# Patient Record
Sex: FEMALE | Race: WHITE | Employment: OTHER | URBAN - METROPOLITAN AREA
[De-identification: names, ages, dates, MRNs, and addresses within clinical notes are randomized per-mention and may not be internally consistent; named-entity substitution may affect disease eponyms.]

---

## 2017-04-20 ENCOUNTER — OFFICE VISIT (OUTPATIENT)
Dept: FAMILY MEDICINE CLINIC | Age: 77
End: 2017-04-20

## 2017-04-20 VITALS
HEART RATE: 76 BPM | WEIGHT: 128.5 LBS | RESPIRATION RATE: 16 BRPM | HEIGHT: 66 IN | SYSTOLIC BLOOD PRESSURE: 110 MMHG | DIASTOLIC BLOOD PRESSURE: 62 MMHG | BODY MASS INDEX: 20.65 KG/M2

## 2017-04-20 DIAGNOSIS — S96.912A STRAIN OF ANKLE, LEFT, INITIAL ENCOUNTER: Primary | ICD-10-CM

## 2017-04-20 PROCEDURE — 99213 OFFICE O/P EST LOW 20 MIN: CPT | Performed by: FAMILY MEDICINE

## 2017-04-20 ASSESSMENT — ENCOUNTER SYMPTOMS
CONSTIPATION: 0
SHORTNESS OF BREATH: 0
SINUS PRESSURE: 0

## 2017-04-26 DIAGNOSIS — F41.9 ANXIETY: ICD-10-CM

## 2017-04-27 RX ORDER — LORAZEPAM 1 MG/1
1 TABLET ORAL DAILY PRN
Qty: 30 TABLET | Refills: 0 | Status: SHIPPED | OUTPATIENT
Start: 2017-04-27 | End: 2017-05-31 | Stop reason: SDUPTHER

## 2017-05-31 DIAGNOSIS — F41.9 ANXIETY: ICD-10-CM

## 2017-05-31 RX ORDER — LORAZEPAM 1 MG/1
1 TABLET ORAL DAILY PRN
Qty: 30 TABLET | Refills: 0 | Status: SHIPPED | OUTPATIENT
Start: 2017-05-31 | End: 2017-07-06 | Stop reason: SDUPTHER

## 2017-07-06 DIAGNOSIS — F41.9 ANXIETY: ICD-10-CM

## 2017-07-06 RX ORDER — LORAZEPAM 1 MG/1
1 TABLET ORAL DAILY PRN
Qty: 30 TABLET | Refills: 0 | Status: SHIPPED | OUTPATIENT
Start: 2017-07-06 | End: 2017-08-03 | Stop reason: SDUPTHER

## 2017-07-20 ENCOUNTER — HOSPITAL ENCOUNTER (OUTPATIENT)
Age: 77
Setting detail: SPECIMEN
Discharge: HOME | End: 2017-07-20
Payer: MEDICARE

## 2017-07-20 LAB — GLUCOSE BLD-MCNC: 95 MG/DL (ref 70–108)

## 2017-07-20 PROCEDURE — 36415 COLL VENOUS BLD VENIPUNCTURE: CPT

## 2017-07-20 PROCEDURE — 82947 ASSAY GLUCOSE BLOOD QUANT: CPT

## 2017-08-03 DIAGNOSIS — F41.9 ANXIETY: ICD-10-CM

## 2017-08-03 RX ORDER — LORAZEPAM 1 MG/1
1 TABLET ORAL DAILY PRN
Qty: 30 TABLET | Refills: 0 | Status: SHIPPED | OUTPATIENT
Start: 2017-08-03 | End: 2017-09-06 | Stop reason: SDUPTHER

## 2017-09-06 DIAGNOSIS — F41.9 ANXIETY: ICD-10-CM

## 2017-09-06 RX ORDER — LORAZEPAM 1 MG/1
1 TABLET ORAL DAILY PRN
Qty: 30 TABLET | Refills: 0 | Status: SHIPPED | OUTPATIENT
Start: 2017-09-06 | End: 2017-10-05 | Stop reason: SDUPTHER

## 2017-10-05 ENCOUNTER — OFFICE VISIT (OUTPATIENT)
Dept: FAMILY MEDICINE CLINIC | Age: 77
End: 2017-10-05

## 2017-10-05 VITALS
BODY MASS INDEX: 20.45 KG/M2 | WEIGHT: 127.25 LBS | RESPIRATION RATE: 16 BRPM | HEIGHT: 66 IN | DIASTOLIC BLOOD PRESSURE: 82 MMHG | HEART RATE: 72 BPM | SYSTOLIC BLOOD PRESSURE: 140 MMHG

## 2017-10-05 DIAGNOSIS — F41.9 ANXIETY: Primary | ICD-10-CM

## 2017-10-05 DIAGNOSIS — Z78.0 POST-MENOPAUSAL: ICD-10-CM

## 2017-10-05 PROCEDURE — 99214 OFFICE O/P EST MOD 30 MIN: CPT | Performed by: FAMILY MEDICINE

## 2017-10-05 RX ORDER — LORAZEPAM 1 MG/1
1 TABLET ORAL DAILY PRN
Qty: 30 TABLET | Refills: 0 | Status: SHIPPED | OUTPATIENT
Start: 2017-10-05 | End: 2018-01-03 | Stop reason: SDUPTHER

## 2017-10-05 ASSESSMENT — PATIENT HEALTH QUESTIONNAIRE - PHQ9
1. LITTLE INTEREST OR PLEASURE IN DOING THINGS: 0
2. FEELING DOWN, DEPRESSED OR HOPELESS: 0
SUM OF ALL RESPONSES TO PHQ QUESTIONS 1-9: 0
SUM OF ALL RESPONSES TO PHQ9 QUESTIONS 1 & 2: 0

## 2017-10-05 ASSESSMENT — ENCOUNTER SYMPTOMS
CONSTIPATION: 0
SHORTNESS OF BREATH: 0
SINUS PRESSURE: 0

## 2017-10-05 NOTE — PATIENT INSTRUCTIONS
Let me know if you would agree to do the echocardiogram to look into the heart murmur further  See me in 6 months

## 2017-10-05 NOTE — PROGRESS NOTES
Subjective:      Patient ID: Marija Beatty is a 76 y.o. female. HPI  1. She would like a DEXA scan   2. She continues on the lorazapam no more than one daily. She does not feel badly from it. Review of Systems   Constitutional: Negative for fatigue. HENT: Negative for sinus pressure. Eyes: Negative for visual disturbance. Respiratory: Negative for shortness of breath. Cardiovascular: Negative for chest pain. Gastrointestinal: Negative for constipation. Genitourinary: Negative. Musculoskeletal: Negative for arthralgias. Skin: Negative for rash. Neurological: Negative for headaches. The patient's medications, allergies, past medical problems, surgical, social, and family histories were reviewed and updated as needed. Objective:   Physical Exam   Constitutional: She is oriented to person, place, and time. She appears well-developed and well-nourished. No distress. HENT:   Head: Normocephalic and atraumatic. Right Ear: External ear normal.   Left Ear: External ear normal.   Nose: Nose normal.   Mouth/Throat: Oropharynx is clear and moist. No oropharyngeal exudate. Eyes: Conjunctivae are normal. No scleral icterus. Neck: Neck supple. Carotid bruit is not present. No tracheal deviation present. No thyromegaly present. Cardiovascular: Normal rate, regular rhythm, normal heart sounds and intact distal pulses. Pulmonary/Chest: Effort normal and breath sounds normal.   Abdominal: Soft. Bowel sounds are normal. She exhibits no mass. Musculoskeletal: She exhibits no edema. Lymphadenopathy:     She has no cervical adenopathy. Neurological: She is alert and oriented to person, place, and time. Skin: Skin is warm and dry. Psychiatric: She has a normal mood and affect. Her behavior is normal.     Blood pressure (!) 140/82, pulse 72, resp. rate 16, height 5' 6\" (1.676 m), weight 127 lb 4 oz (57.7 kg), not currently breastfeeding. Assessment:      1. Anxiety     2. Post-menopausal  MELINDA Dexa Bone Density Scan           Plan:      Let me know if you would agree to do the echocardiogram to look into the heart murmur further  See me in 6 months

## 2017-10-05 NOTE — MR AVS SNAPSHOT
Your Current Medications Are              LORazepam (ATIVAN) 1 MG tablet Take 1 tablet by mouth daily as needed for Anxiety    aspirin 81 MG tablet Take 81 mg by mouth daily    Cholecalciferol (VITAMIN D3) 3000 UNITS TABS Take 1,000 Units by mouth       Allergies              Clindamycin/lincomycin Hives    Hydrocod Polst-cpm Polst Er     anxious    Morphine Nausea And Vomiting         Additional Information        Basic Information     Date Of Birth Sex Race Ethnicity Preferred Language    1940 Female White Non-/Non  English      Problem List as of 10/5/2017  Date Reviewed: 4/29/2013                Squamous cell carcinoma of skin      Preventive Care        Date Due    Tetanus Combination Vaccine (1 - Tdap) 4/12/1957    Cholesterol Screening 4/12/1978    Yearly Flu Vaccine (1) 10/5/2018 (Originally 9/1/2017)    Pneumococcal Vaccines (two) for all adults aged 72 and over (1 of 2 - PCV13) 10/5/2018 (Originally 4/12/2003)            [address] [place] [address] records indicate that you have declined MyChart signup.

## 2017-10-15 DIAGNOSIS — M81.0 OSTEOPOROSIS OF MULTIPLE SITES WITHOUT PATHOLOGICAL FRACTURE: Primary | ICD-10-CM

## 2017-10-15 DIAGNOSIS — M81.8 OTHER OSTEOPOROSIS WITHOUT CURRENT PATHOLOGICAL FRACTURE: ICD-10-CM

## 2017-10-16 ENCOUNTER — TELEPHONE (OUTPATIENT)
Dept: FAMILY MEDICINE CLINIC | Age: 77
End: 2017-10-16

## 2017-10-16 NOTE — TELEPHONE ENCOUNTER
----- Message from Antonio Jarquin MD sent at 10/15/2017  9:47 PM EDT -----  Let her know the DEXA showed osteoporosis and I would like her to get some non fasting labs and to then see me to discuss treatment options.

## 2017-10-25 ENCOUNTER — NURSE ONLY (OUTPATIENT)
Dept: FAMILY MEDICINE CLINIC | Age: 77
End: 2017-10-25

## 2017-10-25 DIAGNOSIS — R30.0 BURNING WITH URINATION: Primary | ICD-10-CM

## 2017-10-25 LAB
BACTERIA URINE, POC: ABNORMAL
BILIRUBIN URINE: 0 MG/DL
BLOOD, URINE: POSITIVE
CASTS URINE, POC: ABNORMAL
CLARITY: ABNORMAL
COLOR: ABNORMAL
CRYSTALS URINE, POC: ABNORMAL
EPI CELLS URINE, POC: ABNORMAL
GLUCOSE URINE: ABNORMAL
KETONES, URINE: POSITIVE
LEUKOCYTE EST, POC: ABNORMAL
NITRITE, URINE: NEGATIVE
PH UA: 6.5 (ref 4.5–8)
PROTEIN UA: POSITIVE
RBC URINE, POC: ABNORMAL
SPECIFIC GRAVITY UA: 1.01 (ref 1–1.03)
UROBILINOGEN, URINE: NORMAL
VITAMIN D 25-HYDROXY: 36.46 NG/ML (ref 30–100)
WBC URINE, POC: ABNORMAL
YEAST URINE, POC: ABNORMAL

## 2017-10-25 PROCEDURE — 81000 URINALYSIS NONAUTO W/SCOPE: CPT | Performed by: FAMILY MEDICINE

## 2017-10-26 ENCOUNTER — TELEPHONE (OUTPATIENT)
Dept: FAMILY MEDICINE CLINIC | Age: 77
End: 2017-10-26

## 2017-10-26 RX ORDER — SULFAMETHOXAZOLE AND TRIMETHOPRIM 800; 160 MG/1; MG/1
1 TABLET ORAL 2 TIMES DAILY
Qty: 14 TABLET | Refills: 0 | Status: SHIPPED | OUTPATIENT
Start: 2017-10-26 | End: 2018-02-23 | Stop reason: SDUPTHER

## 2017-10-27 DIAGNOSIS — E55.9 VITAMIN D DEFICIENCY: ICD-10-CM

## 2017-11-02 ENCOUNTER — TELEPHONE (OUTPATIENT)
Dept: FAMILY MEDICINE CLINIC | Age: 77
End: 2017-11-02

## 2017-11-02 NOTE — TELEPHONE ENCOUNTER
Rocio, I ordered the follow up lab but did not do the message. She needs to be on vit D3 over the counter at 5,000 units daily with a meal and check the non fasting level next in late April.

## 2018-01-03 DIAGNOSIS — F41.9 ANXIETY: ICD-10-CM

## 2018-01-03 RX ORDER — LORAZEPAM 1 MG/1
1 TABLET ORAL DAILY PRN
Qty: 30 TABLET | Refills: 0 | Status: SHIPPED | OUTPATIENT
Start: 2018-01-03 | End: 2018-02-23 | Stop reason: SDUPTHER

## 2018-01-03 NOTE — TELEPHONE ENCOUNTER
Romi Cason called requesting a refill of the below medication which has been pended for you:     Requested Prescriptions     Pending Prescriptions Disp Refills    LORazepam (ATIVAN) 1 MG tablet 30 tablet 0     Sig: Take 1 tablet by mouth daily as needed for Anxiety.        Last Appointment Date: 10/5/2017  Next Appointment Date: Visit date not found    Allergies   Allergen Reactions    Ciprofloxacin Other (See Comments)     dizziness    Clindamycin/Lincomycin Hives    Hydrocod Polst-Cpm Polst Er      anxious    Morphine Nausea And Vomiting

## 2018-02-23 ENCOUNTER — OFFICE VISIT (OUTPATIENT)
Dept: FAMILY MEDICINE CLINIC | Age: 78
End: 2018-02-23

## 2018-02-23 VITALS
HEART RATE: 60 BPM | BODY MASS INDEX: 20.95 KG/M2 | RESPIRATION RATE: 16 BRPM | SYSTOLIC BLOOD PRESSURE: 120 MMHG | HEIGHT: 66 IN | DIASTOLIC BLOOD PRESSURE: 70 MMHG | WEIGHT: 130.38 LBS

## 2018-02-23 DIAGNOSIS — R30.9 PAIN WITH URINATION: ICD-10-CM

## 2018-02-23 DIAGNOSIS — F41.9 ANXIETY: ICD-10-CM

## 2018-02-23 DIAGNOSIS — R07.9 CHEST PAIN, UNSPECIFIED TYPE: ICD-10-CM

## 2018-02-23 DIAGNOSIS — N30.00 ACUTE CYSTITIS WITHOUT HEMATURIA: Primary | ICD-10-CM

## 2018-02-23 LAB
BACTERIA URINE, POC: ABNORMAL
BILIRUBIN URINE: 0 MG/DL
BLOOD, URINE: NEGATIVE
CASTS URINE, POC: ABNORMAL
CLARITY: CLEAR
COLOR: YELLOW
CRYSTALS URINE, POC: ABNORMAL
EPI CELLS URINE, POC: ABNORMAL
GLUCOSE URINE: ABNORMAL
KETONES, URINE: POSITIVE
LEUKOCYTE EST, POC: ABNORMAL
NITRITE, URINE: NEGATIVE
PH UA: 5 (ref 4.5–8)
PROTEIN UA: POSITIVE
RBC URINE, POC: ABNORMAL
SPECIFIC GRAVITY UA: 1.02 (ref 1–1.03)
UROBILINOGEN, URINE: NORMAL
WBC URINE, POC: ABNORMAL
YEAST URINE, POC: ABNORMAL

## 2018-02-23 PROCEDURE — 81000 URINALYSIS NONAUTO W/SCOPE: CPT | Performed by: FAMILY MEDICINE

## 2018-02-23 PROCEDURE — 99213 OFFICE O/P EST LOW 20 MIN: CPT | Performed by: FAMILY MEDICINE

## 2018-02-23 PROCEDURE — 93000 ELECTROCARDIOGRAM COMPLETE: CPT | Performed by: FAMILY MEDICINE

## 2018-02-23 RX ORDER — LORAZEPAM 1 MG/1
1 TABLET ORAL DAILY PRN
Qty: 30 TABLET | Refills: 0 | Status: SHIPPED | OUTPATIENT
Start: 2018-02-23 | End: 2018-03-20 | Stop reason: SDUPTHER

## 2018-02-23 RX ORDER — SULFAMETHOXAZOLE AND TRIMETHOPRIM 800; 160 MG/1; MG/1
1 TABLET ORAL 2 TIMES DAILY
Qty: 14 TABLET | Refills: 0 | Status: SHIPPED | OUTPATIENT
Start: 2018-02-23 | End: 2018-03-02

## 2018-02-23 ASSESSMENT — PATIENT HEALTH QUESTIONNAIRE - PHQ9
SUM OF ALL RESPONSES TO PHQ QUESTIONS 1-9: 1
1. LITTLE INTEREST OR PLEASURE IN DOING THINGS: 0
2. FEELING DOWN, DEPRESSED OR HOPELESS: 1
SUM OF ALL RESPONSES TO PHQ9 QUESTIONS 1 & 2: 1

## 2018-02-23 ASSESSMENT — ENCOUNTER SYMPTOMS
CONSTIPATION: 0
SINUS PRESSURE: 0
SHORTNESS OF BREATH: 0

## 2018-02-23 NOTE — PROGRESS NOTES
Result Value Ref Range    Color, UA Yellow     Clarity, UA Clear Clear    Glucose, Ur neg     Bilirubin Urine 0 mg/dL    Ketones, Urine Positive     Specific Gravity, UA 1.020 1.005 - 1.030    Blood, Urine Negative     pH, UA 5 4.5 - 8.0    Protein, UA Positive (A) Negative    Nitrite, Urine Negative     Leukocytes, UA moderate     Urobilinogen, Urine Normal     rbc urine, poc rare     wbc urine, poc moderate     bacteria urine, poc few     yeast urine, poc      casts urine, poc      epi cells urine, poc rare     crystals urine, poc       The EKG is reviewed by me with comparison to 8/6/2014 showing no ischemic change, and single PVC  Assessment:      1. Acute cystitis without hematuria  sulfamethoxazole-trimethoprim (BACTRIM DS) 800-160 MG per tablet   2. Pain with urination  POC URINE with Microscopic    sulfamethoxazole-trimethoprim (BACTRIM DS) 800-160 MG per tablet   3. Chest pain, unspecified type  EKG 12 Lead   4.  Anxiety  LORazepam (ATIVAN) 1 MG tablet           Plan:      Stop up for a urine recheck in 2 weeks  Take a full lorazepam for the next 5 days

## 2018-03-02 ENCOUNTER — NURSE ONLY (OUTPATIENT)
Dept: FAMILY MEDICINE CLINIC | Age: 78
End: 2018-03-02

## 2018-03-02 DIAGNOSIS — R35.0 FREQUENCY OF URINATION: ICD-10-CM

## 2018-03-02 DIAGNOSIS — R30.9 PAIN WITH URINATION: Primary | ICD-10-CM

## 2018-03-02 LAB
BACTERIA URINE, POC: ABNORMAL
BILIRUBIN URINE: 0 MG/DL
BLOOD, URINE: NEGATIVE
CASTS URINE, POC: ABNORMAL
CLARITY: CLEAR
COLOR: YELLOW
CRYSTALS URINE, POC: ABNORMAL
EPI CELLS URINE, POC: ABNORMAL
GLUCOSE URINE: ABNORMAL
KETONES, URINE: POSITIVE
LEUKOCYTE EST, POC: ABNORMAL
NITRITE, URINE: NEGATIVE
PH UA: 5 (ref 4.5–8)
PROTEIN UA: POSITIVE
RBC URINE, POC: ABNORMAL
SPECIFIC GRAVITY UA: 1.01 (ref 1–1.03)
UROBILINOGEN, URINE: NORMAL
WBC URINE, POC: ABNORMAL
YEAST URINE, POC: ABNORMAL

## 2018-03-02 PROCEDURE — 81000 URINALYSIS NONAUTO W/SCOPE: CPT | Performed by: FAMILY MEDICINE

## 2018-03-09 ENCOUNTER — OFFICE VISIT (OUTPATIENT)
Dept: FAMILY MEDICINE CLINIC | Age: 78
End: 2018-03-09

## 2018-03-09 VITALS
BODY MASS INDEX: 20.73 KG/M2 | WEIGHT: 129 LBS | DIASTOLIC BLOOD PRESSURE: 70 MMHG | HEART RATE: 76 BPM | SYSTOLIC BLOOD PRESSURE: 114 MMHG | RESPIRATION RATE: 16 BRPM | HEIGHT: 66 IN

## 2018-03-09 DIAGNOSIS — Z87.898 HISTORY OF TACHYCARDIA: ICD-10-CM

## 2018-03-09 DIAGNOSIS — R35.0 URINE FREQUENCY: Primary | ICD-10-CM

## 2018-03-09 LAB
BACTERIA URINE, POC: ABNORMAL
BILIRUBIN URINE: 0 MG/DL
BLOOD, URINE: NEGATIVE
CASTS URINE, POC: ABNORMAL
CLARITY: CLEAR
COLOR: YELLOW
CRYSTALS URINE, POC: ABNORMAL
EPI CELLS URINE, POC: ABNORMAL
GLUCOSE URINE: ABNORMAL
KETONES, URINE: POSITIVE
LEUKOCYTE EST, POC: ABNORMAL
NITRITE, URINE: NEGATIVE
PH UA: 6 (ref 4.5–8)
PROTEIN UA: POSITIVE
RBC URINE, POC: ABNORMAL
SPECIFIC GRAVITY UA: 1.02 (ref 1–1.03)
UROBILINOGEN, URINE: NORMAL
WBC URINE, POC: ABNORMAL
YEAST URINE, POC: ABNORMAL

## 2018-03-09 PROCEDURE — 99213 OFFICE O/P EST LOW 20 MIN: CPT | Performed by: FAMILY MEDICINE

## 2018-03-09 PROCEDURE — 81000 URINALYSIS NONAUTO W/SCOPE: CPT | Performed by: FAMILY MEDICINE

## 2018-03-09 ASSESSMENT — ENCOUNTER SYMPTOMS
SINUS PRESSURE: 0
CONSTIPATION: 0
SHORTNESS OF BREATH: 0

## 2018-03-09 NOTE — PROGRESS NOTES
Leukocytes, UA neg     Urobilinogen, Urine Normal     rbc urine, poc none     wbc urine, poc none     bacteria urine, poc none     yeast urine, poc      casts urine, poc      epi cells urine, poc few     crystals urine, poc             Assessment:      1. Urine frequency  POC URINE with Microscopic   2.  History of tachycardia  [employer] MD           Plan:      See Dr Tijerina Slider  See me in 6 months

## 2018-03-20 DIAGNOSIS — F41.9 ANXIETY: ICD-10-CM

## 2018-03-20 NOTE — TELEPHONE ENCOUNTER
Pt called and req 30 day refill:    Lorazepam 1 mg I po qd prn    Please send to :    Susanne Zamarri[facility]    Pt can wait until Dr Mane returns but she will be out on the 23rd.

## 2018-03-22 RX ORDER — LORAZEPAM 1 MG/1
1 TABLET ORAL DAILY PRN
Qty: 30 TABLET | Refills: 0 | Status: SHIPPED | OUTPATIENT
Start: 2018-03-22 | End: 2018-04-23 | Stop reason: SDUPTHER

## 2018-04-23 DIAGNOSIS — F41.9 ANXIETY: ICD-10-CM

## 2018-04-23 RX ORDER — LORAZEPAM 1 MG/1
1 TABLET ORAL DAILY PRN
Qty: 30 TABLET | Refills: 0 | Status: SHIPPED | OUTPATIENT
Start: 2018-04-23 | End: 2018-05-22 | Stop reason: SDUPTHER

## 2018-04-30 ENCOUNTER — TELEPHONE (OUTPATIENT)
Dept: FAMILY MEDICINE CLINIC | Age: 78
End: 2018-04-30

## 2018-04-30 RX ORDER — SULFAMETHOXAZOLE AND TRIMETHOPRIM 800; 160 MG/1; MG/1
1 TABLET ORAL 2 TIMES DAILY
Qty: 10 TABLET | Refills: 0 | Status: SHIPPED | OUTPATIENT
Start: 2018-04-30 | End: 2018-05-05

## 2018-05-04 ENCOUNTER — TELEPHONE (OUTPATIENT)
Dept: FAMILY MEDICINE CLINIC | Age: 78
End: 2018-05-04

## 2018-05-07 ENCOUNTER — OFFICE VISIT (OUTPATIENT)
Dept: FAMILY MEDICINE CLINIC | Age: 78
End: 2018-05-07

## 2018-05-07 VITALS
RESPIRATION RATE: 16 BRPM | WEIGHT: 129.38 LBS | DIASTOLIC BLOOD PRESSURE: 68 MMHG | HEART RATE: 60 BPM | BODY MASS INDEX: 20.79 KG/M2 | SYSTOLIC BLOOD PRESSURE: 136 MMHG | HEIGHT: 66 IN

## 2018-05-07 DIAGNOSIS — R30.9 PAIN WITH URINATION: Primary | ICD-10-CM

## 2018-05-07 LAB
BACTERIA URINE, POC: ABNORMAL
BILIRUBIN URINE: 0 MG/DL
BLOOD, URINE: POSITIVE
CASTS URINE, POC: ABNORMAL
CLARITY: CLEAR
COLOR: YELLOW
CRYSTALS URINE, POC: ABNORMAL
EPI CELLS URINE, POC: ABNORMAL
GLUCOSE URINE: ABNORMAL
KETONES, URINE: POSITIVE
LEUKOCYTE EST, POC: ABNORMAL
NITRITE, URINE: NEGATIVE
PH UA: 5 (ref 4.5–8)
PROTEIN UA: POSITIVE
RBC URINE, POC: ABNORMAL
SPECIFIC GRAVITY UA: 1.01 (ref 1–1.03)
UROBILINOGEN, URINE: NORMAL
WBC URINE, POC: ABNORMAL
YEAST URINE, POC: ABNORMAL

## 2018-05-07 PROCEDURE — 99213 OFFICE O/P EST LOW 20 MIN: CPT | Performed by: FAMILY MEDICINE

## 2018-05-07 PROCEDURE — 81000 URINALYSIS NONAUTO W/SCOPE: CPT | Performed by: FAMILY MEDICINE

## 2018-05-07 ASSESSMENT — ENCOUNTER SYMPTOMS
CONSTIPATION: 0
SINUS PRESSURE: 0
SHORTNESS OF BREATH: 0

## 2018-05-09 ENCOUNTER — TELEPHONE (OUTPATIENT)
Dept: FAMILY MEDICINE CLINIC | Age: 78
End: 2018-05-09

## 2018-05-09 RX ORDER — NITROFURANTOIN 25; 75 MG/1; MG/1
100 CAPSULE ORAL 2 TIMES DAILY
Qty: 14 CAPSULE | Refills: 0 | Status: SHIPPED | OUTPATIENT
Start: 2018-05-09 | End: 2018-05-16

## 2018-05-10 ENCOUNTER — TELEPHONE (OUTPATIENT)
Dept: FAMILY MEDICINE CLINIC | Age: 78
End: 2018-05-10

## 2018-05-10 LAB — URINE CULTURE, ROUTINE: ABNORMAL

## 2018-05-22 DIAGNOSIS — F41.9 ANXIETY: ICD-10-CM

## 2018-05-22 RX ORDER — LORAZEPAM 1 MG/1
1 TABLET ORAL DAILY PRN
Qty: 30 TABLET | Refills: 0 | Status: SHIPPED | OUTPATIENT
Start: 2018-05-22 | End: 2018-06-25 | Stop reason: SDUPTHER

## 2018-06-14 ENCOUNTER — OFFICE VISIT (OUTPATIENT)
Dept: FAMILY MEDICINE CLINIC | Age: 78
End: 2018-06-14

## 2018-06-14 VITALS
HEART RATE: 76 BPM | HEIGHT: 66 IN | RESPIRATION RATE: 14 BRPM | BODY MASS INDEX: 20.25 KG/M2 | WEIGHT: 126 LBS | DIASTOLIC BLOOD PRESSURE: 78 MMHG | SYSTOLIC BLOOD PRESSURE: 138 MMHG

## 2018-06-14 DIAGNOSIS — R35.0 URINARY FREQUENCY: Primary | ICD-10-CM

## 2018-06-14 LAB
BACTERIA URINE, POC: ABNORMAL
BILIRUBIN URINE: 0 MG/DL
BLOOD, URINE: POSITIVE
CASTS URINE, POC: ABNORMAL
CLARITY: CLEAR
COLOR: YELLOW
CRYSTALS URINE, POC: ABNORMAL
EPI CELLS URINE, POC: ABNORMAL
GLUCOSE URINE: NEGATIVE
KETONES, URINE: POSITIVE
LEUKOCYTE EST, POC: NEGATIVE
NITRITE, URINE: NEGATIVE
PH UA: 5 (ref 4.5–8)
PROTEIN UA: NEGATIVE
RBC URINE, POC: ABNORMAL
SPECIFIC GRAVITY UA: 1.01 (ref 1–1.03)
UROBILINOGEN, URINE: NORMAL
WBC URINE, POC: ABNORMAL
YEAST URINE, POC: ABNORMAL

## 2018-06-14 PROCEDURE — 99213 OFFICE O/P EST LOW 20 MIN: CPT | Performed by: FAMILY MEDICINE

## 2018-06-14 PROCEDURE — 81000 URINALYSIS NONAUTO W/SCOPE: CPT | Performed by: FAMILY MEDICINE

## 2018-06-14 ASSESSMENT — ENCOUNTER SYMPTOMS
SORE THROAT: 0
COUGH: 0
ABDOMINAL PAIN: 0
SHORTNESS OF BREATH: 0
EYE PAIN: 0
CONSTIPATION: 0
CHEST TIGHTNESS: 0
NAUSEA: 0
WHEEZING: 0

## 2018-06-25 DIAGNOSIS — F41.9 ANXIETY: ICD-10-CM

## 2018-06-25 RX ORDER — LORAZEPAM 1 MG/1
1 TABLET ORAL DAILY PRN
Qty: 30 TABLET | Refills: 0 | Status: SHIPPED | OUTPATIENT
Start: 2018-06-25 | End: 2018-07-24 | Stop reason: SDUPTHER

## 2018-07-24 DIAGNOSIS — F41.9 ANXIETY: ICD-10-CM

## 2018-07-24 RX ORDER — LORAZEPAM 1 MG/1
1 TABLET ORAL DAILY PRN
Qty: 30 TABLET | Refills: 0 | Status: SHIPPED | OUTPATIENT
Start: 2018-07-24 | End: 2018-08-22 | Stop reason: SDUPTHER

## 2018-07-24 NOTE — TELEPHONE ENCOUNTER
Monika Cortes called requesting a refill of the below medication which has been pended for you:     Requested Prescriptions     Pending Prescriptions Disp Refills    LORazepam (ATIVAN) 1 MG tablet 30 tablet 0     Sig: Take 1 tablet by mouth daily as needed for Anxiety for up to 30 days. .       Last Appointment Date: 5/7/2018  Next Appointment Date: Visit date not found    Allergies   Allergen Reactions    Ciprofloxacin Other (See Comments)     dizziness    Clindamycin/Lincomycin Hives    Hydrocod Polst-Cpm Polst Er      anxious    Morphine Nausea And Vomiting

## 2018-08-22 DIAGNOSIS — F41.9 ANXIETY: ICD-10-CM

## 2018-08-22 RX ORDER — LORAZEPAM 1 MG/1
1 TABLET ORAL DAILY PRN
Qty: 30 TABLET | Refills: 0 | Status: SHIPPED | OUTPATIENT
Start: 2018-08-22 | End: 2018-09-21 | Stop reason: SDUPTHER

## 2018-09-12 ENCOUNTER — OFFICE VISIT (OUTPATIENT)
Dept: CARDIOLOGY CLINIC | Age: 78
End: 2018-09-12
Payer: MEDICARE

## 2018-09-12 ENCOUNTER — HOSPITAL ENCOUNTER (OUTPATIENT)
Age: 78
Discharge: HOME | End: 2018-09-12
Payer: MEDICARE

## 2018-09-12 VITALS
HEART RATE: 80 BPM | DIASTOLIC BLOOD PRESSURE: 90 MMHG | BODY MASS INDEX: 20.6 KG/M2 | HEIGHT: 66 IN | SYSTOLIC BLOOD PRESSURE: 140 MMHG | WEIGHT: 128.2 LBS

## 2018-09-12 DIAGNOSIS — R00.0 TACHYCARDIA: ICD-10-CM

## 2018-09-12 DIAGNOSIS — I49.3 PVC (PREMATURE VENTRICULAR CONTRACTION): ICD-10-CM

## 2018-09-12 DIAGNOSIS — R06.09 DYSPNEA ON EXERTION: Primary | ICD-10-CM

## 2018-09-12 DIAGNOSIS — R06.09 DYSPNEA ON EXERTION: ICD-10-CM

## 2018-09-12 LAB
ANION GAP SERPL CALCULATED.3IONS-SCNC: 11 MEQ/L (ref 8–16)
BUN BLDV-MCNC: 10 MG/DL (ref 7–22)
CALCIUM SERPL-MCNC: 9.6 MG/DL (ref 8.5–10.5)
CHLORIDE BLD-SCNC: 99 MEQ/L (ref 98–111)
CO2: 24 MEQ/L (ref 23–33)
CREAT SERPL-MCNC: 0.5 MG/DL (ref 0.4–1.2)
GFR SERPL CREATININE-BSD FRML MDRD: > 90 ML/MIN/1.73M2
GLUCOSE BLD-MCNC: 116 MG/DL (ref 70–108)
MAGNESIUM: 2.3 MG/DL (ref 1.6–2.4)
POTASSIUM SERPL-SCNC: 3.9 MEQ/L (ref 3.5–5.2)
SODIUM BLD-SCNC: 134 MEQ/L (ref 135–145)
TSH SERPL DL<=0.05 MIU/L-ACNC: 4.74 UIU/ML (ref 0.4–4.2)

## 2018-09-12 PROCEDURE — 83735 ASSAY OF MAGNESIUM: CPT

## 2018-09-12 PROCEDURE — 99204 OFFICE O/P NEW MOD 45 MIN: CPT | Performed by: NUCLEAR MEDICINE

## 2018-09-12 PROCEDURE — 4040F PNEUMOC VAC/ADMIN/RCVD: CPT | Performed by: NUCLEAR MEDICINE

## 2018-09-12 PROCEDURE — 1123F ACP DISCUSS/DSCN MKR DOCD: CPT | Performed by: NUCLEAR MEDICINE

## 2018-09-12 PROCEDURE — G8427 DOCREV CUR MEDS BY ELIG CLIN: HCPCS | Performed by: NUCLEAR MEDICINE

## 2018-09-12 PROCEDURE — 84443 ASSAY THYROID STIM HORMONE: CPT

## 2018-09-12 PROCEDURE — G8400 PT W/DXA NO RESULTS DOC: HCPCS | Performed by: NUCLEAR MEDICINE

## 2018-09-12 PROCEDURE — 1101F PT FALLS ASSESS-DOCD LE1/YR: CPT | Performed by: NUCLEAR MEDICINE

## 2018-09-12 PROCEDURE — 1036F TOBACCO NON-USER: CPT | Performed by: NUCLEAR MEDICINE

## 2018-09-12 PROCEDURE — 80048 BASIC METABOLIC PNL TOTAL CA: CPT

## 2018-09-12 PROCEDURE — 1090F PRES/ABSN URINE INCON ASSESS: CPT | Performed by: NUCLEAR MEDICINE

## 2018-09-12 PROCEDURE — 36415 COLL VENOUS BLD VENIPUNCTURE: CPT

## 2018-09-12 PROCEDURE — G8420 CALC BMI NORM PARAMETERS: HCPCS | Performed by: NUCLEAR MEDICINE

## 2018-09-12 ASSESSMENT — ENCOUNTER SYMPTOMS
PHOTOPHOBIA: 0
DIARRHEA: 0
ANAL BLEEDING: 0
VOMITING: 0
BLOOD IN STOOL: 0
RECTAL PAIN: 0
CHEST TIGHTNESS: 0
COLOR CHANGE: 0
FACIAL SWELLING: 0
SHORTNESS OF BREATH: 1
ABDOMINAL PAIN: 0
BACK PAIN: 1
ABDOMINAL DISTENTION: 0
CONSTIPATION: 0
NAUSEA: 0

## 2018-09-12 NOTE — PROGRESS NOTES
- Tdap) 04/12/1957    Shingles Vaccine (1 of 2 - 2 Dose Series) 04/12/1988    Flu vaccine (1) 10/05/2018 (Originally 9/1/2018)    Pneumococcal low/med risk (1 of 2 - PCV13) 10/05/2018 (Originally 4/12/2003)    DEXA (modify frequency per FRAX score)  Addressed       Subjective:  Review of Systems   Constitutional: Positive for fatigue. Negative for chills and diaphoresis. HENT: Negative for facial swelling and nosebleeds. Eyes: Negative for photophobia. Respiratory: Positive for shortness of breath. Negative for chest tightness. Cardiovascular: Positive for palpitations. Negative for chest pain and leg swelling. Gastrointestinal: Negative for abdominal distention, abdominal pain, anal bleeding, blood in stool, constipation, diarrhea, nausea, rectal pain and vomiting. Endocrine: Negative for polyphagia. Genitourinary: Negative for dysuria, frequency and urgency. Musculoskeletal: Positive for arthralgias and back pain. Negative for gait problem, joint swelling, myalgias, neck pain and neck stiffness. Skin: Negative for color change, pallor and rash. Allergic/Immunologic: Negative for food allergies. Neurological: Positive for dizziness and light-headedness. Negative for syncope. Hematological: Negative for adenopathy. Psychiatric/Behavioral: Negative for behavioral problems, confusion, decreased concentration and dysphoric mood. Objective:  Physical Exam   Constitutional: She is oriented to person, place, and time. She appears well-developed. HENT:   Head: Normocephalic. Eyes: EOM are normal.   Neck: No JVD present. No tracheal deviation present. No thyromegaly present. Cardiovascular: Normal rate. Exam reveals no gallop and no friction rub. Murmur heard. Pulmonary/Chest: No respiratory distress. She has no wheezes. She has no rales. She exhibits no tenderness. Abdominal: She exhibits no distension and no mass. There is no tenderness.  There is no rebound and no

## 2018-09-13 ENCOUNTER — HOSPITAL ENCOUNTER (OUTPATIENT)
Dept: NON INVASIVE DIAGNOSTICS | Age: 78
Discharge: HOME | End: 2018-09-13
Payer: MEDICARE

## 2018-09-13 DIAGNOSIS — R06.09 DYSPNEA ON EXERTION: ICD-10-CM

## 2018-09-13 DIAGNOSIS — R00.0 TACHYCARDIA: ICD-10-CM

## 2018-09-13 DIAGNOSIS — I49.3 PVC (PREMATURE VENTRICULAR CONTRACTION): ICD-10-CM

## 2018-09-13 LAB
LV EF: 60 %
LVEF MODALITY: NORMAL

## 2018-09-13 PROCEDURE — 93225 XTRNL ECG REC<48 HRS REC: CPT

## 2018-09-13 PROCEDURE — 93226 XTRNL ECG REC<48 HR SCAN A/R: CPT

## 2018-09-13 PROCEDURE — 93306 TTE W/DOPPLER COMPLETE: CPT

## 2018-09-17 ENCOUNTER — TELEPHONE (OUTPATIENT)
Dept: CARDIOLOGY CLINIC | Age: 78
End: 2018-09-17

## 2018-09-17 DIAGNOSIS — I48.91 ATRIAL FIBRILLATION, UNSPECIFIED TYPE (HCC): Primary | ICD-10-CM

## 2018-09-18 NOTE — TELEPHONE ENCOUNTER
Patient notified and voiced understanding. Order placed and given to scheduling. Rx pended for signature.

## 2018-09-20 ENCOUNTER — HOSPITAL ENCOUNTER (OUTPATIENT)
Dept: NON INVASIVE DIAGNOSTICS | Age: 78
Discharge: HOME | End: 2018-09-20
Payer: MEDICARE

## 2018-09-20 DIAGNOSIS — I48.91 ATRIAL FIBRILLATION, UNSPECIFIED TYPE (HCC): ICD-10-CM

## 2018-09-20 PROCEDURE — 93270 REMOTE 30 DAY ECG REV/REPORT: CPT

## 2018-09-21 DIAGNOSIS — F41.9 ANXIETY: ICD-10-CM

## 2018-09-21 RX ORDER — LORAZEPAM 1 MG/1
1 TABLET ORAL DAILY PRN
Qty: 30 TABLET | Refills: 0 | Status: SHIPPED | OUTPATIENT
Start: 2018-09-21 | End: 2018-10-19 | Stop reason: SDUPTHER

## 2018-10-08 ENCOUNTER — TELEPHONE (OUTPATIENT)
Dept: CARDIOLOGY CLINIC | Age: 78
End: 2018-10-08

## 2018-10-19 DIAGNOSIS — F41.9 ANXIETY: ICD-10-CM

## 2018-10-19 RX ORDER — LORAZEPAM 1 MG/1
1 TABLET ORAL DAILY PRN
Qty: 30 TABLET | Refills: 0 | Status: SHIPPED | OUTPATIENT
Start: 2018-10-19 | End: 2018-11-20 | Stop reason: SDUPTHER

## 2018-10-22 ENCOUNTER — TELEPHONE (OUTPATIENT)
Dept: FAMILY MEDICINE CLINIC | Age: 78
End: 2018-10-22

## 2018-10-22 ENCOUNTER — HOSPITAL ENCOUNTER (EMERGENCY)
Age: 78
Discharge: HOME | End: 2018-10-22
Attending: EMERGENCY MEDICINE
Payer: MEDICARE

## 2018-10-22 VITALS
TEMPERATURE: 98.2 F | WEIGHT: 125 LBS | SYSTOLIC BLOOD PRESSURE: 138 MMHG | RESPIRATION RATE: 20 BRPM | BODY MASS INDEX: 20.09 KG/M2 | HEIGHT: 66 IN | DIASTOLIC BLOOD PRESSURE: 88 MMHG | HEART RATE: 68 BPM | OXYGEN SATURATION: 97 %

## 2018-10-22 DIAGNOSIS — F41.1 ANXIETY STATE: ICD-10-CM

## 2018-10-22 DIAGNOSIS — I10 ELEVATED BLOOD PRESSURE READING WITH DIAGNOSIS OF HYPERTENSION: Primary | ICD-10-CM

## 2018-10-22 LAB
ANION GAP SERPL CALCULATED.3IONS-SCNC: 13 MEQ/L (ref 8–16)
BASOPHILS # BLD: 0.7 %
BASOPHILS ABSOLUTE: 0 THOU/MM3 (ref 0–0.1)
BUN BLDV-MCNC: 14 MG/DL (ref 7–22)
CALCIUM SERPL-MCNC: 8.9 MG/DL (ref 8.5–10.5)
CHLORIDE BLD-SCNC: 98 MEQ/L (ref 98–111)
CO2: 24 MEQ/L (ref 23–33)
CREAT SERPL-MCNC: 0.4 MG/DL (ref 0.4–1.2)
EKG ATRIAL RATE: 80 BPM
EKG P AXIS: 44 DEGREES
EKG P-R INTERVAL: 162 MS
EKG Q-T INTERVAL: 386 MS
EKG QRS DURATION: 108 MS
EKG QTC CALCULATION (BAZETT): 445 MS
EKG R AXIS: -36 DEGREES
EKG T AXIS: 38 DEGREES
EKG VENTRICULAR RATE: 80 BPM
EOSINOPHIL # BLD: 0 %
EOSINOPHILS ABSOLUTE: 0 THOU/MM3 (ref 0–0.4)
ERYTHROCYTE [DISTWIDTH] IN BLOOD BY AUTOMATED COUNT: 13.7 % (ref 11.5–14.5)
ERYTHROCYTE [DISTWIDTH] IN BLOOD BY AUTOMATED COUNT: 45 FL (ref 35–45)
GFR SERPL CREATININE-BSD FRML MDRD: > 90 ML/MIN/1.73M2
GLUCOSE BLD-MCNC: 106 MG/DL (ref 70–108)
HCT VFR BLD CALC: 38.9 % (ref 37–47)
HEMOGLOBIN: 13.1 GM/DL (ref 12–16)
IMMATURE GRANS (ABS): 0.01 THOU/MM3 (ref 0–0.07)
IMMATURE GRANULOCYTES: 0.2 %
LYMPHOCYTES # BLD: 17 %
LYMPHOCYTES ABSOLUTE: 1 THOU/MM3 (ref 1–4.8)
MCH RBC QN AUTO: 30.3 PG (ref 26–33)
MCHC RBC AUTO-ENTMCNC: 33.7 GM/DL (ref 32.2–35.5)
MCV RBC AUTO: 89.8 FL (ref 81–99)
MONOCYTES # BLD: 8.5 %
MONOCYTES ABSOLUTE: 0.5 THOU/MM3 (ref 0.4–1.3)
NUCLEATED RED BLOOD CELLS: 0 /100 WBC
OSMOLALITY CALCULATION: 271 MOSMOL/KG (ref 275–300)
PLATELET # BLD: 236 THOU/MM3 (ref 130–400)
PMV BLD AUTO: 9.8 FL (ref 9.4–12.4)
POTASSIUM SERPL-SCNC: 4.1 MEQ/L (ref 3.5–5.2)
RBC # BLD: 4.33 MILL/MM3 (ref 4.2–5.4)
SEG NEUTROPHILS: 73.6 %
SEGMENTED NEUTROPHILS ABSOLUTE COUNT: 4.3 THOU/MM3 (ref 1.8–7.7)
SODIUM BLD-SCNC: 135 MEQ/L (ref 135–145)
TROPONIN T: < 0.01 NG/ML
WBC # BLD: 5.8 THOU/MM3 (ref 4.8–10.8)

## 2018-10-22 PROCEDURE — 93005 ELECTROCARDIOGRAM TRACING: CPT | Performed by: EMERGENCY MEDICINE

## 2018-10-22 PROCEDURE — 80048 BASIC METABOLIC PNL TOTAL CA: CPT

## 2018-10-22 PROCEDURE — 84484 ASSAY OF TROPONIN QUANT: CPT

## 2018-10-22 PROCEDURE — 85025 COMPLETE CBC W/AUTO DIFF WBC: CPT

## 2018-10-22 PROCEDURE — 93010 ELECTROCARDIOGRAM REPORT: CPT | Performed by: INTERNAL MEDICINE

## 2018-10-22 PROCEDURE — 6360000002 HC RX W HCPCS: Performed by: EMERGENCY MEDICINE

## 2018-10-22 PROCEDURE — 36415 COLL VENOUS BLD VENIPUNCTURE: CPT

## 2018-10-22 PROCEDURE — 96374 THER/PROPH/DIAG INJ IV PUSH: CPT

## 2018-10-22 PROCEDURE — 99284 EMERGENCY DEPT VISIT MOD MDM: CPT

## 2018-10-22 RX ORDER — LORAZEPAM 2 MG/ML
0.5 INJECTION INTRAMUSCULAR ONCE
Status: COMPLETED | OUTPATIENT
Start: 2018-10-22 | End: 2018-10-22

## 2018-10-22 RX ADMIN — LORAZEPAM 0.5 MG: 2 INJECTION INTRAMUSCULAR; INTRAVENOUS at 05:26

## 2018-10-22 ASSESSMENT — ENCOUNTER SYMPTOMS
BLOOD IN STOOL: 0
NAUSEA: 0
WHEEZING: 0
SHORTNESS OF BREATH: 0
VOMITING: 0
COUGH: 0
SORE THROAT: 0
ABDOMINAL PAIN: 0
DIARRHEA: 1
BACK PAIN: 0

## 2018-10-22 NOTE — ED NOTES
Bed: 008A  Expected date: 10/22/18  Expected time:   Means of arrival: [facility]  Comments:      Geo Trotter RN  10/22/18 [id number]

## 2018-10-22 NOTE — PROCEDURES
[address] [place] [address]                                EVENT MONITOR    PATIENT NAME: Maria Alejandra [place]                 :         1940  MED REC NO:   [id number]                           ROOM:  ACCOUNT NO:   [de-identified]                           ADMIT DATE:  2018  PROVIDER:     Jenny Suazo M.D.    TEST TYPE:  Event monitor. CLINICAL HISTORY AND INDICATION:  This a patient with AFib. EVENT MONITOR DESCRIPTION:  Event monitor was attached to the patient  between 2018 and 10/19/2018. EVENT MONITOR FINDINGS:  Baseline rhythm showed sinus rhythm. There  were several episodes of narrow complex tachycardia. There are _____  otherwise is likely AFib with a rapid ventricular response upon the  several beats, episodes of atrial tachycardia cannot be excluded at  other times. CONCLUSION:  1. Sinus rhythm. 2.  Ectopic beats with episodes of a few beats of atrial fibrillation  as well as few beats of atrial tachycardia. 3.  No pauses and no V-tach. 4.  Abnormal event monitor.         Duane Duncan M.D.    D: 10/22/2018 9:53:11       T: 10/22/2018 11:09:34     [email or web address]  Job#: [id number]     Doc#: [id number]    CC:

## 2018-10-23 ENCOUNTER — OFFICE VISIT (OUTPATIENT)
Dept: FAMILY MEDICINE CLINIC | Age: 78
End: 2018-10-23

## 2018-10-23 VITALS
SYSTOLIC BLOOD PRESSURE: 132 MMHG | DIASTOLIC BLOOD PRESSURE: 72 MMHG | HEIGHT: 66 IN | HEART RATE: 72 BPM | RESPIRATION RATE: 14 BRPM | WEIGHT: 130.13 LBS | BODY MASS INDEX: 20.91 KG/M2

## 2018-10-23 DIAGNOSIS — R00.2 HEART PALPITATIONS: Primary | ICD-10-CM

## 2018-10-23 PROCEDURE — 1101F PT FALLS ASSESS-DOCD LE1/YR: CPT | Performed by: FAMILY MEDICINE

## 2018-10-23 PROCEDURE — 99213 OFFICE O/P EST LOW 20 MIN: CPT | Performed by: FAMILY MEDICINE

## 2018-10-23 ASSESSMENT — ENCOUNTER SYMPTOMS
DIARRHEA: 1
SINUS PRESSURE: 0
CONSTIPATION: 0
SHORTNESS OF BREATH: 0

## 2018-10-24 ENCOUNTER — TELEPHONE (OUTPATIENT)
Dept: CARDIOLOGY CLINIC | Age: 78
End: 2018-10-24

## 2018-10-26 ENCOUNTER — NURSE ONLY (OUTPATIENT)
Dept: FAMILY MEDICINE CLINIC | Age: 78
End: 2018-10-26

## 2018-10-26 DIAGNOSIS — R35.0 URINARY FREQUENCY: Primary | ICD-10-CM

## 2018-10-26 LAB
BACTERIA URINE, POC: ABNORMAL
BILIRUBIN URINE: 0 MG/DL
BLOOD, URINE: POSITIVE
CASTS URINE, POC: ABNORMAL
CLARITY: ABNORMAL
COLOR: YELLOW
CRYSTALS URINE, POC: ABNORMAL
EPI CELLS URINE, POC: ABNORMAL
GLUCOSE URINE: NEGATIVE
KETONES, URINE: NEGATIVE
LEUKOCYTE EST, POC: POSITIVE
NITRITE, URINE: NEGATIVE
PH UA: 5 (ref 4.5–8)
PROTEIN UA: POSITIVE
RBC URINE, POC: ABNORMAL
SPECIFIC GRAVITY UA: 1.02 (ref 1–1.03)
UROBILINOGEN, URINE: NORMAL
WBC URINE, POC: ABNORMAL
YEAST URINE, POC: ABNORMAL

## 2018-10-26 PROCEDURE — 81000 URINALYSIS NONAUTO W/SCOPE: CPT | Performed by: FAMILY MEDICINE

## 2018-10-26 RX ORDER — NITROFURANTOIN 25; 75 MG/1; MG/1
100 CAPSULE ORAL 2 TIMES DAILY
Qty: 14 CAPSULE | Refills: 0 | Status: SHIPPED | OUTPATIENT
Start: 2018-10-26 | End: 2018-11-02

## 2018-11-06 RX ORDER — FLECAINIDE ACETATE 50 MG/1
50 TABLET ORAL 2 TIMES DAILY
COMMUNITY
End: 2018-11-06 | Stop reason: SDUPTHER

## 2018-11-06 RX ORDER — FLECAINIDE ACETATE 50 MG/1
50 TABLET ORAL 2 TIMES DAILY
Qty: 60 TABLET | Refills: 1 | Status: SHIPPED | OUTPATIENT
Start: 2018-11-06 | End: 2019-08-15 | Stop reason: ALTCHOICE

## 2018-11-09 ENCOUNTER — TELEPHONE (OUTPATIENT)
Dept: FAMILY MEDICINE CLINIC | Age: 78
End: 2018-11-09

## 2018-11-09 DIAGNOSIS — F41.9 ANXIETY: Primary | ICD-10-CM

## 2018-11-09 RX ORDER — METOPROLOL TARTRATE 50 MG/1
50 TABLET, FILM COATED ORAL EVERY MORNING
Qty: 30 TABLET | Refills: 3 | Status: SHIPPED | OUTPATIENT
Start: 2018-11-09 | End: 2018-11-12 | Stop reason: ALTCHOICE

## 2018-11-09 RX ORDER — PAROXETINE 10 MG/1
10 TABLET, FILM COATED ORAL DAILY
Qty: 30 TABLET | Refills: 3 | Status: SHIPPED | OUTPATIENT
Start: 2018-11-09 | End: 2018-11-13 | Stop reason: SINTOL

## 2018-11-12 ENCOUNTER — TELEPHONE (OUTPATIENT)
Dept: CARDIOLOGY CLINIC | Age: 78
End: 2018-11-12

## 2018-11-12 RX ORDER — METOPROLOL TARTRATE 50 MG/1
50 TABLET, FILM COATED ORAL 2 TIMES DAILY
COMMUNITY
End: 2019-03-11 | Stop reason: SDUPTHER

## 2018-11-13 ENCOUNTER — TELEPHONE (OUTPATIENT)
Dept: FAMILY MEDICINE CLINIC | Age: 78
End: 2018-11-13

## 2018-11-13 DIAGNOSIS — F41.9 ANXIETY: ICD-10-CM

## 2018-11-13 NOTE — TELEPHONE ENCOUNTER
Pt called stating that since she taking the second pill of  the RX Paxil that she has been feeling dizzy.     Pt states she went to the basement and could not get up the stairs due to the dizziness. Pt was in the basement for 45 minutes and could not get up due to dizziness. Pt also called her Cardiologist, Dr Guevara Barriga and stated same effect was from new RX Tambacor. Pt ask if new RX needs ordered? She stated she will not take Paxil again.     Please call pt once addressed  [employer]

## 2018-11-20 RX ORDER — LORAZEPAM 1 MG/1
1 TABLET ORAL DAILY PRN
Qty: 30 TABLET | Refills: 0 | Status: SHIPPED | OUTPATIENT
Start: 2018-11-20 | End: 2018-12-18 | Stop reason: SDUPTHER

## 2018-11-20 RX ORDER — BUSPIRONE HYDROCHLORIDE 5 MG/1
5 TABLET ORAL 2 TIMES DAILY
Qty: 60 TABLET | Refills: 5 | Status: SHIPPED | OUTPATIENT
Start: 2018-11-20 | End: 2018-12-20

## 2018-11-20 NOTE — TELEPHONE ENCOUNTER
Pt called and said that's he is needing this addressed please. If Dr Isiah Ordoñez is not able to address today could the pt please be called and notified. Thank you.

## 2018-12-13 ENCOUNTER — OFFICE VISIT (OUTPATIENT)
Dept: CARDIOLOGY CLINIC | Age: 78
End: 2018-12-13
Payer: MEDICARE

## 2018-12-13 VITALS
HEART RATE: 60 BPM | DIASTOLIC BLOOD PRESSURE: 82 MMHG | WEIGHT: 130 LBS | HEIGHT: 66 IN | SYSTOLIC BLOOD PRESSURE: 144 MMHG | BODY MASS INDEX: 20.89 KG/M2

## 2018-12-13 DIAGNOSIS — I10 ESSENTIAL HYPERTENSION: ICD-10-CM

## 2018-12-13 DIAGNOSIS — I34.1 MVP (MITRAL VALVE PROLAPSE): Primary | ICD-10-CM

## 2018-12-13 DIAGNOSIS — R00.2 PALPITATION: ICD-10-CM

## 2018-12-13 PROCEDURE — G8400 PT W/DXA NO RESULTS DOC: HCPCS | Performed by: NUCLEAR MEDICINE

## 2018-12-13 PROCEDURE — 1090F PRES/ABSN URINE INCON ASSESS: CPT | Performed by: NUCLEAR MEDICINE

## 2018-12-13 PROCEDURE — 4040F PNEUMOC VAC/ADMIN/RCVD: CPT | Performed by: NUCLEAR MEDICINE

## 2018-12-13 PROCEDURE — 1036F TOBACCO NON-USER: CPT | Performed by: NUCLEAR MEDICINE

## 2018-12-13 PROCEDURE — 1101F PT FALLS ASSESS-DOCD LE1/YR: CPT | Performed by: NUCLEAR MEDICINE

## 2018-12-13 PROCEDURE — G8484 FLU IMMUNIZE NO ADMIN: HCPCS | Performed by: NUCLEAR MEDICINE

## 2018-12-13 PROCEDURE — G8420 CALC BMI NORM PARAMETERS: HCPCS | Performed by: NUCLEAR MEDICINE

## 2018-12-13 PROCEDURE — 99213 OFFICE O/P EST LOW 20 MIN: CPT | Performed by: NUCLEAR MEDICINE

## 2018-12-13 PROCEDURE — G8427 DOCREV CUR MEDS BY ELIG CLIN: HCPCS | Performed by: NUCLEAR MEDICINE

## 2018-12-13 PROCEDURE — 1123F ACP DISCUSS/DSCN MKR DOCD: CPT | Performed by: NUCLEAR MEDICINE

## 2018-12-13 NOTE — PROGRESS NOTES
[address] CARDIOLOGY  [facility] [address]  [place] [address]  Dept: [phone]  Dept Fax: [phone]  Loc: [phone]    Visit Date: 12/13/2018    Rafa Barrow is a [de-identified] y.o. female who presents todayfor:  Chief Complaint   Patient presents with    3 Month Follow-Up    Cardiac Valve Problem    Hypertension    Palpitations   does have MVP and moderate MR  Minimal A fib   No major palpitation   No chest pain   No changes in breathing  BP is stable   No ER visits       HPI:  HPI  Past Medical History:   Diagnosis Date    Osteoporosis of multiple sites without pathological fracture 10/2017    Squamous cell carcinoma of skin 2016    Vascular headache 1980's    Vitamin D deficiency 10/2017      Past Surgical History:   Procedure Laterality Date    BLADDER REPAIR  9/2014    MENISCECTOMY Left 5/2013    lateral    SKIN CANCER EXCISION  09/2016    Dr Tre Petty Left 4/2015     History reviewed. No pertinent family history. Social History   Substance Use Topics    Smoking status: Never Smoker    Smokeless tobacco: Never Used    Alcohol use No      Current Outpatient Prescriptions   Medication Sig Dispense Refill    LORazepam (ATIVAN) 1 MG tablet Take 1 tablet by mouth daily as needed for Anxiety for up to 30 days. . 30 tablet 0    busPIRone (BUSPAR) 5 MG tablet Take 1 tablet by mouth 2 times daily 60 tablet 5    metoprolol tartrate (LOPRESSOR) 50 MG tablet Take 50 mg by mouth 2 times daily      flecainide (TAMBOCOR) 50 MG tablet Take 1 tablet by mouth 2 times daily 60 tablet 1    Cholecalciferol (VITAMIN D3) 5000 units CAPS Take 1 capsule by mouth daily       No current facility-administered medications for this visit.       Allergies   Allergen Reactions    Ciprofloxacin Other (See Comments)     dizziness    Clindamycin/Lincomycin Hives    Hydrocod Polst-Cpm Polst Er      anxious    Paxil [Paroxetine Hcl] Other (See Comments)     dizzy    Morphine Nausea And Vomiting     Health Maintenance   Topic Date Due    DTaP/Tdap/Td vaccine (1 - Tdap) 04/12/1957    Shingles Vaccine (1 of 2 - 2 Dose Series) 04/12/1988    Flu vaccine (1) 10/23/2019 (Originally 9/1/2018)    Pneumococcal low/med risk (1 of 2 - PCV13) 10/23/2019 (Originally 4/12/2003)    DEXA (modify frequency per FRAX score)  Addressed       Subjective:  Review of Systems  General:   No fever, no chills, No fatigue or weight loss  Pulmonary:    No dyspnea, no wheezing  Cardiac:    Denies recent chest pain,   GI:     No nausea or vomiting, no abdominal pain  Neuro:    No dizziness or light headedness,   Musculoskeletal:  No recent active issues  Extremities:   No edema, good peripheral pulses      Objective:  Physical Exam  BP (!) 144/82   Pulse 60   Ht 5' 6\" (1.676 m)   Wt 130 lb (59 kg)   BMI 20.98 kg/m²   General:   Well developed, well nourished  Lungs:   Clear to auscultation  Heart:    Normal S1 S2, Slight murmur. no rubs, no gallops  Abdomen:   Soft, non tender, no organomegalies, positive bowel sounds  Extremities:   No edema, no cyanosis, good peripheral pulses  Neurological:   Awake, alert, oriented. No obvious focal deficits  Musculoskelatal:  No obvious deformities    Assessment:      Diagnosis Orders   1. MVP (mitral valve prolapse)     2. Essential hypertension     3. Palpitation     cardiac fair for now  Monitoring the MV     Plan:  No Follow-up on file. As above  Continue risk factor modification and medical management  Thank you for allowing me to participate in the care of your patient. Please don't hesitate to contact me regarding any further issues related to the patient care    Orders Placed:  No orders of the defined types were placed in this encounter. Medications Prescribed:  No orders of the defined types were placed in this encounter. Discussed use, benefit, and side effects of prescribed medications.  All patient questions

## 2018-12-18 ENCOUNTER — NURSE TRIAGE (OUTPATIENT)
Dept: ADMINISTRATIVE | Age: 78
End: 2018-12-18

## 2018-12-18 DIAGNOSIS — F41.9 ANXIETY: ICD-10-CM

## 2018-12-18 RX ORDER — LORAZEPAM 1 MG/1
1 TABLET ORAL DAILY PRN
Qty: 30 TABLET | Refills: 0 | Status: SHIPPED | OUTPATIENT
Start: 2018-12-18 | End: 2019-01-17 | Stop reason: SDUPTHER

## 2019-01-17 DIAGNOSIS — F41.9 ANXIETY: ICD-10-CM

## 2019-01-17 RX ORDER — LORAZEPAM 1 MG/1
1 TABLET ORAL DAILY PRN
Qty: 30 TABLET | Refills: 0 | Status: SHIPPED | OUTPATIENT
Start: 2019-01-17 | End: 2019-02-26 | Stop reason: SDUPTHER

## 2019-02-26 DIAGNOSIS — F41.9 ANXIETY: ICD-10-CM

## 2019-02-26 RX ORDER — LORAZEPAM 1 MG/1
1 TABLET ORAL DAILY PRN
Qty: 30 TABLET | Refills: 0 | Status: SHIPPED | OUTPATIENT
Start: 2019-02-26 | End: 2019-04-22 | Stop reason: SDUPTHER

## 2019-03-11 RX ORDER — METOPROLOL TARTRATE 50 MG/1
50 TABLET, FILM COATED ORAL 2 TIMES DAILY
Qty: 60 TABLET | Refills: 5 | Status: SHIPPED | OUTPATIENT
Start: 2019-03-11 | End: 2019-08-15 | Stop reason: ALTCHOICE

## 2019-03-11 RX ORDER — METOPROLOL TARTRATE 50 MG/1
TABLET, FILM COATED ORAL
Qty: 30 TABLET | Refills: 2 | Status: SHIPPED | OUTPATIENT
Start: 2019-03-11 | End: 2020-01-15 | Stop reason: SDUPTHER

## 2019-03-20 ENCOUNTER — TELEPHONE (OUTPATIENT)
Dept: FAMILY MEDICINE CLINIC | Age: 79
End: 2019-03-20

## 2019-03-20 RX ORDER — SULFAMETHOXAZOLE AND TRIMETHOPRIM 800; 160 MG/1; MG/1
1 TABLET ORAL 2 TIMES DAILY
Qty: 14 TABLET | Refills: 0 | Status: SHIPPED | OUTPATIENT
Start: 2019-03-20 | End: 2019-03-27

## 2019-04-22 DIAGNOSIS — F41.9 ANXIETY: ICD-10-CM

## 2019-04-22 RX ORDER — LORAZEPAM 1 MG/1
1 TABLET ORAL DAILY PRN
Qty: 30 TABLET | Refills: 0 | Status: SHIPPED | OUTPATIENT
Start: 2019-04-22 | End: 2019-05-29 | Stop reason: SDUPTHER

## 2019-05-09 ENCOUNTER — OFFICE VISIT (OUTPATIENT)
Dept: FAMILY MEDICINE CLINIC | Age: 79
End: 2019-05-09

## 2019-05-09 VITALS
WEIGHT: 132.38 LBS | BODY MASS INDEX: 21.28 KG/M2 | HEIGHT: 66 IN | RESPIRATION RATE: 14 BRPM | SYSTOLIC BLOOD PRESSURE: 132 MMHG | HEART RATE: 74 BPM | DIASTOLIC BLOOD PRESSURE: 70 MMHG

## 2019-05-09 DIAGNOSIS — F41.9 ANXIETY: Primary | ICD-10-CM

## 2019-05-09 PROCEDURE — G8420 CALC BMI NORM PARAMETERS: HCPCS | Performed by: FAMILY MEDICINE

## 2019-05-09 PROCEDURE — 1036F TOBACCO NON-USER: CPT | Performed by: FAMILY MEDICINE

## 2019-05-09 PROCEDURE — 1090F PRES/ABSN URINE INCON ASSESS: CPT | Performed by: FAMILY MEDICINE

## 2019-05-09 PROCEDURE — G8400 PT W/DXA NO RESULTS DOC: HCPCS | Performed by: FAMILY MEDICINE

## 2019-05-09 PROCEDURE — G8427 DOCREV CUR MEDS BY ELIG CLIN: HCPCS | Performed by: FAMILY MEDICINE

## 2019-05-09 PROCEDURE — 1123F ACP DISCUSS/DSCN MKR DOCD: CPT | Performed by: FAMILY MEDICINE

## 2019-05-09 PROCEDURE — 4040F PNEUMOC VAC/ADMIN/RCVD: CPT | Performed by: FAMILY MEDICINE

## 2019-05-09 PROCEDURE — 99214 OFFICE O/P EST MOD 30 MIN: CPT | Performed by: FAMILY MEDICINE

## 2019-05-09 ASSESSMENT — PATIENT HEALTH QUESTIONNAIRE - PHQ9
SUM OF ALL RESPONSES TO PHQ QUESTIONS 1-9: 2
2. FEELING DOWN, DEPRESSED OR HOPELESS: 1
1. LITTLE INTEREST OR PLEASURE IN DOING THINGS: 1
SUM OF ALL RESPONSES TO PHQ QUESTIONS 1-9: 2
SUM OF ALL RESPONSES TO PHQ9 QUESTIONS 1 & 2: 2

## 2019-05-09 ASSESSMENT — ENCOUNTER SYMPTOMS
CONSTIPATION: 0
SINUS PRESSURE: 0
SHORTNESS OF BREATH: 0

## 2019-05-09 NOTE — PROGRESS NOTES
Subjective:      Patient ID: Namrata Rodrigues is a 78 y.o. female. HPI  1. She states she is feeling well  2. She feels that her ativan works well and is not sedated during the day time  Review of Systems   Constitutional: Negative for fatigue. HENT: Negative for sinus pressure. Eyes: Negative for visual disturbance. Respiratory: Negative for shortness of breath. Cardiovascular: Negative for chest pain. Gastrointestinal: Negative for constipation. Genitourinary: Negative. Musculoskeletal: Positive for arthralgias. Skin: Negative for rash. Neurological: Negative for headaches. The patient's medications, allergies, past medical problems, surgical, social, and family histories were reviewed and updated as needed. \      Objective:   Physical Exam   Constitutional: She is oriented to person, place, and time. She appears well-developed and well-nourished. No distress. HENT:   Head: Normocephalic and atraumatic. Right Ear: External ear normal.   Left Ear: External ear normal.   Nose: Nose normal.   Mouth/Throat: Oropharynx is clear and moist. No oropharyngeal exudate. Eyes: Conjunctivae are normal. No scleral icterus. Neck: Neck supple. Carotid bruit is not present. No tracheal deviation present. No thyromegaly present. Cardiovascular: Normal rate, regular rhythm and intact distal pulses. Murmur (2/6 JAKUB) heard. Pulmonary/Chest: Effort normal and breath sounds normal.   Abdominal: Soft. Bowel sounds are normal. She exhibits no mass. Musculoskeletal: She exhibits no edema. Lymphadenopathy:     She has no cervical adenopathy. Neurological: She is alert and oriented to person, place, and time. Skin: Skin is warm and dry. Psychiatric: She has a normal mood and affect. Her behavior is normal.   Blood pressure 132/70, pulse 74, resp. rate 14, height 5' 6\" (1.676 m), weight 132 lb 6 oz (60 kg), not currently breastfeeding. Assessment:       Diagnosis Orders   1.  Anxiety Plan:      See me in 6 months        Mundo Knapp MD

## 2019-05-29 DIAGNOSIS — F41.9 ANXIETY: ICD-10-CM

## 2019-05-29 RX ORDER — LORAZEPAM 1 MG/1
1 TABLET ORAL DAILY PRN
Qty: 30 TABLET | Refills: 0 | Status: SHIPPED | OUTPATIENT
Start: 2019-05-29 | End: 2019-06-28 | Stop reason: SDUPTHER

## 2019-05-29 NOTE — TELEPHONE ENCOUNTER
Luiz Kaye called requesting a refill of the below medication which has been pended for you:     Requested Prescriptions     Pending Prescriptions Disp Refills    LORazepam (ATIVAN) 1 MG tablet 30 tablet 0     Sig: Take 1 tablet by mouth daily as needed for Anxiety for up to 30 days.        Last Appointment Date: 5/9/2019  Next Appointment Date: Visit date not found    Allergies   Allergen Reactions    Ciprofloxacin Other (See Comments)     dizziness    Clindamycin/Lincomycin Hives    Hydrocod Polst-Cpm Polst Er      anxious    Paxil [Paroxetine Hcl] Other (See Comments)     dizzy    Morphine Nausea And Vomiting

## 2019-06-28 DIAGNOSIS — F41.9 ANXIETY: ICD-10-CM

## 2019-06-28 RX ORDER — LORAZEPAM 1 MG/1
1 TABLET ORAL DAILY PRN
Qty: 30 TABLET | Refills: 0 | Status: SHIPPED | OUTPATIENT
Start: 2019-06-28 | End: 2019-07-22 | Stop reason: SDUPTHER

## 2019-07-16 ENCOUNTER — NURSE ONLY (OUTPATIENT)
Dept: FAMILY MEDICINE CLINIC | Age: 79
End: 2019-07-16

## 2019-07-16 DIAGNOSIS — R35.0 URINARY FREQUENCY: Primary | ICD-10-CM

## 2019-07-16 LAB
BACTERIA URINE, POC: ABNORMAL
BILIRUBIN URINE: 0 MG/DL
BLOOD, URINE: POSITIVE
CASTS URINE, POC: ABNORMAL
CLARITY: ABNORMAL
COLOR: ABNORMAL
CRYSTALS URINE, POC: ABNORMAL
EPI CELLS URINE, POC: ABNORMAL
GLUCOSE URINE: 0
KETONES, URINE: POSITIVE
LEUKOCYTE EST, POC: ABNORMAL
NITRITE, URINE: NEGATIVE
PH UA: 7 (ref 4.5–8)
PROTEIN UA: ABNORMAL
RBC URINE, POC: ABNORMAL
SPECIFIC GRAVITY UA: 1.01 (ref 1–1.03)
UROBILINOGEN, URINE: NORMAL
WBC URINE, POC: ABNORMAL
YEAST URINE, POC: ABNORMAL

## 2019-07-16 PROCEDURE — 81000 URINALYSIS NONAUTO W/SCOPE: CPT | Performed by: FAMILY MEDICINE

## 2019-07-16 RX ORDER — SULFAMETHOXAZOLE AND TRIMETHOPRIM 800; 160 MG/1; MG/1
1 TABLET ORAL 2 TIMES DAILY
Qty: 10 TABLET | Refills: 0 | Status: SHIPPED | OUTPATIENT
Start: 2019-07-16 | End: 2019-07-21

## 2019-07-17 LAB
REPORT STATUS: NORMAL
SITE/TYPE: NORMAL
URINE CULTURE, ROUTINE: NORMAL

## 2019-07-18 ENCOUNTER — TELEPHONE (OUTPATIENT)
Dept: FAMILY MEDICINE CLINIC | Age: 79
End: 2019-07-18

## 2019-07-18 NOTE — TELEPHONE ENCOUNTER
Pt informed. Stated that she is feeling better since she is on the ATB. Advised to complete the ATB therapy.

## 2019-07-18 NOTE — TELEPHONE ENCOUNTER
----- Message from Mackenzie Gibson MD sent at 7/18/2019 12:16 AM EDT -----  Let her know the urine specimen showed no infection. How is she feeling?

## 2019-07-22 DIAGNOSIS — F41.9 ANXIETY: ICD-10-CM

## 2019-07-22 RX ORDER — LORAZEPAM 1 MG/1
1 TABLET ORAL DAILY PRN
Qty: 30 TABLET | Refills: 0 | Status: SHIPPED | OUTPATIENT
Start: 2019-07-22 | End: 2019-08-23 | Stop reason: SDUPTHER

## 2019-08-15 ENCOUNTER — OFFICE VISIT (OUTPATIENT)
Dept: CARDIOLOGY CLINIC | Age: 79
End: 2019-08-15
Payer: MEDICARE

## 2019-08-15 VITALS
BODY MASS INDEX: 20.57 KG/M2 | DIASTOLIC BLOOD PRESSURE: 85 MMHG | WEIGHT: 128 LBS | HEART RATE: 62 BPM | HEIGHT: 66 IN | SYSTOLIC BLOOD PRESSURE: 133 MMHG

## 2019-08-15 DIAGNOSIS — I10 ESSENTIAL HYPERTENSION: ICD-10-CM

## 2019-08-15 DIAGNOSIS — I48.0 PAROXYSMAL ATRIAL FIBRILLATION (HCC): Primary | ICD-10-CM

## 2019-08-15 DIAGNOSIS — I34.1 MVP (MITRAL VALVE PROLAPSE): ICD-10-CM

## 2019-08-15 PROCEDURE — 1123F ACP DISCUSS/DSCN MKR DOCD: CPT | Performed by: NUCLEAR MEDICINE

## 2019-08-15 PROCEDURE — 4040F PNEUMOC VAC/ADMIN/RCVD: CPT | Performed by: NUCLEAR MEDICINE

## 2019-08-15 PROCEDURE — 99213 OFFICE O/P EST LOW 20 MIN: CPT | Performed by: NUCLEAR MEDICINE

## 2019-08-15 PROCEDURE — 1036F TOBACCO NON-USER: CPT | Performed by: NUCLEAR MEDICINE

## 2019-08-15 PROCEDURE — 1090F PRES/ABSN URINE INCON ASSESS: CPT | Performed by: NUCLEAR MEDICINE

## 2019-08-15 PROCEDURE — G8427 DOCREV CUR MEDS BY ELIG CLIN: HCPCS | Performed by: NUCLEAR MEDICINE

## 2019-08-15 PROCEDURE — G8400 PT W/DXA NO RESULTS DOC: HCPCS | Performed by: NUCLEAR MEDICINE

## 2019-08-15 PROCEDURE — G8420 CALC BMI NORM PARAMETERS: HCPCS | Performed by: NUCLEAR MEDICINE

## 2019-08-15 NOTE — PROGRESS NOTES
 Hydrocod Polst-Cpm Polst Er      anxious    Paxil [Paroxetine Hcl] Other (See Comments)     dizzy    Morphine Nausea And Vomiting     Health Maintenance   Topic Date Due    DTaP/Tdap/Td vaccine (1 - Tdap) 04/12/1957    Shingles Vaccine (1 of 2) 04/12/1988    Annual Wellness Visit (AWV)  04/12/2001    Pneumococcal 65+ years Vaccine (1 of 2 - PCV13) 04/12/2003    Flu vaccine (1) 10/23/2019 (Originally 9/1/2019)    DEXA (modify frequency per FRAX score)  Addressed       Subjective:  Review of Systems  General:   No fever, no chills, No fatigue or weight loss  Pulmonary:    No dyspnea, no wheezing  Cardiac:    Denies recent chest pain,   GI:     No nausea or vomiting, no abdominal pain  Neuro:    No dizziness or light headedness,   Musculoskeletal:  No recent active issues  Extremities:   No edema, good peripheral pulses      Objective:  Physical Exam  /85   Pulse 62   Ht 5' 6\" (1.676 m)   Wt 128 lb (58.1 kg)   BMI 20.66 kg/m²   General:   Well developed, well nourished  Lungs:   Clear to auscultation  Heart:    Normal S1 S2, Slight murmur. no rubs, no gallops  Abdomen:   Soft, non tender, no organomegalies, positive bowel sounds  Extremities:   No edema, no cyanosis, good peripheral pulses  Neurological:   Awake, alert, oriented. No obvious focal deficits  Musculoskelatal:  No obvious deformities    Assessment:      Diagnosis Orders   1. Paroxysmal atrial fibrillation (HCC)     2. MVP (mitral valve prolapse)     3. Essential hypertension     cardiac fair for now     Plan:  No follow-ups on file. As above  Continue risk factor modification and medical management  Thank you for allowing me to participate in the care of your patient. Please don't hesitate to contact me regarding any further issues related to the patient care    Orders Placed:  No orders of the defined types were placed in this encounter. Medications Prescribed:  No orders of the defined types were placed in this encounter.

## 2019-08-22 DIAGNOSIS — F41.9 ANXIETY: ICD-10-CM

## 2019-08-23 RX ORDER — LORAZEPAM 1 MG/1
1 TABLET ORAL DAILY PRN
Qty: 30 TABLET | Refills: 0 | Status: SHIPPED | OUTPATIENT
Start: 2019-08-23 | End: 2019-09-23 | Stop reason: SDUPTHER

## 2019-08-28 ENCOUNTER — TELEPHONE (OUTPATIENT)
Dept: FAMILY MEDICINE CLINIC | Age: 79
End: 2019-08-28

## 2019-08-28 ENCOUNTER — NURSE ONLY (OUTPATIENT)
Dept: FAMILY MEDICINE CLINIC | Age: 79
End: 2019-08-28

## 2019-08-28 DIAGNOSIS — R35.0 URINARY FREQUENCY: Primary | ICD-10-CM

## 2019-08-28 LAB
BACTERIA URINE, POC: ABNORMAL
BILIRUBIN URINE: 0 MG/DL
BLOOD, URINE: POSITIVE
CASTS URINE, POC: ABNORMAL
CLARITY: ABNORMAL
COLOR: YELLOW
CRYSTALS URINE, POC: ABNORMAL
EPI CELLS URINE, POC: ABNORMAL
GLUCOSE URINE: ABNORMAL
KETONES, URINE: NEGATIVE
LEUKOCYTE EST, POC: POSITIVE
NITRITE, URINE: ABNORMAL
PH UA: 5 (ref 4.5–8)
PROTEIN UA: ABNORMAL
RBC URINE, POC: ABNORMAL
SPECIFIC GRAVITY UA: 1.01 (ref 1–1.03)
UROBILINOGEN, URINE: NORMAL
WBC URINE, POC: ABNORMAL
YEAST URINE, POC: ABNORMAL

## 2019-08-28 PROCEDURE — 81000 URINALYSIS NONAUTO W/SCOPE: CPT | Performed by: FAMILY MEDICINE

## 2019-08-28 RX ORDER — NITROFURANTOIN 25; 75 MG/1; MG/1
100 CAPSULE ORAL 2 TIMES DAILY
Qty: 14 CAPSULE | Refills: 0 | Status: SHIPPED | OUTPATIENT
Start: 2019-08-28 | End: 2019-08-30 | Stop reason: SINTOL

## 2019-08-30 ENCOUNTER — TELEPHONE (OUTPATIENT)
Dept: FAMILY MEDICINE CLINIC | Age: 79
End: 2019-08-30

## 2019-08-30 RX ORDER — SULFAMETHOXAZOLE AND TRIMETHOPRIM 800; 160 MG/1; MG/1
1 TABLET ORAL 2 TIMES DAILY
Qty: 10 TABLET | Refills: 0 | Status: SHIPPED | OUTPATIENT
Start: 2019-08-30 | End: 2019-09-04

## 2019-09-23 DIAGNOSIS — F41.9 ANXIETY: ICD-10-CM

## 2019-09-23 RX ORDER — LORAZEPAM 1 MG/1
1 TABLET ORAL DAILY PRN
Qty: 30 TABLET | Refills: 0 | Status: SHIPPED | OUTPATIENT
Start: 2019-09-23 | End: 2019-10-15 | Stop reason: SDUPTHER

## 2019-10-15 DIAGNOSIS — F41.9 ANXIETY: ICD-10-CM

## 2019-10-15 RX ORDER — LORAZEPAM 1 MG/1
1 TABLET ORAL DAILY PRN
Qty: 30 TABLET | Refills: 0 | Status: SHIPPED | OUTPATIENT
Start: 2019-10-15 | End: 2019-11-07 | Stop reason: SDUPTHER

## 2019-11-07 ENCOUNTER — OFFICE VISIT (OUTPATIENT)
Dept: FAMILY MEDICINE CLINIC | Age: 79
End: 2019-11-07

## 2019-11-07 VITALS
HEART RATE: 64 BPM | RESPIRATION RATE: 14 BRPM | SYSTOLIC BLOOD PRESSURE: 120 MMHG | BODY MASS INDEX: 20.33 KG/M2 | HEIGHT: 66 IN | DIASTOLIC BLOOD PRESSURE: 70 MMHG | WEIGHT: 126.5 LBS

## 2019-11-07 DIAGNOSIS — F41.9 ANXIETY: ICD-10-CM

## 2019-11-07 PROCEDURE — G8420 CALC BMI NORM PARAMETERS: HCPCS | Performed by: FAMILY MEDICINE

## 2019-11-07 PROCEDURE — 1123F ACP DISCUSS/DSCN MKR DOCD: CPT | Performed by: FAMILY MEDICINE

## 2019-11-07 PROCEDURE — 1036F TOBACCO NON-USER: CPT | Performed by: FAMILY MEDICINE

## 2019-11-07 PROCEDURE — 1090F PRES/ABSN URINE INCON ASSESS: CPT | Performed by: FAMILY MEDICINE

## 2019-11-07 PROCEDURE — G8427 DOCREV CUR MEDS BY ELIG CLIN: HCPCS | Performed by: FAMILY MEDICINE

## 2019-11-07 PROCEDURE — 4040F PNEUMOC VAC/ADMIN/RCVD: CPT | Performed by: FAMILY MEDICINE

## 2019-11-07 PROCEDURE — 99213 OFFICE O/P EST LOW 20 MIN: CPT | Performed by: FAMILY MEDICINE

## 2019-11-07 PROCEDURE — G8400 PT W/DXA NO RESULTS DOC: HCPCS | Performed by: FAMILY MEDICINE

## 2019-11-07 RX ORDER — LORAZEPAM 1 MG/1
1 TABLET ORAL DAILY PRN
Qty: 30 TABLET | Refills: 0 | Status: SHIPPED | OUTPATIENT
Start: 2019-11-07 | End: 2019-12-23 | Stop reason: SDUPTHER

## 2019-11-07 ASSESSMENT — ENCOUNTER SYMPTOMS
CONSTIPATION: 0
SHORTNESS OF BREATH: 0
SINUS PRESSURE: 0

## 2019-11-07 ASSESSMENT — PATIENT HEALTH QUESTIONNAIRE - PHQ9
1. LITTLE INTEREST OR PLEASURE IN DOING THINGS: 0
SUM OF ALL RESPONSES TO PHQ QUESTIONS 1-9: 0
2. FEELING DOWN, DEPRESSED OR HOPELESS: 0
SUM OF ALL RESPONSES TO PHQ9 QUESTIONS 1 & 2: 0
SUM OF ALL RESPONSES TO PHQ QUESTIONS 1-9: 0

## 2019-11-18 ENCOUNTER — TELEPHONE (OUTPATIENT)
Dept: CARDIOLOGY CLINIC | Age: 79
End: 2019-11-18

## 2019-11-18 ENCOUNTER — OFFICE VISIT (OUTPATIENT)
Dept: CARDIOLOGY CLINIC | Age: 79
End: 2019-11-18
Payer: MEDICARE

## 2019-11-18 VITALS
HEART RATE: 59 BPM | HEIGHT: 66 IN | BODY MASS INDEX: 20.41 KG/M2 | SYSTOLIC BLOOD PRESSURE: 122 MMHG | WEIGHT: 127 LBS | DIASTOLIC BLOOD PRESSURE: 72 MMHG

## 2019-11-18 DIAGNOSIS — I48.0 PAROXYSMAL ATRIAL FIBRILLATION (HCC): Primary | ICD-10-CM

## 2019-11-18 DIAGNOSIS — I34.1 MVP (MITRAL VALVE PROLAPSE): ICD-10-CM

## 2019-11-18 DIAGNOSIS — F41.9 ANXIETY: ICD-10-CM

## 2019-11-18 DIAGNOSIS — R00.2 PALPITATIONS: ICD-10-CM

## 2019-11-18 PROCEDURE — 93000 ELECTROCARDIOGRAM COMPLETE: CPT | Performed by: PHYSICIAN ASSISTANT

## 2019-11-18 PROCEDURE — G8420 CALC BMI NORM PARAMETERS: HCPCS | Performed by: PHYSICIAN ASSISTANT

## 2019-11-18 PROCEDURE — 1090F PRES/ABSN URINE INCON ASSESS: CPT | Performed by: PHYSICIAN ASSISTANT

## 2019-11-18 PROCEDURE — G8484 FLU IMMUNIZE NO ADMIN: HCPCS | Performed by: PHYSICIAN ASSISTANT

## 2019-11-18 PROCEDURE — 99213 OFFICE O/P EST LOW 20 MIN: CPT | Performed by: PHYSICIAN ASSISTANT

## 2019-11-18 PROCEDURE — G8427 DOCREV CUR MEDS BY ELIG CLIN: HCPCS | Performed by: PHYSICIAN ASSISTANT

## 2019-11-18 PROCEDURE — 1036F TOBACCO NON-USER: CPT | Performed by: PHYSICIAN ASSISTANT

## 2019-11-18 PROCEDURE — 1123F ACP DISCUSS/DSCN MKR DOCD: CPT | Performed by: PHYSICIAN ASSISTANT

## 2019-11-18 PROCEDURE — 4040F PNEUMOC VAC/ADMIN/RCVD: CPT | Performed by: PHYSICIAN ASSISTANT

## 2019-11-18 PROCEDURE — G8400 PT W/DXA NO RESULTS DOC: HCPCS | Performed by: PHYSICIAN ASSISTANT

## 2019-12-23 DIAGNOSIS — F41.9 ANXIETY: ICD-10-CM

## 2019-12-23 RX ORDER — LORAZEPAM 1 MG/1
1 TABLET ORAL DAILY PRN
Qty: 30 TABLET | Refills: 0 | Status: SHIPPED | OUTPATIENT
Start: 2019-12-23 | End: 2020-01-24 | Stop reason: SDUPTHER

## 2020-01-03 ENCOUNTER — TELEPHONE (OUTPATIENT)
Dept: FAMILY MEDICINE CLINIC | Age: 80
End: 2020-01-03

## 2020-01-03 RX ORDER — AZITHROMYCIN 250 MG/1
TABLET, FILM COATED ORAL
Qty: 1 PACKET | Refills: 0 | Status: SHIPPED | OUTPATIENT
Start: 2020-01-03 | End: 2020-01-13

## 2020-01-03 NOTE — TELEPHONE ENCOUNTER
Patient called C/O sore throat, laryngitis, slight productive cough. Has had this for 2-3 days. Unable to come in because Re Felix is on [facility]osp[facility]. Req Rx to [facility]. Advised to check the pharmacy after 3:00pm today. Says she thinks she can tolerate Erythromycin.

## 2020-01-15 RX ORDER — METOPROLOL TARTRATE 50 MG/1
50 TABLET, FILM COATED ORAL EVERY MORNING
Qty: 30 TABLET | Refills: 8 | Status: SHIPPED | OUTPATIENT
Start: 2020-01-15 | End: 2020-08-20 | Stop reason: SDUPTHER

## 2020-01-15 NOTE — TELEPHONE ENCOUNTER
01/15/2020   Issa Clarke called requesting a refill on the following medications:  Requested Prescriptions     Pending Prescriptions Disp Refills    metoprolol tartrate (LOPRESSOR) 50 MG tablet 30 tablet 2     Pharmacy verified:  [facility]      Date of last visit: 11/18/2019  Date of next visit (if applicable): 82/10/2358

## 2020-01-24 RX ORDER — LORAZEPAM 1 MG/1
1 TABLET ORAL DAILY PRN
Qty: 30 TABLET | Refills: 0 | Status: SHIPPED | OUTPATIENT
Start: 2020-01-24 | End: 2020-02-25 | Stop reason: SDUPTHER

## 2020-02-26 RX ORDER — LORAZEPAM 1 MG/1
1 TABLET ORAL DAILY PRN
Qty: 30 TABLET | Refills: 0 | Status: SHIPPED | OUTPATIENT
Start: 2020-02-26 | End: 2020-03-23 | Stop reason: SDUPTHER

## 2020-03-23 RX ORDER — LORAZEPAM 1 MG/1
1 TABLET ORAL DAILY PRN
Qty: 30 TABLET | Refills: 0 | Status: SHIPPED | OUTPATIENT
Start: 2020-03-23 | End: 2020-04-21 | Stop reason: SDUPTHER

## 2020-04-21 RX ORDER — LORAZEPAM 1 MG/1
1 TABLET ORAL DAILY PRN
Qty: 30 TABLET | Refills: 0 | Status: SHIPPED | OUTPATIENT
Start: 2020-04-21 | End: 2020-05-20 | Stop reason: SDUPTHER

## 2020-05-20 RX ORDER — LORAZEPAM 1 MG/1
1 TABLET ORAL DAILY PRN
Qty: 30 TABLET | Refills: 0 | Status: SHIPPED | OUTPATIENT
Start: 2020-05-20 | End: 2020-06-19 | Stop reason: SDUPTHER

## 2020-05-29 ENCOUNTER — OFFICE VISIT (OUTPATIENT)
Dept: FAMILY MEDICINE CLINIC | Age: 80
End: 2020-05-29

## 2020-05-29 VITALS
SYSTOLIC BLOOD PRESSURE: 138 MMHG | WEIGHT: 133.13 LBS | DIASTOLIC BLOOD PRESSURE: 70 MMHG | TEMPERATURE: 97.5 F | BODY MASS INDEX: 21.4 KG/M2 | HEIGHT: 66 IN | RESPIRATION RATE: 12 BRPM | HEART RATE: 60 BPM

## 2020-05-29 PROCEDURE — 1090F PRES/ABSN URINE INCON ASSESS: CPT | Performed by: FAMILY MEDICINE

## 2020-05-29 PROCEDURE — G8400 PT W/DXA NO RESULTS DOC: HCPCS | Performed by: FAMILY MEDICINE

## 2020-05-29 PROCEDURE — 1036F TOBACCO NON-USER: CPT | Performed by: FAMILY MEDICINE

## 2020-05-29 PROCEDURE — 99213 OFFICE O/P EST LOW 20 MIN: CPT | Performed by: FAMILY MEDICINE

## 2020-05-29 PROCEDURE — 1123F ACP DISCUSS/DSCN MKR DOCD: CPT | Performed by: FAMILY MEDICINE

## 2020-05-29 PROCEDURE — 4040F PNEUMOC VAC/ADMIN/RCVD: CPT | Performed by: FAMILY MEDICINE

## 2020-05-29 PROCEDURE — G8420 CALC BMI NORM PARAMETERS: HCPCS | Performed by: FAMILY MEDICINE

## 2020-05-29 PROCEDURE — G8427 DOCREV CUR MEDS BY ELIG CLIN: HCPCS | Performed by: FAMILY MEDICINE

## 2020-05-29 ASSESSMENT — PATIENT HEALTH QUESTIONNAIRE - PHQ9
SUM OF ALL RESPONSES TO PHQ9 QUESTIONS 1 & 2: 0
1. LITTLE INTEREST OR PLEASURE IN DOING THINGS: 0
2. FEELING DOWN, DEPRESSED OR HOPELESS: 0
SUM OF ALL RESPONSES TO PHQ QUESTIONS 1-9: 0
SUM OF ALL RESPONSES TO PHQ QUESTIONS 1-9: 0

## 2020-05-31 ASSESSMENT — ENCOUNTER SYMPTOMS
SINUS PRESSURE: 0
SHORTNESS OF BREATH: 0
CONSTIPATION: 0

## 2020-06-19 RX ORDER — LORAZEPAM 1 MG/1
1 TABLET ORAL DAILY PRN
Qty: 30 TABLET | Refills: 0 | Status: SHIPPED | OUTPATIENT
Start: 2020-06-19 | End: 2020-07-17 | Stop reason: SDUPTHER

## 2020-07-17 RX ORDER — LORAZEPAM 1 MG/1
1 TABLET ORAL DAILY PRN
Qty: 30 TABLET | Refills: 0 | Status: SHIPPED | OUTPATIENT
Start: 2020-07-17 | End: 2020-08-19 | Stop reason: SDUPTHER

## 2020-07-17 NOTE — TELEPHONE ENCOUNTER
Date of last visit:  5/29/2020  Date of next visit:  Visit date not found    Requested Prescriptions     Pending Prescriptions Disp Refills    LORazepam (ATIVAN) 1 MG tablet 30 tablet 0     Sig: Take 1 tablet by mouth daily as needed for Anxiety for up to 30 days.

## 2020-08-19 RX ORDER — LORAZEPAM 1 MG/1
1 TABLET ORAL DAILY PRN
Qty: 30 TABLET | Refills: 0 | Status: SHIPPED | OUTPATIENT
Start: 2020-08-19 | End: 2020-09-21 | Stop reason: SDUPTHER

## 2020-08-20 ENCOUNTER — OFFICE VISIT (OUTPATIENT)
Dept: CARDIOLOGY CLINIC | Age: 80
End: 2020-08-20
Payer: MEDICARE

## 2020-08-20 VITALS
BODY MASS INDEX: 21.38 KG/M2 | HEART RATE: 60 BPM | SYSTOLIC BLOOD PRESSURE: 138 MMHG | HEIGHT: 66 IN | WEIGHT: 133 LBS | DIASTOLIC BLOOD PRESSURE: 88 MMHG

## 2020-08-20 PROCEDURE — 99213 OFFICE O/P EST LOW 20 MIN: CPT | Performed by: NUCLEAR MEDICINE

## 2020-08-20 PROCEDURE — 1123F ACP DISCUSS/DSCN MKR DOCD: CPT | Performed by: NUCLEAR MEDICINE

## 2020-08-20 PROCEDURE — 1036F TOBACCO NON-USER: CPT | Performed by: NUCLEAR MEDICINE

## 2020-08-20 PROCEDURE — 1090F PRES/ABSN URINE INCON ASSESS: CPT | Performed by: NUCLEAR MEDICINE

## 2020-08-20 PROCEDURE — 4040F PNEUMOC VAC/ADMIN/RCVD: CPT | Performed by: NUCLEAR MEDICINE

## 2020-08-20 PROCEDURE — G8400 PT W/DXA NO RESULTS DOC: HCPCS | Performed by: NUCLEAR MEDICINE

## 2020-08-20 PROCEDURE — G8427 DOCREV CUR MEDS BY ELIG CLIN: HCPCS | Performed by: NUCLEAR MEDICINE

## 2020-08-20 PROCEDURE — G8420 CALC BMI NORM PARAMETERS: HCPCS | Performed by: NUCLEAR MEDICINE

## 2020-08-20 RX ORDER — METOPROLOL TARTRATE 50 MG/1
50 TABLET, FILM COATED ORAL EVERY MORNING
Qty: 90 TABLET | Refills: 3 | Status: SHIPPED | OUTPATIENT
Start: 2020-08-20 | End: 2021-04-19 | Stop reason: SDUPTHER

## 2020-08-20 NOTE — PROGRESS NOTES
[address] [facility],[place] [id number]  Dept: [phone]  Dept Fax: [phone]  Loc: [phone]    Visit Date: 8/20/2020    Latricia Fields is a 78 y.o. female who presents todayfor:  Chief Complaint   Patient presents with    1 Year Follow Up    Atrial Fibrillation    Hypertension     Has minimal A fib   BP is stable  No chest pain   No changes in breathing  Active  No dizziness  No syncope  No new issues  No meds side effects      HPI:  HPI  Past Medical History:   Diagnosis Date    Osteoporosis of multiple sites without pathological fracture 10/2017    Squamous cell carcinoma of skin 2016    Vascular headache 1980's    Vitamin D deficiency 10/2017      Past Surgical History:   Procedure Laterality Date    BLADDER REPAIR  9/2014    MENISCECTOMY Left 5/2013    lateral    SKIN CANCER EXCISION  09/2016    Dr Jerardo Courtney Left 4/2015     No family history on file. Social History     Tobacco Use    Smoking status: Never Smoker    Smokeless tobacco: Never Used   Substance Use Topics    Alcohol use: No      Current Outpatient Medications   Medication Sig Dispense Refill    LORazepam (ATIVAN) 1 MG tablet Take 1 tablet by mouth daily as needed for Anxiety for up to 30 days. 30 tablet 0    metoprolol tartrate (LOPRESSOR) 25 MG tablet TAKE 1 AND 1/2 TABLETS BY MOUTH IN THE EVENING  135 tablet 0    metoprolol tartrate (LOPRESSOR) 50 MG tablet Take 1 tablet by mouth every morning 30 tablet 8    Cholecalciferol (VITAMIN D3) 5000 units CAPS Take 1 capsule by mouth daily       No current facility-administered medications for this visit.       Allergies   Allergen Reactions    Ciprofloxacin Other (See Comments)     dizziness    Clindamycin/Lincomycin Hives    Hydrocod Polst-Cpm Polst Er      anxious    Macrobid [Nitrofurantoin Macrocrystal] Diarrhea    Paxil [Paroxetine Hcl] Other (See Comments)     dizzy encounter. Medications Prescribed:  No orders of the defined types were placed in this encounter. Discussed use, benefit, and side effects of prescribed medications. All patient questions answered. Pt voicedunderstanding. Instructed to continue current medications, diet and exercise. Continue risk factor modification and medical management. Patient agreed with treatment plan. Follow up as directed.     Electronically signedby Shilpa Crandall MD on 8/20/2020 at 1:08 PM

## 2020-09-21 RX ORDER — LORAZEPAM 1 MG/1
1 TABLET ORAL DAILY PRN
Qty: 30 TABLET | Refills: 0 | Status: SHIPPED | OUTPATIENT
Start: 2020-09-21 | End: 2020-10-20 | Stop reason: SDUPTHER

## 2020-10-20 RX ORDER — LORAZEPAM 1 MG/1
1 TABLET ORAL DAILY PRN
Qty: 30 TABLET | Refills: 0 | Status: SHIPPED | OUTPATIENT
Start: 2020-10-20 | End: 2020-11-20 | Stop reason: SDUPTHER

## 2020-10-21 NOTE — TELEPHONE ENCOUNTER
Pt informed but said that she was here more recently than May so she wouldn't schedule an appt. Said she is going to find her paperwork and call us back. I did let her know she cannot have anymore Ativan than 30 days until seen.

## 2020-11-20 ENCOUNTER — OFFICE VISIT (OUTPATIENT)
Dept: FAMILY MEDICINE CLINIC | Age: 80
End: 2020-11-20

## 2020-11-20 VITALS
SYSTOLIC BLOOD PRESSURE: 122 MMHG | RESPIRATION RATE: 12 BRPM | WEIGHT: 129.8 LBS | HEART RATE: 72 BPM | BODY MASS INDEX: 20.86 KG/M2 | TEMPERATURE: 97.3 F | DIASTOLIC BLOOD PRESSURE: 70 MMHG | HEIGHT: 66 IN

## 2020-11-20 PROCEDURE — 1036F TOBACCO NON-USER: CPT | Performed by: FAMILY MEDICINE

## 2020-11-20 PROCEDURE — G8400 PT W/DXA NO RESULTS DOC: HCPCS | Performed by: FAMILY MEDICINE

## 2020-11-20 PROCEDURE — 1090F PRES/ABSN URINE INCON ASSESS: CPT | Performed by: FAMILY MEDICINE

## 2020-11-20 PROCEDURE — G8427 DOCREV CUR MEDS BY ELIG CLIN: HCPCS | Performed by: FAMILY MEDICINE

## 2020-11-20 PROCEDURE — G8420 CALC BMI NORM PARAMETERS: HCPCS | Performed by: FAMILY MEDICINE

## 2020-11-20 PROCEDURE — 4040F PNEUMOC VAC/ADMIN/RCVD: CPT | Performed by: FAMILY MEDICINE

## 2020-11-20 PROCEDURE — 99214 OFFICE O/P EST MOD 30 MIN: CPT | Performed by: FAMILY MEDICINE

## 2020-11-20 PROCEDURE — 1123F ACP DISCUSS/DSCN MKR DOCD: CPT | Performed by: FAMILY MEDICINE

## 2020-11-20 RX ORDER — LORAZEPAM 1 MG/1
1 TABLET ORAL DAILY PRN
Qty: 30 TABLET | Refills: 0 | OUTPATIENT
Start: 2020-11-20 | End: 2020-12-18 | Stop reason: SDUPTHER

## 2020-11-20 NOTE — PROGRESS NOTES
Subjective:      Patient ID: Stuart Han is a 78 y.o. female. HPI  1. She is exercising and wonders about a brace for posture  2. The ativan continues to be of help and she does not feel sedated by that  Review of Systems   Constitutional: Negative for fatigue. HENT: Negative for sinus pressure. Eyes: Negative for visual disturbance. Respiratory: Negative for shortness of breath. Cardiovascular: Negative for chest pain. Gastrointestinal: Negative for constipation. Genitourinary: Negative. Musculoskeletal: Negative for arthralgias. Skin: Negative for rash. Neurological: Negative for headaches. Psychiatric/Behavioral: The patient is nervous/anxious. The patient's medications, allergies, past medical problems, surgical, social, and family histories were reviewed and updated as needed. Objective:   Physical Exam  Constitutional:       General: She is not in acute distress. Appearance: She is well-developed. HENT:      Head: Normocephalic and atraumatic. Eyes:      General: No scleral icterus. Conjunctiva/sclera: Conjunctivae normal.   Neck:      Trachea: No tracheal deviation. Cardiovascular:      Rate and Rhythm: Normal rate and regular rhythm. Heart sounds: Normal heart sounds. Pulmonary:      Effort: Pulmonary effort is normal.      Breath sounds: Normal breath sounds. Skin:     General: Skin is warm and dry. Neurological:      Mental Status: She is alert and oriented to person, place, and time. Psychiatric:         Behavior: Behavior normal.     Blood pressure 122/70, pulse 72, temperature 97.3 °F (36.3 °C), resp. rate 12, height 5' 6\" (1.676 m), weight 129 lb 12.8 oz (58.9 kg), not currently breastfeeding. Assessment:       Diagnosis Orders   1.  Anxiety  DISCONTINUED: LORazepam (ATIVAN) 1 MG tablet           Plan:      See orders        Bashir Martinez MD

## 2020-12-18 RX ORDER — LORAZEPAM 1 MG/1
1 TABLET ORAL DAILY PRN
Qty: 30 TABLET | Refills: 0 | Status: SHIPPED | OUTPATIENT
Start: 2020-12-18 | End: 2021-01-19 | Stop reason: SDUPTHER

## 2020-12-27 ASSESSMENT — ENCOUNTER SYMPTOMS
SINUS PRESSURE: 0
SHORTNESS OF BREATH: 0
CONSTIPATION: 0

## 2021-01-19 DIAGNOSIS — F41.9 ANXIETY: ICD-10-CM

## 2021-01-19 RX ORDER — LORAZEPAM 1 MG/1
1 TABLET ORAL DAILY PRN
Qty: 30 TABLET | Refills: 0 | Status: SHIPPED | OUTPATIENT
Start: 2021-01-19 | End: 2021-02-18 | Stop reason: SDUPTHER

## 2021-01-19 NOTE — TELEPHONE ENCOUNTER
Date of last visit:  11/20/2020  Date of next visit:  Visit date not found    Requested Prescriptions     Pending Prescriptions Disp Refills    LORazepam (ATIVAN) 1 MG tablet 30 tablet 0     Sig: Take 1 tablet by mouth daily as needed for Anxiety for up to 30 days.

## 2021-02-18 DIAGNOSIS — F41.9 ANXIETY: ICD-10-CM

## 2021-02-18 RX ORDER — LORAZEPAM 1 MG/1
1 TABLET ORAL DAILY PRN
Qty: 30 TABLET | Refills: 0 | Status: SHIPPED | OUTPATIENT
Start: 2021-02-18 | End: 2021-03-17 | Stop reason: SDUPTHER

## 2021-02-18 NOTE — TELEPHONE ENCOUNTER
Date of last visit:  11/20/2020  Date of next visit:  Visit date not found    Offered to schedule appt for in May but patient said she would call back to schedule. Requested Prescriptions     Pending Prescriptions Disp Refills    LORazepam (ATIVAN) 1 MG tablet 30 tablet 0     Sig: Take 1 tablet by mouth daily as needed for Anxiety for up to 30 days.

## 2021-03-17 DIAGNOSIS — F41.9 ANXIETY: ICD-10-CM

## 2021-03-17 RX ORDER — LORAZEPAM 1 MG/1
1 TABLET ORAL DAILY PRN
Qty: 30 TABLET | Refills: 0 | Status: SHIPPED | OUTPATIENT
Start: 2021-03-17 | End: 2021-05-10 | Stop reason: SDUPTHER

## 2021-03-17 NOTE — TELEPHONE ENCOUNTER
Date of last visit:  11/20/2020  Date of next visit:  4/13/2021    Requested Prescriptions     Pending Prescriptions Disp Refills    LORazepam (ATIVAN) 1 MG tablet 30 tablet 0     Sig: Take 1 tablet by mouth daily as needed for Anxiety for up to 30 days.

## 2021-04-16 DIAGNOSIS — F41.9 ANXIETY: ICD-10-CM

## 2021-04-19 ENCOUNTER — TELEPHONE (OUTPATIENT)
Dept: CARDIOLOGY CLINIC | Age: 81
End: 2021-04-19

## 2021-04-19 RX ORDER — METOPROLOL TARTRATE 50 MG/1
50 TABLET, FILM COATED ORAL 2 TIMES DAILY
Qty: 180 TABLET | Refills: 1 | Status: SHIPPED | OUTPATIENT
Start: 2021-04-19 | End: 2022-02-28

## 2021-04-19 NOTE — TELEPHONE ENCOUNTER
Pt takes 50 of metoprolol in AM and 25 in PM     States when she wakes up at 2-3 am to go to the bathroom she feels dizzy, takes her BP and its high 150s/90s    She took a 25 mg of metoprolol and lorazepam and it went down     wondering of she needs 50 BID please advise

## 2021-04-26 ENCOUNTER — HOSPITAL ENCOUNTER (OUTPATIENT)
Age: 81
Discharge: HOME | End: 2021-04-26
Payer: MEDICARE

## 2021-04-26 ENCOUNTER — OFFICE VISIT (OUTPATIENT)
Dept: FAMILY MEDICINE CLINIC | Age: 81
End: 2021-04-26

## 2021-04-26 ENCOUNTER — TELEPHONE (OUTPATIENT)
Dept: FAMILY MEDICINE CLINIC | Age: 81
End: 2021-04-26

## 2021-04-26 VITALS
TEMPERATURE: 96.4 F | HEART RATE: 64 BPM | RESPIRATION RATE: 14 BRPM | WEIGHT: 133.5 LBS | BODY MASS INDEX: 21.45 KG/M2 | DIASTOLIC BLOOD PRESSURE: 80 MMHG | HEIGHT: 66 IN | SYSTOLIC BLOOD PRESSURE: 138 MMHG

## 2021-04-26 DIAGNOSIS — I10 ESSENTIAL HYPERTENSION: ICD-10-CM

## 2021-04-26 DIAGNOSIS — H61.22 LEFT EAR IMPACTED CERUMEN: ICD-10-CM

## 2021-04-26 DIAGNOSIS — E55.9 VITAMIN D DEFICIENCY: Primary | ICD-10-CM

## 2021-04-26 DIAGNOSIS — I48.0 PAROXYSMAL ATRIAL FIBRILLATION (HCC): ICD-10-CM

## 2021-04-26 DIAGNOSIS — R01.1 HEART MURMUR: ICD-10-CM

## 2021-04-26 DIAGNOSIS — E55.9 VITAMIN D DEFICIENCY: ICD-10-CM

## 2021-04-26 LAB
BASOPHILS # BLD: 0.6 %
BASOPHILS ABSOLUTE: 0 THOU/MM3 (ref 0–0.1)
EOSINOPHIL # BLD: 3.1 %
EOSINOPHILS ABSOLUTE: 0.2 THOU/MM3 (ref 0–0.4)
ERYTHROCYTE [DISTWIDTH] IN BLOOD BY AUTOMATED COUNT: 13.1 % (ref 11.5–14.5)
ERYTHROCYTE [DISTWIDTH] IN BLOOD BY AUTOMATED COUNT: 46.2 FL (ref 35–45)
HCT VFR BLD CALC: 40.3 % (ref 37–47)
HEMOGLOBIN: 13 GM/DL (ref 12–16)
IMMATURE GRANS (ABS): 0 THOU/MM3 (ref 0–0.07)
IMMATURE GRANULOCYTES: 0 %
LYMPHOCYTES # BLD: 25.7 %
LYMPHOCYTES ABSOLUTE: 1.4 THOU/MM3 (ref 1–4.8)
MCH RBC QN AUTO: 31 PG (ref 26–33)
MCHC RBC AUTO-ENTMCNC: 32.3 GM/DL (ref 32.2–35.5)
MCV RBC AUTO: 96 FL (ref 81–99)
MONOCYTES # BLD: 9.8 %
MONOCYTES ABSOLUTE: 0.5 THOU/MM3 (ref 0.4–1.3)
NUCLEATED RED BLOOD CELLS: 0 /100 WBC
PLATELET # BLD: 219 THOU/MM3 (ref 130–400)
PMV BLD AUTO: 10 FL (ref 9.4–12.4)
RBC # BLD: 4.2 MILL/MM3 (ref 4.2–5.4)
SEG NEUTROPHILS: 60.8 %
SEGMENTED NEUTROPHILS ABSOLUTE COUNT: 3.3 THOU/MM3 (ref 1.8–7.7)
WBC # BLD: 5.4 THOU/MM3 (ref 4.8–10.8)

## 2021-04-26 PROCEDURE — G8427 DOCREV CUR MEDS BY ELIG CLIN: HCPCS | Performed by: FAMILY MEDICINE

## 2021-04-26 PROCEDURE — 69210 REMOVE IMPACTED EAR WAX UNI: CPT | Performed by: FAMILY MEDICINE

## 2021-04-26 PROCEDURE — 80053 COMPREHEN METABOLIC PANEL: CPT

## 2021-04-26 PROCEDURE — 36415 COLL VENOUS BLD VENIPUNCTURE: CPT

## 2021-04-26 PROCEDURE — 1123F ACP DISCUSS/DSCN MKR DOCD: CPT | Performed by: FAMILY MEDICINE

## 2021-04-26 PROCEDURE — 99214 OFFICE O/P EST MOD 30 MIN: CPT | Performed by: FAMILY MEDICINE

## 2021-04-26 PROCEDURE — 1036F TOBACCO NON-USER: CPT | Performed by: FAMILY MEDICINE

## 2021-04-26 PROCEDURE — 84443 ASSAY THYROID STIM HORMONE: CPT

## 2021-04-26 PROCEDURE — 83735 ASSAY OF MAGNESIUM: CPT

## 2021-04-26 PROCEDURE — G8400 PT W/DXA NO RESULTS DOC: HCPCS | Performed by: FAMILY MEDICINE

## 2021-04-26 PROCEDURE — G8420 CALC BMI NORM PARAMETERS: HCPCS | Performed by: FAMILY MEDICINE

## 2021-04-26 PROCEDURE — 1090F PRES/ABSN URINE INCON ASSESS: CPT | Performed by: FAMILY MEDICINE

## 2021-04-26 PROCEDURE — 4040F PNEUMOC VAC/ADMIN/RCVD: CPT | Performed by: FAMILY MEDICINE

## 2021-04-26 PROCEDURE — 85025 COMPLETE CBC W/AUTO DIFF WBC: CPT

## 2021-04-26 PROCEDURE — 82306 VITAMIN D 25 HYDROXY: CPT

## 2021-04-26 SDOH — ECONOMIC STABILITY: FOOD INSECURITY: WITHIN THE PAST 12 MONTHS, YOU WORRIED THAT YOUR FOOD WOULD RUN OUT BEFORE YOU GOT MONEY TO BUY MORE.: NEVER TRUE

## 2021-04-26 SDOH — ECONOMIC STABILITY: FOOD INSECURITY: WITHIN THE PAST 12 MONTHS, THE FOOD YOU BOUGHT JUST DIDN'T LAST AND YOU DIDN'T HAVE MONEY TO GET MORE.: NOT ASKED

## 2021-04-26 ASSESSMENT — PATIENT HEALTH QUESTIONNAIRE - PHQ9
2. FEELING DOWN, DEPRESSED OR HOPELESS: 0
SUM OF ALL RESPONSES TO PHQ QUESTIONS 1-9: 0
1. LITTLE INTEREST OR PLEASURE IN DOING THINGS: 0

## 2021-04-26 ASSESSMENT — ENCOUNTER SYMPTOMS
SHORTNESS OF BREATH: 0
CONSTIPATION: 0
SINUS PRESSURE: 0

## 2021-04-26 NOTE — PROGRESS NOTES
Gelacio Montero (:  1940) is a 78 y.o. female,Established patient, here for evaluation of the following chief complaint(s): Anxiety and Hypertension      ASSESSMENT/PLAN:   Diagnosis Orders   1. Vitamin D deficiency  Vitamin D 25 Hydroxy   2. Paroxysmal atrial fibrillation (HCC)  CBC Auto Differential    TSH    Magnesium   3. Essential hypertension  Comprehensive Metabolic Panel   4. Left ear impacted cerumen  76515 - IN REMOVE IMPACTED EAR WAX   5. Heart murmur  Echocardiogram complete     Get the non fasting labs done and the echocardiogram  Do the non fasting labs soon  See me in 6 months     SUBJECTIVE/OBJECTIVE:  HPI  1. She has had some soreness in the ears  2. She has noticed some shaking and the cardiogist increased the metoprolol some  3. The ativan works well for her at night and she doesn't feel sedated during the day. Review of Systems   Constitutional: Positive for fatigue. HENT: Negative for sinus pressure. Eyes: Negative for visual disturbance. Respiratory: Negative for shortness of breath. Cardiovascular: Negative for chest pain. Gastrointestinal: Negative for constipation. Genitourinary: Negative. Musculoskeletal: Negative for arthralgias. Skin: Negative for rash. Neurological: Positive for weakness. Negative for headaches. The patient's medications, allergies, past medical problems, surgical, social, and family histories were reviewed and updated as needed. Physical Exam  Constitutional:       General: She is not in acute distress. Appearance: She is well-developed. HENT:      Head: Normocephalic and atraumatic. Right Ear: External ear normal.      Left Ear: External ear normal.      Ears:      Comments: Left cerumen impaction. Wax was removed from the affected ear(s) by myself, using the curette. Examination of the canal(s) by myself showed no sign of injury afterward.        Nose: Nose normal.      Mouth/Throat:      Pharynx: No oropharyngeal exudate. Eyes:      General: No scleral icterus. Conjunctiva/sclera: Conjunctivae normal.   Neck:      Musculoskeletal: Neck supple. Thyroid: No thyromegaly. Vascular: No carotid bruit. Trachea: No tracheal deviation. Cardiovascular:      Rate and Rhythm: Normal rate. Rhythm irregular. Heart sounds: Murmur present. Pulmonary:      Effort: Pulmonary effort is normal.      Breath sounds: Normal breath sounds. Abdominal:      General: Bowel sounds are normal.      Palpations: Abdomen is soft. There is no mass. Lymphadenopathy:      Cervical: No cervical adenopathy. Skin:     General: Skin is warm and dry. Neurological:      Mental Status: She is alert and oriented to person, place, and time. Psychiatric:         Behavior: Behavior normal.     Blood pressure 138/80, pulse 64, temperature 96.4 °F (35.8 °C), temperature source Skin, resp. rate 14, height 5' 6\" (1.676 m), weight 133 lb 8 oz (60.6 kg), not currently breastfeeding. An electronic signature was used to authenticate this note.     --Mateus Macario MD

## 2021-04-26 NOTE — PATIENT INSTRUCTIONS
Get the non fasting labs done and the echocardiogram  Do the non fasting labs soon  See me in 6 months

## 2021-04-26 NOTE — TELEPHONE ENCOUNTER
Echo scheduled for 5/3/2021 at 1pm at [facility] patient to arrive at 1245pm.  Go to  heart and vascular. Lizzy informed by Phone.

## 2021-04-27 ENCOUNTER — TELEPHONE (OUTPATIENT)
Dept: FAMILY MEDICINE CLINIC | Age: 81
End: 2021-04-27

## 2021-04-27 DIAGNOSIS — I10 ESSENTIAL HYPERTENSION: Primary | ICD-10-CM

## 2021-04-27 LAB
ALBUMIN SERPL-MCNC: 4.3 G/DL (ref 3.5–5.1)
ALP BLD-CCNC: 84 U/L (ref 38–126)
ALT SERPL-CCNC: 9 U/L (ref 11–66)
ANION GAP SERPL CALCULATED.3IONS-SCNC: 13 MEQ/L (ref 8–16)
AST SERPL-CCNC: 18 U/L (ref 5–40)
BILIRUB SERPL-MCNC: 0.4 MG/DL (ref 0.3–1.2)
BUN BLDV-MCNC: 10 MG/DL (ref 7–22)
CALCIUM SERPL-MCNC: 9.4 MG/DL (ref 8.5–10.5)
CHLORIDE BLD-SCNC: 93 MEQ/L (ref 98–111)
CO2: 23 MEQ/L (ref 23–33)
CREAT SERPL-MCNC: 0.4 MG/DL (ref 0.4–1.2)
GFR SERPL CREATININE-BSD FRML MDRD: > 90 ML/MIN/1.73M2
GLUCOSE BLD-MCNC: 124 MG/DL (ref 70–108)
MAGNESIUM: 2.3 MG/DL (ref 1.6–2.4)
POTASSIUM SERPL-SCNC: 4.2 MEQ/L (ref 3.5–5.2)
SODIUM BLD-SCNC: 129 MEQ/L (ref 135–145)
TOTAL PROTEIN: 6.9 G/DL (ref 6.1–8)
TSH SERPL DL<=0.05 MIU/L-ACNC: 3.35 UIU/ML (ref 0.4–4.2)
VITAMIN D 25-HYDROXY: 45 NG/ML (ref 30–100)

## 2021-04-27 NOTE — TELEPHONE ENCOUNTER
Pt called asking the results of her blood work she got done yesterday.     Kisha Bettencourt may be reached at [phone]

## 2021-05-03 ENCOUNTER — HOSPITAL ENCOUNTER (OUTPATIENT)
Age: 81
Discharge: HOME | End: 2021-05-03
Payer: MEDICARE

## 2021-05-03 ENCOUNTER — HOSPITAL ENCOUNTER (OUTPATIENT)
Dept: NON INVASIVE DIAGNOSTICS | Age: 81
Discharge: HOME | End: 2021-05-03
Payer: MEDICARE

## 2021-05-03 DIAGNOSIS — I10 ESSENTIAL HYPERTENSION: ICD-10-CM

## 2021-05-03 DIAGNOSIS — R01.1 HEART MURMUR: ICD-10-CM

## 2021-05-03 LAB
LV EF: 60 %
LVEF MODALITY: NORMAL

## 2021-05-03 PROCEDURE — 36415 COLL VENOUS BLD VENIPUNCTURE: CPT

## 2021-05-03 PROCEDURE — 80048 BASIC METABOLIC PNL TOTAL CA: CPT

## 2021-05-03 PROCEDURE — 93306 TTE W/DOPPLER COMPLETE: CPT

## 2021-05-04 LAB
ANION GAP SERPL CALCULATED.3IONS-SCNC: 13 MEQ/L (ref 8–16)
BUN BLDV-MCNC: 13 MG/DL (ref 7–22)
CALCIUM SERPL-MCNC: 9.3 MG/DL (ref 8.5–10.5)
CHLORIDE BLD-SCNC: 94 MEQ/L (ref 98–111)
CO2: 23 MEQ/L (ref 23–33)
CREAT SERPL-MCNC: 0.4 MG/DL (ref 0.4–1.2)
GFR SERPL CREATININE-BSD FRML MDRD: > 90 ML/MIN/1.73M2
GLUCOSE BLD-MCNC: 109 MG/DL (ref 70–108)
POTASSIUM SERPL-SCNC: 3.9 MEQ/L (ref 3.5–5.2)
SODIUM BLD-SCNC: 130 MEQ/L (ref 135–145)

## 2021-05-05 ENCOUNTER — TELEPHONE (OUTPATIENT)
Dept: FAMILY MEDICINE CLINIC | Age: 81
End: 2021-05-05

## 2021-05-05 ENCOUNTER — TELEPHONE (OUTPATIENT)
Dept: CARDIOLOGY CLINIC | Age: 81
End: 2021-05-05

## 2021-05-05 NOTE — TELEPHONE ENCOUNTER
Pt called requesting lab results and has questions about her sodium. Detail Level: Detailed Instructions (Optional): Will do a insurance benefits investigation, then will call patient with cost. Patient can at that time schedule removal. Introduction Text (Please End With A Colon): The following procedure was deferred:

## 2021-05-05 NOTE — TELEPHONE ENCOUNTER
Pt had an echo 5-3-21 that was ordered by Dr. Snow Benavidez. Pt is asking if anything is needed from Dr. Jody Gonsalez standpoint. Conclusions      Summary  Ejection fraction is visually estimated at 60%. Overall left ventricular function is normal.  Aortic valve appears tricuspid. Aortic valve leaflets are somewhat thickened. Aortic valve leaflets are Mildly calcified. Trivial aortic regurgitation is noted. Moderate late systolic prolapse of posterior leaflet of mitral valve. Moderate late systolic prolapse of anterior leaflet of mitral valve. Moderate mitral regurgitation is present.

## 2021-05-06 ENCOUNTER — TELEPHONE (OUTPATIENT)
Dept: FAMILY MEDICINE CLINIC | Age: 81
End: 2021-05-06

## 2021-05-06 NOTE — TELEPHONE ENCOUNTER
----- Message from Arcelia Carrasco MD sent at 5/5/2021  9:35 PM EDT -----  Let her know that the sodium is stable from before. See me in October still.

## 2021-05-06 NOTE — TELEPHONE ENCOUNTER
----- Message from Rachel Salvador MD sent at 5/5/2021  9:07 PM EDT -----  Let her know that the echocardiogram showed a heart murmur but nothing to be concerned with.

## 2021-05-08 NOTE — TELEPHONE ENCOUNTER
Let her know that other than the sodium the labs were fine. Some people as they age the sodium will drop some. Adding more table salt or eating salty foods would be not likely to help. Does she have other questions?

## 2021-05-10 DIAGNOSIS — F41.9 ANXIETY: ICD-10-CM

## 2021-05-10 RX ORDER — LORAZEPAM 1 MG/1
1 TABLET ORAL DAILY PRN
Qty: 30 TABLET | Refills: 0 | Status: SHIPPED | OUTPATIENT
Start: 2021-05-10 | End: 2021-06-17 | Stop reason: SDUPTHER

## 2021-05-10 NOTE — TELEPHONE ENCOUNTER
Date of last visit:  4/26/2021  Date of next visit:  10/25/2021    Requested Prescriptions     Pending Prescriptions Disp Refills    LORazepam (ATIVAN) 1 MG tablet 30 tablet 0     Sig: Take 1 tablet by mouth daily as needed for Anxiety for up to 30 days.

## 2021-06-17 DIAGNOSIS — F41.9 ANXIETY: ICD-10-CM

## 2021-06-17 RX ORDER — LORAZEPAM 1 MG/1
1 TABLET ORAL DAILY PRN
Qty: 30 TABLET | Refills: 0 | Status: SHIPPED | OUTPATIENT
Start: 2021-06-17 | End: 2021-07-16 | Stop reason: SDUPTHER

## 2021-07-16 DIAGNOSIS — F41.9 ANXIETY: ICD-10-CM

## 2021-07-17 RX ORDER — LORAZEPAM 1 MG/1
1 TABLET ORAL DAILY PRN
Qty: 30 TABLET | Refills: 0 | Status: SHIPPED | OUTPATIENT
Start: 2021-07-17 | End: 2021-08-17 | Stop reason: SDUPTHER

## 2021-08-17 DIAGNOSIS — F41.9 ANXIETY: ICD-10-CM

## 2021-08-17 RX ORDER — LORAZEPAM 1 MG/1
1 TABLET ORAL DAILY PRN
Qty: 30 TABLET | Refills: 0 | Status: SHIPPED | OUTPATIENT
Start: 2021-08-17 | End: 2021-09-16 | Stop reason: SDUPTHER

## 2021-08-26 ENCOUNTER — OFFICE VISIT (OUTPATIENT)
Dept: CARDIOLOGY CLINIC | Age: 81
End: 2021-08-26
Payer: MEDICARE

## 2021-08-26 VITALS
DIASTOLIC BLOOD PRESSURE: 82 MMHG | WEIGHT: 129 LBS | HEART RATE: 59 BPM | HEIGHT: 66 IN | BODY MASS INDEX: 20.73 KG/M2 | SYSTOLIC BLOOD PRESSURE: 160 MMHG

## 2021-08-26 DIAGNOSIS — I34.1 MVP (MITRAL VALVE PROLAPSE): ICD-10-CM

## 2021-08-26 DIAGNOSIS — I10 ESSENTIAL HYPERTENSION: Primary | ICD-10-CM

## 2021-08-26 DIAGNOSIS — I48.0 PAROXYSMAL ATRIAL FIBRILLATION (HCC): ICD-10-CM

## 2021-08-26 PROCEDURE — 99213 OFFICE O/P EST LOW 20 MIN: CPT | Performed by: NUCLEAR MEDICINE

## 2021-08-26 PROCEDURE — 4040F PNEUMOC VAC/ADMIN/RCVD: CPT | Performed by: NUCLEAR MEDICINE

## 2021-08-26 PROCEDURE — G8420 CALC BMI NORM PARAMETERS: HCPCS | Performed by: NUCLEAR MEDICINE

## 2021-08-26 PROCEDURE — 93000 ELECTROCARDIOGRAM COMPLETE: CPT | Performed by: NUCLEAR MEDICINE

## 2021-08-26 PROCEDURE — G8428 CUR MEDS NOT DOCUMENT: HCPCS | Performed by: NUCLEAR MEDICINE

## 2021-08-26 PROCEDURE — 1123F ACP DISCUSS/DSCN MKR DOCD: CPT | Performed by: NUCLEAR MEDICINE

## 2021-08-26 PROCEDURE — G8400 PT W/DXA NO RESULTS DOC: HCPCS | Performed by: NUCLEAR MEDICINE

## 2021-08-26 PROCEDURE — 1090F PRES/ABSN URINE INCON ASSESS: CPT | Performed by: NUCLEAR MEDICINE

## 2021-08-26 PROCEDURE — 1036F TOBACCO NON-USER: CPT | Performed by: NUCLEAR MEDICINE

## 2021-08-26 NOTE — PROGRESS NOTES
[facility] [place]  [address] [place] [id number]  Dept: [phone]  Dept Fax: [phone]  Loc: [phone]    Visit Date: 8/26/2021    Olive Borges is a 78 y.o. female who presents todayfor:  Chief Complaint   Patient presents with    Hypertension    Cardiac Valve Problem    Atrial Fibrillation   has minimal A fib   Some palpitation   Known MVP and MR  No chest pain   Some baseline dyspnea   No major change from last year   Slowing down   BP is stable      HPI:  HPI  Past Medical History:   Diagnosis Date    Osteoporosis of multiple sites without pathological fracture 10/2017    Squamous cell carcinoma of skin 2016    Vascular headache 1980's    Vitamin D deficiency 10/2017      Past Surgical History:   Procedure Laterality Date    BLADDER REPAIR  9/2014    MENISCECTOMY Left 5/2013    lateral    SKIN CANCER EXCISION  09/2016    Dr Isabel Pedersen Left 4/2015     History reviewed. No pertinent family history. Social History     Tobacco Use    Smoking status: Never Smoker    Smokeless tobacco: Never Used   Substance Use Topics    Alcohol use: No      Current Outpatient Medications   Medication Sig Dispense Refill    LORazepam (ATIVAN) 1 MG tablet Take 1 tablet by mouth daily as needed for Anxiety for up to 30 days. 30 tablet 0    metoprolol tartrate (LOPRESSOR) 50 MG tablet Take 1 tablet by mouth 2 times daily 180 tablet 1    Cholecalciferol (VITAMIN D3) 5000 units CAPS Take 1 capsule by mouth daily       No current facility-administered medications for this visit.      Allergies   Allergen Reactions    Ciprofloxacin Other (See Comments)     dizziness    Clindamycin/Lincomycin Hives    Hydrocod Polst-Cpm Polst Er      anxious    Macrobid [Nitrofurantoin Macrocrystal] Diarrhea    Paxil [Paroxetine Hcl] Other (See Comments)     dizzy    Morphine Nausea And Vomiting     Health Maintenance   Topic Date Due    COVID-19 Vaccine (1) Never done    DTaP/Tdap/Td vaccine (1 - Tdap) Never done    Shingles Vaccine (1 of 2) Never done   Deyanira Curl Annual Wellness Visit (AWV)  Never done    Pneumococcal 65+ years Vaccine (1 of 1 - PPSV23) 11/20/2021 (Originally 4/12/2003)    Flu vaccine (1) 09/01/2021    Potassium monitoring  05/03/2022    Creatinine monitoring  05/03/2022    DEXA (modify frequency per FRAX score)  Completed    Hepatitis A vaccine  Aged Out    Hepatitis B vaccine  Aged Out    Hib vaccine  Aged Out    Meningococcal (ACWY) vaccine  Aged Out       Subjective:  Review of Systems  General:   No fever, no chills, No fatigue or weight loss  Pulmonary:    some dyspnea, no wheezing  Cardiac:    Denies recent chest pain,   GI:     No nausea or vomiting, no abdominal pain  Neuro:    No dizziness or light headedness,   Musculoskeletal:  No recent active issues  Extremities:   No edema, no obvious claudication       Objective:  Physical Exam  BP (!) 160/82   Pulse 59   Ht 5' 6\" (1.676 m)   Wt 129 lb (58.5 kg)   BMI 20.82 kg/m²   General:   Well developed, well nourished  Lungs:   Clear to auscultation  Heart:    Normal S1 S2, Slight murmur. no rubs, no gallops  Abdomen:   Soft, non tender, no organomegalies, positive bowel sounds  Extremities:   No edema, no cyanosis, good peripheral pulses  Neurological:   Awake, alert, oriented. No obvious focal deficits  Musculoskelatal:  No obvious deformities    Assessment:      Diagnosis Orders   1. Essential hypertension  EKG 12 Lead   2. Paroxysmal atrial fibrillation (HCC)  EKG 12 Lead   3. MVP (mitral valve prolapse)     as above  Cardiac fair for now   ECG in office was done today. I reviewed the ECG. No acute findings      Plan:  No follow-ups on file. As above  Monitor the MR  Continue risk factor modification and medical management  Thank you for allowing me to participate in the care of your patient.  Please don't hesitate to contact me regarding any further issues related to the patient care    Orders Placed:  Orders Placed This Encounter   Procedures    EKG 12 Lead     Order Specific Question:   Reason for Exam?     Answer: Other       Medications Prescribed:  No orders of the defined types were placed in this encounter. Discussed use, benefit, and side effects of prescribed medications. All patient questions answered. Pt voicedunderstanding. Instructed to continue current medications, diet and exercise. Continue risk factor modification and medical management. Patient agreed with treatment plan. Follow up as directed.     Electronically signedby Melani Bar MD on 8/26/2021 at 1:25 PM

## 2021-08-26 NOTE — PROGRESS NOTES
Patient denies having any chest pain, dizziness, lightheadedness or palpitations SHELLY    C/O: shortness of breath with exertion   EKG was done today

## 2021-09-16 DIAGNOSIS — F41.9 ANXIETY: ICD-10-CM

## 2021-09-16 RX ORDER — LORAZEPAM 1 MG/1
1 TABLET ORAL DAILY PRN
Qty: 30 TABLET | Refills: 0 | Status: SHIPPED | OUTPATIENT
Start: 2021-09-16 | End: 2021-10-18 | Stop reason: SDUPTHER

## 2021-10-18 DIAGNOSIS — F41.9 ANXIETY: ICD-10-CM

## 2021-10-18 RX ORDER — LORAZEPAM 1 MG/1
1 TABLET ORAL DAILY PRN
Qty: 30 TABLET | Refills: 0 | Status: SHIPPED | OUTPATIENT
Start: 2021-10-18 | End: 2021-11-15 | Stop reason: SDUPTHER

## 2021-11-15 DIAGNOSIS — F41.9 ANXIETY: ICD-10-CM

## 2021-11-15 RX ORDER — LORAZEPAM 1 MG/1
1 TABLET ORAL DAILY PRN
Qty: 30 TABLET | Refills: 0 | Status: SHIPPED | OUTPATIENT
Start: 2021-11-15 | End: 2021-12-16 | Stop reason: SDUPTHER

## 2021-11-15 NOTE — TELEPHONE ENCOUNTER
Date of last visit:  4/26/2021  Date of next visit:  12/9/2021    Requested Prescriptions     Pending Prescriptions Disp Refills    LORazepam (ATIVAN) 1 MG tablet 30 tablet 0     Sig: Take 1 tablet by mouth daily as needed for Anxiety for up to 30 days.

## 2021-12-14 DIAGNOSIS — F41.9 ANXIETY: ICD-10-CM

## 2021-12-14 NOTE — TELEPHONE ENCOUNTER
Date of last visit:  4/26/2021  Date of next visit:  1/11/2022    Requested Prescriptions     Pending Prescriptions Disp Refills    LORazepam (ATIVAN) 1 MG tablet 30 tablet 0     Sig: Take 1 tablet by mouth daily as needed for Anxiety for up to 30 days.

## 2021-12-16 RX ORDER — LORAZEPAM 1 MG/1
1 TABLET ORAL DAILY PRN
Qty: 30 TABLET | Refills: 0 | Status: SHIPPED | OUTPATIENT
Start: 2021-12-16 | End: 2022-01-14 | Stop reason: SDUPTHER

## 2022-01-11 ENCOUNTER — OFFICE VISIT (OUTPATIENT)
Dept: FAMILY MEDICINE CLINIC | Age: 82
End: 2022-01-11

## 2022-01-11 VITALS
WEIGHT: 132.13 LBS | RESPIRATION RATE: 16 BRPM | BODY MASS INDEX: 21.23 KG/M2 | TEMPERATURE: 96.5 F | HEART RATE: 80 BPM | DIASTOLIC BLOOD PRESSURE: 82 MMHG | HEIGHT: 66 IN | SYSTOLIC BLOOD PRESSURE: 138 MMHG

## 2022-01-11 DIAGNOSIS — Z00.00 ROUTINE GENERAL MEDICAL EXAMINATION AT A HEALTH CARE FACILITY: ICD-10-CM

## 2022-01-11 DIAGNOSIS — I48.0 PAROXYSMAL ATRIAL FIBRILLATION (HCC): ICD-10-CM

## 2022-01-11 DIAGNOSIS — H61.23 BILATERAL IMPACTED CERUMEN: Primary | ICD-10-CM

## 2022-01-11 DIAGNOSIS — I10 ESSENTIAL HYPERTENSION: ICD-10-CM

## 2022-01-11 PROCEDURE — 1123F ACP DISCUSS/DSCN MKR DOCD: CPT | Performed by: FAMILY MEDICINE

## 2022-01-11 PROCEDURE — 1090F PRES/ABSN URINE INCON ASSESS: CPT | Performed by: FAMILY MEDICINE

## 2022-01-11 PROCEDURE — 99213 OFFICE O/P EST LOW 20 MIN: CPT | Performed by: FAMILY MEDICINE

## 2022-01-11 PROCEDURE — G8420 CALC BMI NORM PARAMETERS: HCPCS | Performed by: FAMILY MEDICINE

## 2022-01-11 PROCEDURE — G0438 PPPS, INITIAL VISIT: HCPCS | Performed by: FAMILY MEDICINE

## 2022-01-11 PROCEDURE — 69210 REMOVE IMPACTED EAR WAX UNI: CPT | Performed by: FAMILY MEDICINE

## 2022-01-11 PROCEDURE — G8427 DOCREV CUR MEDS BY ELIG CLIN: HCPCS | Performed by: FAMILY MEDICINE

## 2022-01-11 PROCEDURE — G8400 PT W/DXA NO RESULTS DOC: HCPCS | Performed by: FAMILY MEDICINE

## 2022-01-11 PROCEDURE — 1036F TOBACCO NON-USER: CPT | Performed by: FAMILY MEDICINE

## 2022-01-11 ASSESSMENT — LIFESTYLE VARIABLES: HOW OFTEN DO YOU HAVE A DRINK CONTAINING ALCOHOL: 0

## 2022-01-11 ASSESSMENT — PATIENT HEALTH QUESTIONNAIRE - PHQ9
SUM OF ALL RESPONSES TO PHQ QUESTIONS 1-9: 0
SUM OF ALL RESPONSES TO PHQ QUESTIONS 1-9: 0
2. FEELING DOWN, DEPRESSED OR HOPELESS: 0
SUM OF ALL RESPONSES TO PHQ QUESTIONS 1-9: 0
SUM OF ALL RESPONSES TO PHQ9 QUESTIONS 1 & 2: 0
1. LITTLE INTEREST OR PLEASURE IN DOING THINGS: 0
SUM OF ALL RESPONSES TO PHQ QUESTIONS 1-9: 0

## 2022-01-11 NOTE — TELEPHONE ENCOUNTER
EMERGENCY DEPARTMENT ENCOUNTER      CHIEF COMPLAINT    Chief Complaint   Patient presents with   • Medical Problem       HPI    Jorge Alberto Castano is a 19 year old male with no sig pmhx presenting to ED in [employer] custody for medication administration. Pt's primary complaint is pleuritic chest pain located over lower lateral chest walls bilaterally. It is present with cough and deep breathing. Not related to exertion, position, or eating/drinking. Has had multiple ED visits with the same pain. Has had neg covid test, labs (including trop/dimer), ekg, and cxr during previous 3 ED visits. States cough/sore throat are actually improving, but sharp chest pain is persistent. No fever, chills, shortness of breath/wheezing, abd pain, n/v, change in bowel/bladder habits. Requesting pain medications.     History obtained from the patient.      I have reviewed the nurse's notes    ALLERGIES    ALLERGIES:  No Known Allergies    CURRENT MEDICATIONS    No current facility-administered medications for this encounter.     No current outpatient medications on file.       PAST MEDICAL HISTORY    Past Medical History:   Diagnosis Date   • Negative History of CA, HTN, DM, CAD, CVA, DVT, Asthma    • RAD (reactive airway disease)        SURGICAL HISTORY    Past Surgical History:   Procedure Laterality Date   • Past surgical history      PSH of Negative       SOCIAL HISTORY    Social History     Tobacco Use   • Smoking status: Never Smoker   • Smokeless tobacco: Never Used   Substance Use Topics   • Alcohol use: No   • Drug use: Yes     Types: Marijuana       FAMILY HISTORY    Family History   Problem Relation Age of Onset   • NEGATIVE FAMILY HX OF Mother    • Other Father         Kidney Failure   • Hypertension Father    • NEGATIVE FAMILY HX OF Sister    • NEGATIVE FAMILY HX OF Brother        REVIEW OF SYSTEMS    Constitutional:  Denies fever or chills.   Eyes:  Denies change in visual acuity, eye pain.   HENT:  Denies nasal congestion or  Patient is calling in asking for a refill of her metoprolol medication. Please verify her dosage and she would like 90 days supplies to [facility]. sore throat.   Respiratory:  +cough, denies shortness of breath.   Cardiovascular:  +chest pain, denies palpitations or LE edema   GI:  Denies abdominal pain. No nausea or vomiting. No diarrhea.  :  Denies dysuria. No hematuria.  Musculoskeletal:  Denies back pain or joint pain.   Skin:  Denies rash. No petechia.  Neurologic:  Denies headache. No paresthesias. No Weakness.  Endocrine:  Denies polyuria or polydipsia.   Lymphatic:  Denies swollen glands.   Psychiatric:  Denies depression or anxiety.  All others reviewed and are negative.    PHYSICAL EXAM    Vitals:    01/11/22 1116 01/11/22 1240   BP: 128/81 130/84   BP Location: LUE - Left upper extremity RFA - Right forearm   Patient Position: Sitting Sitting   Pulse: (!) 110 (!) 101   Resp: 14 16   Temp: 98.4 °F (36.9 °C)    TempSrc: Oral    SpO2: 100% 99%       Pulse Ox / Interpretation:  100%, normal on RA     Constitutional:  Well developed, well nourished. No acute distress, non-toxic appearance.   Eyes:  PERRL, EOMI.  Conjunctivae normal.   HENT:  Atraumatic/normocephalic. External ears normal. Hearing intact. Nose normal. Oropharynx moist.  Neck:  Supple. Normal range of motion. No midline tenderness, stepoff or deformity.   Respiratory:  Effort is normal, no respiratory distress, normal breath sounds bilaterally. No wheezing/rales/rhonchi.   Cardiovascular:  Normal rate, normal rhythm. No murmurs, gallops, or rubs.  No edema in all 4 extremities.   GI:  Soft, non-distended. No tenderness. No rebound or rigidity.   Musculoskeletal:  No obvious deformities. No tenderness. Full range of motion of bilateral upper and lower extremities.   Back: No midline tenderness, stepoff, deformity. No paravertebral tenderness. ROM intact.   Skin:  Warm and dry. Well hydrated. No rashes.  No petechiae or purpura.   Lymphatic:  No anterior or posterior cervical lymphadenopathy noted. No submandibular lymphadenopathy.  Neurologic:  Alert & oriented x 3.  Grossly normal motor  and sensory exam. No focal deficits.   Psychiatric:  Speech and behavior appropriate.     LABS    Results for orders placed or performed during the hospital encounter of 01/11/22   Rapid SARS-CoV-2 by PCR    Specimen: Nasal, Mid-turbinate; Swab   Result Value    Rapid SARS-COV-2 by PCR Not Detected    Isolation Guidelines      Comment: Do not use this test result as the sole decision-maker for discontinuation of isolation.   Clinical evaluation should be considered for other respiratory illness requiring transmission-based isolation.    -    No fever (<99.0 F/37.2 C) for at least 24 hours without the use of fever-reducing medications    AND  -    Respiratory symptoms have improved or resolved (e.g. cough, shortness of breath)     AND  -    COVID-19 negative test    See COVID-19 Deisolation Resource Guide    Procedural Comment      Comment: SARS-CoV-2 nucleic acid has not been detected indicating the absence of COVID-19.       Testing was performed using the [employer] Xpert Xpress SARS-CoV-2 RT-PCR assay that has been given Emergency Use Authorization (EUA) by the [place] Food and Drug Administration (FDA).  These results are considered definitive and do not need to be confirmed by another method.       ED MEDS  Medications   ketorolac injection 30 mg (30 mg Intramuscular Given 1/11/22 1140)     MEDICAL DECISION MAKING    19 year old male with no sig pmhx presenting in [employer] custody with pleuritic chest pain with dry cough. Has had multiple ED visits with the same pain. Has had neg covid test, labs (including trop/dimer), ekg, and cxr during previous 3 ED visits. Requesting pain meds, as they are unable to administer in the skilled nursing at this time.     On eval, pt well-appearing and in NAD. Slightly tachycardic 110's but pt relates this to anxiety, and admits he hasn't been eating/drinking well while in skilled nursing because he doesn't like what is offered to him. His HR improved here with toradol and PO fluids/crackers.      Given persistent sx with extensive w/u in recent days, low suspicion for ACS, severe infection. Has bilat breath sounds. He actually states symptoms are improving today and just wants pain meds. Toradol was given with improvement.     Discussed with pt that sx likely related to pleurisy from recent cough. We did repeat COVID test today and again it was negative. Recommended APAP/NSAIDs PRN, and close f/u with pcp when able. Return precautions discussed. Pt verbalized understanding and agreed with the plan. Discharged in [employer] custody in stable condition.     IMPRESSION:  1. Pleurisy         There are no discharge medications for this patient.         Saira Terrell,   01/11/22 3196

## 2022-01-11 NOTE — LETTER
[facility] [id number]  Phone: [phone]  Fax: [phone]    Danny Avery MD         January 11, 2022     Patient: Kirsten Haider   YOB: 1940   Date of Visit: 1/11/2022       To Whom It May Concern: It is my medical opinion that Dav Fiore requires a disability parking placard for the following reasons:  She cannot walk 200 feet without stopping to rest.  Duration of need: 5 years    If you have any questions or concerns, please don't hesitate to call.     Sincerely,        Danny Avery MD

## 2022-01-11 NOTE — PATIENT INSTRUCTIONS
Personalized Preventive Plan for Ana María Lazo - 1/11/2022  Medicare offers a range of preventive health benefits. Some of the tests and screenings are paid in full while other may be subject to a deductible, co-insurance, and/or copay. Some of these benefits include a comprehensive review of your medical history including lifestyle, illnesses that may run in your family, and various assessments and screenings as appropriate. After reviewing your medical record and screening and assessments performed today your provider may have ordered immunizations, labs, imaging, and/or referrals for you. A list of these orders (if applicable) as well as your Preventive Care list are included within your After Visit Summary for your review. Other Preventive Recommendations:    · A preventive eye exam performed by an eye specialist is recommended every 1-2 years to screen for glaucoma; cataracts, macular degeneration, and other eye disorders. · A preventive dental visit is recommended every 6 months. · Try to get at least 150 minutes of exercise per week or 10,000 steps per day on a pedometer . · Order or download the FREE \"Exercise & Physical Activity: Your Everyday Guide\" from [employer]. Call 1-168.102.8805 or search [employer] online. · You need 8090-5213 mg of calcium and 8287-0118 IU of vitamin D per day. It is possible to meet your calcium requirement with diet alone, but a vitamin D supplement is usually necessary to meet this goal.  · When exposed to the sun, use a sunscreen that protects against both UVA and UVB radiation with an SPF of 30 or greater. Reapply every 2 to 3 hours or after sweating, drying off with a towel, or swimming. · Always wear a seat belt when traveling in a car. Always wear a helmet when riding a bicycle or motorcycle.

## 2022-01-11 NOTE — PROGRESS NOTES
Medicare Annual Wellness Visit  Name: Rey Olmedo Date: 2022   MRN: [id number] Sex: Female   Age: 78 y.o. Ethnicity: Non- / Non    : 1940 Race: White (non-)      Dilip Ziegler is here for Medicare AWV, Anxiety, and Other (pt request handicap placard)    Screenings for behavioral, psychosocial and functional/safety risks, and cognitive dysfunction are all negative except as indicated below. These results, as well as other patient data from the [address] form, are documented in Flowsheets linked to this Encounter. Allergies   Allergen Reactions    Ciprofloxacin Other (See Comments)     dizziness    Clindamycin/Lincomycin Hives    Hydrocod Polst-Cpm Polst Er      anxious    Macrobid [Nitrofurantoin Macrocrystal] Diarrhea    Paxil [Paroxetine Hcl] Other (See Comments)     dizzy    Morphine Nausea And Vomiting       Prior to Visit Medications    Medication Sig Taking? Authorizing Provider   Cranberry 125 MG TABS Take by mouth Yes Historical Provider, MD   LORazepam (ATIVAN) 1 MG tablet Take 1 tablet by mouth daily as needed for Anxiety for up to 30 days. Yes Weldon Bamberger, MD   metoprolol tartrate (LOPRESSOR) 50 MG tablet Take 1 tablet by mouth 2 times daily Yes Norah Caceres MD   Cholecalciferol (VITAMIN D3) 5000 units CAPS Take 1 capsule by mouth daily Yes Danie Stahl MD       Past Medical History:   Diagnosis Date    Osteoporosis of multiple sites without pathological fracture 10/2017    Squamous cell carcinoma of skin 2016    Vascular headache     Vitamin D deficiency 10/2017       Past Surgical History:   Procedure Laterality Date    BLADDER REPAIR  2014    MENISCECTOMY Left 2013    lateral    SKIN CANCER EXCISION  2016    Dr Kinjal Khanna Left 2015       No family history on file.     CareTeam (Including outside providers/suppliers regularly involved in providing care):   Patient range of motion, no joint swelling, deformity or tenderness  Neurologic: gait and coordination normal and speech normal    Patient's complete Health Risk Assessment and screening values have been reviewed and are found in Flowsheets. The following problems were reviewed today and where indicated follow up appointments were made and/or referrals ordered. Positive Risk Factor Screenings with Interventions:            General Health and ACP:  General  In general, how would you say your health is?: Good  In the past 7 days, have you experienced any of the following? New or Increased Pain, New or Increased Fatigue, Loneliness, Social Isolation, Stress or Anger?: None of These  Do you get the social and emotional support that you need?: Yes  Do you have a Living Will?: Yes  Advance Directives     Power of  Living Will ACP-Advance Directive ACP-Power of     Not on File Not on File Not on File Not on File      General Health Risk Interventions:  · the answers were acceptable          Personalized Preventive Plan   Current Health Maintenance Status  Immunization History   Administered Date(s) Administered    COVID-19, Saldivar Peter, PF, 30mcg/0.3mL 08/30/2021, 09/20/2021        Health Maintenance   Topic Date Due    DTaP/Tdap/Td vaccine (1 - Tdap) Never done    Shingles Vaccine (1 of 2) Never done    Pneumococcal 65+ years Vaccine (1 of 1 - PPSV23) Never done   [employer] Visit (AWV)  Never done    Flu vaccine (1) Never done    COVID-19 Vaccine (3 - Booster for Pfizer series) 03/20/2022    Depression Screen  04/26/2022    Potassium monitoring  05/03/2022    Creatinine monitoring  05/03/2022    DEXA (modify frequency per FRAX score)  Completed    Hepatitis A vaccine  Aged Out    Hepatitis B vaccine  Aged Out    Hib vaccine  Aged Out    Meningococcal (ACWY) vaccine  Aged Out     Recommendations for [employer] Due: see orders and patient instructions/AVS.  .   Recommended screening schedule for the next 5-10 years is provided to the patient in written form: see Patient Ela Lindo was seen today for medicare awv, anxiety and other.     Diagnoses and all orders for this visit:    Paroxysmal atrial fibrillation ([facility] [address]

## 2022-01-14 DIAGNOSIS — F41.9 ANXIETY: ICD-10-CM

## 2022-01-14 RX ORDER — LORAZEPAM 1 MG/1
1 TABLET ORAL DAILY PRN
Qty: 30 TABLET | Refills: 0 | Status: SHIPPED | OUTPATIENT
Start: 2022-01-14 | End: 2022-02-14 | Stop reason: SDUPTHER

## 2022-01-14 NOTE — TELEPHONE ENCOUNTER
Date of last visit:  1/11/2022  Date of next visit:  Visit date not found    Requested Prescriptions     Pending Prescriptions Disp Refills    LORazepam (ATIVAN) 1 MG tablet 30 tablet 0     Sig: Take 1 tablet by mouth daily as needed for Anxiety for up to 30 days.

## 2022-02-14 DIAGNOSIS — F41.9 ANXIETY: ICD-10-CM

## 2022-02-14 RX ORDER — LORAZEPAM 1 MG/1
1 TABLET ORAL DAILY PRN
Qty: 30 TABLET | Refills: 0 | Status: SHIPPED | OUTPATIENT
Start: 2022-02-14 | End: 2022-03-14 | Stop reason: SDUPTHER

## 2022-02-28 RX ORDER — METOPROLOL TARTRATE 50 MG/1
TABLET, FILM COATED ORAL
Qty: 180 TABLET | Refills: 1 | Status: SHIPPED | OUTPATIENT
Start: 2022-02-28 | End: 2022-03-01 | Stop reason: SDUPTHER

## 2022-03-01 RX ORDER — METOPROLOL TARTRATE 50 MG/1
TABLET, FILM COATED ORAL
Qty: 180 TABLET | Refills: 2 | Status: ON HOLD | OUTPATIENT
Start: 2022-03-01 | End: 2022-08-29 | Stop reason: HOSPADM

## 2022-03-01 NOTE — TELEPHONE ENCOUNTER
America Hudson called requesting a refill on the following medications:  Requested Prescriptions     Pending Prescriptions Disp Refills    metoprolol tartrate (LOPRESSOR) 50 MG tablet 180 tablet 1     Pharmacy verified: [facility]  . pv      Date of last visit: 8/26/2021  Date of next visit (if applicable): Visit date not found    Pt requests 90 day supply

## 2022-03-14 DIAGNOSIS — F41.9 ANXIETY: ICD-10-CM

## 2022-03-14 RX ORDER — LORAZEPAM 1 MG/1
1 TABLET ORAL DAILY PRN
Qty: 30 TABLET | Refills: 0 | Status: SHIPPED | OUTPATIENT
Start: 2022-03-14 | End: 2022-04-14 | Stop reason: SDUPTHER

## 2022-04-13 DIAGNOSIS — F41.9 ANXIETY: ICD-10-CM

## 2022-04-14 RX ORDER — LORAZEPAM 1 MG/1
1 TABLET ORAL DAILY PRN
Qty: 30 TABLET | Refills: 0 | Status: SHIPPED | OUTPATIENT
Start: 2022-04-14 | End: 2022-05-12 | Stop reason: SDUPTHER

## 2022-05-12 DIAGNOSIS — F41.9 ANXIETY: ICD-10-CM

## 2022-05-12 RX ORDER — LORAZEPAM 1 MG/1
1 TABLET ORAL DAILY PRN
Qty: 30 TABLET | Refills: 0 | Status: SHIPPED | OUTPATIENT
Start: 2022-05-12 | End: 2022-06-13 | Stop reason: SDUPTHER

## 2022-05-19 ENCOUNTER — TELEPHONE (OUTPATIENT)
Dept: FAMILY MEDICINE CLINIC | Age: 82
End: 2022-05-19

## 2022-05-19 NOTE — TELEPHONE ENCOUNTER
Pt called asking if you can write an order for orthotics for both feet at [facility]. Said that she has flat arches also one of her feet turns in and without an orthotic her foot turns in.

## 2022-05-24 ENCOUNTER — OFFICE VISIT (OUTPATIENT)
Dept: FAMILY MEDICINE CLINIC | Age: 82
End: 2022-05-24

## 2022-05-24 VITALS
WEIGHT: 131.5 LBS | HEIGHT: 66 IN | HEART RATE: 60 BPM | RESPIRATION RATE: 12 BRPM | SYSTOLIC BLOOD PRESSURE: 122 MMHG | DIASTOLIC BLOOD PRESSURE: 60 MMHG | BODY MASS INDEX: 21.13 KG/M2

## 2022-05-24 DIAGNOSIS — F41.9 ANXIETY: Primary | ICD-10-CM

## 2022-05-24 DIAGNOSIS — M25.373 UNSTABLE ANKLE, UNSPECIFIED LATERALITY: ICD-10-CM

## 2022-05-24 PROBLEM — H25.9 AGE-RELATED CATARACT OF BOTH EYES: Status: ACTIVE | Noted: 2022-02-17

## 2022-05-24 PROCEDURE — G8400 PT W/DXA NO RESULTS DOC: HCPCS | Performed by: FAMILY MEDICINE

## 2022-05-24 PROCEDURE — G8427 DOCREV CUR MEDS BY ELIG CLIN: HCPCS | Performed by: FAMILY MEDICINE

## 2022-05-24 PROCEDURE — 99213 OFFICE O/P EST LOW 20 MIN: CPT | Performed by: FAMILY MEDICINE

## 2022-05-24 PROCEDURE — G8420 CALC BMI NORM PARAMETERS: HCPCS | Performed by: FAMILY MEDICINE

## 2022-05-24 PROCEDURE — 1036F TOBACCO NON-USER: CPT | Performed by: FAMILY MEDICINE

## 2022-05-24 PROCEDURE — 1123F ACP DISCUSS/DSCN MKR DOCD: CPT | Performed by: FAMILY MEDICINE

## 2022-05-24 PROCEDURE — 1090F PRES/ABSN URINE INCON ASSESS: CPT | Performed by: FAMILY MEDICINE

## 2022-05-24 SDOH — ECONOMIC STABILITY: FOOD INSECURITY: WITHIN THE PAST 12 MONTHS, YOU WORRIED THAT YOUR FOOD WOULD RUN OUT BEFORE YOU GOT MONEY TO BUY MORE.: NEVER TRUE

## 2022-05-24 SDOH — ECONOMIC STABILITY: FOOD INSECURITY: WITHIN THE PAST 12 MONTHS, THE FOOD YOU BOUGHT JUST DIDN'T LAST AND YOU DIDN'T HAVE MONEY TO GET MORE.: NEVER TRUE

## 2022-05-24 ASSESSMENT — ENCOUNTER SYMPTOMS
SHORTNESS OF BREATH: 0
CONSTIPATION: 0
SINUS PRESSURE: 0

## 2022-05-24 ASSESSMENT — SOCIAL DETERMINANTS OF HEALTH (SDOH): HOW HARD IS IT FOR YOU TO PAY FOR THE VERY BASICS LIKE FOOD, HOUSING, MEDICAL CARE, AND HEATING?: NOT HARD AT ALL

## 2022-05-24 NOTE — PROGRESS NOTES
Gabriel Garcaí (:  1940) is a 78 y.o. female,Established patient, here for evaluation of the following chief complaint(s):  No chief complaint on file. ASSESSMENT/PLAN:      Diagnosis Orders   1. Anxiety     2. Unstable ankle, unspecified laterality         See me in 6 months  [id number] [facility] Orthotics from [address]:  HPI  1. We discussed how she has needed orthotics in the past due to the feet/ankles falling inward  2. She had a red area on the anterior right ankle that is getting better with cortisone 10  3. The ativan continues to help  Review of Systems   Constitutional: Negative for fatigue. HENT: Negative for sinus pressure. Eyes: Negative for visual disturbance. Respiratory: Negative for shortness of breath. Cardiovascular: Negative for chest pain. Gastrointestinal: Negative for constipation. Genitourinary: Negative. Musculoskeletal: Positive for arthralgias. Skin: Negative for rash. Neurological: Negative for headaches. Psychiatric/Behavioral: The patient is nervous/anxious. The patient's medications, allergies, past medical problems, surgical, social, and family histories were reviewed and updated as needed. Objective   Physical Exam  Constitutional:       General: She is not in acute distress. Appearance: She is well-developed. HENT:      Head: Normocephalic and atraumatic. Eyes:      General: No scleral icterus. Conjunctiva/sclera: Conjunctivae normal.   Neck:      Trachea: No tracheal deviation. Cardiovascular:      Rate and Rhythm: Normal rate and regular rhythm. Heart sounds: Normal heart sounds. Pulmonary:      Effort: Pulmonary effort is normal.      Breath sounds: Normal breath sounds. Musculoskeletal:      Comments: Standing without the orthotic the feet/ankles fall inward    Skin:     General: Skin is warm and dry.    Neurological:      Mental Status: She is alert and oriented to person, place, and time. Psychiatric:         Behavior: Behavior normal.         Blood pressure 122/60, pulse 60, resp. rate 12, height 5' 6\" (1.676 m), weight 131 lb 8 oz (59.6 kg), not currently breastfeeding. An electronic signature was used to authenticate this note.     --Reba Kennedy MD

## 2022-06-13 DIAGNOSIS — F41.9 ANXIETY: ICD-10-CM

## 2022-06-13 RX ORDER — LORAZEPAM 1 MG/1
1 TABLET ORAL DAILY PRN
Qty: 30 TABLET | Refills: 0 | Status: SHIPPED | OUTPATIENT
Start: 2022-06-13 | End: 2022-07-07 | Stop reason: SDUPTHER

## 2022-06-13 NOTE — TELEPHONE ENCOUNTER
Date of last visit:  5/24/2022  Date of next visit:  Visit date not found    Requested Prescriptions     Pending Prescriptions Disp Refills    LORazepam (ATIVAN) 1 MG tablet 30 tablet 0     Sig: Take 1 tablet by mouth daily as needed for Anxiety for up to 30 days.

## 2022-06-21 ENCOUNTER — OFFICE VISIT (OUTPATIENT)
Dept: CARDIOLOGY CLINIC | Age: 82
End: 2022-06-21
Payer: MEDICARE

## 2022-06-21 VITALS
HEART RATE: 63 BPM | DIASTOLIC BLOOD PRESSURE: 75 MMHG | SYSTOLIC BLOOD PRESSURE: 134 MMHG | BODY MASS INDEX: 21.22 KG/M2 | HEIGHT: 66 IN

## 2022-06-21 DIAGNOSIS — R06.00 PND (PAROXYSMAL NOCTURNAL DYSPNEA): ICD-10-CM

## 2022-06-21 DIAGNOSIS — I48.0 PAROXYSMAL ATRIAL FIBRILLATION (HCC): ICD-10-CM

## 2022-06-21 DIAGNOSIS — I10 ESSENTIAL HYPERTENSION: Primary | ICD-10-CM

## 2022-06-21 DIAGNOSIS — F41.9 ANXIETY: ICD-10-CM

## 2022-06-21 DIAGNOSIS — I34.1 MVP (MITRAL VALVE PROLAPSE): ICD-10-CM

## 2022-06-21 DIAGNOSIS — R60.0 LOWER LEG EDEMA: ICD-10-CM

## 2022-06-21 PROCEDURE — 1123F ACP DISCUSS/DSCN MKR DOCD: CPT | Performed by: NURSE PRACTITIONER

## 2022-06-21 PROCEDURE — 99213 OFFICE O/P EST LOW 20 MIN: CPT | Performed by: NURSE PRACTITIONER

## 2022-06-21 PROCEDURE — 1036F TOBACCO NON-USER: CPT | Performed by: NURSE PRACTITIONER

## 2022-06-21 PROCEDURE — G8427 DOCREV CUR MEDS BY ELIG CLIN: HCPCS | Performed by: NURSE PRACTITIONER

## 2022-06-21 PROCEDURE — G8400 PT W/DXA NO RESULTS DOC: HCPCS | Performed by: NURSE PRACTITIONER

## 2022-06-21 PROCEDURE — 1090F PRES/ABSN URINE INCON ASSESS: CPT | Performed by: NURSE PRACTITIONER

## 2022-06-21 PROCEDURE — G8420 CALC BMI NORM PARAMETERS: HCPCS | Performed by: NURSE PRACTITIONER

## 2022-06-21 RX ORDER — FUROSEMIDE 20 MG/1
20 TABLET ORAL DAILY
Qty: 60 TABLET | Refills: 1 | Status: ON HOLD | OUTPATIENT
Start: 2022-06-21 | End: 2022-08-17 | Stop reason: HOSPADM

## 2022-06-21 NOTE — PROGRESS NOTES
Last seen by dr Vasu Robison 8-26-21    C/o weak, fatigue, edema in ankles, palpitations. Pt used w/c today.  Son states she does not normally need a w/c

## 2022-06-21 NOTE — PROGRESS NOTES
[address] [place] [address]  Dept: [phone]  Dept Fax: [phone]  Loc: [phone]    Visit Date: 7/19/2022    Primary Cardiologist: Dr. Jim Childs is a 78 y.o. female who presents today for: swelling in ankles and fatigue    Chief Complaint   Patient presents with    Follow-up     Last seen in office on 8/26/21 per Dr. Cam Gonzales. Per office note:  has minimal A fib   Some palpitation   Known MVP and MR  No chest pain   Some baseline dyspnea   No major change from last year   Slowing down   BP is stable  A & P:  Monitor the MR  Continue risk factor modification and medical management      HPI:  HPI  Past Medical History:   Diagnosis Date    Osteoporosis of multiple sites without pathological fracture 10/2017    Squamous cell carcinoma of skin 2016    Vascular headache 1980's    Vitamin D deficiency 10/2017      Past Surgical History:   Procedure Laterality Date    BLADDER REPAIR  9/2014    MENISCECTOMY Left 5/2013    lateral    SKIN CANCER EXCISION  09/2016    Dr Jones Villarreal Left 4/2015     History reviewed. No pertinent family history. Social History     Tobacco Use    Smoking status: Never    Smokeless tobacco: Never   Substance Use Topics    Alcohol use: No      Current Outpatient Medications   Medication Sig Dispense Refill    NONFORMULARY preservision      furosemide (LASIX) 20 MG tablet Take 1 tablet by mouth daily (Patient not taking: Reported on 7/5/2022) 60 tablet 1    metoprolol tartrate (LOPRESSOR) 50 MG tablet TAKE 1 TABLET BY MOUTH TWO TIMES A  tablet 2    Cholecalciferol (VITAMIN D3) 5000 units CAPS Take 1 capsule by mouth daily      LORazepam (ATIVAN) 1 MG tablet Take 1 tablet by mouth daily as needed for Anxiety for up to 30 days.  30 tablet 0    NONFORMULARY Eye shot-pt unsure of the name      Cranberry 125 MG TABS Take by mouth (Patient not taking: Reported on 5/24/2022)       No current facility-administered medications for this visit. Allergies   Allergen Reactions    Ciprofloxacin Other (See Comments)     dizziness    Clindamycin/Lincomycin Hives    Hydrocod Polst-Cpm Polst Er      anxious    Macrobid [Nitrofurantoin Macrocrystal] Diarrhea    Paxil [Paroxetine Hcl] Other (See Comments)     dizzy    Morphine Nausea And Vomiting     Health Maintenance   Topic Date Due    COVID-19 Vaccine (3 - Booster for Pfizer series) 02/20/2022    DTaP/Tdap/Td vaccine (1 - Tdap) 01/11/2023 (Originally 4/12/1959)    Shingles vaccine (1 of 2) 01/11/2023 (Originally 4/12/1988)    Pneumococcal 65+ years Vaccine (1 - PCV) 01/11/2023 (Originally 4/12/2003)    Flu vaccine (1) 09/01/2022    Depression Screen  01/11/2023    Annual Wellness Visit (AWV)  01/12/2023    DEXA (modify frequency per FRAX score)  Completed    Hepatitis A vaccine  Aged Out    Hepatitis B vaccine  Aged Out    Hib vaccine  Aged Out    Meningococcal (ACWY) vaccine  Aged Out     Today's visit:   Subjective: Woke up last 2 nights with high BP and shaking  Last night at 4:15; headache, BP up  164/95 - HR 61, shaking   - took a Lorazapam and came down into 120's  Aches all over  Just restarted doing some exercises with light weights in past 2 weeks  Feels like it is more work to breathe - even at rest  No chest pain  Active - does own housework - mopped floors - tired - did in sections - still tired  Has been feeling more lightheaded and dizzy - needed w/c today - could not have done steps  Heart has felt slow lately  Swelling in ankles - new       Subjective:  Review of Systems  General:   No fever, no chills, increased fatigue; weight stable  Pulmonary:    some dyspnea, no wheezing  Cardiac:    Denies recent chest pain,   GI:     No nausea or vomiting, no abdominal pain  Neuro:       More dizziness or light headedness,   Musculoskeletal:  Recently returned to exercising with light weights; no noted MSK soreness  Extremities:   Mild swelling in her ankles, no obvious claudication       Objective:  Physical Exam  /75   Pulse 63   Ht 5' 6\" (1.676 m)   BMI 21.22 kg/m²   General:   Well developed, well nourished  Lungs:    Clear to auscultation  Heart:    Normal S1 S2,  regular rhythm, normal rate Slight murmur. no rubs, no gallops  Abdomen:   Soft, non tender, no organomegalies, positive bowel sounds  Extremities:   1+ edema ankles bilaterally, no cyanosis, good peripheral pulses  Neurological:   Awake, alert, oriented. No obvious focal deficits  Musculoskelatal:  No obvious deformities    Diagnostics:   Echo: 5/3/2021  Summary   Ejection fraction is visually estimated at 60%. Overall left ventricular function is normal.   Aortic valve appears tricuspid. Aortic valve leaflets are somewhat thickened. Aortic valve leaflets are Mildly calcified. Trivial aortic regurgitation is noted. Moderate late systolic prolapse of posterior leaflet of mitral valve. Moderate late systolic prolapse of anterior leaflet of mitral valve. Moderate mitral regurgitation is present. Signature    ----------------------------------------------------------------   Electronically signed by Mya Cochran MD (Interpreting   physician) on 05/03/2021 at 03:38 PM    Assessment:      Diagnosis Orders   1. Essential hypertension        2. MVP (mitral valve prolapse)        3. Paroxysmal atrial fibrillation (HCC)        4. PND (paroxysmal nocturnal dyspnea)        5. Anxiety        6. Lower leg edema          Plan:  Return in about 2 weeks (around 7/5/2022), or if symptoms worsen or fail to improve, for Dr. Lavonne Zuniga. As above  Monitor the MR  Continue risk factor modification and medical management  Thank you for allowing me to participate in the care of your patient.  Please don't hesitate to contact me regarding any further issues related to the patient care    Orders Placed:  No orders of the defined types were placed in this encounter. Medications Prescribed:  Orders Placed This Encounter   Medications    furosemide (LASIX) 20 MG tablet     Sig: Take 1 tablet by mouth daily     Dispense:  60 tablet     Refill:  1      Take the Lasix daily as prescribed. Try to sleep more upright. Keep legs elevated during day as you can. Talk with Dr. Kaykay Soto regarding your Lorazepam.    Follow-up with Dr. Eugenio Borges in 2 weeks     Have the JAVIER done as scheduled. Discussed use, benefit, and side effects of prescribed medications. All patient questions answered. Pt voicedunderstanding. Instructed to continue current medications, diet and exercise. Continue risk factor modification and medical management. Patient agreed with treatment plan. Follow up as directed.     Electronically signedby TERRENCE Batista CNP on 7/19/2022 at 2:25 PM

## 2022-06-21 NOTE — PATIENT INSTRUCTIONS
Take the Lasix daily as prescribed. Try to sleep more upright. Keep legs elevated during day as you can. Talk with Dr. Shefali Palumbo regarding your Lorazepam.    Follow-up with . [facility] in 2 weeks     Have the JAVIER done as scheduled. You may receive a survey regarding the care you received during your visit. Your input is valuable to us. We encourage you to complete and return your survey. We hope you will choose us in the future for your healthcare needs.

## 2022-06-24 ENCOUNTER — TELEPHONE (OUTPATIENT)
Dept: CARDIOLOGY CLINIC | Age: 82
End: 2022-06-24

## 2022-06-24 NOTE — TELEPHONE ENCOUNTER
Spoke with patient via phone, who stated that she would like to see Dr. Norbert Watts and discuss the JAVIER that Kristin ordered at patient's ov on 6/21/22 before scheduling. JAVIER spot tentatively booked for patient on 7/15/22 at 3:30, spoke with Michele Beach in surgery. Will not officially schedule JAVIER until patient's appt with Dr. Norbert Watts on 7/05/22.

## 2022-07-05 ENCOUNTER — HOSPITAL ENCOUNTER (OUTPATIENT)
Age: 82
Discharge: HOME | End: 2022-07-05
Payer: MEDICARE

## 2022-07-05 ENCOUNTER — OFFICE VISIT (OUTPATIENT)
Dept: CARDIOLOGY CLINIC | Age: 82
End: 2022-07-05
Payer: MEDICARE

## 2022-07-05 VITALS
DIASTOLIC BLOOD PRESSURE: 82 MMHG | SYSTOLIC BLOOD PRESSURE: 148 MMHG | WEIGHT: 127 LBS | BODY MASS INDEX: 20.41 KG/M2 | HEART RATE: 60 BPM | HEIGHT: 66 IN

## 2022-07-05 DIAGNOSIS — I34.1 MVP (MITRAL VALVE PROLAPSE): ICD-10-CM

## 2022-07-05 DIAGNOSIS — I10 PRIMARY HYPERTENSION: ICD-10-CM

## 2022-07-05 DIAGNOSIS — R06.02 SHORTNESS OF BREATH: Primary | ICD-10-CM

## 2022-07-05 DIAGNOSIS — R06.02 SHORTNESS OF BREATH: ICD-10-CM

## 2022-07-05 LAB
ANION GAP SERPL CALCULATED.3IONS-SCNC: 9 MEQ/L (ref 8–16)
BUN BLDV-MCNC: 12 MG/DL (ref 7–22)
CALCIUM SERPL-MCNC: 9.8 MG/DL (ref 8.5–10.5)
CHLORIDE BLD-SCNC: 93 MEQ/L (ref 98–111)
CO2: 25 MEQ/L (ref 23–33)
CREAT SERPL-MCNC: 0.4 MG/DL (ref 0.4–1.2)
ERYTHROCYTE [DISTWIDTH] IN BLOOD BY AUTOMATED COUNT: 13.3 % (ref 11.5–14.5)
ERYTHROCYTE [DISTWIDTH] IN BLOOD BY AUTOMATED COUNT: 46.5 FL (ref 35–45)
GFR SERPL CREATININE-BSD FRML MDRD: > 90 ML/MIN/1.73M2
GLUCOSE BLD-MCNC: 128 MG/DL (ref 70–108)
HCT VFR BLD CALC: 40.9 % (ref 37–47)
HEMOGLOBIN: 13.4 GM/DL (ref 12–16)
MAGNESIUM: 2.3 MG/DL (ref 1.6–2.4)
MCH RBC QN AUTO: 31 PG (ref 26–33)
MCHC RBC AUTO-ENTMCNC: 32.8 GM/DL (ref 32.2–35.5)
MCV RBC AUTO: 94.7 FL (ref 81–99)
PLATELET # BLD: 227 THOU/MM3 (ref 130–400)
PMV BLD AUTO: 9.9 FL (ref 9.4–12.4)
POTASSIUM SERPL-SCNC: 4.4 MEQ/L (ref 3.5–5.2)
RBC # BLD: 4.32 MILL/MM3 (ref 4.2–5.4)
SODIUM BLD-SCNC: 127 MEQ/L (ref 135–145)
TSH SERPL DL<=0.05 MIU/L-ACNC: 3.65 UIU/ML (ref 0.4–4.2)
WBC # BLD: 6.1 THOU/MM3 (ref 4.8–10.8)

## 2022-07-05 PROCEDURE — 83735 ASSAY OF MAGNESIUM: CPT

## 2022-07-05 PROCEDURE — G8420 CALC BMI NORM PARAMETERS: HCPCS | Performed by: NUCLEAR MEDICINE

## 2022-07-05 PROCEDURE — 80048 BASIC METABOLIC PNL TOTAL CA: CPT

## 2022-07-05 PROCEDURE — G8427 DOCREV CUR MEDS BY ELIG CLIN: HCPCS | Performed by: NUCLEAR MEDICINE

## 2022-07-05 PROCEDURE — 99214 OFFICE O/P EST MOD 30 MIN: CPT | Performed by: NUCLEAR MEDICINE

## 2022-07-05 PROCEDURE — 1036F TOBACCO NON-USER: CPT | Performed by: NUCLEAR MEDICINE

## 2022-07-05 PROCEDURE — 93000 ELECTROCARDIOGRAM COMPLETE: CPT | Performed by: NUCLEAR MEDICINE

## 2022-07-05 PROCEDURE — 1090F PRES/ABSN URINE INCON ASSESS: CPT | Performed by: NUCLEAR MEDICINE

## 2022-07-05 PROCEDURE — 84443 ASSAY THYROID STIM HORMONE: CPT

## 2022-07-05 PROCEDURE — 1123F ACP DISCUSS/DSCN MKR DOCD: CPT | Performed by: NUCLEAR MEDICINE

## 2022-07-05 PROCEDURE — 85027 COMPLETE CBC AUTOMATED: CPT

## 2022-07-05 PROCEDURE — G8400 PT W/DXA NO RESULTS DOC: HCPCS | Performed by: NUCLEAR MEDICINE

## 2022-07-05 PROCEDURE — 36415 COLL VENOUS BLD VENIPUNCTURE: CPT

## 2022-07-05 NOTE — PROGRESS NOTES
[address] [facility] [address]  [place] [address]  Dept: [phone]  Dept Fax: [phone]  Loc: [phone]    Visit Date: 7/5/2022    Julia Rivera is a 78 y.o. female who presents todayfor:  Chief Complaint   Patient presents with    Follow-up    Fatigue    Valvular Heart Disease    Atrial Fibrillation   some more dyspnea   More fatigue   In a recliner now due to orthopnea  Known MVP and MR  No chest pain  General weakness  No dizziness  No syncope  HR is slower         HPI:  HPI  Past Medical History:   Diagnosis Date    Osteoporosis of multiple sites without pathological fracture 10/2017    Squamous cell carcinoma of skin 2016    Vascular headache 1980's    Vitamin D deficiency 10/2017      Past Surgical History:   Procedure Laterality Date    BLADDER REPAIR  9/2014    MENISCECTOMY Left 5/2013    lateral    SKIN CANCER EXCISION  09/2016    Dr Taya Santana Left 4/2015     No family history on file. Social History     Tobacco Use    Smoking status: Never Smoker    Smokeless tobacco: Never Used   Substance Use Topics    Alcohol use: No      Current Outpatient Medications   Medication Sig Dispense Refill    NONFORMULARY Eye shot-pt unsure of the name      NONFORMULARY preservision      LORazepam (ATIVAN) 1 MG tablet Take 1 tablet by mouth daily as needed for Anxiety for up to 30 days. 30 tablet 0    metoprolol tartrate (LOPRESSOR) 50 MG tablet TAKE 1 TABLET BY MOUTH TWO TIMES A  tablet 2    Cholecalciferol (VITAMIN D3) 5000 units CAPS Take 1 capsule by mouth daily      furosemide (LASIX) 20 MG tablet Take 1 tablet by mouth daily (Patient not taking: Reported on 7/5/2022) 60 tablet 1    Cranberry 125 MG TABS Take by mouth (Patient not taking: Reported on 5/24/2022)       No current facility-administered medications for this visit.      Allergies   Allergen Reactions    Ciprofloxacin Other (See Comments)     dizziness    Clindamycin/Lincomycin Hives    Hydrocod Polst-Cpm Polst Er      anxious    Macrobid [Nitrofurantoin Macrocrystal] Diarrhea    Paxil [Paroxetine Hcl] Other (See Comments)     dizzy    Morphine Nausea And Vomiting     Health Maintenance   Topic Date Due    COVID-19 Vaccine (3 - Booster for Pfizer series) 02/20/2022    DTaP/Tdap/Td vaccine (1 - Tdap) 01/11/2023 (Originally 4/12/1959)    Shingles vaccine (1 of 2) 01/11/2023 (Originally 4/12/1988)    Pneumococcal 65+ years Vaccine (1 - PCV) 01/11/2023 (Originally 4/12/2003)    Flu vaccine (1) 09/01/2022    Depression Screen  01/11/2023    Annual Wellness Visit (AWV)  01/12/2023    DEXA (modify frequency per FRAX score)  Completed    Hepatitis A vaccine  Aged Out    Hepatitis B vaccine  Aged Out    Hib vaccine  Aged Out    Meningococcal (ACWY) vaccine  Aged Out       Subjective:  Review of Systems  General:   No fever, no chills, some fatigue or weight loss  Pulmonary:    some more dyspnea, no wheezing  Cardiac:    Denies recent chest pain,   GI:     No nausea or vomiting, no abdominal pain  Neuro:     No dizziness or light headedness,   Musculoskeletal:  No recent active issues  Extremities:   No edema, no obvious claudication       Objective:  Physical Exam  BP (!) 148/82   Pulse 60   Ht 5' 6\" (1.676 m)   Wt 127 lb (57.6 kg)   BMI 20.50 kg/m²   General:   Well developed, well nourished  Lungs:    Clear to auscultation  Heart:    Normal S1 S2, Slight murmur. no rubs, no gallops  Abdomen:   Soft, non tender, no organomegalies, positive bowel sounds  Extremities:   No edema, no cyanosis, good peripheral pulses  Neurological:   Awake, alert, oriented. No obvious focal deficits  Musculoskelatal:  No obvious deformities    Assessment:      Diagnosis Orders   1. Shortness of breath     2. Primary hypertension     3. MVP (mitral valve prolapse)     as above  ? ?  Causes of her generalized symptoms   Could be different causes  ? ? Mitral   ECG in office was done today. I reviewed the ECG. No acute findings      Plan:  No follow-ups on file. As above  Echo   Blood work   Cut metoprolol dose  Continue risk factor modification and medical management  Thank you for allowing me to participate in the care of your patient. Please don't hesitate to contact me regarding any further issues related to the patient care      Orders Placed:  No orders of the defined types were placed in this encounter. Medications Prescribed:  No orders of the defined types were placed in this encounter. Discussed use, benefit, and side effects of prescribed medications. All patient questions answered. Pt voicedunderstanding. Instructed to continue current medications, diet and exercise. Continue risk factor modification and medical management. Patient agreed with treatment plan. Follow up as directed.     Electronically signedby Ramin Gomez MD on 7/5/2022 at 12:15 PM

## 2022-07-07 DIAGNOSIS — F41.9 ANXIETY: ICD-10-CM

## 2022-07-07 RX ORDER — LORAZEPAM 1 MG/1
1 TABLET ORAL DAILY PRN
Qty: 30 TABLET | Refills: 0 | Status: SHIPPED | OUTPATIENT
Start: 2022-07-07 | End: 2022-08-12 | Stop reason: SDUPTHER

## 2022-07-15 ENCOUNTER — HOSPITAL ENCOUNTER (OUTPATIENT)
Dept: NON INVASIVE DIAGNOSTICS | Age: 82
Discharge: HOME | End: 2022-07-15
Payer: MEDICARE

## 2022-07-15 LAB
LV EF: 55 %
LVEF MODALITY: NORMAL

## 2022-07-15 PROCEDURE — 93306 TTE W/DOPPLER COMPLETE: CPT

## 2022-08-12 DIAGNOSIS — F41.9 ANXIETY: ICD-10-CM

## 2022-08-12 RX ORDER — LORAZEPAM 1 MG/1
1 TABLET ORAL DAILY PRN
Qty: 30 TABLET | Refills: 0 | Status: SHIPPED | OUTPATIENT
Start: 2022-08-12 | End: 2022-09-12 | Stop reason: SDUPTHER

## 2022-08-12 NOTE — TELEPHONE ENCOUNTER
Date of last visit:  5/24/2022  Date of next visit:  Visit date not found    Requested Prescriptions     Pending Prescriptions Disp Refills    LORazepam (ATIVAN) 1 MG tablet 30 tablet 0     Sig: Take 1 tablet by mouth daily as needed for Anxiety for up to 30 days.

## 2022-08-12 NOTE — TELEPHONE ENCOUNTER
[facility] called requesting a refill of their:    LORazepam (ATIVAN) 1 MG tablet Take 1 tablet by mouth daily as needed for Anxiety     Send to Brian Farr

## 2022-08-13 ENCOUNTER — APPOINTMENT (OUTPATIENT)
Dept: GENERAL RADIOLOGY | Age: 82
DRG: 522 | End: 2022-08-13
Payer: MEDICARE

## 2022-08-13 ENCOUNTER — APPOINTMENT (OUTPATIENT)
Dept: CT IMAGING | Age: 82
DRG: 522 | End: 2022-08-13
Payer: MEDICARE

## 2022-08-13 ENCOUNTER — HOSPITAL ENCOUNTER (INPATIENT)
Age: 82
LOS: 4 days | Discharge: NURSING, REHAB OR LONG-TERM CARE | DRG: 522 | End: 2022-08-17
Attending: EMERGENCY MEDICINE | Admitting: ORTHOPAEDIC SURGERY
Payer: MEDICARE

## 2022-08-13 DIAGNOSIS — Z96.649 S/P HIP HEMIARTHROPLASTY: ICD-10-CM

## 2022-08-13 DIAGNOSIS — S72.002A CLOSED FRACTURE OF LEFT HIP, INITIAL ENCOUNTER (HCC): Primary | ICD-10-CM

## 2022-08-13 LAB
ABO: NORMAL
ANION GAP SERPL CALCULATED.3IONS-SCNC: 11 MEQ/L (ref 8–16)
ANTIBODY SCREEN: NORMAL
BACTERIA: ABNORMAL /HPF
BASOPHILS # BLD: 0.6 %
BASOPHILS ABSOLUTE: 0 THOU/MM3 (ref 0–0.1)
BILIRUBIN URINE: NEGATIVE
BLOOD, URINE: NEGATIVE
BUN BLDV-MCNC: 14 MG/DL (ref 7–22)
CALCIUM SERPL-MCNC: 9.4 MG/DL (ref 8.5–10.5)
CASTS 2: ABNORMAL /LPF
CASTS UA: ABNORMAL /LPF
CHARACTER, URINE: CLEAR
CHLORIDE BLD-SCNC: 92 MEQ/L (ref 98–111)
CO2: 24 MEQ/L (ref 23–33)
COLOR: YELLOW
CREAT SERPL-MCNC: 0.4 MG/DL (ref 0.4–1.2)
CRYSTALS, UA: ABNORMAL
EOSINOPHIL # BLD: 0.1 %
EOSINOPHILS ABSOLUTE: 0 THOU/MM3 (ref 0–0.4)
EPITHELIAL CELLS, UA: ABNORMAL /HPF
ERYTHROCYTE [DISTWIDTH] IN BLOOD BY AUTOMATED COUNT: 13.1 % (ref 11.5–14.5)
ERYTHROCYTE [DISTWIDTH] IN BLOOD BY AUTOMATED COUNT: 44.1 FL (ref 35–45)
GFR SERPL CREATININE-BSD FRML MDRD: > 90 ML/MIN/1.73M2
GLUCOSE BLD-MCNC: 159 MG/DL (ref 70–108)
GLUCOSE URINE: NEGATIVE MG/DL
HCT VFR BLD CALC: 39.8 % (ref 37–47)
HEMOGLOBIN: 13.3 GM/DL (ref 12–16)
IMMATURE GRANS (ABS): 0.06 THOU/MM3 (ref 0–0.07)
IMMATURE GRANULOCYTES: 0.8 %
KETONES, URINE: NEGATIVE
LEUKOCYTE ESTERASE, URINE: ABNORMAL
LYMPHOCYTES # BLD: 21.3 %
LYMPHOCYTES ABSOLUTE: 1.7 THOU/MM3 (ref 1–4.8)
MCH RBC QN AUTO: 30.7 PG (ref 26–33)
MCHC RBC AUTO-ENTMCNC: 33.4 GM/DL (ref 32.2–35.5)
MCV RBC AUTO: 91.9 FL (ref 81–99)
MISCELLANEOUS 2: ABNORMAL
MONOCYTES # BLD: 7.9 %
MONOCYTES ABSOLUTE: 0.6 THOU/MM3 (ref 0.4–1.3)
NITRITE, URINE: POSITIVE
NUCLEATED RED BLOOD CELLS: 0 /100 WBC
OSMOLALITY CALCULATION: 259.1 MOSMOL/KG (ref 275–300)
OSMOLALITY: 275 MOSMOL/KG (ref 275–295)
PH UA: 7 (ref 5–9)
PLATELET # BLD: 222 THOU/MM3 (ref 130–400)
PMV BLD AUTO: 9.7 FL (ref 9.4–12.4)
POTASSIUM REFLEX MAGNESIUM: 3.7 MEQ/L (ref 3.5–5.2)
PROTEIN UA: NEGATIVE
RBC # BLD: 4.33 MILL/MM3 (ref 4.2–5.4)
RBC URINE: ABNORMAL /HPF
RENAL EPITHELIAL, UA: ABNORMAL
RH FACTOR: NORMAL
SEG NEUTROPHILS: 69.3 %
SEGMENTED NEUTROPHILS ABSOLUTE COUNT: 5.5 THOU/MM3 (ref 1.8–7.7)
SODIUM BLD-SCNC: 127 MEQ/L (ref 135–145)
SPECIFIC GRAVITY, URINE: 1.01 (ref 1–1.03)
UROBILINOGEN, URINE: 0.2 EU/DL (ref 0–1)
WBC # BLD: 7.9 THOU/MM3 (ref 4.8–10.8)
WBC UA: ABNORMAL /HPF
YEAST: ABNORMAL

## 2022-08-13 PROCEDURE — 85025 COMPLETE CBC W/AUTO DIFF WBC: CPT

## 2022-08-13 PROCEDURE — 6820000001 HC L2 TRAUMA SURGERY EVALUATION: Performed by: ORTHOPAEDIC SURGERY

## 2022-08-13 PROCEDURE — 96365 THER/PROPH/DIAG IV INF INIT: CPT

## 2022-08-13 PROCEDURE — 86900 BLOOD TYPING SEROLOGIC ABO: CPT

## 2022-08-13 PROCEDURE — 2580000003 HC RX 258: Performed by: EMERGENCY MEDICINE

## 2022-08-13 PROCEDURE — 6360000002 HC RX W HCPCS: Performed by: EMERGENCY MEDICINE

## 2022-08-13 PROCEDURE — 86901 BLOOD TYPING SEROLOGIC RH(D): CPT

## 2022-08-13 PROCEDURE — 6370000000 HC RX 637 (ALT 250 FOR IP): Performed by: EMERGENCY MEDICINE

## 2022-08-13 PROCEDURE — 86850 RBC ANTIBODY SCREEN: CPT

## 2022-08-13 PROCEDURE — 81001 URINALYSIS AUTO W/SCOPE: CPT

## 2022-08-13 PROCEDURE — 93005 ELECTROCARDIOGRAM TRACING: CPT | Performed by: EMERGENCY MEDICINE

## 2022-08-13 PROCEDURE — 36415 COLL VENOUS BLD VENIPUNCTURE: CPT

## 2022-08-13 PROCEDURE — 6370000000 HC RX 637 (ALT 250 FOR IP): Performed by: PHYSICIAN ASSISTANT

## 2022-08-13 PROCEDURE — 80048 BASIC METABOLIC PNL TOTAL CA: CPT

## 2022-08-13 PROCEDURE — 99285 EMERGENCY DEPT VISIT HI MDM: CPT

## 2022-08-13 PROCEDURE — 99222 1ST HOSP IP/OBS MODERATE 55: CPT | Performed by: STUDENT IN AN ORGANIZED HEALTH CARE EDUCATION/TRAINING PROGRAM

## 2022-08-13 PROCEDURE — 6370000000 HC RX 637 (ALT 250 FOR IP): Performed by: STUDENT IN AN ORGANIZED HEALTH CARE EDUCATION/TRAINING PROGRAM

## 2022-08-13 PROCEDURE — 71045 X-RAY EXAM CHEST 1 VIEW: CPT

## 2022-08-13 PROCEDURE — 96375 TX/PRO/DX INJ NEW DRUG ADDON: CPT

## 2022-08-13 PROCEDURE — 6360000002 HC RX W HCPCS: Performed by: PHYSICIAN ASSISTANT

## 2022-08-13 PROCEDURE — 87077 CULTURE AEROBIC IDENTIFY: CPT

## 2022-08-13 PROCEDURE — 73560 X-RAY EXAM OF KNEE 1 OR 2: CPT

## 2022-08-13 PROCEDURE — 83930 ASSAY OF BLOOD OSMOLALITY: CPT

## 2022-08-13 PROCEDURE — 73502 X-RAY EXAM HIP UNI 2-3 VIEWS: CPT

## 2022-08-13 PROCEDURE — 87086 URINE CULTURE/COLONY COUNT: CPT

## 2022-08-13 PROCEDURE — 1200000000 HC SEMI PRIVATE

## 2022-08-13 PROCEDURE — 87186 SC STD MICRODIL/AGAR DIL: CPT

## 2022-08-13 PROCEDURE — 2580000003 HC RX 258: Performed by: PHYSICIAN ASSISTANT

## 2022-08-13 RX ORDER — SODIUM CHLORIDE 9 MG/ML
INJECTION, SOLUTION INTRAVENOUS PRN
Status: DISCONTINUED | OUTPATIENT
Start: 2022-08-13 | End: 2022-08-14 | Stop reason: SDUPTHER

## 2022-08-13 RX ORDER — CEFTRIAXONE 1 G/1
1000 INJECTION, POWDER, FOR SOLUTION INTRAMUSCULAR; INTRAVENOUS ONCE
Status: DISCONTINUED | OUTPATIENT
Start: 2022-08-13 | End: 2022-08-13

## 2022-08-13 RX ORDER — ONDANSETRON 2 MG/ML
4 INJECTION INTRAMUSCULAR; INTRAVENOUS EVERY 6 HOURS PRN
Status: DISCONTINUED | OUTPATIENT
Start: 2022-08-13 | End: 2022-08-14 | Stop reason: SDUPTHER

## 2022-08-13 RX ORDER — SODIUM CHLORIDE 0.9 % (FLUSH) 0.9 %
10 SYRINGE (ML) INJECTION PRN
Status: DISCONTINUED | OUTPATIENT
Start: 2022-08-13 | End: 2022-08-14 | Stop reason: SDUPTHER

## 2022-08-13 RX ORDER — SODIUM CHLORIDE 0.9 % (FLUSH) 0.9 %
10 SYRINGE (ML) INJECTION EVERY 12 HOURS SCHEDULED
Status: DISCONTINUED | OUTPATIENT
Start: 2022-08-13 | End: 2022-08-14 | Stop reason: SDUPTHER

## 2022-08-13 RX ORDER — SODIUM CHLORIDE 9 MG/ML
INJECTION, SOLUTION INTRAVENOUS CONTINUOUS
Status: DISCONTINUED | OUTPATIENT
Start: 2022-08-13 | End: 2022-08-14 | Stop reason: SDUPTHER

## 2022-08-13 RX ORDER — LORAZEPAM 1 MG/1
0.5 TABLET ORAL ONCE
Status: COMPLETED | OUTPATIENT
Start: 2022-08-13 | End: 2022-08-13

## 2022-08-13 RX ORDER — FENTANYL CITRATE 50 UG/ML
25 INJECTION, SOLUTION INTRAMUSCULAR; INTRAVENOUS ONCE
Status: COMPLETED | OUTPATIENT
Start: 2022-08-13 | End: 2022-08-13

## 2022-08-13 RX ORDER — POLYETHYLENE GLYCOL 3350 17 G/17G
17 POWDER, FOR SOLUTION ORAL DAILY PRN
Status: DISCONTINUED | OUTPATIENT
Start: 2022-08-13 | End: 2022-08-17 | Stop reason: HOSPADM

## 2022-08-13 RX ORDER — 0.9 % SODIUM CHLORIDE 0.9 %
1000 INTRAVENOUS SOLUTION INTRAVENOUS ONCE
Status: COMPLETED | OUTPATIENT
Start: 2022-08-13 | End: 2022-08-13

## 2022-08-13 RX ORDER — ONDANSETRON 2 MG/ML
4 INJECTION INTRAMUSCULAR; INTRAVENOUS ONCE
Status: COMPLETED | OUTPATIENT
Start: 2022-08-13 | End: 2022-08-13

## 2022-08-13 RX ORDER — LORAZEPAM 1 MG/1
1 TABLET ORAL EVERY 4 HOURS PRN
Status: DISCONTINUED | OUTPATIENT
Start: 2022-08-13 | End: 2022-08-13

## 2022-08-13 RX ORDER — BISACODYL 10 MG
10 SUPPOSITORY, RECTAL RECTAL DAILY PRN
Status: DISCONTINUED | OUTPATIENT
Start: 2022-08-13 | End: 2022-08-17 | Stop reason: HOSPADM

## 2022-08-13 RX ORDER — LORAZEPAM 0.5 MG/1
0.5 TABLET ORAL EVERY 4 HOURS PRN
Status: DISCONTINUED | OUTPATIENT
Start: 2022-08-13 | End: 2022-08-17 | Stop reason: HOSPADM

## 2022-08-13 RX ORDER — ACETAMINOPHEN 325 MG/1
650 TABLET ORAL EVERY 6 HOURS
Status: DISCONTINUED | OUTPATIENT
Start: 2022-08-13 | End: 2022-08-14

## 2022-08-13 RX ORDER — TRAMADOL HYDROCHLORIDE 50 MG/1
50 TABLET ORAL EVERY 6 HOURS PRN
Status: DISCONTINUED | OUTPATIENT
Start: 2022-08-13 | End: 2022-08-14 | Stop reason: SDUPTHER

## 2022-08-13 RX ADMIN — ACETAMINOPHEN 650 MG: 325 TABLET ORAL at 14:22

## 2022-08-13 RX ADMIN — ONDANSETRON 4 MG: 2 INJECTION INTRAMUSCULAR; INTRAVENOUS at 02:13

## 2022-08-13 RX ADMIN — LORAZEPAM 0.5 MG: 0.5 TABLET ORAL at 13:01

## 2022-08-13 RX ADMIN — ACETAMINOPHEN 325 MG: 325 TABLET ORAL at 20:40

## 2022-08-13 RX ADMIN — SODIUM CHLORIDE: 9 INJECTION, SOLUTION INTRAVENOUS at 22:03

## 2022-08-13 RX ADMIN — SODIUM CHLORIDE, PRESERVATIVE FREE 10 ML: 5 INJECTION INTRAVENOUS at 08:51

## 2022-08-13 RX ADMIN — LORAZEPAM 0.5 MG: 1 TABLET ORAL at 04:35

## 2022-08-13 RX ADMIN — LORAZEPAM 0.5 MG: 0.5 TABLET ORAL at 20:40

## 2022-08-13 RX ADMIN — SODIUM CHLORIDE 1000 ML: 9 INJECTION, SOLUTION INTRAVENOUS at 03:37

## 2022-08-13 RX ADMIN — CEFTRIAXONE SODIUM 1000 MG: 1 INJECTION, POWDER, FOR SOLUTION INTRAMUSCULAR; INTRAVENOUS at 03:39

## 2022-08-13 RX ADMIN — ONDANSETRON 4 MG: 2 INJECTION INTRAMUSCULAR; INTRAVENOUS at 11:56

## 2022-08-13 RX ADMIN — SODIUM CHLORIDE: 9 INJECTION, SOLUTION INTRAVENOUS at 12:04

## 2022-08-13 RX ADMIN — ACETAMINOPHEN 650 MG: 325 TABLET ORAL at 08:50

## 2022-08-13 RX ADMIN — FENTANYL CITRATE 25 MCG: 50 INJECTION, SOLUTION INTRAMUSCULAR; INTRAVENOUS at 02:13

## 2022-08-13 ASSESSMENT — PAIN DESCRIPTION - LOCATION
LOCATION: KNEE;HIP;LEG
LOCATION: KNEE;HIP
LOCATION: LEG

## 2022-08-13 ASSESSMENT — PAIN SCALES - GENERAL
PAINLEVEL_OUTOF10: 5
PAINLEVEL_OUTOF10: 10
PAINLEVEL_OUTOF10: 5
PAINLEVEL_OUTOF10: 10
PAINLEVEL_OUTOF10: 10

## 2022-08-13 ASSESSMENT — PAIN DESCRIPTION - ORIENTATION
ORIENTATION: LEFT

## 2022-08-13 ASSESSMENT — PAIN DESCRIPTION - DESCRIPTORS
DESCRIPTORS: DISCOMFORT
DESCRIPTORS: SHARP

## 2022-08-13 ASSESSMENT — PAIN - FUNCTIONAL ASSESSMENT
PAIN_FUNCTIONAL_ASSESSMENT: 0-10
PAIN_FUNCTIONAL_ASSESSMENT: ACTIVITIES ARE NOT PREVENTED

## 2022-08-13 ASSESSMENT — PAIN DESCRIPTION - PAIN TYPE: TYPE: ACUTE PAIN

## 2022-08-13 NOTE — ED NOTES
Pt incontinent of urine. Pt cleaned and linens changed x 2 assist. External catheter placed.      Jimmy Caballero RN  08/13/22 [id number]

## 2022-08-13 NOTE — ED PROVIDER NOTES
T[facility] [address] COMPLAINT       Chief Complaint   Patient presents with    Fall    Knee Pain    Hip Pain       Nurses Notes reviewed and I agree except as noted in the HPI. HISTORY OF PRESENT ILLNESS    Peyton Lopez is a 78 y.o. female. Patient was bending down to pick something up and apparently had a fall injuring the left hip and arrives with shortening and external rotation consistent with a likely hip fracture. She thinks she probably did not hit her head or neck. Does have a history of a prior left knee surgery    REVIEW OF SYSTEMS         No fever no chest pain no dyspnea    Remainder of review of systems is otherwise reviewed as negative. PAST MEDICAL HISTORY    has a past medical history of Osteoporosis of multiple sites without pathological fracture, Squamous cell carcinoma of skin, Vascular headache, and Vitamin D deficiency. SURGICAL HISTORY      has a past surgical history that includes meniscectomy (Left, 2013); bladder repair (2014); Total knee arthroplasty (Left, 2015); and Skin cancer excision (2016). CURRENT MEDICATIONS       Previous Medications    CHOLECALCIFEROL (VITAMIN D3) 5000 UNITS CAPS    Take 1 capsule by mouth daily    CRANBERRY 125 MG TABS    Take by mouth    FUROSEMIDE (LASIX) 20 MG TABLET    Take 1 tablet by mouth daily    LORAZEPAM (ATIVAN) 1 MG TABLET    Take 1 tablet by mouth daily as needed for Anxiety for up to 30 days. METOPROLOL TARTRATE (LOPRESSOR) 50 MG TABLET    TAKE 1 TABLET BY MOUTH TWO TIMES A DAY    NONFORMULARY    preservision    NONFORMULARY    Eye shot-pt unsure of the name       ALLERGIES     is allergic to ciprofloxacin, clindamycin/lincomycin, hydrocod polst-cpm polst er, macrobid [nitrofurantoin macrocrystal], paxil [paroxetine hcl], and morphine. FAMILY HISTORY     She indicated that her mother is . She indicated that her father is .    family history is not on file. SOCIAL HISTORY      reports that she has never smoked. She has never used smokeless tobacco. She reports that she does not drink alcohol and does not use drugs. PHYSICAL EXAM     INITIAL VITALS:  height is 5' 6\" (1.676 m) and weight is 125 lb (56.7 kg). Her oral temperature is 97.9 °F (36.6 °C). Her blood pressure is 128/57 (abnormal) and her pulse is 76. Her respiration is 18 and oxygen saturation is 98%. Constitutional: chronically ill appearing   Eyes:  Pupils are equal and reactive  HENT: No crepitance or step-off on the face. No hematomas or lacerations noted. No midline neck tenderness  Respiratory:  No wheezing, rhonchi or rales  Cardiovascular: regular  GI:  Non tender, no rigidity, rebound or guarding  Musculoskeletal: Left lower extremity shortened and externally rotated. She is not able to do flexion or extension at the hip. No obvious deformity of the knee. Integument: warm and dry  Neurologic:  Alert & oriented x 3, cranial nerves II through XII are grossly intact. Equal strength in the upper and lower extremities bilaterally. Psychiatric:  Speech and behavior appropriate        DIAGNOSTIC RESULTS     EKG: All EKG's are interpreted by the Emergency Department Physician who either signs or Co-signs this chart in the absence of a cardiologist.  EKG interpreted by me showing sinus rhythm at a rate of 80, QRS of 104, QTc of 4-38, axis of -39. RADIOLOGY: non-plain film images(s) such as CT, Ultrasound and MRI are read by the radiologist.  Chest x-ray interpreted by the radiologist as negative, left knee films interpreted by the radiologist with no disruption of the prior surgery. X-ray of the left hip showing a left hip fracture.     LABS:   Labs Reviewed   BASIC METABOLIC PANEL W/ REFLEX TO MG FOR LOW K - Abnormal; Notable for the following components:       Result Value    Sodium 127 (*)     Chloride 92 (*)     Glucose 159 (*)     All other components within

## 2022-08-13 NOTE — PROGRESS NOTES
Pharmacy Pre-Op Antibiotic Dose Adjustment    Flo Le is a 78 y.o. female scheduled for surgery. Pharmacy will adjust the following pre-op antibiotic per P&T approved policy: cefazolin    Weight:  Wt Readings from Last 1 Encounters:   08/13/22 125 lb (56.7 kg)     Body mass index is 20.18 kg/m².     Plan:   Pre-op antibiotic adjustment: increase cefazolin from 1 g to 2 g

## 2022-08-13 NOTE — ED NOTES
Pt and son updated on plan of care. Pt given warm blanket per request. Call light in reach.      Sridhar Zendejas RN  08/13/22 [id number]

## 2022-08-13 NOTE — H&P
Ortho H&P/Consult  Patient:  Leatha Herrera  YOB: 1940  MRN: [id number]     Acct: [de-identified]    PCP: Gary Corona MD  Date of Admission: 8/13/2022  Date of Service: Pt seen/examined on 8/13/2022     Chief Complaint: left femoral neck fracture  History Of Present Illness: 78 y.o. female who presents with left hip and knee pain after a mechanical fall at home. Patient landed on her left side and was unable to ambulate thereafter. Patient transferred to [facility] ED via EMS and radiologic eval revealed left femoral neck fracture. Patient lives at home independently with her son. She uses cane and walker occasionally for ambulation. No blood thinners. Walks less than 1 mile per day. Hx left TKA 7 years ago. Non smoker. No alcohol use. Patient states she has a \"leaky valve\" for which she uses lopressor daily. Does mention some difficulty with anesthesia in the past with racing heart rate postoperatively. Patient ambulation status: mild difficulty. Antiplatelets/Anticoagulation includes: none. Past Medical History:        Diagnosis Date    Osteoporosis of multiple sites without pathological fracture 10/2017    Squamous cell carcinoma of skin 2016    Vascular headache 1980's    Vitamin D deficiency 10/2017       Past Surgical History:        Procedure Laterality Date    BLADDER REPAIR  9/2014    MENISCECTOMY Left 5/2013    lateral    SKIN CANCER EXCISION  09/2016    Dr Dean Lea Left 4/2015       Home Medications:   Prior to Admission medications    Medication Sig Start Date End Date Taking? Authorizing Provider   LORazepam (ATIVAN) 1 MG tablet Take 1 tablet by mouth daily as needed for Anxiety for up to 30 days.  8/12/22 9/11/22  Gary Corona MD   NONFORMULARY Eye shot-pt unsure of the name    Historical Provider, MD   NONFORMULARY preservision    Historical Provider, MD   furosemide (LASIX) 20 MG tablet Take 1 tablet by mouth daily  Patient not taking: Reported on 7/5/2022 6/21/22   Kristin Pina, APRN - CNP   metoprolol tartrate (LOPRESSOR) 50 MG tablet TAKE 1 TABLET BY MOUTH TWO TIMES A DAY 3/1/22   Crys Mayers MD   Cranberry 125 MG TABS Take by mouth  Patient not taking: Reported on 5/24/2022    Historical Provider, MD   Cholecalciferol (VITAMIN D3) 5000 units CAPS Take 1 capsule by mouth daily 10/27/17   Eugenio Farnsworth MD       Current Hospital Medications:    Current Facility-Administered Medications:     0.9 % sodium chloride infusion, , IntraVENous, Continuous, DARA Yuen    sodium chloride flush 0.9 % injection 10 mL, 10 mL, IntraVENous, 2 times per day, DARA Medellin    sodium chloride flush 0.9 % injection 10 mL, 10 mL, IntraVENous, PRN, DARA Yuen    0.9 % sodium chloride infusion, , IntraVENous, PRN, DARA Medellin    acetaminophen (TYLENOL) tablet 650 mg, 650 mg, Oral, Q6H, DARA Yuen    traMADol (ULTRAM) tablet 50 mg, 50 mg, Oral, Q6H PRN, DARA Yuen    HYDROmorphone (DILAUDID) injection 0.25 mg, 0.25 mg, IntraVENous, Q3H PRN **OR** HYDROmorphone (DILAUDID) injection 0.5 mg, 0.5 mg, IntraVENous, Q3H PRN, DARA Yuen    ondansetron (ZOFRAN) injection 4 mg, 4 mg, IntraVENous, Q6H PRN, DARA Yuen    polyethylene glycol (GLYCOLAX) packet 17 g, 17 g, Oral, Daily PRN, DARA Medellin    bisacodyl (DULCOLAX) suppository 10 mg, 10 mg, Rectal, Daily PRN, DARA Medellin    tranexamic acid (CYKLOKAPRON) 1,000 mg in sodium chloride 0.9 % 100 mL IVPB, 1,000 mg, IntraVENous, On Call to OR, DARA Medellin    ceFAZolin (ANCEF) 1,000 mg in dextrose 5 % 50 mL IVPB (mini-bag), 1,000 mg, IntraVENous, On Call to OR, Bary Gume, PA    Current Outpatient Medications:     LORazepam (ATIVAN) 1 MG tablet, Take 1 tablet by mouth daily as needed for Anxiety for up to 30 days. , Disp: 30 tablet, Rfl: 0    NONFORMULARY, Eye shot-pt unsure of the name, Disp: , Rfl:     NONFORMULARY, preservision, Disp: , Rfl:     furosemide (LASIX) 20 MG tablet, Take 1 tablet by mouth daily (Patient not taking: Reported on 7/5/2022), Disp: 60 tablet, Rfl: 1    metoprolol tartrate (LOPRESSOR) 50 MG tablet, TAKE 1 TABLET BY MOUTH TWO TIMES A DAY, Disp: 180 tablet, Rfl: 2    Cranberry 125 MG TABS, Take by mouth (Patient not taking: Reported on 5/24/2022), Disp: , Rfl:     Cholecalciferol (VITAMIN D3) 5000 units CAPS, Take 1 capsule by mouth daily, Disp: , Rfl:      Allergies:  Ciprofloxacin, Clindamycin/lincomycin, Hydrocod polst-cpm polst er, Macrobid [nitrofurantoin macrocrystal], Paxil [paroxetine hcl], and Morphine  Social History:    Social History     Socioeconomic History    Marital status:      Spouse name: Not on file    Number of children: Not on file    Years of education: Not on file    Highest education level: Not on file   Occupational History    Not on file   Tobacco Use    Smoking status: Never    Smokeless tobacco: Never   Substance and Sexual Activity    Alcohol use: No    Drug use: No    Sexual activity: Never   Other Topics Concern    Not on file   Social History Narrative    Not on file     Social Determinants of Health     Financial Resource Strain: Low Risk     Difficulty of Paying Living Expenses: Not hard at all   Food Insecurity: No Food Insecurity    Worried About Running Out of Food in the Last Year: Never true    Ran Out of Food in the Last Year: Never true   Transportation Needs: Not on file   Physical Activity: Not on file   Stress: Not on file   Social Connections: Not on file   Intimate Partner Violence: Not on file   Housing Stability: Not on file     Family History:  History reviewed. No pertinent family history. Further Family History is noncontributory to this injury.     REVIEW OF SYSTEMS:      Review of Systems - General ROS: negative for - chills, fatigue, fever, malaise or night sweats  Psychological ROS: negative  Ophthalmic ROS: negative   ENT ROS: negative for - headaches or sore throat  Hematological and Lymphatic ROS: negative for - bleeding problems or blood clots  Respiratory ROS: no cough, shortness of breath, or wheezing  Cardiovascular ROS: no chest pain or dyspnea on exertion  Gastrointestinal ROS: negative  Musculoskeletal ROS: See HPI  Neurological ROS: negative for - bowel and bladder control changes, gait disturbance or numbness/tingling  All other systems reviewed and are negative      PHYSICAL EXAM:  /61   Pulse 73   Temp 97.9 °F (36.6 °C) (Oral)   Resp 17   Ht 5' 6\" (1.676 m)   Wt 125 lb (56.7 kg)   SpO2 93%   BMI 20.18 kg/m²   GENERAL APPEARANCE: Awake and oriented x3. No acute distress, except appropriate to injury. MOOD AND AFFECT: Calm appropriate to situation  GAIT AND STATION: Patient is in bed and unable to ambulate secondary to known injury. REFLEXES: No clonus or Babinski. COORDINATION and BALANCE: Patient is grossly coordinated unable to ambulate secondary to known injury. Lymphadenopathy: none on examination of the affected extremity(s)    Left Lower Extremity:  No obvious pain or deformity to inspection with normal joint range of motion, stability, and muscle strength except noted below. DP/PT 2+. No Lymphedema. Skin intact except where noted below. No tenderness to palpation over hip/knee/tibia/medial mal/lateral mal/calcaneus/midfoot/forefoot. Sensation intact to light touch in the superficial peroneal, deep peroneal, tibial, sural, saphenous nerve distributions. Motor function of tibialis anterior, extensor hallucis longus, and gactrocsoleus complex intact. Skin intact. Leg resting in ER and shortened position. Pain with passive log roll. Compartments soft. TTP over left knee. Fires DF/PF/EHL. SILTD.     Labs:   CBC:   Lab Results   Component Value Date/Time    WBC 7.9 08/13/2022 01:00 AM    RBC 4.33 08/13/2022 01:00 AM     BMP:  Lab Results   Component Value Date/Time    GLUCOSE 159 08/13/2022 01:00 AM    CO2 24 08/13/2022 01:00 AM    BUN 14 08/13/2022 01:00 AM    CREATININE 0.4 08/13/2022 01:00 AM    CALCIUM 9.4 08/13/2022 01:00 AM     PT/INR:   Lab Results   Component Value Date/Time    INR 1.00 08/27/2014 05:44 AM    APTT 33.5 08/27/2014 05:44 AM     Type and Screen: No results found for: LABABO, RH, LABANTI  CRP: No results found for: CRP  ESR: No results found for: SEDRATE  HgBA1c:  No results found for: LABA1C  The above labs were reviewed by me. Radiology:   XR: left displaced femoral neck fracture. No acute fracture left knee with intact TKA prosthesis. Radiology report reviewed. ASSESSMENT:  78 y.o. female with left femoral neck fracture after fall at home. PLAN:    -Discussed with Dr Marium Berger.  -Plan for OR tomorrow 7:30AM  -NPO at [address] Per Hospitalist  -Obtain consent for left Kali vs KATHY DAA  -Added to surgery schedule  -Ice/Elevate  -NWB LLE  -Admitted to Dr. Marium Berger.  -Please hold DVT prophylaxis in anticipation of OR    Patient seen and examined independently by me. Above discussed and I agree with above note except where indicated in the EMR revision history. Also see my additional comments and changes indicated by discrete font, text color, italics, and/or initials. Labs, cultures, and radiographs where available were reviewed. Changes were made in the orders as necessary. I discussed patient concerns with Son PRASAD and instructions were given. Please see our orders for the updated patient care plan. 78y female with left displaced FNFx. Will plan for press fit vs cemented kali. Discussed with patient/son.     Electronically signed by See Santa MD on 8/14/2022 at 1:26 PM

## 2022-08-13 NOTE — ED TRIAGE NOTES
Pt arrives via EMS for c/o fall at home. Pt states she was walking across the carpet when she slipped and fell. Pt c/o pain to left hip and left knee. Pt reports having a knee replacement to knee 7 years ago in [facility]. Pt has shortening to left leg.

## 2022-08-13 NOTE — ED NOTES
Pt and son updated on plan of care. Voiced no needs. Call light in reach.      Owen Duvall RN  08/13/22 [id number]

## 2022-08-13 NOTE — CARE COORDINATION
8/13/22, 11:54 AM EDT  DISCHARGE PLANNING EVALUATION:    Vandana Reece       Admitted: 8/13/2022/ [facility] day: 0   Location: -07/007-A Reason for admit: Displaced fracture of left femoral neck ([facility] [address] [S72.002A]  Closed fracture of left hip, initial encounter ([facility] [address] [S72.002A]  Closed left hip fracture, initial encounter ([facility] [address] Carmina Amanda   PMH:  has a past medical history of Osteoporosis of multiple sites without pathological fracture, Squamous cell carcinoma of skin, Vascular headache, and Vitamin D deficiency. Procedure:   8/13 XR left hip: Fracture of the left proximal femur. Barriers to Discharge:  Ortho following. Hospitalist consult. Bedrest. Order for bucks traction. Pain control. NPO at midnight for OR. Na+ 127. IVF. Will need PT/OT post-op. PCP: Eugenio Farnsworth MD  Readmission Risk Score: 11.4%  Patient's Healthcare Decision Maker: Legal Next of Kin    Patient Goals/Plan/Treatment Preferences: Spoke with Reggie Avila, she is from home prior to arrival. Reggie Avila is having pain so meeting kept brief. IMM given. Will follow post-op for home going needs. Transportation/Food Security/Housekeeping Addressed:  No issues identified.

## 2022-08-13 NOTE — CONSULTS
Consult History & Physical      Patient:  Filiberto Lyn  YOB: 1940    MRN: [id number]     Acct: [de-identified]    Date of Admission: 8/13/2022    Chief Complaint:  Hip fracture    Date of Service: Pt seen/examined in consultation on 8/13/22    History Of Present Illness: This is an 78 y.o. female PMH vitamin D deficiency, osteoporosis, and squamous cell carcinoma, who originally presented to the ER on 8/13/22 with complaints of knee and hip pain. She was walking across the carpet and had a fall after which she injured her left hip. She is now endorsing left hip and left knee pain. She denies hitting her head or neck. The hospitalist team was asked to see the patient for medical management and pre-op clearance. The patient is independent and lives at home with her son. She is able to cook, bathe, eat, and do grocery shopping independently. She previously had left knee surgery 7 years ago in [facility] for which she had an episode of tachycardia post-op which resolved by itself. Cardiac wise, the patient has a history of moderate mitral regurgitation and mitral valve prolapse and has had intermittent episodes of atrial fibrillation over the last few decades never requiring cardioversion or anticoagulation. She has no history of ischemic heart disease or heart failure but was recently prescribed lasix by her cardiologist for pedal edema which she did not take as she felt she did not need it. The patient denies any history of stroke or obstructive sleep apnea. She is a lifetime non-smoker and doesn't use alcohol. Chest x-ray and knee x-ray done on admission were negative for acute injury while left hip x-ray showed left femoral neck fracture. She denies any history of rheumatoid arthritis. She denies any major history of bleeding difficulties from any procedures.        Past Medical History:        Diagnosis Date    Osteoporosis of multiple sites without pathological fracture 10/2017    Squamous cell carcinoma of skin 2016    Vascular headache 1980's    Vitamin D deficiency 10/2017       Past Surgical History:        Procedure Laterality Date    BLADDER REPAIR  9/2014    MENISCECTOMY Left 5/2013    lateral    SKIN CANCER EXCISION  09/2016    Dr Darylene Righter Left 4/2015       Medications Prior to Admission:    Prior to Admission medications    Medication Sig Start Date End Date Taking? Authorizing Provider   LORazepam (ATIVAN) 1 MG tablet Take 1 tablet by mouth daily as needed for Anxiety for up to 30 days. 8/12/22 9/11/22  Gustavo Valdes MD   NONFORMULARY Eye shot-pt unsure of the name    Historical Provider, MD   NONFORMULARY preservision    Historical Provider, MD   furosemide (LASIX) 20 MG tablet Take 1 tablet by mouth daily  Patient not taking: Reported on 7/5/2022 6/21/22   Kristin Pina APRN - CNP   metoprolol tartrate (LOPRESSOR) 50 MG tablet TAKE 1 TABLET BY MOUTH TWO TIMES A DAY 3/1/22   Yamilex Santos MD   Cranberry 125 MG TABS Take by mouth  Patient not taking: Reported on 5/24/2022    Historical Provider, MD   Cholecalciferol (VITAMIN D3) 5000 units CAPS Take 1 capsule by mouth daily 10/27/17   Gustavo Valdes MD       Allergies:  Ciprofloxacin, Clindamycin/lincomycin, Hydrocod polst-cpm polst er, Macrobid [nitrofurantoin macrocrystal], Paxil [paroxetine hcl], and Morphine    Social History:      The patient currently lives at home with her son. TOBACCO:   reports that she has never smoked. She has never used smokeless tobacco.  ETOH:   reports no history of alcohol use. Family History:     Reviewed in detail and negative for DM, CAD, Cancer, CVA. Positive as follows:    History reviewed. No pertinent family history. Diet:  ADULT ORAL NUTRITION SUPPLEMENT; Breakfast; Standard High Calorie/High Protein Oral Supplement  Diet NPO Exceptions are: Sips of Water with Meds  ADULT DIET;  Regular    REVIEW OF SYSTEMS:   Pertinent positives as noted in the HPI. All other systems reviewed and negative. PHYSICAL EXAM:  /61   Pulse 73   Temp 97.9 °F (36.6 °C) (Oral)   Resp 17   Ht 5' 6\" (1.676 m)   Wt 125 lb (56.7 kg)   SpO2 93%   BMI 20.18 kg/m²   General appearance: No apparent distress, appears stated age and cooperative. HEENT: Normal cephalic, atraumatic without obvious deformity. Pupils equal, round, and reactive to light. Extra ocular muscles intact. Conjunctivae/corneas clear. Neck: Supple, with full range of motion. No jugular venous distention. Trachea midline. Respiratory:  Normal respiratory effort. Clear to auscultation, bilaterally without Rales/Wheezes/Rhonchi. Cardiovascular: Mild tachycardia; regular rhythm with normal S1/S2 without murmurs, rubs or gallops. Abdomen: Soft, non-tender, non-distended with normal bowel sounds. Musculoskeletal: Tenderness to touch noted in the left hip region; 1/5 movement in left hip; 4/5 movement in right hip; no erythema, ecchymosis, or swelling noted  Skin: Skin color, texture, turgor normal.  No rashes or lesions. Neurologic:  Neurovascularly intact without any focal sensory/motor deficits. Cranial nerves: II-XII intact, grossly non-focal.  Psychiatric: Alert and oriented, thought content appropriate, normal insight  Capillary Refill: Brisk,< 3 seconds   Peripheral Pulses: +2 palpable, equal bilaterally     Labs:     Recent Labs     08/13/22  0100   WBC 7.9   HGB 13.3   HCT 39.8        Recent Labs     08/13/22  0100   *   K 3.7   CL 92*   CO2 24   BUN 14   CREATININE 0.4   CALCIUM 9.4     No results for input(s): AST, ALT, BILIDIR, BILITOT, ALKPHOS in the last 72 hours. No results for input(s): INR in the last 72 hours. No results for input(s): Aisha Height in the last 72 hours.     Urinalysis:    Lab Results   Component Value Date/Time    NITRU POSITIVE 08/13/2022 12:33 AM    WBCUA 10-15 08/13/2022 12:33 AM    BACTERIA MANY 08/13/2022 12:33 AM    RBCUA 0-2 08/13/2022 12:33 AM BLOODU NEGATIVE 08/13/2022 12:33 AM    SPECGRAV 1.015 08/28/2019 01:34 PM    GLUCOSEU NEGATIVE 08/13/2022 12:33 AM       Radiology: I have reviewed the radiology reports with the following interpretation:     XR CHEST 1 VIEW   Final Result   Impression:   No acute findings. This document has been electronically signed by: Goyo Landon. Matt Barrett MD    on 08/13/2022 03:19 AM      XR KNEE LEFT (1-2 VIEWS)   Final Result   Impression:   No fracture. Intact total knee arthroplasty. This document has been electronically signed by: Goyo Landon. Matt Barrett MD    on 08/13/2022 03:46 AM      XR HIP 2-3 VW W PELVIS LEFT   Final Result   Impression:   Fracture of the left proximal femur. This document has been electronically signed by: Goyo Landon.  Matt Barrett MD    on 08/13/2022 03:36 AM                EKG:  I have reviewed the EKG with the following interpretation:  Left Axis deviation Noted  No current Q waves  No noted T wave inversions or elevations    DVT prophylaxis: SCD's/held by ortho in anticipation of OR       Code Status: Full Code    PT/OT Eval Status: Active and ongoing      ASSESSMENT:    Active Hospital Problems    Diagnosis Date Noted    Displaced fracture of left femoral neck ([facility] [address] [S72.002A] 08/13/2022     Priority: Medium    Closed left hip fracture, initial encounter ([facility] [address] [S72.002A] 08/13/2022     Priority: Medium       PLAN:    Acute left displaced femoral neck fracture: Secondary to ground level fall/long-standing history of osteoporosis; patient not currently on any blood thinners; never had fall like this in the past before  -Ortho primary service with procedure currently scheduled for 8/14/22  -Pain management and conservative measures per orthopedic team  -Per cardiac risk index score, patient is class I risk (0 points)  -Per [facility] [employer] Criteria), patient has less than 3% chance of developing complications post-op  -Mallampati score of 1 noted  -Obtain PT-INR tomorrow  -Continue IV hydration  -Patient understands risk and benefits of upcoming post-op procedure  2. Moderate mitral valve regurgitation: Noted on echocardiogram in July 15 2022 with EF 55%; has previously been on lasix for pedal edema but never officially diagnosed with heart failure; patient currently asymptomatic  -Followed by Dr. Yessi Gray  3. Paroxysmal atrial fibrillation: Currently rate controlled; CHADSVASC2 score of 4 with HAS-BLED score of 3; per chart review patient has had intermittent episodes of atrial fibrillation in the past without RVR  -Lopressor currently on hold by primary service due to normal blood pressure and heart rate  -Recent fall precludes starting anticoagulation in patient  -Continue to monitor on telemetry  -Follow-up with cardiology   4. Chronic hypotonic hyponatremia: Stable; Followed by PCP; patient not currently on salt tablets or any other medications  -Obtain urine osmolality  -Obtain serum osmolality  -Obtain urine sodium  5. Essential hypertension: Blood pressures currently well-controlled  -Lopressor currently on hold by primary service  6. Asymptomatic UTI: Patient nitrite positive and leukocyte esterase positive but denies any current symptoms; UA showing dilution of the urine  -Urine culture pending  -No current need for antibiotics given lack of symptoms  7. Insomnia: continue home dose lorazepam  8. Vitamin D deficiency: Noted per chart review; continue vitamin D supplementation         Thank you Tamika Hoyt MD for the consultation.     Electronically signed by Eric Morales DO on 8/13/2022 at 8:14 AM

## 2022-08-13 NOTE — ED NOTES
Pt keeps putting on call light and asking to be placed on bedpan for bowel movement. Pt is not having any bowel movements when placed on bedpan. Pt states she feels anxious and is asking for her Lorazepam. Provider notified.      Sera Cisneros RN  08/13/22 1399

## 2022-08-13 NOTE — ED NOTES
Pt in bed resting with lights dimmed. Son at bedside. Call light in reach.      Harlan Lorenz RN  08/13/22 9904

## 2022-08-14 ENCOUNTER — ANESTHESIA (OUTPATIENT)
Dept: OPERATING ROOM | Age: 82
DRG: 522 | End: 2022-08-14
Payer: MEDICARE

## 2022-08-14 ENCOUNTER — APPOINTMENT (OUTPATIENT)
Dept: GENERAL RADIOLOGY | Age: 82
DRG: 522 | End: 2022-08-14
Payer: MEDICARE

## 2022-08-14 ENCOUNTER — ANESTHESIA EVENT (OUTPATIENT)
Dept: OPERATING ROOM | Age: 82
DRG: 522 | End: 2022-08-14
Payer: MEDICARE

## 2022-08-14 LAB
ANION GAP SERPL CALCULATED.3IONS-SCNC: 10 MEQ/L (ref 8–16)
BASOPHILS # BLD: 0.4 %
BASOPHILS ABSOLUTE: 0 THOU/MM3 (ref 0–0.1)
BUN BLDV-MCNC: 10 MG/DL (ref 7–22)
CALCIUM SERPL-MCNC: 8.7 MG/DL (ref 8.5–10.5)
CHLORIDE BLD-SCNC: 100 MEQ/L (ref 98–111)
CO2: 22 MEQ/L (ref 23–33)
CREAT SERPL-MCNC: 0.3 MG/DL (ref 0.4–1.2)
EKG ATRIAL RATE: 80 BPM
EKG P AXIS: 48 DEGREES
EKG P-R INTERVAL: 184 MS
EKG Q-T INTERVAL: 380 MS
EKG QRS DURATION: 104 MS
EKG QTC CALCULATION (BAZETT): 438 MS
EKG R AXIS: -39 DEGREES
EKG T AXIS: 20 DEGREES
EKG VENTRICULAR RATE: 80 BPM
EOSINOPHIL # BLD: 0 %
EOSINOPHILS ABSOLUTE: 0 THOU/MM3 (ref 0–0.4)
ERYTHROCYTE [DISTWIDTH] IN BLOOD BY AUTOMATED COUNT: 13.6 % (ref 11.5–14.5)
ERYTHROCYTE [DISTWIDTH] IN BLOOD BY AUTOMATED COUNT: 46.3 FL (ref 35–45)
GFR SERPL CREATININE-BSD FRML MDRD: > 90 ML/MIN/1.73M2
GLUCOSE BLD-MCNC: 112 MG/DL (ref 70–108)
HCT VFR BLD CALC: 35.7 % (ref 37–47)
HEMOGLOBIN: 11.9 GM/DL (ref 12–16)
IMMATURE GRANS (ABS): 0.04 THOU/MM3 (ref 0–0.07)
IMMATURE GRANULOCYTES: 0.5 %
INR BLD: 1.15 (ref 0.85–1.13)
LYMPHOCYTES # BLD: 9.8 %
LYMPHOCYTES ABSOLUTE: 0.8 THOU/MM3 (ref 1–4.8)
MCH RBC QN AUTO: 31.1 PG (ref 26–33)
MCHC RBC AUTO-ENTMCNC: 33.3 GM/DL (ref 32.2–35.5)
MCV RBC AUTO: 93.2 FL (ref 81–99)
MONOCYTES # BLD: 8.1 %
MONOCYTES ABSOLUTE: 0.7 THOU/MM3 (ref 0.4–1.3)
NUCLEATED RED BLOOD CELLS: 0 /100 WBC
PLATELET # BLD: 175 THOU/MM3 (ref 130–400)
PMV BLD AUTO: 9.7 FL (ref 9.4–12.4)
POTASSIUM REFLEX MAGNESIUM: 3.8 MEQ/L (ref 3.5–5.2)
RBC # BLD: 3.83 MILL/MM3 (ref 4.2–5.4)
SEG NEUTROPHILS: 81.2 %
SEGMENTED NEUTROPHILS ABSOLUTE COUNT: 7 THOU/MM3 (ref 1.8–7.7)
SODIUM BLD-SCNC: 132 MEQ/L (ref 135–145)
WBC # BLD: 8.6 THOU/MM3 (ref 4.8–10.8)

## 2022-08-14 PROCEDURE — 85610 PROTHROMBIN TIME: CPT

## 2022-08-14 PROCEDURE — 2709999900 HC NON-CHARGEABLE SUPPLY: Performed by: ORTHOPAEDIC SURGERY

## 2022-08-14 PROCEDURE — 3700000000 HC ANESTHESIA ATTENDED CARE: Performed by: ORTHOPAEDIC SURGERY

## 2022-08-14 PROCEDURE — 3700000001 HC ADD 15 MINUTES (ANESTHESIA): Performed by: ORTHOPAEDIC SURGERY

## 2022-08-14 PROCEDURE — C1776 JOINT DEVICE (IMPLANTABLE): HCPCS | Performed by: ORTHOPAEDIC SURGERY

## 2022-08-14 PROCEDURE — 2580000003 HC RX 258: Performed by: ORTHOPAEDIC SURGERY

## 2022-08-14 PROCEDURE — 1200000000 HC SEMI PRIVATE

## 2022-08-14 PROCEDURE — 6360000002 HC RX W HCPCS: Performed by: REGISTERED NURSE

## 2022-08-14 PROCEDURE — 3600000014 HC SURGERY LEVEL 4 ADDTL 15MIN: Performed by: ORTHOPAEDIC SURGERY

## 2022-08-14 PROCEDURE — 2580000003 HC RX 258: Performed by: PHYSICIAN ASSISTANT

## 2022-08-14 PROCEDURE — 3600000004 HC SURGERY LEVEL 4 BASE: Performed by: ORTHOPAEDIC SURGERY

## 2022-08-14 PROCEDURE — 73502 X-RAY EXAM HIP UNI 2-3 VIEWS: CPT

## 2022-08-14 PROCEDURE — 80048 BASIC METABOLIC PNL TOTAL CA: CPT

## 2022-08-14 PROCEDURE — 2500000003 HC RX 250 WO HCPCS: Performed by: ORTHOPAEDIC SURGERY

## 2022-08-14 PROCEDURE — 36415 COLL VENOUS BLD VENIPUNCTURE: CPT

## 2022-08-14 PROCEDURE — 7100000001 HC PACU RECOVERY - ADDTL 15 MIN: Performed by: ORTHOPAEDIC SURGERY

## 2022-08-14 PROCEDURE — 0SRS0JZ REPLACEMENT OF LEFT HIP JOINT, FEMORAL SURFACE WITH SYNTHETIC SUBSTITUTE, OPEN APPROACH: ICD-10-PCS | Performed by: ORTHOPAEDIC SURGERY

## 2022-08-14 PROCEDURE — 2500000003 HC RX 250 WO HCPCS: Performed by: REGISTERED NURSE

## 2022-08-14 PROCEDURE — 6360000002 HC RX W HCPCS: Performed by: ORTHOPAEDIC SURGERY

## 2022-08-14 PROCEDURE — 6370000000 HC RX 637 (ALT 250 FOR IP): Performed by: STUDENT IN AN ORGANIZED HEALTH CARE EDUCATION/TRAINING PROGRAM

## 2022-08-14 PROCEDURE — 99232 SBSQ HOSP IP/OBS MODERATE 35: CPT | Performed by: STUDENT IN AN ORGANIZED HEALTH CARE EDUCATION/TRAINING PROGRAM

## 2022-08-14 PROCEDURE — 85025 COMPLETE CBC W/AUTO DIFF WBC: CPT

## 2022-08-14 PROCEDURE — 6360000002 HC RX W HCPCS: Performed by: PHYSICIAN ASSISTANT

## 2022-08-14 PROCEDURE — 7100000000 HC PACU RECOVERY - FIRST 15 MIN: Performed by: ORTHOPAEDIC SURGERY

## 2022-08-14 PROCEDURE — 6370000000 HC RX 637 (ALT 250 FOR IP): Performed by: PHYSICIAN ASSISTANT

## 2022-08-14 PROCEDURE — 93010 ELECTROCARDIOGRAM REPORT: CPT | Performed by: INTERNAL MEDICINE

## 2022-08-14 DEVICE — POLARSTEM COLLAR STANDARD                                    NON-CEMENTED WITH TI/HA 5
Type: IMPLANTABLE DEVICE | Status: FUNCTIONAL
Brand: POLARSTEM

## 2022-08-14 DEVICE — TANDEM UNIPOLAR HEAD 43MM
Type: IMPLANTABLE DEVICE | Status: FUNCTIONAL
Brand: TANDEM

## 2022-08-14 DEVICE — TANDEM UNIPOLAR 12/14 TAPER SLEEVE -3
Type: IMPLANTABLE DEVICE | Status: FUNCTIONAL
Brand: TANDEM

## 2022-08-14 RX ORDER — KETOROLAC TROMETHAMINE 30 MG/ML
15 INJECTION, SOLUTION INTRAMUSCULAR; INTRAVENOUS EVERY 6 HOURS
Status: DISCONTINUED | OUTPATIENT
Start: 2022-08-14 | End: 2022-08-14

## 2022-08-14 RX ORDER — KETOROLAC TROMETHAMINE 30 MG/ML
15 INJECTION, SOLUTION INTRAMUSCULAR; INTRAVENOUS EVERY 6 HOURS
Status: DISPENSED | OUTPATIENT
Start: 2022-08-14 | End: 2022-08-15

## 2022-08-14 RX ORDER — DEXAMETHASONE SODIUM PHOSPHATE 10 MG/ML
INJECTION, EMULSION INTRAMUSCULAR; INTRAVENOUS PRN
Status: DISCONTINUED | OUTPATIENT
Start: 2022-08-14 | End: 2022-08-14 | Stop reason: SDUPTHER

## 2022-08-14 RX ORDER — FENTANYL CITRATE 50 UG/ML
INJECTION, SOLUTION INTRAMUSCULAR; INTRAVENOUS PRN
Status: DISCONTINUED | OUTPATIENT
Start: 2022-08-14 | End: 2022-08-14 | Stop reason: SDUPTHER

## 2022-08-14 RX ORDER — ACETAMINOPHEN 500 MG
500 TABLET ORAL 3 TIMES DAILY PRN
Qty: 60 TABLET | Refills: 0 | Status: SHIPPED | OUTPATIENT
Start: 2022-08-14

## 2022-08-14 RX ORDER — PANTOPRAZOLE SODIUM 40 MG/1
40 TABLET, DELAYED RELEASE ORAL DAILY PRN
Qty: 42 TABLET | Refills: 0 | Status: SHIPPED | OUTPATIENT
Start: 2022-08-14 | End: 2022-08-29

## 2022-08-14 RX ORDER — SODIUM CHLORIDE 9 MG/ML
INJECTION, SOLUTION INTRAVENOUS PRN
Status: DISCONTINUED | OUTPATIENT
Start: 2022-08-14 | End: 2022-08-17 | Stop reason: HOSPADM

## 2022-08-14 RX ORDER — SODIUM CHLORIDE 9 MG/ML
INJECTION, SOLUTION INTRAVENOUS CONTINUOUS
Status: DISCONTINUED | OUTPATIENT
Start: 2022-08-14 | End: 2022-08-16

## 2022-08-14 RX ORDER — ONDANSETRON 4 MG/1
4 TABLET, FILM COATED ORAL 3 TIMES DAILY PRN
Qty: 30 TABLET | Refills: 0 | Status: ON HOLD | OUTPATIENT
Start: 2022-08-14 | End: 2022-08-29 | Stop reason: HOSPADM

## 2022-08-14 RX ORDER — ASPIRIN 81 MG/1
81 TABLET, CHEWABLE ORAL 2 TIMES DAILY
Status: DISCONTINUED | OUTPATIENT
Start: 2022-08-15 | End: 2022-08-17

## 2022-08-14 RX ORDER — ONDANSETRON 2 MG/ML
4 INJECTION INTRAMUSCULAR; INTRAVENOUS EVERY 4 HOURS PRN
Status: DISCONTINUED | OUTPATIENT
Start: 2022-08-14 | End: 2022-08-17 | Stop reason: HOSPADM

## 2022-08-14 RX ORDER — ASPIRIN 81 MG/1
81 TABLET ORAL 2 TIMES DAILY
Qty: 84 TABLET | Refills: 0 | Status: SHIPPED | OUTPATIENT
Start: 2022-08-14 | End: 2022-08-17 | Stop reason: HOSPADM

## 2022-08-14 RX ORDER — SODIUM CHLORIDE 0.9 % (FLUSH) 0.9 %
5-40 SYRINGE (ML) INJECTION EVERY 12 HOURS SCHEDULED
Status: DISCONTINUED | OUTPATIENT
Start: 2022-08-14 | End: 2022-08-17 | Stop reason: HOSPADM

## 2022-08-14 RX ORDER — ROCURONIUM BROMIDE 10 MG/ML
INJECTION, SOLUTION INTRAVENOUS PRN
Status: DISCONTINUED | OUTPATIENT
Start: 2022-08-14 | End: 2022-08-14 | Stop reason: SDUPTHER

## 2022-08-14 RX ORDER — SODIUM CHLORIDE 0.9 % (FLUSH) 0.9 %
5-40 SYRINGE (ML) INJECTION PRN
Status: DISCONTINUED | OUTPATIENT
Start: 2022-08-14 | End: 2022-08-17 | Stop reason: HOSPADM

## 2022-08-14 RX ORDER — LIDOCAINE HYDROCHLORIDE 20 MG/ML
INJECTION, SOLUTION EPIDURAL; INFILTRATION; INTRACAUDAL; PERINEURAL PRN
Status: DISCONTINUED | OUTPATIENT
Start: 2022-08-14 | End: 2022-08-14 | Stop reason: SDUPTHER

## 2022-08-14 RX ORDER — ONDANSETRON 2 MG/ML
INJECTION INTRAMUSCULAR; INTRAVENOUS PRN
Status: DISCONTINUED | OUTPATIENT
Start: 2022-08-14 | End: 2022-08-14 | Stop reason: SDUPTHER

## 2022-08-14 RX ORDER — ACETAMINOPHEN 325 MG/1
650 TABLET ORAL EVERY 6 HOURS
Status: DISCONTINUED | OUTPATIENT
Start: 2022-08-14 | End: 2022-08-17 | Stop reason: HOSPADM

## 2022-08-14 RX ORDER — TRAMADOL HYDROCHLORIDE 50 MG/1
50 TABLET ORAL EVERY 6 HOURS PRN
Status: DISCONTINUED | OUTPATIENT
Start: 2022-08-14 | End: 2022-08-17

## 2022-08-14 RX ORDER — DOCUSATE SODIUM 100 MG/1
100 CAPSULE, LIQUID FILLED ORAL 2 TIMES DAILY
Qty: 60 CAPSULE | Refills: 0 | Status: ON HOLD | OUTPATIENT
Start: 2022-08-14 | End: 2022-08-29 | Stop reason: HOSPADM

## 2022-08-14 RX ORDER — HYDROCODONE BITARTRATE AND ACETAMINOPHEN 5; 325 MG/1; MG/1
1 TABLET ORAL EVERY 6 HOURS PRN
Qty: 28 TABLET | Refills: 0 | Status: ON HOLD | OUTPATIENT
Start: 2022-08-14 | End: 2022-08-29 | Stop reason: HOSPADM

## 2022-08-14 RX ORDER — TRANEXAMIC ACID 100 MG/ML
INJECTION, SOLUTION INTRAVENOUS PRN
Status: DISCONTINUED | OUTPATIENT
Start: 2022-08-14 | End: 2022-08-14 | Stop reason: SDUPTHER

## 2022-08-14 RX ORDER — TRANEXAMIC ACID 100 MG/ML
INJECTION, SOLUTION INTRAVENOUS PRN
Status: DISCONTINUED | OUTPATIENT
Start: 2022-08-14 | End: 2022-08-14 | Stop reason: ALTCHOICE

## 2022-08-14 RX ORDER — PROPOFOL 10 MG/ML
INJECTION, EMULSION INTRAVENOUS PRN
Status: DISCONTINUED | OUTPATIENT
Start: 2022-08-14 | End: 2022-08-14 | Stop reason: SDUPTHER

## 2022-08-14 RX ORDER — TRAMADOL HYDROCHLORIDE 50 MG/1
100 TABLET ORAL EVERY 6 HOURS PRN
Status: DISCONTINUED | OUTPATIENT
Start: 2022-08-14 | End: 2022-08-17

## 2022-08-14 RX ADMIN — CEFAZOLIN 2000 MG: 10 INJECTION, POWDER, FOR SOLUTION INTRAVENOUS at 14:10

## 2022-08-14 RX ADMIN — CEFAZOLIN 2000 MG: 10 INJECTION, POWDER, FOR SOLUTION INTRAVENOUS at 08:05

## 2022-08-14 RX ADMIN — LORAZEPAM 0.5 MG: 0.5 TABLET ORAL at 11:04

## 2022-08-14 RX ADMIN — FENTANYL CITRATE 50 MCG: 50 INJECTION, SOLUTION INTRAMUSCULAR; INTRAVENOUS at 15:10

## 2022-08-14 RX ADMIN — LORAZEPAM 0.5 MG: 0.5 TABLET ORAL at 03:45

## 2022-08-14 RX ADMIN — SODIUM CHLORIDE: 9 INJECTION, SOLUTION INTRAVENOUS at 14:33

## 2022-08-14 RX ADMIN — SUGAMMADEX 200 MG: 100 INJECTION, SOLUTION INTRAVENOUS at 15:12

## 2022-08-14 RX ADMIN — LORAZEPAM 0.5 MG: 0.5 TABLET ORAL at 23:58

## 2022-08-14 RX ADMIN — SODIUM CHLORIDE: 9 INJECTION, SOLUTION INTRAVENOUS at 17:45

## 2022-08-14 RX ADMIN — SODIUM CHLORIDE: 9 INJECTION, SOLUTION INTRAVENOUS at 08:05

## 2022-08-14 RX ADMIN — TRANEXAMIC ACID 1000 MG: 1 INJECTION, SOLUTION INTRAVENOUS at 14:15

## 2022-08-14 RX ADMIN — DEXAMETHASONE SODIUM PHOSPHATE 5 MG: 10 INJECTION, EMULSION INTRAMUSCULAR; INTRAVENOUS at 14:09

## 2022-08-14 RX ADMIN — Medication 50 MG: at 14:02

## 2022-08-14 RX ADMIN — SODIUM CHLORIDE: 9 INJECTION, SOLUTION INTRAVENOUS at 21:51

## 2022-08-14 RX ADMIN — ROCURONIUM BROMIDE 35 MG: 10 INJECTION, SOLUTION INTRAVENOUS at 14:02

## 2022-08-14 RX ADMIN — CEFAZOLIN 2000 MG: 10 INJECTION, POWDER, FOR SOLUTION INTRAVENOUS at 21:56

## 2022-08-14 RX ADMIN — ACETAMINOPHEN 650 MG: 325 TABLET ORAL at 17:43

## 2022-08-14 RX ADMIN — FENTANYL CITRATE 50 MCG: 50 INJECTION, SOLUTION INTRAMUSCULAR; INTRAVENOUS at 14:28

## 2022-08-14 RX ADMIN — PROPOFOL 100 MG: 10 INJECTION, EMULSION INTRAVENOUS at 14:02

## 2022-08-14 RX ADMIN — FENTANYL CITRATE 50 MCG: 50 INJECTION, SOLUTION INTRAMUSCULAR; INTRAVENOUS at 14:02

## 2022-08-14 RX ADMIN — ONDANSETRON 4 MG: 2 INJECTION INTRAMUSCULAR; INTRAVENOUS at 14:09

## 2022-08-14 RX ADMIN — ONDANSETRON 4 MG: 2 INJECTION INTRAMUSCULAR; INTRAVENOUS at 23:58

## 2022-08-14 ASSESSMENT — PAIN SCALES - GENERAL
PAINLEVEL_OUTOF10: 10
PAINLEVEL_OUTOF10: 10
PAINLEVEL_OUTOF10: 0
PAINLEVEL_OUTOF10: 7
PAINLEVEL_OUTOF10: 10
PAINLEVEL_OUTOF10: 7
PAINLEVEL_OUTOF10: 5

## 2022-08-14 ASSESSMENT — PAIN DESCRIPTION - ORIENTATION
ORIENTATION: LEFT

## 2022-08-14 ASSESSMENT — PAIN DESCRIPTION - LOCATION
LOCATION: LEG
LOCATION: LEG;HIP;KNEE

## 2022-08-14 ASSESSMENT — PAIN - FUNCTIONAL ASSESSMENT: PAIN_FUNCTIONAL_ASSESSMENT: PREVENTS OR INTERFERES SOME ACTIVE ACTIVITIES AND ADLS

## 2022-08-14 ASSESSMENT — PAIN DESCRIPTION - DESCRIPTORS
DESCRIPTORS: SHARP
DESCRIPTORS: DISCOMFORT

## 2022-08-14 ASSESSMENT — PAIN DESCRIPTION - PAIN TYPE: TYPE: SURGICAL PAIN

## 2022-08-14 NOTE — ANESTHESIA PRE PROCEDURE
0.25 mg  0.25 mg IntraVENous Q3H PRN Wynona Duverney, PA        Or    HYDROmorphone (DILAUDID) injection 0.5 mg  0.5 mg IntraVENous Q3H PRN Wynona Duverney, PA        ondansetron (ZOFRAN) injection 4 mg  4 mg IntraVENous Q6H PRN Wynona Duverney, PA   4 mg at 08/13/22 1156    polyethylene glycol (GLYCOLAX) packet 17 g  17 g Oral Daily PRN Wynona Duverney, PA        bisacodyl (DULCOLAX) suppository 10 mg  10 mg Rectal Daily PRN Wynona Duverney, PA        tranexamic acid (CYKLOKAPRON) 1,000 mg in sodium chloride 0.9 % 100 mL IVPB  1,000 mg IntraVENous On Call to [address] [employer] [place], [place]        LORazepam (ATIVAN) tablet 0.5 mg  0.5 mg Oral Q4H PRN Arvilla Gasquet Sara, DO   0.5 mg at 08/14/22 1104       Allergies:     Allergies   Allergen Reactions    Ciprofloxacin Other (See Comments)     dizziness    Clindamycin/Lincomycin Hives    Hydrocod Polst-Cpm Polst Er      anxious    Macrobid [Nitrofurantoin Macrocrystal] Diarrhea    Paxil [Paroxetine Hcl] Other (See Comments)     dizzy    Morphine Nausea And Vomiting       Problem List:    Patient Active Problem List   Diagnosis Code    Squamous cell carcinoma of skin C44.92    Osteoporosis of multiple sites without pathological fracture M81.0    Vitamin D deficiency E55.9    Essential hypertension I10    MVP (mitral valve prolapse) I34.1    Paroxysmal atrial fibrillation (HCC) I48.0    Age-related cataract of both eyes H25.9    Displaced fracture of left femoral neck ([address] S72.002A    Closed fracture of left hip ([address] S72.002A       Past Medical History:        Diagnosis Date    Osteoporosis of multiple sites without pathological fracture 10/2017    Squamous cell carcinoma of skin 2016    Vascular headache 1980's    Vitamin D deficiency 10/2017       Past Surgical History:        Procedure Laterality Date    BLADDER REPAIR  9/2014    MENISCECTOMY Left 5/2013    lateral    SKIN CANCER EXCISION  09/2016    Dr Celina Jon Left 4/2015       Social History:    Social History     Tobacco Use    Smoking status: Never    Smokeless tobacco: Never   Substance Use Topics    Alcohol use: No                                Counseling given: Not Answered      Vital Signs (Current):   Vitals:    08/13/22 2025 08/14/22 0014 08/14/22 0346 08/14/22 0800   BP: 124/65 122/60 123/65 129/69   Pulse: 86 81 91 88   Resp: 18 18 18 20   Temp: 97.9 °F (36.6 °C) 97.7 °F (36.5 °C) 98.3 °F (36.8 °C) 98.2 °F (36.8 °C)   TempSrc: Oral Oral Oral Oral   SpO2: 93% 90% 94% 95%   Weight:       Height:                                                  BP Readings from Last 3 Encounters:   08/14/22 129/69   07/05/22 (!) 148/82   06/21/22 134/75       NPO Status:                                                                                 BMI:   Wt Readings from Last 3 Encounters:   08/13/22 125 lb (56.7 kg)   07/05/22 127 lb (57.6 kg)   05/24/22 131 lb 8 oz (59.6 kg)     Body mass index is 20.18 kg/m². CBC:   Lab Results   Component Value Date/Time    WBC 8.6 08/14/2022 05:28 AM    RBC 3.83 08/14/2022 05:28 AM    HGB 11.9 08/14/2022 05:28 AM    HCT 35.7 08/14/2022 05:28 AM    MCV 93.2 08/14/2022 05:28 AM    RDW 14.6 08/27/2014 05:44 AM     08/14/2022 05:28 AM       CMP:   Lab Results   Component Value Date/Time     08/14/2022 05:28 AM    K 3.8 08/14/2022 05:28 AM     08/14/2022 05:28 AM    CO2 22 08/14/2022 05:28 AM    BUN 10 08/14/2022 05:28 AM    CREATININE 0.3 08/14/2022 05:28 AM    LABGLOM >90 08/14/2022 05:28 AM    GLUCOSE 112 08/14/2022 05:28 AM    PROT 6.9 04/26/2021 03:22 PM    CALCIUM 8.7 08/14/2022 05:28 AM    BILITOT 0.4 04/26/2021 03:22 PM    ALKPHOS 84 04/26/2021 03:22 PM    AST 18 04/26/2021 03:22 PM    ALT 9 04/26/2021 03:22 PM       POC Tests: No results for input(s): POCGLU, POCNA, POCK, POCCL, POCBUN, POCHEMO, POCHCT in the last 72 hours.     Coags:   Lab Results   Component Value Date/Time    PROTIME 10.8 08/27/2014 05:44 AM    INR 1.15 08/14/2022 05:28 AM    APTT 33.5

## 2022-08-14 NOTE — PROGRESS NOTES
Hospitalist Progress Note      Patient:  Carlos Kurtz    Unit/Bed:5K-07/007-A  YOB: 1940  MRN: [id number]   Acct: [de-identified]   PCP: Tamara Boudreaux MD  Date of Admission: 8/13/2022    Assessment/Plan:    Acute left displaced fmoral neck fracture  Mechanical ground level fall  Primary to manage  Planning OR today for total vs. Hemiarthroplasty  Pain/nasuea control per primary  Recommend bowel regimen daily  Asymptomatic bacteriuria  U/A with GNB >1000k CFU without symptoms. No indication to treat at this time  Moderate mitral valve regurgitation  Recent echo 7/15/22  Have had discussions regarding possible mitral clip. Follows with Dr. Marcia Oliveira outpatient  pAF  Not currently on [facility] as her episodes of AF have been intermittent. Unclear based on history, but may have developed AF RVR following a prior surgery. 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 HTN  Continue lopressor  Insomnia  Can cautiously continue home lorazepam.  Recommend discontinuation/weaning down in the outpatient setting given advanced age and frequent falls  Vitamin D deficiency  On supplementation    Subjective (past 24 hours):   Doing well today. Does complain of mild left leg pain, mostly with movement. Denies fevers or chills. Denies abdominal pain, dysuria, or polyuria. Past medical history, family history, social history and allergies reviewed again and is unchanged since admission. ROS (12 point review of systems completed. Pertinent positives noted.  Otherwise ROS is negative)     Medications:  Reviewed    Infusion Medications    sodium chloride 100 mL/hr at 08/14/22 0805    sodium chloride       Scheduled Medications    sodium chloride flush  10 mL IntraVENous 2 times per day    acetaminophen  650 mg Oral Q6H    tranexamic acid (CYCLOKAPRON) IVPB  1,000 mg IntraVENous On Call to OR     PRN Meds: sodium chloride flush, sodium chloride, traMADol, HYDROmorphone **OR** HYDROmorphone, ondansetron, polyethylene glycol, bisacodyl, LORazepam    No intake or output data in the 24 hours ending 08/14/22 1039    Diet:  Diet NPO Exceptions are: Sips of Water with Meds    Exam:  /69   Pulse 88   Temp 98.2 °F (36.8 °C) (Oral)   Resp 20   Ht 5' 6\" (1.676 m)   Wt 125 lb (56.7 kg)   SpO2 95%   BMI 20.18 kg/m²   General appearance: No apparent distress, appears stated age and cooperative. HEENT: Pupils equal, round, and reactive to light. Conjunctivae/corneas clear. Neck: Supple, with full range of motion. No jugular venous distention. Trachea midline. Respiratory:  Normal respiratory effort. Clear to auscultation, bilaterally without Rales/Wheezes/Rhonchi. Cardiovascular: Regular rate and rhythm with normal W3/Z1.  4/6 diastolic ejection murmur noted. Abdomen: Soft, non-tender, non-distended with normal bowel sounds. Musculoskeletal: LLE in traction and immobilizer support. Skin: Skin color, texture, turgor normal.  No rashes or lesions. Neurologic:  Neurovascularly intact without any focal sensory/motor deficits. Cranial nerves: II-XII intact, grossly non-focal.  Psychiatric: Alert and oriented, thought content appropriate, normal insight  Capillary Refill: Brisk,< 3 seconds   Peripheral Pulses: +2 palpable, equal bilaterally     Labs:   Recent Labs     08/13/22  0100 08/14/22  0528   WBC 7.9 8.6   HGB 13.3 11.9*   HCT 39.8 35.7*    175     Recent Labs     08/13/22  0100 08/14/22  0528   * 132*   K 3.7 3.8   CL 92* 100   CO2 24 22*   BUN 14 10   CREATININE 0.4 0.3*   CALCIUM 9.4 8.7     No results for input(s): AST, ALT, BILIDIR, BILITOT, ALKPHOS in the last 72 hours. Recent Labs     08/14/22  0528   INR 1.15*     No results for input(s): Yeny Risk in the last 72 hours.     Microbiology:    Blood culture #1: No results found for: [facility]    Blood culture #2:No results found for: BLOODCULT2    Organism:  Lab Results   Component Value Date/Time    ORG gram negative bacilli 08/13/2022 12:30 AM       No results found for: LABGRAM    MRSA culture only:No results found for: [facility]    Urine culture:   Lab Results   Component Value Date/Time    LABURIN CULTURE RESULTS 07/16/2019 12:00 AM       Respiratory culture: No results found for: CULTRESP    Aerobic and Anaerobic :  No results found for: LABAERO  No results found for: LABANAE    Urinalysis:      Lab Results   Component Value Date/Time    NITRU POSITIVE 08/13/2022 12:33 AM    WBCUA 10-15 08/13/2022 12:33 AM    BACTERIA MANY 08/13/2022 12:33 AM    RBCUA 0-2 08/13/2022 12:33 AM    BLOODU NEGATIVE 08/13/2022 12:33 AM    SPECGRAV 1.015 08/28/2019 01:34 PM    GLUCOSEU NEGATIVE 08/13/2022 12:33 AM       Radiology:  XR CHEST 1 VIEW   Final Result   Impression:   No acute findings. This document has been electronically signed by: Li Kevin. Diana May MD    on 08/13/2022 03:19 AM      XR KNEE LEFT (1-2 VIEWS)   Final Result   Impression:   No fracture. Intact total knee arthroplasty. This document has been electronically signed by: Li Kevin. Diana May MD    on 08/13/2022 03:46 AM      XR HIP 2-3 VW W PELVIS LEFT   Final Result   Impression:   Fracture of the left proximal femur. This document has been electronically signed by: Li Kevin. Diana May MD    on 08/13/2022 03:36 AM           XR KNEE LEFT (1-2 VIEWS)    Result Date: 8/13/2022  Radiographs of the left knee, 2 views Comparison: None Findings: There is no fracture or dislocation. Status post total knee arthroplasty. Intact hardware. No periprosthetic lucency to indicate loosening. Decreased bone mineralization. Identified joint effusion. No soft tissue swelling. Vascular calcifications. Impression: No fracture. Intact total knee arthroplasty. This document has been electronically signed by: Li Kevin.  Diana May MD on 08/13/2022 03:46 AM    XR CHEST 1 VIEW    Result Date: 8/13/2022  Chest Radiograph Comparison: None Findings: Cardiomegaly. Normal mediastinal contours. No pneumothorax. No opacity. No pleural effusion. Normal upper abdomen. No fracture. Decreased bone mineralization. Impression: No acute findings. This document has been electronically signed by: Reed Bain. Miri Garnica MD on 08/13/2022 03:19 AM    XR HIP 2-3 VW W PELVIS LEFT    Result Date: 8/13/2022   ADDENDUM #1  This report was discussed with Flower Schulte  on Aug 13, 2022 03:37:00 EDT. This document has been electronically signed by: Sol Canseco on 08/13/2022 03:37 AM  ORIGINAL REPORT  Radiographs of the pelvis and left hip, 3 views Comparison: None Findings: There is a fracture of the left proximal femur involving the femoral neck. 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 No dislocation. Mild to moderate degenerative change. Decreased bone mineralization. Increased stool quantity. Vascular calcifications. Impression: Fracture of the left proximal femur. This document has been electronically signed by: Reed Bain.  Miri Garnica MD on 08/13/2022 03:36 AM       Electronically signed by Elvis Castañeda DO on 8/14/2022 at 10:39 AM

## 2022-08-14 NOTE — PROGRESS NOTES
1522 Pt arrives to recovery responsive to verbal stimulation. Pt respirations are unlabored on 2 L nasal canula. Pt VSS. Pt denies any pain at this time    1532 Pt resting quietly in bed, tolerating ice chips. Pt denies any pain. Respirations are unlabored. VSS  1542 pt status remains unchanged at this time. Report called to mey, Rn.   1552 Pt meets criteria for discharge from recovery at this time. Pt departing recovery in stable condition.

## 2022-08-14 NOTE — OP NOTE
[address] [address]  Dept: [phone]  Loc: [phone]      Operative Report      Patient Name:  Rama Herrera   YOB: 1940   MRN:  [id number]   Date of Surgery: 08/14/22        Pre-operative diagnosis:  Left displaced femoral neck fracture    Post-operative diagnosis:  Same    Procedure(s): Hemiarthroplasty of left hip (CPT 29944)    Surgeon: See Santa M.D. Assistant(s):  Radha Wadsworth PA-C PA-C participated in all aspect of the case including position of the patient, prepping, draping, protecting vital structures, closing wounds, and the application of dressings. Her overall presence was not only medically necessary, it also expedited the case resulting in decreased blood loss and shortening anesthesia time. Anesthesia: General    EBL:  150cc    IVF: Crystalloid    Medications: Two grams of Cefazolin were given. Implants: Erle Coupe Polar Stem size 5 standard, Head size 43-3    Clinical History/Indication for Surgery  The patient is a 78y.o. year old female who was presents for operative fixation of a displaced femoral neck fracture. Typical indications for surgery were reviewed and hemiarthroplasty was recommended. Risks of surgery in general were reviewed including, but not limited to, infection, fracture, subsidence, instability, need for additional procedures,  failure of fixation which would require revision, damage to normal structures as well as medical complications such as MI, stroke, PE, DVT, and even death. Patient and any family present were given opportunity to ask questions and consider her options ultimately electing to proceed with surgery. No guarantees were given or implied. Operative Narration  The patient was identified in the pre-operative holding area. The surgical site was identified and marked. Informed consent was obtained.   The patient was then brought to the operating room and placed supine on the operating table. Anesthesia was administered and care of the head, neck, and airway was maintained by the anesthesia staff throughout the entire procedure. Patient was turned lateral decubitus with the operative hip facing up. Axillary roll was placed. All bony prominences were identified and padded. The operative leg was prepped and draped in the usual sterile fashion. A surgical timeout was performed. Antibiotics were confirmed to have been given. A posterior approach to the hip was made incising skin and subcutaneous tissues down to the iliotibial band. This was divided in line with skin and carried into the gluteus jennifer fascia. The muscle was split along its fibers with finger dissection. The Charnley was placed after palpating and protecting the sciatic nerve. With the medius retracted, the short external rotators and posterior hip capsule were reflected from the posterior femoral neck until the lesser could be palpated and the hip was exposed. This dissection was curved posteriorly along the superior margin of the piriformis. Labrum was not incised. With retractors in place, the femoral neck cut was made approximately one finger breadth above the lesser trochanter. The femoral head was removed and sized and any remaining neck fragments were excised. Two tagging sutures were placed into the piriformis and posterior capsule for later repair through drill holes. With the leg internally rotated, the proximal femur was prepared starting with the box osteotome and canal finder. Sequential broaching was performed until the last broach obtained good fit within the femur. A trial reduction was performed to ensure correct leg length, hip stability, and soft tissue tensioning. Once appropriate sizing was completed the hip was dislocated and the trial components were exchanged for the final implants which were impacted in standard fashion.   Final reduction was performed. Two drill holes were made in the greater trochanter and final irrigation performed. The postero-superior limb of the capsulotomy was closed with interrupted #2 Ethibond sutures. The two tagging sutures were then pulled thought the greater trochanter and tied with the hip in external rotation. Layered closure was performed including iliotibial band, subcutaneous tissues and skin. A sterile dressing was applied. The patient was turned supine. Blankets were placed between the knees. Once the patient was awakened from anesthesia, they were transported to the PACU in stable condition, having tolerated surgery well with no immediate complications.     This operative report was prepared and signed by Rey Castañeda MD at 08/14/22, 1:29 PM

## 2022-08-14 NOTE — PLAN OF CARE
Problem: Pain  Goal: Verbalizes/displays adequate comfort level or baseline comfort level  Outcome: Progressing  Flowsheets (Taken 8/14/2022 0056)  Verbalizes/displays adequate comfort level or baseline comfort level:   Encourage patient to monitor pain and request assistance   Assess pain using appropriate pain scale   Administer analgesics based on type and severity of pain and evaluate response   Implement non-pharmacological measures as appropriate and evaluate response     Problem: Safety - Adult  Goal: Free from fall injury  Outcome: Progressing  Flowsheets (Taken 8/14/2022 0056)  Free From Fall Injury: Instruct family/caregiver on patient safety     Problem: ABCDS Injury Assessment  Goal: Absence of physical injury  Outcome: Progressing  Flowsheets (Taken 8/14/2022 0056)  Absence of Physical Injury: Implement safety measures based on patient assessment     Problem: Skin/Tissue Integrity  Goal: Absence of new skin breakdown  Description: 1. Monitor for areas of redness and/or skin breakdown  2. Assess vascular access sites hourly  3. Every 4-6 hours minimum:  Change oxygen saturation probe site  4. Every 4-6 hours:  If on nasal continuous positive airway pressure, respiratory therapy assess nares and determine need for appliance change or resting period.   Outcome: Progressing

## 2022-08-14 NOTE — DISCHARGE INSTRUCTIONS
Dr. Eric Man  Adult Hip and Knee Reconstruction  [phone]    Hip [facility]ali Discharge Instruction      Physical Therapy  Physical Therapy should be arranged for you prior to your discharge. Therapy should begin 1 or 2 days after surgery and continue 3 times a week for a month. Do the exercises at home on the days you do not see a therapist.    Dressing  Your wound will be covered by a dressing after surgery. It should usually be removed after 10 days. You can shower as long as there is no drainage from the wound. After the dressing is removed it is not recommended to apply any cream, ointment or lotion to the wound unless specifc instructions are given by your surgeon. Most of the time, your stitches will be under the skin and will dissolve of their own. If you have staples or external stitches they can be removed 10 days after surgery as long as there is no drainage. If the wound is draining, the dressing should be changed daily. The wound should be dry and without drainage by about 7 days postoperative. If there is persistent drainage from the wound after this time period, you should call our office immediately. If there is worsening redness around the incision, you should also call the office immediately. These may be signs of a superficial or deep wound infection and you may have to return to the office for an evaluation by one of our staff. Common concerns after hip replacement surgery include swelling and bruising. These can be quite signifcant in nature and can appear anywhere from the thigh to the toes. These are typically worse at night which can contribute to trouble sleeping comfortably for more than one to two hours at a time. Activity  You will be using an assistive device (walker, crutches, or cane). Your physical therapist will help you with this. Most patients are able to get in and out of bed, use the rest room, and go up and down stairs when they go home.  Wed like you to get up and walk every hour after surgery. For the first 1-2 weeks you will be walking with a walker or two crutches. After that, you can start using a cane. Preventing Postoperative Hip Dislocations  Dislocations are rare, but if they occur they most often occur the first 3 months after surgery. Before surgery, the physical therapist begins teaching you special precautions and how to avoid dislocation. After surgery, everyone will be reminding you not to bend the hip too much, not to twist at the waist, and to avoid turning your leg in or out. patients hip joints are so stable after surgery that they do not have dislocation precautions. If you are one of these patients, the therapist will tell you not to worry about dislocation but you should still avoid extreme bending and twisting. Again, your therapist will go over this after surgery. Bathing  Your dressing is waterproof. You may take a shower but not a bath. Precautions  It is very common to have swelling and bruising in the thigh, lower leg and foot after surgery. Elevating your leg, doing ankle pump exercises, and using ice packs will help. Call us if the swelling does not subside overnight. Call for a temperature over 101. Take the pain medications as needed for pain. Pain pills can cause constipation. Use over the counter stool softeners like Colace to avoid constipation. If Colace is not effective use a gentle over the counter laxative such as Miralx  Please refer to your medication sheet for more information regarding any prescriptions you have been given to take after surgery. Follow up office visit  The Doctor would like see you in 4 weeks. If the followup appointment is not already made the office will call within 2 weeks. Please avoid any other surgery, procedure or dental procedure until cleared by Dr. Luis Angel Traylor      Common Questions About Hip Replacement    Question: How long can I expect to have pain after surgery? Answer:  The time varies for each patient. Many patients report that there is very little pain right after surgery, but postoperative soreness may continue for 3 - 4 weeks. Question. How long until bone ingrowth occurs? Answer. Bone ingrowth occurs between 6 weeks and 1 year. Question. How long after surgery will I have to limit weight bearing on my leg? Answer. The amount of weight you are allowed to put on your leg varies from full weight bearing to just the weight of your foot. Several surgical factors are considered in making this decision and your surgeon will inform you of your weight bearing status following the procedure. Most patients are cleared to be full weightbearing, as tolerated. Question. Why do I have to take a blood thinner after surgery and how long will this continue? Answer. A blood thinning medicine is recommended to prevent blood clots and is usually discontinued after your first follow-up appointment. Question: When can I resume sexual activities? Answer. You can resume sexual activity 3 - 6 weeks after surgery. The physical therapist iraj julian. Question: Why does the skin feel funny around my incision? Answer: The nerves in the skin cross the front of the hip in an inside-out direction. When an incision is made on the hip, these tiny nerves are divided and the skin on the outside will feel fuzzy or numb. This sensation will lessen with time and is normal for all patients with hip replacement surgery. Question: Why is my leg discolored? Answer: You may develop some discoloration (like a bruise) in the leg. This discoloration, which may extend to the hip or ankle, will slowly disappear. Question: What about cocoa butter and vitamin E oil? Answer:  Do not use either of these until after your four week postoperative visit. Ask for clearance to use during that visit. Question: A stitch is sticking out. What do I do? Answer:  We often suture the skin from underneath to reduce scarring. The knot at the end of the stitch sometimes will protrude from the skin. Redness and a small amount of drainage may appear. Cleanse the skin with peroxide. Please notify your surgeons of office. Question: When can I drive my car? Answer: Usually after 4 weeks. A patients decision to drive sooner is a personal decision related to their mobility and pain control. You cannot drive while you are taking narcotic pain medicine such as Norco, Percocet, Hydrocodone or Oxycodone. Question: When can I go in the swimming pool? Answer: Ordinarily, patients may resume pool activities after the first follow-up visit. Be sure to check with the surgeon or fellow at that time. Question. When can I start playing tennis, ski or golf? Answer. Active sports are generally not resumed until 3 - 6 months after surgery. This is to allow for bone ingrowth of your new hip and muscle strengthening. Question. When will I be able to return to work? Answer. This depends on the type of work you do as well as several other factors. This sp an individual basis and you should discuss with your surgeon.

## 2022-08-14 NOTE — ANESTHESIA POSTPROCEDURE EVALUATION
Department of Anesthesiology  Postprocedure Note    Patient: Carlos Navarro  MRN: [id number]  YOB: 1940  Date of evaluation: 8/14/2022      Procedure Summary     Date: 08/14/22 Room / Location: [address] Angelica Madi    Anesthesia Start: 1400 Anesthesia Stop: 1995    Procedure: LEFT HIP BEE  ARTHROPLASTY (Left) Diagnosis:       Closed fracture of left femur, unspecified fracture morphology, unspecified portion of femur, initial encounter ([facility] [address]      (Closed fracture of left femur, unspecified fracture morphology, unspecified portion of femur, initial encounter ([facility] [address] [facility])    Surgeons: Raul Romero MD Responsible Provider: Jackson Long MD    Anesthesia Type: general ASA Status: 4          Anesthesia Type: No value filed.     Ryan Phase I: Ryan Score: 8    Ryan Phase II:        Anesthesia Post Evaluation    Patient location during evaluation: PACU  Patient participation: complete - patient participated  Level of consciousness: awake  Airway patency: patent  Nausea & Vomiting: no vomiting and no nausea  Complications: no  Cardiovascular status: hemodynamically stable  Respiratory status: acceptable and nasal cannula  Hydration status: stable

## 2022-08-15 LAB
ANION GAP SERPL CALCULATED.3IONS-SCNC: 11 MEQ/L (ref 8–16)
BUN BLDV-MCNC: 8 MG/DL (ref 7–22)
CALCIUM SERPL-MCNC: 7.9 MG/DL (ref 8.5–10.5)
CHLORIDE BLD-SCNC: 103 MEQ/L (ref 98–111)
CO2: 17 MEQ/L (ref 23–33)
CREAT SERPL-MCNC: 0.3 MG/DL (ref 0.4–1.2)
EKG Q-T INTERVAL: 258 MS
EKG Q-T INTERVAL: 302 MS
EKG QRS DURATION: 82 MS
EKG QRS DURATION: 92 MS
EKG QTC CALCULATION (BAZETT): 457 MS
EKG QTC CALCULATION (BAZETT): 474 MS
EKG R AXIS: -38 DEGREES
EKG R AXIS: -40 DEGREES
EKG T AXIS: -139 DEGREES
EKG T AXIS: 167 DEGREES
EKG VENTRICULAR RATE: 148 BPM
EKG VENTRICULAR RATE: 189 BPM
GFR SERPL CREATININE-BSD FRML MDRD: > 90 ML/MIN/1.73M2
GLUCOSE BLD-MCNC: 142 MG/DL (ref 70–108)
HCT VFR BLD CALC: 35.2 % (ref 37–47)
HEMOGLOBIN: 11.1 GM/DL (ref 12–16)
MAGNESIUM: 1.9 MG/DL (ref 1.6–2.4)
ORGANISM: ABNORMAL
POTASSIUM SERPL-SCNC: 3.2 MEQ/L (ref 3.5–5.2)
SODIUM BLD-SCNC: 131 MEQ/L (ref 135–145)
URINE CULTURE REFLEX: ABNORMAL

## 2022-08-15 PROCEDURE — 80048 BASIC METABOLIC PNL TOTAL CA: CPT

## 2022-08-15 PROCEDURE — 2500000003 HC RX 250 WO HCPCS: Performed by: NURSE PRACTITIONER

## 2022-08-15 PROCEDURE — 6370000000 HC RX 637 (ALT 250 FOR IP): Performed by: PHYSICIAN ASSISTANT

## 2022-08-15 PROCEDURE — 6360000002 HC RX W HCPCS

## 2022-08-15 PROCEDURE — 2580000003 HC RX 258: Performed by: PHYSICIAN ASSISTANT

## 2022-08-15 PROCEDURE — 97162 PT EVAL MOD COMPLEX 30 MIN: CPT

## 2022-08-15 PROCEDURE — 97116 GAIT TRAINING THERAPY: CPT

## 2022-08-15 PROCEDURE — 36415 COLL VENOUS BLD VENIPUNCTURE: CPT

## 2022-08-15 PROCEDURE — 6370000000 HC RX 637 (ALT 250 FOR IP): Performed by: STUDENT IN AN ORGANIZED HEALTH CARE EDUCATION/TRAINING PROGRAM

## 2022-08-15 PROCEDURE — 93010 ELECTROCARDIOGRAM REPORT: CPT | Performed by: INTERNAL MEDICINE

## 2022-08-15 PROCEDURE — 85014 HEMATOCRIT: CPT

## 2022-08-15 PROCEDURE — 97110 THERAPEUTIC EXERCISES: CPT

## 2022-08-15 PROCEDURE — 99233 SBSQ HOSP IP/OBS HIGH 50: CPT | Performed by: STUDENT IN AN ORGANIZED HEALTH CARE EDUCATION/TRAINING PROGRAM

## 2022-08-15 PROCEDURE — 2140000000 HC CCU INTERMEDIATE R&B

## 2022-08-15 PROCEDURE — 93005 ELECTROCARDIOGRAM TRACING: CPT | Performed by: ORTHOPAEDIC SURGERY

## 2022-08-15 PROCEDURE — 99222 1ST HOSP IP/OBS MODERATE 55: CPT | Performed by: NURSE PRACTITIONER

## 2022-08-15 PROCEDURE — 93005 ELECTROCARDIOGRAM TRACING: CPT | Performed by: PHYSICIAN ASSISTANT

## 2022-08-15 PROCEDURE — 97530 THERAPEUTIC ACTIVITIES: CPT

## 2022-08-15 PROCEDURE — 85018 HEMOGLOBIN: CPT

## 2022-08-15 PROCEDURE — 83735 ASSAY OF MAGNESIUM: CPT

## 2022-08-15 PROCEDURE — 6360000002 HC RX W HCPCS: Performed by: PHYSICIAN ASSISTANT

## 2022-08-15 PROCEDURE — 97166 OT EVAL MOD COMPLEX 45 MIN: CPT

## 2022-08-15 PROCEDURE — 2500000003 HC RX 250 WO HCPCS: Performed by: PHYSICIAN ASSISTANT

## 2022-08-15 RX ORDER — POTASSIUM CHLORIDE 20 MEQ/1
40 TABLET, EXTENDED RELEASE ORAL 2 TIMES DAILY WITH MEALS
Status: COMPLETED | OUTPATIENT
Start: 2022-08-15 | End: 2022-08-15

## 2022-08-15 RX ORDER — MIDAZOLAM HYDROCHLORIDE 1 MG/ML
2 INJECTION INTRAMUSCULAR; INTRAVENOUS ONCE
Status: COMPLETED | OUTPATIENT
Start: 2022-08-15 | End: 2022-08-15

## 2022-08-15 RX ORDER — ADENOSINE 3 MG/ML
INJECTION, SOLUTION INTRAVENOUS
Status: DISPENSED
Start: 2022-08-15 | End: 2022-08-15

## 2022-08-15 RX ORDER — ADENOSINE 3 MG/ML
12 INJECTION, SOLUTION INTRAVENOUS ONCE
Status: COMPLETED | OUTPATIENT
Start: 2022-08-15 | End: 2022-08-15

## 2022-08-15 RX ORDER — ADENOSINE 3 MG/ML
INJECTION, SOLUTION INTRAVENOUS
Status: COMPLETED
Start: 2022-08-15 | End: 2022-08-15

## 2022-08-15 RX ORDER — MIDAZOLAM HYDROCHLORIDE 1 MG/ML
INJECTION INTRAMUSCULAR; INTRAVENOUS
Status: COMPLETED
Start: 2022-08-15 | End: 2022-08-15

## 2022-08-15 RX ORDER — HYDROXYZINE HYDROCHLORIDE 10 MG/1
10 TABLET, FILM COATED ORAL ONCE
Status: COMPLETED | OUTPATIENT
Start: 2022-08-15 | End: 2022-08-15

## 2022-08-15 RX ORDER — ADENOSINE 3 MG/ML
6 INJECTION, SOLUTION INTRAVENOUS ONCE
Status: COMPLETED | OUTPATIENT
Start: 2022-08-15 | End: 2022-08-15

## 2022-08-15 RX ADMIN — ACETAMINOPHEN 650 MG: 325 TABLET ORAL at 14:10

## 2022-08-15 RX ADMIN — KETOROLAC TROMETHAMINE 15 MG: 30 INJECTION, SOLUTION INTRAMUSCULAR; INTRAVENOUS at 03:47

## 2022-08-15 RX ADMIN — HYDROXYZINE HYDROCHLORIDE 10 MG: 10 TABLET ORAL at 09:38

## 2022-08-15 RX ADMIN — TRAMADOL HYDROCHLORIDE 100 MG: 50 TABLET, COATED ORAL at 20:41

## 2022-08-15 RX ADMIN — ACETAMINOPHEN 650 MG: 325 TABLET ORAL at 20:41

## 2022-08-15 RX ADMIN — POTASSIUM CHLORIDE 40 MEQ: 1500 TABLET, EXTENDED RELEASE ORAL at 09:40

## 2022-08-15 RX ADMIN — AMIODARONE HYDROCHLORIDE 1 MG/MIN: 1.8 INJECTION, SOLUTION INTRAVENOUS at 01:03

## 2022-08-15 RX ADMIN — ADENOSINE 6 MG: 3 INJECTION, SOLUTION INTRAVENOUS at 00:23

## 2022-08-15 RX ADMIN — LORAZEPAM 0.5 MG: 0.5 TABLET ORAL at 18:13

## 2022-08-15 RX ADMIN — ADENOSINE 12 MG: 3 INJECTION INTRAVENOUS at 00:25

## 2022-08-15 RX ADMIN — ADENOSINE 6 MG: 3 INJECTION INTRAVENOUS at 00:23

## 2022-08-15 RX ADMIN — MIDAZOLAM HYDROCHLORIDE 2 MG: 1 INJECTION INTRAMUSCULAR; INTRAVENOUS at 00:33

## 2022-08-15 RX ADMIN — CEFAZOLIN 2000 MG: 10 INJECTION, POWDER, FOR SOLUTION INTRAVENOUS at 06:36

## 2022-08-15 RX ADMIN — POTASSIUM CHLORIDE 40 MEQ: 1500 TABLET, EXTENDED RELEASE ORAL at 18:27

## 2022-08-15 RX ADMIN — ACETAMINOPHEN 650 MG: 325 TABLET ORAL at 02:55

## 2022-08-15 RX ADMIN — ADENOSINE 12 MG: 3 INJECTION, SOLUTION INTRAVENOUS at 00:25

## 2022-08-15 RX ADMIN — SODIUM CHLORIDE: 9 INJECTION, SOLUTION INTRAVENOUS at 14:04

## 2022-08-15 RX ADMIN — ASPIRIN 81 MG 81 MG: 81 TABLET ORAL at 20:41

## 2022-08-15 RX ADMIN — AMIODARONE HYDROCHLORIDE 0.5 MG/MIN: 1.8 INJECTION, SOLUTION INTRAVENOUS at 07:59

## 2022-08-15 RX ADMIN — AMIODARONE HYDROCHLORIDE 150 MG: 1.5 INJECTION, SOLUTION INTRAVENOUS at 01:23

## 2022-08-15 RX ADMIN — TRAMADOL HYDROCHLORIDE 50 MG: 50 TABLET, COATED ORAL at 01:26

## 2022-08-15 RX ADMIN — MIDAZOLAM 2 MG: 1 INJECTION INTRAMUSCULAR; INTRAVENOUS at 00:33

## 2022-08-15 RX ADMIN — LORAZEPAM 0.5 MG: 0.5 TABLET ORAL at 21:57

## 2022-08-15 RX ADMIN — ASPIRIN 81 MG 81 MG: 81 TABLET ORAL at 09:40

## 2022-08-15 ASSESSMENT — PAIN SCALES - GENERAL
PAINLEVEL_OUTOF10: 6
PAINLEVEL_OUTOF10: 4

## 2022-08-15 ASSESSMENT — PAIN DESCRIPTION - DESCRIPTORS: DESCRIPTORS: ACHING

## 2022-08-15 ASSESSMENT — PAIN DESCRIPTION - LOCATION
LOCATION: HIP
LOCATION: LEG

## 2022-08-15 ASSESSMENT — PAIN DESCRIPTION - ORIENTATION
ORIENTATION: LEFT
ORIENTATION: LEFT

## 2022-08-15 NOTE — PROGRESS NOTES
[address] [address]  INPATIENT PHYSICAL THERAPY  EVALUATION  [facility] CCU-STEPDOWN 3B - 3B-31/031-A    Time In: 827  Time Out: 0916  Timed Code Treatment Minutes: 45 Minutes  Minutes: 49          Date: 8/15/2022  Patient Name: Vandana Reece,  Gender:  female        MRN: [id number]  : 1940  (78 y.o.)      Referring Practitioner: DARA Medellin  Diagnosis: displaced fx of Left femoral neck  Additional Pertinent Hx: Per EMR \"78 y.o. female who presents with left hip and knee pain after a mechanical fall at home. Patient landed on her left side and was unable to ambulate thereafter. Patient transferred to [facility] ED via EMS and radiologic eval revealed left femoral neck fracture. Patient lives at home independently with her son. She uses cane and walker occasionally for ambulation. No blood thinners. Walks less than 1 mile per day. Hx left TKA 7 years ago. Non smoker. No alcohol use. Patient states she has a \"leaky valve\" for which she uses lopressor daily. Does mention some difficulty with anesthesia in the past with racing heart rate postoperatively. \" Pt is s/p L hemiarthoplasty completed by Dr. Florence Hernandez on . Restrictions/Precautions:  Restrictions/Precautions: Surgical Protocols, Weight Bearing, General Precautions, Fall Risk  Left Lower Extremity Weight Bearing: Weight Bearing As Tolerated  Position Activity Restriction  Hip Precautions: No hip flexion > 90 degrees, No hip internal rotation, Posterior hip precautions, No ADduction  Other position/activity restrictions: THR precautions, 2L O2 on 8/15    Subjective:  Chart Reviewed: Yes  Patient assessed for rehabilitation services?: Yes  Family / Caregiver Present: No  Subjective: OK to see pt per nursing. Pt in bed when PT arrived, agreeable to PT session. PT very hesistant during session, high levels of anxiety, step by step commands for mobility, and emotional support and encouragement required for all tasks.  Pt in bedside chair following session. General:  Overall Orientation Status: Within Normal Limits  Orientation Level: Oriented X4  Vision: Impaired  Vision Exceptions: Wears glasses at all times  Hearing: Within functional limits       Pain: reports increased L hip pain, did not quantify, reports more anxiety than pain    Vitals: Blood Pressure: 129/71 in bed, 135/72 sitting, 143/78 standing  Oxygen: 2 L of O2 at rest, >96%, doffed O2 as pt not on at home, nursing ok'd. Decreased to 83%, redonned 2 L of O2, >90%, education on proper breathing mechanics, fair demo due to increased anxiety   Heart Rate: 70-90s during session    Social/Functional History:    Lives With: Son  Type of Home: House  Home Layout: Two level, Able to Live on Main level with bedroom/bathroom, Performs ADL's on one level, Laundry in basement  Home Access: Stairs to enter with rails, Stairs to enter without rails  Entrance Stairs - Number of Steps: 2 ELOY  Home Equipment: Cane     Bathroom Shower/Tub: Tub/Shower unit  Bathroom Toilet: Standard       ADL Assistance: Independent  Homemaking Assistance: Independent  Ambulation Assistance: Independent  Transfer Assistance: Independent    Active : Yes  Mode of Transportation: Car     Additional Comments: son recently (retired) moved in with pt. Pt amb with SC intermittently in the home, and uses SC for longer distances. OBJECTIVE:  Range of Motion:  Bilateral Lower Extremity: WFL    Strength:  Right Lower Extremity: Impaired - grossly deconditioned  Left Lower Extremity: Impaired - 3-/5 hip. 3+/5 knee, 4-/5 ankle    Balance:  Static Sitting Balance:  Stand By Assistance, Contact Guard Assistance, X 1  Static Standing Balance: Contact Guard Assistance, Minimal Assistance, X 1, with cues for safety, with verbal cues   Extensive time sitting on EOB due to increased anxiety, assisted with donning socks, and for emotional support.    Bed Mobility:  Supine to Sit: Minimal Assistance, X 1, with head of bed raised, with rail, with verbal cues , with increased time for completion  Assist required for trunk support  Transfers:  Sit to Stand: Contact Guard Assistance, Minimal Assistance, X 1, with increased time for completion, cues for hand placement, with verbal cues  [facility], X 1, with increased time for completion, cues for hand placement, with verbal cues  RW for support, increased time for transitioning UE to walker for support, increased posterior lean noted once in standing with cues for correction and fair demo. Ambulation:  Minimal Assistance, X 1, with cues for safety, with verbal cues , with increased time for completion  Distance: 3 feet to chair  Surface: Level Tile  Device:Rolling Walker  Gait Deviations: Forward Flexed Posture, Slow Mague, Decreased Step Length Bilaterally, Decreased Weight Shift Left, Decreased Gait Speed, Decreased Heel Strike Bilaterally, Unsteady Gait, and Decreased Terminal Knee Extension  Very slow gait speed, increased time with step by step commands required for each movement    Functional Outcome Measures: Completed  AM-PAC Inpatient Mobility Raw Score : 16  AM-PAC Inpatient T-Scale Score : 40.78    ASSESSMENT:  Activity Tolerance:  Patient tolerance of  treatment: fair. Anxiety      Treatment Initiated: Treatment and education initiated within context of evaluation. Evaluation time included review of current medical information, gathering information related to past medical, social and functional history, completion of standardized testing, formal and informal observation of tasks, assessment of data and development of plan of care and goals. Treatment time included skilled education and facilitation of tasks to increase safety and independence with functional mobility for improved independence and quality of life. Assessment:   Body Structures, Functions, Activity Limitations Requiring Skilled Therapeutic Intervention: Decreased functional mobility , Decreased cognition, Decreased endurance, Decreased balance, Decreased strength, Increased pain  Assessment: Pt presents with the deficits above, cont to require skilled PT services to increase IND with functional tasks, progress with overall mobility, and decrease reliance on others for support. Pt has high levels of anxiety, requires lots of encouragment for tasks and increased time. Pt is not safe to return home, recommend further skilled PT services following stay  prior to home return. Therapy Prognosis: Good    Requires PT Follow-Up: Yes    Discharge Recommendations:  Discharge Recommendations: Continue to assess pending progress, 24 hour supervision or assist, No therapy recommended at discharge, [address], Patient would benefit from continued therapy after discharge    Patient Education:      . Patient Education  Education Given To: Patient  Education Provided: Role of Therapy, Plan of Care, Precautions, Transfer Training  Education Method: Verbal  Education Outcome: Verbalized understanding     Educated on precautions with fair recall    Equipment Recommendations:  Equipment Needed:  (depending on d/c plans)  Mobility Devices: Iam Rear: Rolling    Plan:  Current Treatment Recommendations: Strengthening, Balance training, Functional mobility training, Gait training, Stair training, Transfer training, Home exercise program, Neuromuscular re-education, Safety education & training, Equipment evaluation, education, & procurement, Patient/Caregiver education & training, Therapeutic activities  Plan:  (6x O)    Goals:  Patient goals : \"want to be home tomorrow\"  Short Term Goals  Time Frame for Short term goals: by discharge  Short term goal 1: Pt will demo S for bed mobility tasks with good technique and bed flat to progress with overall mobility. Short term goal 2: Pt will demo S for transfers with RW for support with good safety to progress with mobility.   Short term goal 3: Pt will amb for >40 feet with SBA for safety and RW for support to progress towards PLOF. Short term goal 4: Pt to recall 3/3 hip precautions with S.  Short term goal 5: Pt will negotiate a step with CGA and rail for support as needed to progress with overall  mobility. Long Term Goals  Time Frame for Long term goals : NA due to short ELOS    Following session, patient left in safe position with all fall risk precautions in place. In chair, all needs in reach, alarm on.

## 2022-08-15 NOTE — PROGRESS NOTES
Orthopaedic Progress Note      SUBJECTIVE   Ms. Ragini Gallo is post op day # 1 L hip hemiarthroplasty  Dr Romaine Herring     Seen at bedside this morning, well appearing  transferred to , rapid response overnight, now in SR  Pain well controlled, has not advanced diet  Denies any numbness/paresthesia to LLE  Hgb 11.1      OBJECTIVE      Physical    VITALS:  BP (!) 109/56   Pulse 84   Temp 97.7 °F (36.5 °C) (Oral)   Resp 18   Ht 5' 6\" (1.676 m)   Wt 125 lb (56.7 kg)   SpO2 95%   BMI 20.18 kg/m²   I/O last 3 completed shifts:   In: 1710.4 [I.V.:1611.2; IV Piggyback:99.2]  Out: 100 [Blood:100]    4/10 pain  Gen: alert and oriented  Head: normorcephalic, atraumatic  Resp: unlabored  Pelvis: stable  LLE incision c/d/I, no drainage, no bandage saturation  Abductor in place  Sensation to light touch intact  Gastroc TA EHL intact  Distal pulses palpable, warm well perfused  Calf supple nontender to palpation       Data  CBC:   Lab Results   Component Value Date/Time    WBC 8.6 2022 05:28 AM    HGB 11.1 08/15/2022 03:33 AM     2022 05:28 AM     BMP:    Lab Results   Component Value Date/Time     08/15/2022 03:33 AM    K 3.2 08/15/2022 03:33 AM    K 3.8 2022 05:28 AM     08/15/2022 03:33 AM    CO2 17 08/15/2022 03:33 AM    BUN 8 08/15/2022 03:33 AM    CREATININE 0.3 08/15/2022 03:33 AM    CALCIUM 7.9 08/15/2022 03:33 AM    GLUCOSE 142 08/15/2022 03:33 AM     Uric Acid:  No components found for: URIC  PT/INR:    Lab Results   Component Value Date/Time    PROTIME 10.8 2014 05:44 AM    INR 1.15 2022 05:28 AM     Troponin:  No results found for: TROPONINI  Urine Culture:  No components found for: CURINE      Current Inpatient Medications    Current Facility-Administered Medications: adenosine (ADENOCARD) 6 MG/2ML injection, , ,   [] amiodarone (NEXTERONE) 360 mg in dextrose 5% 200 ml, 1 mg/min, IntraVENous, Continuous **FOLLOWED BY** amiodarone (NEXTERONE) 360 mg in dextrose 5% 200 ml, 0.5 mg/min, IntraVENous, Continuous  potassium chloride (KLOR-CON M) extended release tablet 40 mEq, 40 mEq, Oral, BID WC  0.9 % sodium chloride infusion, , IntraVENous, Continuous  sodium chloride flush 0.9 % injection 5-40 mL, 5-40 mL, IntraVENous, 2 times per day  sodium chloride flush 0.9 % injection 5-40 mL, 5-40 mL, IntraVENous, PRN  0.9 % sodium chloride infusion, , IntraVENous, PRN  acetaminophen (TYLENOL) tablet 650 mg, 650 mg, Oral, Q6H  ondansetron (ZOFRAN) injection 4 mg, 4 mg, IntraVENous, Q4H PRN  HYDROmorphone (DILAUDID) injection 0.25 mg, 0.25 mg, IntraVENous, Q3H PRN **OR** HYDROmorphone (DILAUDID) injection 0.5 mg, 0.5 mg, IntraVENous, Q3H PRN  traMADol (ULTRAM) tablet 50 mg, 50 mg, Oral, Q6H PRN **OR** traMADol (ULTRAM) tablet 100 mg, 100 mg, Oral, Q6H PRN  aspirin chewable tablet 81 mg, 81 mg, Oral, BID  ketorolac (TORADOL) injection 15 mg, 15 mg, IntraVENous, Q6H  polyethylene glycol (GLYCOLAX) packet 17 g, 17 g, Oral, Daily PRN  bisacodyl (DULCOLAX) suppository 10 mg, 10 mg, Rectal, Daily PRN  LORazepam (ATIVAN) tablet 0.5 mg, 0.5 mg, Oral, Q4H PRN        PLAN    Ms. Ami Araiza is post op day # 1 L hip hemiarthroplasty  Dr Viji Perla, now in NSR  Continue to ADAT  WBAT LLE- posterior hip precautions  Abductor brace- While in bed and while turning for 48 hours then use regular pillow.   Post operative imaging reviewed, stable  Repeat hcg/hct tomorrow, no indication for transfusion at this time  Okay for  PT/OT from ortho standpoint, can hold if medicine deems fit   Dvt ppx- ASA 81 bid   Dispo- like SNF  Medicine consulted, appreciate following

## 2022-08-15 NOTE — CONSULTS
donning socks, and for emotional support. Bed Mobility:  Supine to Sit: Minimal Assistance, X 1, with head of bed raised, with rail, with verbal cues , with increased time for completion  Assist required for trunk support  Transfers:  Sit to Stand: Contact Guard Assistance, Minimal Assistance, X 1, with increased time for completion, cues for hand placement, with verbal cues  Stand to [facility] 68, X 1, with increased time for completion, cues for hand placement, with verbal cues  RW for support, increased time for transitioning UE to walker for support, increased posterior lean noted once in standing with cues for correction and fair demo. Ambulation:  Minimal Assistance, X 1, with cues for safety, with verbal cues , with increased time for completion  Distance: 3 feet to chair  Surface: Level Tile  Device:Rolling Walker  Gait Deviations: Forward Flexed Posture, Slow Mague, Decreased Step Length Bilaterally, Decreased Weight Shift Left, Decreased Gait Speed, Decreased Heel Strike Bilaterally, Unsteady Gait, and Decreased Terminal Knee Extension  Very slow gait speed, increased time with step by step commands required for each movement      OT:    VISION: hx of B catarcts  HEARING:  Corrected  COGNITION: Slow Processing, Decreased Insight, Decreased Problem Solving, Decreased Safety Awareness, and Impaired Attentio  RANGE OF MOTION:  Bilateral Upper Extremity:  WFL  STRENGTH:  Bilateral Upper Extremity:  WFL  SENSATION:   WFL-BUE  ADL:   Lower Extremity Dressing: Dependent. For doffing shoes . **educated Pt on possible LH AE needs-Pt reluctant to ed on this date, reports son can A  BALANCE:  Sitting Balance:  Stand By Assistance. EOB in prep for laying down  Standing Balance: [address] -min A  BED MOBILITY:  Sit to Supine: Maximum Assistance poor technique despite therapist giving 1 step at at time  TRANSFERS:  Sit to Stand:  Minimal Assistance.  From recliner  Stand to Sit: Contact Guard Assistance. To recliner  **educated Pt on UE placement  FUNCTIONAL MOBILITY:  Assistive Device: Rolling Walker  Assist Level:  Minimal Assistance. Distance:  2ft to EOB  Slow pace, encouragement & vcs for technique (high levels of anxiety noted)       Past Medical History:        Diagnosis Date    Osteoporosis of multiple sites without pathological fracture 10/2017    Squamous cell carcinoma of skin 2016    Vascular headache     Vitamin D deficiency 10/2017       Past Surgical History:        Procedure Laterality Date    BLADDER REPAIR  2014    MENISCECTOMY Left 2013    lateral    SKIN CANCER EXCISION  2016    Dr Jared Forrester Left 2015       Allergies:     Allergies   Allergen Reactions    Ciprofloxacin Other (See Comments)     dizziness    Clindamycin/Lincomycin Hives    Hydrocod Polst-Cpm Polst Er      anxious    Macrobid [Nitrofurantoin Macrocrystal] Diarrhea    Paxil [Paroxetine Hcl] Other (See Comments)     dizzy    Morphine Nausea And Vomiting        Current Medications:   Current Facility-Administered Medications: [] amiodarone (NEXTERONE) 360 mg in dextrose 5% 200 ml, 1 mg/min, IntraVENous, Continuous **FOLLOWED BY** amiodarone (NEXTERONE) 360 mg in dextrose 5% 200 ml, 0.5 mg/min, IntraVENous, Continuous  potassium chloride (KLOR-CON M) extended release tablet 40 mEq, 40 mEq, Oral, BID WC  0.9 % sodium chloride infusion, , IntraVENous, Continuous  sodium chloride flush 0.9 % injection 5-40 mL, 5-40 mL, IntraVENous, 2 times per day  sodium chloride flush 0.9 % injection 5-40 mL, 5-40 mL, IntraVENous, PRN  0.9 % sodium chloride infusion, , IntraVENous, PRN  acetaminophen (TYLENOL) tablet 650 mg, 650 mg, Oral, Q6H  ondansetron (ZOFRAN) injection 4 mg, 4 mg, IntraVENous, Q4H PRN  HYDROmorphone (DILAUDID) injection 0.25 mg, 0.25 mg, IntraVENous, Q3H PRN **OR** HYDROmorphone (DILAUDID) injection 0.5 mg, 0.5 mg, IntraVENous, Q3H PRN  traMADol (ULTRAM) tablet 50 mg, 50 mg, Oral, Q6H PRN **OR** traMADol (ULTRAM) tablet 100 mg, 100 mg, Oral, Q6H PRN  aspirin chewable tablet 81 mg, 81 mg, Oral, BID  polyethylene glycol (GLYCOLAX) packet 17 g, 17 g, Oral, Daily PRN  bisacodyl (DULCOLAX) suppository 10 mg, 10 mg, Rectal, Daily PRN  LORazepam (ATIVAN) tablet 0.5 mg, 0.5 mg, Oral, Q4H PRN    Social History:  Social History     Socioeconomic History    Marital status:      Spouse name: Not on file    Number of children: Not on file    Years of education: Not on file    Highest education level: Not on file   Occupational History    Not on file   Tobacco Use    Smoking status: Never    Smokeless tobacco: Never   Substance and Sexual Activity    Alcohol use: No    Drug use: No    Sexual activity: Never   Other Topics Concern    Not on file   Social History Narrative    Not on file     Social Determinants of Health     Financial Resource Strain: Low Risk     Difficulty of Paying Living Expenses: Not hard at all   Food Insecurity: No Food Insecurity    Worried About Running Out of Food in the Last Year: Never true    Ran Out of Food in the Last Year: Never true   Transportation Needs: Not on file   Physical Activity: Not on file   Stress: Not on file   Social Connections: Not on file   Intimate Partner Violence: Not on file   Housing Stability: Not on file     Lives With: Son  Type of Home: [address] [place] [address],[address]: Two level, Able to Live on Main level with bedroom/bathroom, Performs ADL's on one level, Laundry in basement  Home Access: Stairs to enter with rails, Stairs to enter without rails  Entrance Stairs - Number of Steps: 2 ELOY  Home Equipment: Cane   Bathroom Shower/Tub: Tub/Shower unit  Bathroom Toilet: Standard     ADL Assistance: Independent  Homemaking Assistance: Independent  Ambulation Assistance: Independent  Transfer Assistance: Independent     Active : Yes  Mode of Transportation: Car  Additional Comments: son (retired) recently moved in with pt.  Pt amb with SC intermittently in the home, and uses SC for longer distances. Pt reports she was completing IADLs PLOF. Family History:   History reviewed. No pertinent family history. Review of Systems:  CONSTITUTIONAL:  positive for  fatigue  EYES:  positive for  glasses & bilateral cataracts. HEENT:  negative  RESPIRATORY:  negative  CARDIOVASCULAR:  positive for  palpitations, fatigue, heart murmur  GASTROINTESTINAL:  positive for constipation and decreased appetite  GENITOURINARY:  negative  SKIN:  left hip incision  HEMATOLOGIC/LYMPHATIC:  positive for swelling/edema  MUSCULOSKELETAL:  positive for  pain  NEUROLOGICAL:  positive for gait problems and pain  BEHAVIOR/PSYCH:  negative  System review otherwise negative    Physical Exam:  /70   Pulse 75   Temp 98 °F (36.7 °C) (Oral)   Resp 18   Ht 5' 6\" (1.676 m)   Wt 125 lb (56.7 kg)   SpO2 96%   BMI 20.18 kg/m²   awake  Orientation:   person, place, time  Mood: within normal limits  Affect: calm  General appearance: Patient is well nourished, well developed, well groomed and in no acute distress    Memory:   normal,   Attention/Concentration: normal  Language:  normal    Cranial Nerves:  cranial nerves II-XII are grossly intact  ROM:  abnormal - left hip  Motor Exam:  Motor exam is 4 out of 5 all extremities with the exception of left leg not tested  Tone:  normal  Muscle bulk: within normal limits  Sensory:  Sensory intact    Heart: normal rate and regular rhythm, + murmur noted  Lungs: clear to auscultation without wheezes or rales  Abdomen: soft, non-tender, non-distended, normal bowel sounds, no masses or organomegaly    Skin: warm and dry, no rash or erythema.  Left hip incision with dry dressing in place  Peripheral vascular: Pulses: Normal upper and lower extremity pulses; Edema: 1+      Diagnostics:  Recent Results (from the past 24 hour(s))   EKG 12 Lead    Collection Time: 08/15/22 12:05 AM   Result Value Ref Range    Ventricular Rate 189 BPM QRS Duration 82 ms    Q-T Interval 258 ms    QTc Calculation (Bazett) 457 ms    R Axis -38 degrees    T Axis 167 degrees   EKG 12 Lead    Collection Time: 08/15/22  1:21 AM   Result Value Ref Range    Ventricular Rate 148 BPM    QRS Duration 92 ms    Q-T Interval 302 ms    QTc Calculation (Bazett) 474 ms    R Axis -40 degrees    T Axis -139 degrees   Basic Metabolic Panel    Collection Time: 08/15/22  3:33 AM   Result Value Ref Range    Sodium 131 (L) 135 - 145 meq/L    Potassium 3.2 (L) 3.5 - 5.2 meq/L    Chloride 103 98 - 111 meq/L    CO2 17 (L) 23 - 33 meq/L    Glucose 142 (H) 70 - 108 mg/dL    BUN 8 7 - 22 mg/dL    Creatinine 0.3 (L) 0.4 - 1.2 mg/dL    Calcium 7.9 (L) 8.5 - 10.5 mg/dL   Hemoglobin and hematocrit, blood    Collection Time: 08/15/22  3:33 AM   Result Value Ref Range    Hemoglobin 11.1 (L) 12.0 - 16.0 gm/dl    Hematocrit 35.2 (L) 37.0 - 47.0 %   Anion Gap    Collection Time: 08/15/22  3:33 AM   Result Value Ref Range    Anion Gap 11.0 8.0 - 16.0 meq/L   Glomerular Filtration Rate, Estimated    Collection Time: 08/15/22  3:33 AM   Result Value Ref Range    Est, Glom Filt Rate >90 ml/min/1.73m2   Magnesium    Collection Time: 08/15/22  3:33 AM   Result Value Ref Range    Magnesium 1.9 1.6 - 2.4 mg/dL           Impression:  Acute left displaced fmoral neck fracture  S/p left hip hemiarthroplasty with Dr. Maddie Martinez on 8/14/22. Mechanical ground level fall  SVT vs. AF with RVR  Asymptomatic bacteriuria  Moderate mitral valve regurgitation  Chronic hyponatremia  HTN  Insomnia  Anxiety  Vitamin D deficiency    Recommendations:  Continue current therapies  Patient is POD #1 and continue on Amiodorone gtt at this time. Patient will likely be appropriate for IPR once medically stable. Will need to be off amio gtt for 24 hours.    Patient is appropriate for IPR for the diagnosis of left hip fracture  Patient require active and ongoing intervention of multiple therapy disciplines of PT, OT  Patient can participate in and does require an intensive rehabilitation therapy program, generally consisting of 3 hours of therapy per day at least 5 days per week  Patient is expected to actively participate in, and benefit significantly from, the intensive rehabilitation therapy program (the patients condition and functional status are such that the patient can reasonably be expected to make measurable improvement, expected to be made within a prescribed period of time and as a result of the intensive rehabilitation therapy program, that will be of practical value to improve the patients functional capacity or adaptation to impairments)  Patient requires physician supervision by a rehabilitation physician, with face-to-face visits at least 3 days per week to assess the patient both medically and functionally and to modify the course of treatment as needed. Medical conditions to include for management include : UTI, HTN, SVT monitoring and treatment. Require an intensive and coordinated interdisciplinary team approach to the delivery of rehabilitative care. Will follow     It was my pleasure to evaluate Tra Coffman today. Please call with questions.     Stormy Conner, APRN - CNP

## 2022-08-15 NOTE — SIGNIFICANT EVENT
Patient having tachycardia. On EKG patient in SVT. Patient is given Adenosine 6mg and 12 mg with no change. Patient is cardioverted at [address], [place], and [address] with no change. The patient is started on amiodarone bolus and drip and the patient is transferred to .     Asha Kirk PA-C

## 2022-08-15 NOTE — PROGRESS NOTES
0229 update sent to Jackie DIAMOND regarding HR and BP, no new orders    0255 pts son at bedside, pt was unable to reach son on phone so she called her neighbor who is a  and he went and knocked on door until son woke up, updated on situation and questions answered. Son states this happened after she had knee surgery.

## 2022-08-15 NOTE — PLAN OF CARE
Problem: Pain  Goal: Verbalizes/displays adequate comfort level or baseline comfort level  Outcome: Progressing  Flowsheets (Taken 8/15/2022 1904)  Verbalizes/displays adequate comfort level or baseline comfort level: Encourage patient to monitor pain and request assistance     Problem: ABCDS Injury Assessment  Goal: Absence of physical injury  Outcome: Progressing  Flowsheets (Taken 8/15/2022 1904)  Absence of Physical Injury: Implement safety measures based on patient assessment     Problem: Skin/Tissue Integrity  Goal: Absence of new skin breakdown  Description: 1. Monitor for areas of redness and/or skin breakdown  2. Assess vascular access sites hourly  3. Every 4-6 hours minimum:  Change oxygen saturation probe site  4. Every 4-6 hours:  If on nasal continuous positive airway pressure, respiratory therapy assess nares and determine need for appliance change or resting period. Outcome: Progressing  Note: Ongoing assessment & interventions provided throughout shift. Skin assessments provided. Assisting patient to turn as needed.       Problem: Discharge Planning  Goal: Discharge to home or other facility with appropriate resources  8/15/2022 1904 by Pierre Martinez RN  Outcome: Progressing  Flowsheets (Taken 8/15/2022 1904)  Discharge to home or other facility with appropriate resources: Identify barriers to discharge with patient and caregiver     Problem: Musculoskeletal - Adult  Goal: Return mobility to safest level of function  Outcome: Progressing  Flowsheets (Taken 8/15/2022 1904)  Return Mobility to Safest Level of Function: Assist with transfers and ambulation using safe patient handling equipment as needed     Problem: Musculoskeletal - Adult  Goal: Return ADL status to a safe level of function  Outcome: Progressing     Problem: Infection - Adult  Goal: Absence of infection at discharge  Outcome: Progressing  Flowsheets (Taken 8/15/2022 1904)  Absence of infection at discharge:   Assess and monitor for signs and symptoms of infection   Monitor lab/diagnostic results     Problem: Infection - Adult  Goal: Absence of infection during hospitalization  Outcome: Progressing  Flowsheets (Taken 8/15/2022 1904)  Absence of infection during hospitalization:   Assess and monitor for signs and symptoms of infection   Administer medications as ordered     Problem: Safety - Adult  Goal: Free from fall injury  Flowsheets (Taken 8/15/2022 1904)  Free From Fall Injury: Instruct family/caregiver on patient safety  Note: Bed locked & in low position, call light in reach, side-rails up x2, bed/chair alarm utilized, non-slip socks on when ambulating, reminded patient to use call light to call for assistance.

## 2022-08-15 NOTE — PROGRESS NOTES
Messaged provider at this time d/t patient being in Afib RVR now, -160, patient received fluid bolus and amio bolus after rapid response on 5k, pt arrived to 3B in afib RVR, maintenance fluids, amio gtt running at this time. EKG confirmed RVR    Notified [facility]	[place],  BP still soft, d/t versed being given before shocking patient. Monitoring BP at this time until versed wears off.

## 2022-08-15 NOTE — CARE COORDINATION
DISCHARGE/PLANNING EVALUATION  8/15/22, 12:11 PM EDT    Reason for Referral: Discharge planning. Mental Status: Answered questions appropriately. Decision Making:Typically makes her own decisions. Son is helping with decision making at this time. Family/Social/Home Environment: Lives at home with her son. He is retired and able to help as needed. Current Services including food security, transportation and housekeeping: She was independent prior to admission. She was driving. Current Equipment:cane  Payment Source:Medicare and [facility] [place].   Concerns or Barriers to Discharge: None  Post acute provider list with quality measures, geographic area and applicable managed care information provided. Questions regarding selection process answered:ECF list given to Angela Watkins. Teach Back Method used with Angela Watkins and then her son on the phone regarding care plan and discharge planning. Patient and son verbalized understanding of the plan of care and contribute to goal setting. Patient goals, treatment preferences and discharge plan: Patient would like to go to [facility]. Asked her to come up with a back up plan if IPR denied. Updated son Kris Osei. He reports he is patients primary caregiver and would be willing to take her back home and provide 24/7 care if needed. Electronically signed by AGNES Aguilera on 8/15/2022 at 12:11 PM    Update 3:20pm: Angela Watkins has been accepted to [facility].

## 2022-08-15 NOTE — PROGRESS NOTES
2350 - Pt was feeling hot and short of breathe. Pt heart rate was tachy, in the 180s. Orders for EKG. 2358 - Gave pt prn ativan and Linda Parson - RN [place], [place]. Waiting for response. 0006 - Pt heart rate went to 196. This RN called [place], [place]. No new orders at this time. 0012 - EKG resulted. 0013 - Rapid Response Called  0014 - Pt BP 13/82 -  97% on 2L. Had pt blow into straw and bear down. 46 - Resource nurse, Peter Ball, here. Pt . House Supervisor/Lab/Respiratory also here. 1721 - Pad Applied to pt  0020 - [place], [place] arrived. 0021 -  /65 97% 2L  0023 - Adenosine 6mg given  0024   0025 - Adenosine 12mg given  0026   0027 - Run of Vtach  0028 /78  94% 2L  0029 - Had pt blow into straw and bear down. 235 [facility] arrived  0033 -   0033 - 2mg Versed given  0034 - 200 j delivered - cardioverted.   0035 - Amio Bolus ordered. 0036 -  Shock 300 j   0037   0038 -  360 j delivered  0039 -   0041 -   0042 - amio bolus started   0043 - 20g started Right forearm - [place] IRAHETA  0044 - 0.9 at 999  BP 77/47 96% 2L  0047 - BP 91/50  94% 2L  0047 - Transferred to 3B31. Reported given to Mesha Whitley on 3B at 9593. Attempted to call pts son, no answer. Informed in a voicemail to call back. [phone].

## 2022-08-15 NOTE — PROGRESS NOTES
Hospitalist Progress Note      Patient:  Monica Bertrand    Unit/Bed:3B-31/031-A  YOB: 1940  MRN: [id number]   Acct: [de-identified]   PCP: Zandra Saleh MD  Date of Admission: 8/13/2022    Assessment/Plan:    Acute left displaced fmoral neck fracture  Mechanical ground level fall  Primary to manage  S/p left hip hemiarthroplasty with Dr. Charlie Morocho on 8/14/22. Pain/nasuea control per primary  Recommend bowel regimen daily  Ok for PT/OT. SVT vs. AF with RVR  Developed in the postoperative period s/p adenosin x2 and DCCV x3. Currently receiving amiodarone infusion. Will continue today and likely change to metoprolol tomorrow if she remains in NSR  Not on anticoagulation at baseline. Monitor electrolytes. Keep Mg>2, K>4. Asymptomatic bacteriuria  U/A with GNB >1000k CFU without symptoms. No indication to treat at this time  Moderate mitral valve regurgitation  Recent echo 7/15/22  Have had discussions regarding possible mitral clip. Follows with Dr. Jasen Worley outpatient  Chronic hyponatremia, resolved. 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  Will likely start lopressor tomorrow. Insomnia  Can cautiously continue home lorazepam.  Recommend discontinuation/weaning down in the outpatient setting given advanced age and frequent falls  Anxiety  1x dose of atarax today. Vitamin D deficiency  On supplementation    Subjective (past 24 hours):   Patient underwent left hemiarthroplasty yesterday with orthopedic surgery. Postoperatively, patient developed narrow complex tachyarrhythmia concerning for SVT versus A. fib with RVR status post adenosine x2 and DCCV x3. Patient currently on amiodarone infusion in normal sinus rhythm. Denies any further episodes of chest pain or palpitations. She states she has very minimal pain in her left hip. No paresthesias or numbness in the bilateral lower extremities.       Past medical lesions. Neurologic:  Neurovascularly intact without any focal sensory/motor deficits. Cranial nerves: II-XII intact, grossly non-focal.  Psychiatric: Alert and oriented, thought content appropriate, normal insight  Capillary Refill: Brisk,< 3 seconds   Peripheral Pulses: +2 palpable, equal bilaterally     Labs:   Recent Labs     08/13/22  0100 08/14/22  0528 08/15/22  0333   WBC 7.9 8.6  --    HGB 13.3 11.9* 11.1*   HCT 39.8 35.7* 35.2*    175  --        Recent Labs     08/13/22  0100 08/14/22  0528 08/15/22  0333   * 132* 131*   K 3.7 3.8 3.2*   CL 92* 100 103   CO2 24 22* 17*   BUN 14 10 8   CREATININE 0.4 0.3* 0.3*   CALCIUM 9.4 8.7 7.9*       No results for input(s): AST, ALT, BILIDIR, BILITOT, ALKPHOS in the last 72 hours. Recent Labs     08/14/22 0528   INR 1.15*       No results for input(s): [place] in the last 72 hours. Microbiology:    Blood culture #1: No results found for: BC    Blood culture #2:No results found for: BLOODCULT2    Organism:  Lab Results   Component Value Date/Time    ORG gram negative bacilli 08/13/2022 12:30 AM         No results found for: LABGRAM    MRSA culture only:No results found for: [address] [place] [address]    Urine culture:   Lab Results   Component Value Date/Time    LABURIN CULTURE RESULTS 07/16/2019 12:00 AM       Respiratory culture: No results found for: CULTRESP    Aerobic and Anaerobic :  No results found for: LABAERO  No results found for: LABANAE    Urinalysis:      Lab Results   Component Value Date/Time    NITRU POSITIVE 08/13/2022 12:33 AM    WBCUA 10-15 08/13/2022 12:33 AM    BACTERIA MANY 08/13/2022 12:33 AM    RBCUA 0-2 08/13/2022 12:33 AM    BLOODU NEGATIVE 08/13/2022 12:33 AM    SPECGRAV 1.015 08/28/2019 01:34 PM    GLUCOSEU NEGATIVE 08/13/2022 12:33 AM       Radiology:  XR HIP 2-3 VW W PELVIS LEFT   Final Result   1. There has been interval left hip arthroplasty. The surgical hardware appears to this are satisfactory alignment.  There are small foci of gas attenuation adjacent to the left hip joint soft tissues which likely represent postsurgical change. **This report has been created using voice recognition software. It may contain minor errors which are inherent in voice recognition technology. **      Final report electronically signed by Dr. Rola Jung on 8/14/2022 6:52 PM      XR CHEST 1 VIEW   Final Result   Impression:   No acute findings. This document has been electronically signed by: Adriel Ortega MD    on 08/13/2022 03:19 AM      XR KNEE LEFT (1-2 VIEWS)   Final Result   Impression:   No fracture. Intact total knee arthroplasty. This document has been electronically signed by: Adriel Ortega MD    on 08/13/2022 03:46 AM      XR HIP 2-3 VW W PELVIS LEFT   Final Result   Impression:   Fracture of the left proximal femur. This document has been electronically signed by: Adriel Ortega MD    on 08/13/2022 03:36 AM           XR KNEE LEFT (1-2 VIEWS)    Result Date: 8/13/2022  Radiographs of the left knee, 2 views Comparison: None Findings: There is no fracture or dislocation. Status post total knee arthroplasty. Intact hardware. No periprosthetic lucency to indicate loosening. Decreased bone mineralization. Identified joint effusion. No soft tissue swelling. Vascular calcifications. Impression: No fracture. Intact total knee arthroplasty. This document has been electronically signed by: Adriel Ortega MD on 08/13/2022 03:46 AM    XR CHEST 1 VIEW    Result Date: 8/13/2022  Chest Radiograph Comparison: None Findings: Cardiomegaly. Normal mediastinal contours. No pneumothorax. No opacity. No pleural effusion. Normal upper abdomen. No fracture. Decreased bone mineralization. Impression: No acute findings. This document has been electronically signed by: Adriel Ortega MD on 08/13/2022 03:19 AM    XR HIP 2-3 VW W PELVIS LEFT    Result Date: 8/13/2022   ADDENDUM #1  This report was discussed with Mikala Branch  on Aug 13, 2022 03:37:00 EDT. This document has been electronically signed by: Xu Hannon on 08/13/2022 03:37 AM  ORIGINAL REPORT  Radiographs of the pelvis and left hip, 3 views Comparison: None Findings: There is a fracture of the left proximal femur involving the femoral neck. 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 No dislocation. Mild to moderate degenerative change. Decreased bone mineralization. Increased stool quantity. Vascular calcifications. Impression: Fracture of the left proximal femur. This document has been electronically signed by: Cachorro Menendez.  Ann Espinoza MD on 08/13/2022 03:36 AM       Electronically signed by Maria Elena Salazar DO on 8/15/2022 at 9:05 AM

## 2022-08-15 NOTE — PROGRESS NOTES
[address] [facility] [address]  INPATIENT OCCUPATIONAL THERAPY  [facility] CCU-STEPDOWN 3B  EVALUATION    Time:    Time In: 1018  Time Out: 1045  Timed Code Treatment Minutes: 12 Minutes  Minutes: 27          Date: 8/15/2022  Patient Name: Urmila Pollard,   Gender: female      MRN: [id number]  : 1940  (78 y.o.)  Referring Practitioner: DARA Hodges  Diagnosis: displaced fracture of left femoral neck  Additional Pertinent Hx: Per ER note on 2022 :78 y.o. female. Patient was bending down to pick something up and apparently had a fall injuring the left hip and arrives with shortening and external rotation consistent with a likely hip fracture. She thinks she probably did not hit her head or neck. Does have a history of a prior left knee surgery.  s/p Hemiarthroplasty of left hip on 2022. had Afib RVR post op, -160,    Restrictions/Precautions:  Restrictions/Precautions: Surgical Protocols, Weight Bearing, General Precautions, Fall Risk  Left Lower Extremity Weight Bearing: Weight Bearing As Tolerated  Position Activity Restriction  Hip Precautions: No hip flexion > 90 degrees, No hip internal rotation, No hip external rotation  Other position/activity restrictions: THR precautions, 2L O2 on 8/15    Subjective  Chart Reviewed: Yes, Orders, Progress Notes, History and Physical  Patient assessed for rehabilitation services?: Yes  Family / Caregiver Present: No    Subjective: cooperative    Pain: no number given/10: pain in LLE    Vitals: Heart Rate: 76    Social/Functional History:  Lives With: Son  Type of Home: House  Home Layout: Two level, Able to Live on Main level with bedroom/bathroom, Performs ADL's on one level, Laundry in basement  Home Access: Stairs to enter with rails, Stairs to enter without rails  Entrance Stairs - Number of Steps: 2 ELOY  Home Equipment: Cane   Bathroom Shower/Tub: Tub/Shower unit  Bathroom Toilet: Standard       ADL Assistance: [address]: Yes  Decision Making: Medium Complexity    Treatment Initiated: Treatment and education initiated within context of evaluation. Evaluation time included review of current medical information, gathering information related to past medical, social and functional history, completion of standardized testing, formal and informal observation of tasks, assessment of data and development of plan of care and goals. Treatment time included skilled education and facilitation of tasks to increase safety and independence with ADL's for improved functional independence and quality of life. Discharge Recommendations:  Continue to assess pending progress    Patient Education:     Patient Education  Education Given To: Patient  Education Provided: Role of Therapy, Plan of Care, Transfer Training, ADL Adaptive Strategies, Precautions  Education Method: Verbal, Demonstration  Barriers to Learning: Cognition  Education Outcome: Continued education needed    Equipment Recommendations:  Equipment Needed: Yes  Other: Pt may benefit from [facility] AE kit, RW, shower chair vs tub t/f bench, BSC    Plan:  Times per Week: 6x  Current Treatment Recommendations: Functional mobility training, Balance training, Self-Care / ADL, Patient/Caregiver education & training, Safety education & training. See long-term goal time frame for expected duration of plan of care. If no long-term goals established, a short length of stay is anticipated.     Goals:  Patient goals : Pt did not state  Short Term Goals  Time Frame for Short term goals: until discharge  Short Term Goal 1: Pt will complete various sit-stand t/fs including BSC with CGA & min vcs for UE placement  Short Term Goal 2: Pt will complete standing 2-3 min with CGA for increased ease of sinkside grooming  Short Term Goal 3: Pt will complete mobility to/from bathroom with CGA, RW, & 0-2 vcs for safety  Short Term Goal 4: Pt will complete LE dressing with mod A, LH AE, & 0-2 vcs for THR precautions  Long Term Goals  Time Frame for Long term goals : No LTG set d/t short ELOS         Following session, patient left in safe position with all fall risk precautions in place.

## 2022-08-15 NOTE — CARE COORDINATION
8/15/22, 11:42 AM EDT    DISCHARGE ON GOING EVALUATION    [facility]  Location: -31/031-A Reason for admit: Displaced fracture of left femoral neck ([facility] [address] [S72.002A]  Closed fracture of left hip, initial encounter ([facility] [address] [S72.002A]  Closed left hip fracture, initial encounter ([facility] [address] [S72.002A]   Procedure:   8/13 XR left hip: Fracture of the left proximal femur. 8/14 Hemiarthroplasty of left hip by Dr. Annalisa Tucker. 8/15 Cardioversion at bedside (200J, 300J and 360J). Barriers to Discharge: Ortho and Hospitalist following. POD #1. Pt had run of SVT vs afib with RVR last night/early this am and was given Adenosine without success and then was cardioverted at bedside. Pt is now on Amio gtt. O2 on at 1L/nc. Sats 96%. Sodium 131, potassium 3.2. Hgb 11.1. Electrolyte replacements. Pain control. PT/OT. IPR/PMR consulted. PCP: Meg Tovar MD  Readmission Risk Score: 16.6%  Patient Goals/Plan/Treatment Preferences: Pt is from home. Pt is requesting IPR at discharge. Reached out to Ortho, orders received for IPR/PMR referral. SW following.

## 2022-08-16 LAB
ANION GAP SERPL CALCULATED.3IONS-SCNC: 8 MEQ/L (ref 8–16)
BUN BLDV-MCNC: 9 MG/DL (ref 7–22)
CALCIUM SERPL-MCNC: 8.2 MG/DL (ref 8.5–10.5)
CHLORIDE BLD-SCNC: 104 MEQ/L (ref 98–111)
CO2: 20 MEQ/L (ref 23–33)
CREAT SERPL-MCNC: 0.3 MG/DL (ref 0.4–1.2)
GFR SERPL CREATININE-BSD FRML MDRD: > 90 ML/MIN/1.73M2
GLUCOSE BLD-MCNC: 107 MG/DL (ref 70–108)
HCT VFR BLD CALC: 35.2 % (ref 37–47)
HEMOGLOBIN: 11 GM/DL (ref 12–16)
POTASSIUM SERPL-SCNC: 4.2 MEQ/L (ref 3.5–5.2)
SODIUM BLD-SCNC: 132 MEQ/L (ref 135–145)

## 2022-08-16 PROCEDURE — 97535 SELF CARE MNGMENT TRAINING: CPT

## 2022-08-16 PROCEDURE — 2140000000 HC CCU INTERMEDIATE R&B

## 2022-08-16 PROCEDURE — 6370000000 HC RX 637 (ALT 250 FOR IP): Performed by: STUDENT IN AN ORGANIZED HEALTH CARE EDUCATION/TRAINING PROGRAM

## 2022-08-16 PROCEDURE — 97530 THERAPEUTIC ACTIVITIES: CPT

## 2022-08-16 PROCEDURE — 97110 THERAPEUTIC EXERCISES: CPT

## 2022-08-16 PROCEDURE — 80048 BASIC METABOLIC PNL TOTAL CA: CPT

## 2022-08-16 PROCEDURE — 99232 SBSQ HOSP IP/OBS MODERATE 35: CPT | Performed by: NURSE PRACTITIONER

## 2022-08-16 PROCEDURE — 6370000000 HC RX 637 (ALT 250 FOR IP): Performed by: PHYSICIAN ASSISTANT

## 2022-08-16 PROCEDURE — 85014 HEMATOCRIT: CPT

## 2022-08-16 PROCEDURE — 99232 SBSQ HOSP IP/OBS MODERATE 35: CPT | Performed by: STUDENT IN AN ORGANIZED HEALTH CARE EDUCATION/TRAINING PROGRAM

## 2022-08-16 PROCEDURE — 85018 HEMOGLOBIN: CPT

## 2022-08-16 PROCEDURE — 36415 COLL VENOUS BLD VENIPUNCTURE: CPT

## 2022-08-16 RX ADMIN — METOPROLOL TARTRATE 12.5 MG: 25 TABLET, FILM COATED ORAL at 09:03

## 2022-08-16 RX ADMIN — LORAZEPAM 0.5 MG: 0.5 TABLET ORAL at 23:05

## 2022-08-16 RX ADMIN — ACETAMINOPHEN 650 MG: 325 TABLET ORAL at 19:57

## 2022-08-16 RX ADMIN — ACETAMINOPHEN 650 MG: 325 TABLET ORAL at 14:26

## 2022-08-16 RX ADMIN — METOPROLOL TARTRATE 12.5 MG: 25 TABLET, FILM COATED ORAL at 19:58

## 2022-08-16 RX ADMIN — ACETAMINOPHEN 650 MG: 325 TABLET ORAL at 09:03

## 2022-08-16 RX ADMIN — ASPIRIN 81 MG 81 MG: 81 TABLET ORAL at 19:57

## 2022-08-16 RX ADMIN — LORAZEPAM 0.5 MG: 0.5 TABLET ORAL at 14:26

## 2022-08-16 RX ADMIN — ASPIRIN 81 MG 81 MG: 81 TABLET ORAL at 09:03

## 2022-08-16 RX ADMIN — LORAZEPAM 0.5 MG: 0.5 TABLET ORAL at 09:06

## 2022-08-16 ASSESSMENT — LIFESTYLE VARIABLES
HOW MANY STANDARD DRINKS CONTAINING ALCOHOL DO YOU HAVE ON A TYPICAL DAY: PATIENT DOES NOT DRINK
HOW OFTEN DO YOU HAVE A DRINK CONTAINING ALCOHOL: NEVER

## 2022-08-16 NOTE — PROGRESS NOTES
[address] [address]  INPATIENT PHYSICAL THERAPY  DAILY NOTE  [facility]Z CCU-STEPDOWN 3B - 3B-31/031-A  Time In: 0912  Time Out: 1008  Timed Code Treatment Minutes: 64 Minutes  Minutes: 56          Date: 2022  Patient Name: Lopez Ortega,  Gender:  female        MRN: [id number]  : 1940  (78 y.o.)     Referring Practitioner: Glenford Bence, PA  Diagnosis: displaced fx of Left femoral neck  Additional Pertinent Hx: Per EMR \"78 y.o. female who presents with left hip and knee pain after a mechanical fall at home. Patient landed on her left side and was unable to ambulate thereafter. Patient transferred to [facility] ED via EMS and radiologic eval revealed left femoral neck fracture. Patient lives at home independently with her son. She uses cane and walker occasionally for ambulation. No blood thinners. Walks less than 1 mile per day. Hx left TKA 7 years ago. Non smoker. No alcohol use. Patient states she has a \"leaky valve\" for which she uses lopressor daily. Does mention some difficulty with anesthesia in the past with racing heart rate postoperatively. \" Pt is s/p L hemiarthoplasty completed by Dr. Beverly Simon on . Prior Level of Function:  Lives With: Son  Type of Home: House  Home Layout: Two level, Able to Live on Main level with bedroom/bathroom, Performs ADL's on one level, Laundry in basement  Home Access: Stairs to enter with rails, Stairs to enter without rails  Entrance Stairs - Number of Steps: 2 ELOY  Home Equipment: Cane   Bathroom Shower/Tub: Tub/Shower unit  Bathroom Toilet: Standard    ADL Assistance: Independent  Homemaking Assistance: Independent  Ambulation Assistance: Independent  Transfer Assistance: Independent  Active : Yes  Additional Comments: son recently (retired) moved in with pt. Pt amb with SC intermittently in the home, and uses SC for longer distances.     Restrictions/Precautions:  Restrictions/Precautions: Surgical Protocols, Weight Bearing, General Precautions, Fall Risk  Left Lower Extremity Weight Bearing: Weight Bearing As Tolerated  Position Activity Restriction  Hip Precautions: No hip flexion > 90 degrees, No hip internal rotation, Posterior hip precautions, No ADduction  Other position/activity restrictions: KATHY precautions     SUBJECTIVE: OK to see pt per nursing. Pt in bed when PT arrived, just finished breakfast, agreeable to PT session. Pt's bed soiled with urine and required assist with bathing tasks during session and clean up. Nursing reports pt had an ativan prior to PT session. Pt also shaky during session. Pt able to recall 2/3 hip precautions. PAIN: L hip pain, did not quantify    Vitals: Oxygen: >94% during session on room air  Heart Rate: 70s-80s during session    OBJECTIVE:  Bed Mobility:  Supine to Sit: Minimal Assistance, X 1, with head of bed raised, with rail, with verbal cues , with increased time for completion  Assist for L LE and trunk support with increased time for completion due to weakness, pain and anxiety  Transfers:  Sit to Stand: Contact Guard Assistance, Minimal Assistance, X 1, with increased time for completion, cues for hand placement, with verbal cues  Stand to Sit:Minimal Assistance, X 1, cues for hand placement, with verbal cues  X2 transfers from bed to complete mobility  Ambulation:  Minimal Assistance, X 1, with cues for safety, with verbal cues , with increased time for completion  Distance: 3 feet to chair  Surface: Level Tile  Device:Rolling Walker  Gait Deviations:   Forward Flexed Posture, Slow Mague, Decreased Step Length Bilaterally, Decreased Weight Shift Left, Lean to Right, Decreased Gait Speed, Decreased Heel Strike Bilaterally, Unsteady Gait, and Decreased Terminal Knee Extension  Cues required for safety with completion with step by step commands for tasks and weight shifting  Balance:  Static Sitting Balance:  Supervision, Stand By Assistance  Dynamic Sitting Balance: Stand By Assistance  Static Standing Balance: Contact Guard Assistance, Minimal Assistance, X 1  Sitting on EOB to complete bathing tasks, change gown, complete grooming tasks and brushing teeth, no LOB, completed within hip precautions. Pt with increased time sitting on EOB to complete tasks. Pt assisted with nikos care in standing. Pt required assist for donning shoes and socks sitting on EOB to prepare for transfers and mobility. Exercise:  Patient was guided in 1 set(s) 10 reps of exercise to both lower extremities. Ankle pumps, Glut sets, Quad sets, Heelslides, Hip abduction/adduction, and Straight leg raises. Exercises were completed for increased independence with functional mobility. VC for proper technique of exercises for completion with good demo. Functional Outcome Measures: Completed  AM-PAC Inpatient Mobility Raw Score : 16  AM-PAC Inpatient T-Scale Score : 40.78    ASSESSMENT:  Assessment: Patient progressing toward established goals. Activity Tolerance:  Patient tolerance of  treatment: fair.  Increased weakness and pain noted     Equipment Recommendations:Equipment Needed:  (depending on d/c plans)  Mobility Devices: Brandy Latham: Rolling  Discharge Recommendations: Continue to assess pending progress and Inpatient Therapy Stay  Plan: Current Treatment Recommendations: Strengthening, Balance training, Functional mobility training, Gait training, Stair training, Transfer training, Home exercise program, Neuromuscular re-education, Safety education & training, Equipment evaluation, education, & procurement, Patient/Caregiver education & training, Therapeutic activities  Plan:  (6x O)    Patient Education  Patient Education: Plan of Care, Precautions/Restrictions, Bed Mobility, Equipment Education, Transfers, Gait, Use of [employer], Up in Chair for Christy Padilla, Verbal Exercise Instruction,  - Patient Verbalized Understanding, - Patient Requires Continued Education    Goals:  Patient goals : \"want to be home tomorrow\"  Short Term Goals  Time

## 2022-08-16 NOTE — CARE COORDINATION
8/16/22, 11:20 AM EDT    DISCHARGE PLANNING EVALUATION    Made Sasha Paulino aware she has been accepted to [facility]. She is a potential discharge for tomorrow. Updated son Cody Mccoy.

## 2022-08-16 NOTE — PROGRESS NOTES
Physical Medicine & Rehabilitation   Progress Note    Chief Complaint:   left hip fracture. S/p left hemiarthroplasty. Rehab needs    Subjective:  Patient seen today, sitting at bedside, preparing to work with therapy. Patient off amio gtt today. Discussed tomorrow, POD #3, as long as medically stable will plan admission to [facility]. Patient understanding and appreciative. Patient denies any needs or concerns at this time. Rehabilitation:  PT: Reviewed. OT: Reviewed. Review of Systems:  CONSTITUTIONAL:  positive for  fatigue  EYES:  positive for  glasses & bilateral cataracts. HEENT:  negative  RESPIRATORY:  negative  CARDIOVASCULAR:  positive for  palpitations, fatigue, heart murmur  GASTROINTESTINAL:  positive for constipation and decreased appetite  GENITOURINARY:  negative  SKIN:  left hip incision  HEMATOLOGIC/LYMPHATIC:  positive for swelling/edema  MUSCULOSKELETAL:  positive for  pain  NEUROLOGICAL:  positive for gait problems and pain  BEHAVIOR/PSYCH:  negative  System review otherwise negative      Objective:  BP (!) 164/77   Pulse 88   Temp 98 °F (36.7 °C) (Oral)   Resp 18   Ht 5' 6\" (1.676 m)   Wt 125 lb (56.7 kg)   SpO2 93%   BMI 20.18 kg/m²   awake  Orientation:   person, place, time  Mood: within normal limits  Affect: calm  General appearance: Patient is well nourished, well developed, well groomed and in no acute distress     Memory:   normal,  Attention/Concentration: normal  Language:  normal     Cranial Nerves:  cranial nerves II-XII are grossly intact  ROM:  abnormal - left hip  Motor Exam:  Motor exam is 4 out of 5 all extremities with the exception of left leg not tested  Tone:  normal  Muscle bulk: within normal limits  Sensory:  Sensory intact     Skin: warm and dry, no rash or erythema.  Left hip incision with dry dressing in place  Peripheral vascular: Pulses: Normal upper and lower extremity pulses; Edema: 1+      Diagnostics:   Recent Results (from the past 24 hour(s)) Basic Metabolic Panel    Collection Time: 08/16/22  4:43 AM   Result Value Ref Range    Sodium 132 (L) 135 - 145 meq/L    Potassium 4.2 3.5 - 5.2 meq/L    Chloride 104 98 - 111 meq/L    CO2 20 (L) 23 - 33 meq/L    Glucose 107 70 - 108 mg/dL    BUN 9 7 - 22 mg/dL    Creatinine 0.3 (L) 0.4 - 1.2 mg/dL    Calcium 8.2 (L) 8.5 - 10.5 mg/dL   Hemoglobin and hematocrit, blood    Collection Time: 08/16/22  4:43 AM   Result Value Ref Range    Hemoglobin 11.0 (L) 12.0 - 16.0 gm/dl    Hematocrit 35.2 (L) 37.0 - 47.0 %   Anion Gap    Collection Time: 08/16/22  4:43 AM   Result Value Ref Range    Anion Gap 8.0 8.0 - 16.0 meq/L   Glomerular Filtration Rate, Estimated    Collection Time: 08/16/22  4:43 AM   Result Value Ref Range    Est, Glom Filt Rate >90 ml/min/1.73m2       Impression:  Acute left displaced fmoral neck fracture  S/p left hip hemiarthroplasty with Dr. Juice Braxton on 8/14/22.    Mechanical ground level fall  SVT vs. AF with RVR  Asymptomatic bacteriuria  Moderate mitral valve regurgitation  Chronic hyponatremia  HTN  Insomnia  Anxiety  Vitamin D deficiency    Plan:  Continue current therapies  Amio gtt off as of this AM  Monitor medical stability for IPR admission, earliest 8/17/22 (POD #3)  Patient is appropriate for IPR for the diagnosis of left hip fracture  Patient require active and ongoing intervention of multiple therapy disciplines of PT, OT  Patient can participate in and does require an intensive rehabilitation therapy program, generally consisting of 3 hours of therapy per day at least 5 days per week  Patient is expected to actively participate in, and benefit significantly from, the intensive rehabilitation therapy program (the patients condition and functional status are such that the patient can reasonably be expected to make measurable improvement, expected to be made within a prescribed period of time and as a result of the intensive rehabilitation therapy program, that will be of practical value

## 2022-08-16 NOTE — PLAN OF CARE
Problem: Pain  Goal: Verbalizes/displays adequate comfort level or baseline comfort level  Outcome: Progressing  Flowsheets (Taken 8/16/2022 1751)  Verbalizes/displays adequate comfort level or baseline comfort level:   Encourage patient to monitor pain and request assistance   Assess pain using appropriate pain scale  Note: Ongoing assessment & interventions provided throughout shift. Reminded patient to report any pain, pressure, or shortness of breath to the nurse. Patient receives scheduled tylenol. Problem: Safety - Adult  Goal: Free from fall injury  Outcome: Progressing  Flowsheets  Taken 8/16/2022 1751  Free From Fall Injury: Instruct family/caregiver on patient safety  Taken 8/16/2022 0800  Free From Fall Injury: Instruct family/caregiver on patient safety  Note: Bed locked & in low position, call light in reach, side-rails up x2, bed/chair alarm utilized, non-slip socks on when ambulating, reminded patient to use call light to call for assistance. Problem: Skin/Tissue Integrity  Goal: Absence of new skin breakdown  Description: 1. Monitor for areas of redness and/or skin breakdown  2. Assess vascular access sites hourly  3. Every 4-6 hours minimum:  Change oxygen saturation probe site  4. Every 4-6 hours:  If on nasal continuous positive airway pressure, respiratory therapy assess nares and determine need for appliance change or resting period.   Outcome: Progressing     Problem: Discharge Planning  Goal: Discharge to home or other facility with appropriate resources  Outcome: Progressing  Flowsheets  Taken 8/16/2022 1751  Discharge to home or other facility with appropriate resources: Identify barriers to discharge with patient and caregiver  Taken 8/16/2022 0903  Discharge to home or other facility with appropriate resources: Identify barriers to discharge with patient and caregiver  Note: Patient to return home with son     Problem: Musculoskeletal - Adult  Goal: Return ADL status to a safe level of function  Outcome: Progressing  Flowsheets  Taken 8/16/2022 1751  Return ADL Status to a Safe Level of Function:   Administer medication as ordered   Assess activities of daily living deficits and provide assistive devices as needed  Taken 8/16/2022 0903  Return ADL Status to a Safe Level of Function: Administer medication as ordered  Note: Patient up to chair today with PT     Problem: Musculoskeletal - Adult  Goal: Return mobility to safest level of function  Recent Flowsheet Documentation  Taken 8/16/2022 0903 by Ranjit Gong RN  Return Mobility to Safest Level of Function: Assess patient stability and activity tolerance for standing, transferring and ambulating with or without assistive devices     Problem: Infection - Adult  Goal: Absence of infection at discharge  Recent Flowsheet Documentation  Taken 8/16/2022 0903 by Ranjit Gong RN  Absence of infection at discharge: Assess and monitor for signs and symptoms of infection     Problem: Infection - Adult  Goal: Absence of infection during hospitalization  Recent Flowsheet Documentation  Taken 8/16/2022 0903 by Ranjit Gong RN  Absence of infection during hospitalization: Assess and monitor for signs and symptoms of infection    Care plan reviewed with patient. Patient verbalizes understanding of the care plan and contributed to goal setting.

## 2022-08-16 NOTE — PROGRESS NOTES
[address] [place] [address]  Occupational Therapy  Daily Note  Time:    Time In: 3912  Time Out: 1336  Timed Code Treatment Minutes: 31 Minutes  Minutes: 31          Date: 2022  Patient Name: Carlos Kurtz,   Gender: female      Room: [facility]/031-A  MRN: [id number]  : 1940  (78 y.o.)  Referring Practitioner: DARA Connolly  Diagnosis: displaced fracture of left femoral neck  Additional Pertinent Hx: Per ER note on 2022 :78 y.o. female. Patient was bending down to pick something up and apparently had a fall injuring the left hip and arrives with shortening and external rotation consistent with a likely hip fracture. She thinks she probably did not hit her head or neck. Does have a history of a prior left knee surgery. s/p Hemiarthroplasty of left hip on 2022. had Afib RVR post op, -160,    Restrictions/Precautions:  Restrictions/Precautions: Surgical Protocols, Weight Bearing, General Precautions, Fall Risk  Left Lower Extremity Weight Bearing: Weight Bearing As Tolerated  Position Activity Restriction  Hip Precautions: No hip flexion > 90 degrees, No hip internal rotation, Posterior hip precautions, No ADduction  Other position/activity restrictions: KATHY precautions      SUBJECTIVE: Pt up in recliner upon arrival of therapist. Pt reporting that her external catheter was not working & reports sitting in urine. Pt reports she did not want to bother the nurses. Therapist educated Pt on prevention of skin breakdown & keep incision clean for prevention of infection. PAIN: no number given/10: reports pain in L hip    Vitals: Vitals not assessed per clinical judgement, see nursing flowsheet    COGNITION: Decreased Insight, Decreased Problem Solving, Decreased Safety Awareness, Difficulty Following Commands, and anxiety noted    ADL:   Lower Extremity Dressing: Minimal Assistance.   For doffing B shoes, educated Pt on use of LH shoehorn for doffing-completed after therapist untied shoelaces  Toileting: Moderate Assistance. Pt wiped front while seated on [facility] then therapist wiped back in standing  Toilet Transfer: Moderate Assistance. Sit-stand from [facility] . BALANCE:  Standing Balance: Minimal Assistance. BED MOBILITY:  Sit to Supine: Maximum Assistance poor technique despite education on technique    TRANSFERS:  Sit to Stand:  Minimal Assistance. From recliner    FUNCTIONAL MOBILITY:  Assistive Device: Rolling Walker  Assist Level:  Minimal Assistance. Distance:  3ft to [facility] then 2ft to EOB from [facility]  Slow pace & vcs for technique     ADDITIONAL ACTIVITIES:  Reviewed THR precautions with Pt, Pt able to id 2    ASSESSMENT:     Activity Tolerance:  Patient tolerance of  treatment: fair. Discharge Recommendations: Continue to assess pending progress  Equipment Recommendations: Equipment Needed: Yes  Other: Pt may benefit from [facility] AE kit, RW, shower chair vs tub t/f bench, BSC  Plan: Times per Week: 6x  Current Treatment Recommendations: Functional mobility training, Balance training, Self-Care / ADL, Patient/Caregiver education & training, Safety education & training    Patient Education  Patient Education:  see above    Goals  Short Term Goals  Time Frame for Short term goals: until discharge  Short Term Goal 1: Pt will complete various sit-stand t/fs including BSC with CGA & min vcs for UE placement  Short Term Goal 2: Pt will complete standing 2-3 min with CGA for increased ease of sinkside grooming  Short Term Goal 3: Pt will complete mobility to/from bathroom with CGA, RW, & 0-2 vcs for safety  Short Term Goal 4: Pt will complete LE dressing with mod A, LH AE, & 0-2 vcs for THR precautions  Long Term Goals  Time Frame for Long term goals : No LTG set d/t short ELOS    Following session, patient left in safe position with all fall risk precautions in place.

## 2022-08-16 NOTE — PROGRESS NOTES
Hospitalist Progress Note      Patient:  Vandana Reece    Unit/Bed:3B-31/031-A  YOB: 1940  MRN: [id number]   Acct: [de-identified]   PCP: Eugenio Farnsworth MD  Date of Admission: 8/13/2022    Assessment/Plan:    Acute left displaced fmoral neck fracture  Mechanical ground level fall  Primary to manage  S/p left hip hemiarthroplasty with Dr. Florence Hernandez on 8/14/22. Pain/nasuea control per primary  Recommend bowel regimen daily  Ok for PT/OT. SVT vs. AF with RVR  Developed in the postoperative period s/p adenosin x2 and DCCV x3. Placed on amiodarone infusion. Will transition to PO metoprolol today. Not on anticoagulation at baseline. Monitor electrolytes. Keep Mg>2, K>4. Asymptomatic bacteriuria  U/A with GNB >1000k CFU without symptoms. No indication to treat at this time  Moderate mitral valve regurgitation  Recent echo 7/15/22  Have had discussions regarding possible mitral clip. Follows with Dr. Wilian Harding outpatient  Chronic hyponatremia, resolved. 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  Will likely start lopressor tomorrow. Insomnia  Can cautiously continue home lorazepam.  Recommend discontinuation/weaning down in the outpatient setting given advanced age and frequent falls  Anxiety  Received 1x dose of atarax on 8/15. Minimize ativan usage. Vitamin D deficiency  On supplementation    Subjective (past 24 hours):   Doing well today. HR controlled overnight. She denies any chest pain, palpitations, or SOB. Past medical history, family history, social history and allergies reviewed again and is unchanged since admission. ROS (12 point review of systems completed. Pertinent positives noted.  Otherwise ROS is negative)     Medications:  Reviewed    Infusion Medications    sodium chloride       Scheduled Medications    metoprolol tartrate  12.5 mg Oral BID    sodium chloride flush  5-40 mL IntraVENous 2 times per day    acetaminophen  650 mg Oral Q6H    aspirin  81 mg Oral BID     PRN Meds: sodium chloride flush, sodium chloride, ondansetron, HYDROmorphone **OR** HYDROmorphone, traMADol **OR** traMADol, polyethylene glycol, bisacodyl, LORazepam      Intake/Output Summary (Last 24 hours) at 8/16/2022 0932  Last data filed at 8/16/2022 0617  Gross per 24 hour   Intake 2253.97 ml   Output --   Net 2253.97 ml         Diet:  ADULT DIET; Regular    Exam:  BP (!) 164/77   Pulse 88   Temp 98 °F (36.7 °C) (Oral)   Resp 18   Ht 5' 6\" (1.676 m)   Wt 125 lb (56.7 kg)   SpO2 93%   BMI 20.18 kg/m²   General appearance: No apparent distress, appears stated age and cooperative. HEENT: Pupils equal, round, and reactive to light. Conjunctivae/corneas clear. Neck: Supple, with full range of motion. No jugular venous distention. Trachea midline. Respiratory:  Normal respiratory effort. Clear to auscultation, bilaterally without Rales/Wheezes/Rhonchi. Cardiovascular: Regular rate and rhythm with normal F8/H0.  4/6 diastolic ejection murmur noted. Abdomen: Soft, non-tender, non-distended with normal bowel sounds. Musculoskeletal: Left lateral hip with clean and dry dressings over incision. No erythema or drainage. No tenderness to palpation. Left lower extremity is mildly cool with normal capillary refill. Skin: Skin color, texture, turgor normal.  No rashes or lesions. Neurologic:  Neurovascularly intact without any focal sensory/motor deficits.  Cranial nerves: II-XII intact, grossly non-focal.  Psychiatric: Alert and oriented, thought content appropriate, normal insight  Capillary Refill: Brisk,< 3 seconds   Peripheral Pulses: +2 palpable, equal bilaterally     Labs:   Recent Labs     08/14/22  0528 08/15/22  0333 08/16/22  0443   WBC 8.6  --   --    HGB 11.9* 11.1* 11.0*   HCT 35.7* 35.2* 35.2*     --   --        Recent Labs     08/14/22  0528 08/15/22  0333 08/16/22  0443   * 131* 132*   K 3.8 3.2* 4.2    103 104   CO2 22* 17* 20*   BUN 10 8 9   CREATININE 0.3* 0.3* 0.3*   CALCIUM 8.7 7.9* 8.2*       No results for input(s): AST, ALT, BILIDIR, BILITOT, ALKPHOS in the last 72 hours. Recent Labs     08/14/22  0528   INR 1.15*       No results for input(s): Amaury Pears in the last 72 hours. Microbiology:    Blood culture #1: No results found for: BC    Blood culture #2:No results found for: BLOODCULT2    Organism:  Lab Results   Component Value Date/Time    ORG Escherichia coli 08/13/2022 12:30 AM         No results found for: LABGRAM    MRSA culture only:No results found for: [facility]    Urine culture:   Lab Results   Component Value Date/Time    LABURIN CULTURE RESULTS 07/16/2019 12:00 AM       Respiratory culture: No results found for: CULTRESP    Aerobic and Anaerobic :  No results found for: LABAERO  No results found for: LABANAE    Urinalysis:      Lab Results   Component Value Date/Time    NITRU POSITIVE 08/13/2022 12:33 AM    WBCUA 10-15 08/13/2022 12:33 AM    BACTERIA MANY 08/13/2022 12:33 AM    RBCUA 0-2 08/13/2022 12:33 AM    BLOODU NEGATIVE 08/13/2022 12:33 AM    SPECGRAV 1.015 08/28/2019 01:34 PM    GLUCOSEU NEGATIVE 08/13/2022 12:33 AM       Radiology:  XR HIP 2-3 VW W PELVIS LEFT   Final Result   1. There has been interval left hip arthroplasty. The surgical hardware appears to this are satisfactory alignment. There are small foci of gas attenuation adjacent to the left hip joint soft tissues which likely represent postsurgical change. **This report has been created using voice recognition software. It may contain minor errors which are inherent in voice recognition technology. **      Final report electronically signed by Dr. Santo Arora on 8/14/2022 6:52 PM      XR CHEST 1 VIEW   Final Result   Impression:   No acute findings. This document has been electronically signed by: Reed Bain.  Miri Garnica MD    on 08/13/2022 03:19 AM      XR KNEE LEFT (1-2 VIEWS)   Final Result   Impression:   No fracture. Intact total knee arthroplasty. This document has been electronically signed by: Kajal Carey MD    on 08/13/2022 03:46 AM      XR HIP 2-3 VW W PELVIS LEFT   Final Result   Impression:   Fracture of the left proximal femur. This document has been electronically signed by: Kajal Carey MD    on 08/13/2022 03:36 AM           XR KNEE LEFT (1-2 VIEWS)    Result Date: 8/13/2022  Radiographs of the left knee, 2 views Comparison: None Findings: There is no fracture or dislocation. Status post total knee arthroplasty. Intact hardware. No periprosthetic lucency to indicate loosening. Decreased bone mineralization. Identified joint effusion. No soft tissue swelling. Vascular calcifications. Impression: No fracture. Intact total knee arthroplasty. This document has been electronically signed by: Kajal Carey MD on 08/13/2022 03:46 AM    XR CHEST 1 VIEW    Result Date: 8/13/2022  Chest Radiograph Comparison: None Findings: Cardiomegaly. Normal mediastinal contours. No pneumothorax. No opacity. No pleural effusion. Normal upper abdomen. No fracture. Decreased bone mineralization. Impression: No acute findings. This document has been electronically signed by: Kajal Carey MD on 08/13/2022 03:19 AM    XR HIP 2-3 VW W PELVIS LEFT    Result Date: 8/13/2022   ADDENDUM #1  This report was discussed with Elaine Kirby  on Aug 13, 2022 03:37:00 EDT. This document has been electronically signed by: Ruslan Montero on 08/13/2022 03:37 AM  ORIGINAL REPORT  Radiographs of the pelvis and left hip, 3 views Comparison: None Findings: There is a fracture of the left proximal femur involving the femoral neck. 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 No dislocation. Mild to moderate degenerative change. Decreased bone mineralization. Increased stool quantity. Vascular calcifications. Impression: Fracture of the left proximal femur.  This document has been electronically signed by: Saurabh Nazario.  Tre Novak MD on 08/13/2022 03:36 AM       Electronically signed by Camilla Gu DO on 8/16/2022 at 9:32 AM

## 2022-08-16 NOTE — PROGRESS NOTES
Orthopaedic Progress Note      SUBJECTIVE   Ms. Marleny Wynne is post op day # 2 L hip hemiarthroplasty  Dr Krystal Duff     Seen at bedside this morning, well appearing  no adverse events overnight  transferred to  initially due to prior rapid response, now in NSR  Pain well controlled, tolerating oral diet  Denies any numbness/paresthesia to LLE  Hgb 11.0  Worked with therapy, tolerated this well      OBJECTIVE      Physical    VITALS:  /68   Pulse 84   Temp 98.6 °F (37 °C) (Oral)   Resp 18   Ht 5' 6\" (1.676 m)   Wt 125 lb (56.7 kg)   SpO2 95%   BMI 20.18 kg/m²   I/O last 3 completed shifts:   In: 1890.4 [P.O.:180; I.V.:1611.2; IV Piggyback:99.2]  Out: 100 [Blood:100]    4/10 pain  Gen: alert and oriented  Head: normorcephalic, atraumatic  Resp: unlabored  Pelvis: stable  LLE incision c/d/I, no drainage, no bandage saturation  Abductor pillow in place  Sensation to light touch intact  Gastroc TA EHL intact  Distal pulses palpable, warm well perfused  Calf supple nontender to palpation       Data  CBC:   Lab Results   Component Value Date/Time    WBC 8.6 2022 05:28 AM    HGB 11.0 2022 04:43 AM     2022 05:28 AM     BMP:    Lab Results   Component Value Date/Time     2022 04:43 AM    K 4.2 2022 04:43 AM    K 3.8 2022 05:28 AM     2022 04:43 AM    CO2 20 2022 04:43 AM    BUN 9 2022 04:43 AM    CREATININE 0.3 2022 04:43 AM    CALCIUM 8.2 2022 04:43 AM    GLUCOSE 107 2022 04:43 AM     Uric Acid:  No components found for: URIC  PT/INR:    Lab Results   Component Value Date/Time    PROTIME 10.8 2014 05:44 AM    INR 1.15 2022 05:28 AM     Troponin:  No results found for: TROPONINI  Urine Culture:  No components found for: CURINE      Current Inpatient Medications    Current Facility-Administered Medications: [] amiodarone (NEXTERONE) 360 mg in dextrose 5% 200 ml, 1 mg/min, IntraVENous, Continuous **FOLLOWED BY** amiodarone (NEXTERONE) 360 mg in dextrose 5% 200 ml, 0.5 mg/min, IntraVENous, Continuous  0.9 % sodium chloride infusion, , IntraVENous, Continuous  sodium chloride flush 0.9 % injection 5-40 mL, 5-40 mL, IntraVENous, 2 times per day  sodium chloride flush 0.9 % injection 5-40 mL, 5-40 mL, IntraVENous, PRN  0.9 % sodium chloride infusion, , IntraVENous, PRN  acetaminophen (TYLENOL) tablet 650 mg, 650 mg, Oral, Q6H  ondansetron (ZOFRAN) injection 4 mg, 4 mg, IntraVENous, Q4H PRN  HYDROmorphone (DILAUDID) injection 0.25 mg, 0.25 mg, IntraVENous, Q3H PRN **OR** HYDROmorphone (DILAUDID) injection 0.5 mg, 0.5 mg, IntraVENous, Q3H PRN  traMADol (ULTRAM) tablet 50 mg, 50 mg, Oral, Q6H PRN **OR** traMADol (ULTRAM) tablet 100 mg, 100 mg, Oral, Q6H PRN  aspirin chewable tablet 81 mg, 81 mg, Oral, BID  polyethylene glycol (GLYCOLAX) packet 17 g, 17 g, Oral, Daily PRN  bisacodyl (DULCOLAX) suppository 10 mg, 10 mg, Rectal, Daily PRN  LORazepam (ATIVAN) tablet 0.5 mg, 0.5 mg, Oral, Q4H PRN        PLAN    Ms. Collin John is post op day # 2 L hip hemiarthroplasty  Dr Stephanie Lambert, now in NSR  Continue to ADAT  WBAT LLE- posterior hip precautions  Abductor brace- While in bed and while turning for 48 hours post operatively then use regular pillow.   Hgb Hct stable  Continue PT OT  Dvt ppx- ASA 81 bid once appropriate   Dispo- like SNF vs IPR  Medicine consulted, appreciate recs

## 2022-08-16 NOTE — CARE COORDINATION
8/16/22, 11:26 AM EDT    DISCHARGE ON GOING EVALUATION    [address] [facility] day: 3  Location: -31/031-A Reason for admit: Displaced fracture of left femoral neck ([facility] [S72.002A]  Closed fracture of left hip, initial encounter ([facility] [S72.002A]  Closed left hip fracture, initial encounter ([facility] [S72.002A]   Procedure:   8/13 XR left hip: Fracture of the left proximal femur. 8/14 Hemiarthroplasty of left hip by Dr. Rob Andrews. 8/15 Cardioversion at bedside (200J, 300J and 360J). Barriers to Discharge: Ortho, Hospitalist and PM&R following. POD #2. Amio gtt off today. PT/OT. Planning IPR once medically stable. Starting Lopressor 12.5 mg bid. PCP: Anum Bower MD  Readmission Risk Score: 16.7%  Patient Goals/Plan/Treatment Preferences: Planning IPR when medically stable. Not a precert.

## 2022-08-17 ENCOUNTER — HOSPITAL ENCOUNTER (INPATIENT)
Age: 82
LOS: 13 days | Discharge: HOME HEALTH | DRG: 559 | End: 2022-08-30
Attending: PHYSICAL MEDICINE & REHABILITATION | Admitting: PHYSICAL MEDICINE & REHABILITATION
Payer: MEDICARE

## 2022-08-17 VITALS
WEIGHT: 125 LBS | RESPIRATION RATE: 18 BRPM | TEMPERATURE: 97.6 F | SYSTOLIC BLOOD PRESSURE: 135 MMHG | HEIGHT: 66 IN | DIASTOLIC BLOOD PRESSURE: 73 MMHG | BODY MASS INDEX: 20.09 KG/M2 | HEART RATE: 78 BPM | OXYGEN SATURATION: 97 %

## 2022-08-17 DIAGNOSIS — E87.1 HYPONATREMIA: ICD-10-CM

## 2022-08-17 DIAGNOSIS — I10 ESSENTIAL HYPERTENSION: ICD-10-CM

## 2022-08-17 DIAGNOSIS — S72.002A HIP FRACTURE REQUIRING OPERATIVE REPAIR, LEFT, CLOSED, INITIAL ENCOUNTER (HCC): Primary | ICD-10-CM

## 2022-08-17 DIAGNOSIS — S72.002A CLOSED FRACTURE OF LEFT HIP, INITIAL ENCOUNTER (HCC): ICD-10-CM

## 2022-08-17 PROCEDURE — 97535 SELF CARE MNGMENT TRAINING: CPT

## 2022-08-17 PROCEDURE — 6370000000 HC RX 637 (ALT 250 FOR IP): Performed by: STUDENT IN AN ORGANIZED HEALTH CARE EDUCATION/TRAINING PROGRAM

## 2022-08-17 PROCEDURE — 99233 SBSQ HOSP IP/OBS HIGH 50: CPT | Performed by: INTERNAL MEDICINE

## 2022-08-17 PROCEDURE — 1180000000 HC REHAB R&B

## 2022-08-17 PROCEDURE — 6370000000 HC RX 637 (ALT 250 FOR IP): Performed by: PHYSICAL MEDICINE & REHABILITATION

## 2022-08-17 PROCEDURE — 99222 1ST HOSP IP/OBS MODERATE 55: CPT | Performed by: PHYSICAL MEDICINE & REHABILITATION

## 2022-08-17 PROCEDURE — 6370000000 HC RX 637 (ALT 250 FOR IP): Performed by: PHYSICIAN ASSISTANT

## 2022-08-17 PROCEDURE — 93270 REMOTE 30 DAY ECG REV/REPORT: CPT

## 2022-08-17 PROCEDURE — 97530 THERAPEUTIC ACTIVITIES: CPT

## 2022-08-17 PROCEDURE — 6370000000 HC RX 637 (ALT 250 FOR IP): Performed by: INTERNAL MEDICINE

## 2022-08-17 RX ORDER — ENOXAPARIN SODIUM 100 MG/ML
40 INJECTION SUBCUTANEOUS DAILY
Status: CANCELLED | OUTPATIENT
Start: 2022-08-17

## 2022-08-17 RX ORDER — ACETAMINOPHEN 325 MG/1
650 TABLET ORAL EVERY 6 HOURS
Status: DISCONTINUED | OUTPATIENT
Start: 2022-08-17 | End: 2022-08-30 | Stop reason: HOSPADM

## 2022-08-17 RX ORDER — TRAMADOL HYDROCHLORIDE 50 MG/1
25 TABLET ORAL EVERY 6 HOURS PRN
Status: DISCONTINUED | OUTPATIENT
Start: 2022-08-17 | End: 2022-08-17 | Stop reason: HOSPADM

## 2022-08-17 RX ORDER — TRAMADOL HYDROCHLORIDE 50 MG/1
25 TABLET ORAL EVERY 6 HOURS PRN
Status: DISCONTINUED | OUTPATIENT
Start: 2022-08-17 | End: 2022-08-30 | Stop reason: HOSPADM

## 2022-08-17 RX ORDER — TRAMADOL HYDROCHLORIDE 50 MG/1
50 TABLET ORAL EVERY 6 HOURS PRN
Status: CANCELLED | OUTPATIENT
Start: 2022-08-17

## 2022-08-17 RX ORDER — POLYETHYLENE GLYCOL 3350 17 G/17G
17 POWDER, FOR SOLUTION ORAL DAILY PRN
Status: DISCONTINUED | OUTPATIENT
Start: 2022-08-17 | End: 2022-08-30 | Stop reason: HOSPADM

## 2022-08-17 RX ORDER — TRAMADOL HYDROCHLORIDE 50 MG/1
50 TABLET ORAL EVERY 6 HOURS PRN
Status: DISCONTINUED | OUTPATIENT
Start: 2022-08-17 | End: 2022-08-30 | Stop reason: HOSPADM

## 2022-08-17 RX ORDER — LORAZEPAM 0.5 MG/1
0.5 TABLET ORAL EVERY 4 HOURS PRN
Status: DISCONTINUED | OUTPATIENT
Start: 2022-08-17 | End: 2022-08-18

## 2022-08-17 RX ORDER — POLYETHYLENE GLYCOL 3350 17 G/17G
17 POWDER, FOR SOLUTION ORAL DAILY PRN
Status: CANCELLED | OUTPATIENT
Start: 2022-08-17

## 2022-08-17 RX ORDER — BISACODYL 10 MG
10 SUPPOSITORY, RECTAL RECTAL DAILY PRN
Status: CANCELLED | OUTPATIENT
Start: 2022-08-17

## 2022-08-17 RX ORDER — ACETAMINOPHEN 325 MG/1
650 TABLET ORAL EVERY 4 HOURS PRN
Status: DISCONTINUED | OUTPATIENT
Start: 2022-08-17 | End: 2022-08-30 | Stop reason: HOSPADM

## 2022-08-17 RX ORDER — FAMOTIDINE 20 MG/1
20 TABLET, FILM COATED ORAL DAILY
Status: DISCONTINUED | OUTPATIENT
Start: 2022-08-18 | End: 2022-08-30 | Stop reason: HOSPADM

## 2022-08-17 RX ORDER — FAMOTIDINE 20 MG/1
20 TABLET, FILM COATED ORAL DAILY
Status: CANCELLED | OUTPATIENT
Start: 2022-08-18

## 2022-08-17 RX ORDER — TRAMADOL HYDROCHLORIDE 50 MG/1
50 TABLET ORAL EVERY 6 HOURS PRN
Status: DISCONTINUED | OUTPATIENT
Start: 2022-08-17 | End: 2022-08-17 | Stop reason: HOSPADM

## 2022-08-17 RX ORDER — TRAMADOL HYDROCHLORIDE 50 MG/1
25 TABLET ORAL EVERY 6 HOURS PRN
Status: CANCELLED | OUTPATIENT
Start: 2022-08-17

## 2022-08-17 RX ORDER — LOPERAMIDE HYDROCHLORIDE 2 MG/1
2 CAPSULE ORAL 4 TIMES DAILY PRN
Status: DISCONTINUED | OUTPATIENT
Start: 2022-08-17 | End: 2022-08-17 | Stop reason: HOSPADM

## 2022-08-17 RX ORDER — ACETAMINOPHEN 325 MG/1
650 TABLET ORAL EVERY 4 HOURS PRN
Status: CANCELLED | OUTPATIENT
Start: 2022-08-17

## 2022-08-17 RX ORDER — FAMOTIDINE 20 MG/1
20 TABLET, FILM COATED ORAL DAILY
Status: DISCONTINUED | OUTPATIENT
Start: 2022-08-17 | End: 2022-08-17 | Stop reason: HOSPADM

## 2022-08-17 RX ORDER — ENOXAPARIN SODIUM 100 MG/ML
40 INJECTION SUBCUTANEOUS DAILY
Status: DISCONTINUED | OUTPATIENT
Start: 2022-08-17 | End: 2022-08-17 | Stop reason: SDUPTHER

## 2022-08-17 RX ORDER — VIT A/VIT C/VIT E/ZINC/COPPER 4296-226
2 CAPSULE ORAL DAILY
COMMUNITY

## 2022-08-17 RX ORDER — ONDANSETRON 4 MG/1
4 TABLET, ORALLY DISINTEGRATING ORAL EVERY 8 HOURS PRN
Status: DISCONTINUED | OUTPATIENT
Start: 2022-08-17 | End: 2022-08-30 | Stop reason: HOSPADM

## 2022-08-17 RX ORDER — BISACODYL 10 MG
10 SUPPOSITORY, RECTAL RECTAL DAILY PRN
Status: DISCONTINUED | OUTPATIENT
Start: 2022-08-17 | End: 2022-08-30 | Stop reason: HOSPADM

## 2022-08-17 RX ORDER — LORAZEPAM 0.5 MG/1
0.5 TABLET ORAL EVERY 4 HOURS PRN
Status: CANCELLED | OUTPATIENT
Start: 2022-08-17

## 2022-08-17 RX ORDER — ONDANSETRON 4 MG/1
4 TABLET, ORALLY DISINTEGRATING ORAL EVERY 8 HOURS PRN
Status: CANCELLED | OUTPATIENT
Start: 2022-08-17

## 2022-08-17 RX ORDER — ACETAMINOPHEN 325 MG/1
650 TABLET ORAL EVERY 6 HOURS
Status: CANCELLED | OUTPATIENT
Start: 2022-08-17

## 2022-08-17 RX ADMIN — LORAZEPAM 0.5 MG: 0.5 TABLET ORAL at 13:54

## 2022-08-17 RX ADMIN — ACETAMINOPHEN 650 MG: 325 TABLET ORAL at 20:40

## 2022-08-17 RX ADMIN — LORAZEPAM 0.5 MG: 0.5 TABLET ORAL at 08:11

## 2022-08-17 RX ADMIN — LORAZEPAM 0.5 MG: 0.5 TABLET ORAL at 18:03

## 2022-08-17 RX ADMIN — TRAMADOL HYDROCHLORIDE 25 MG: 50 TABLET, COATED ORAL at 08:11

## 2022-08-17 RX ADMIN — APIXABAN 2.5 MG: 2.5 TABLET, FILM COATED ORAL at 20:29

## 2022-08-17 RX ADMIN — LOPERAMIDE HYDROCHLORIDE 2 MG: 2 CAPSULE ORAL at 11:47

## 2022-08-17 RX ADMIN — APIXABAN 2.5 MG: 2.5 TABLET, FILM COATED ORAL at 08:27

## 2022-08-17 RX ADMIN — METOPROLOL TARTRATE 12.5 MG: 25 TABLET, FILM COATED ORAL at 08:11

## 2022-08-17 RX ADMIN — METOPROLOL TARTRATE 12.5 MG: 25 TABLET, FILM COATED ORAL at 20:30

## 2022-08-17 RX ADMIN — ACETAMINOPHEN 650 MG: 325 TABLET ORAL at 13:54

## 2022-08-17 RX ADMIN — ACETAMINOPHEN 650 MG: 325 TABLET ORAL at 03:32

## 2022-08-17 RX ADMIN — FAMOTIDINE 20 MG: 20 TABLET ORAL at 08:11

## 2022-08-17 ASSESSMENT — PAIN DESCRIPTION - ONSET
ONSET: ON-GOING

## 2022-08-17 ASSESSMENT — PAIN DESCRIPTION - LOCATION
LOCATION: HIP
LOCATION: HIP
LOCATION: HIP;LEG
LOCATION: HIP
LOCATION: HIP;LEG
LOCATION: HIP;LEG
LOCATION: HIP

## 2022-08-17 ASSESSMENT — PAIN SCALES - GENERAL
PAINLEVEL_OUTOF10: 4
PAINLEVEL_OUTOF10: 1
PAINLEVEL_OUTOF10: 1
PAINLEVEL_OUTOF10: 4
PAINLEVEL_OUTOF10: 1
PAINLEVEL_OUTOF10: 1
PAINLEVEL_OUTOF10: 0
PAINLEVEL_OUTOF10: 4
PAINLEVEL_OUTOF10: 0

## 2022-08-17 ASSESSMENT — PAIN DESCRIPTION - DESCRIPTORS
DESCRIPTORS: ACHING
DESCRIPTORS: ACHING
DESCRIPTORS: ACHING;SORE
DESCRIPTORS: ACHING;SORE
DESCRIPTORS: ACHING

## 2022-08-17 ASSESSMENT — PAIN DESCRIPTION - FREQUENCY
FREQUENCY: INTERMITTENT

## 2022-08-17 ASSESSMENT — PAIN DESCRIPTION - ORIENTATION
ORIENTATION: LEFT

## 2022-08-17 ASSESSMENT — PAIN DESCRIPTION - PAIN TYPE
TYPE: ACUTE PAIN;SURGICAL PAIN
TYPE: SURGICAL PAIN
TYPE: ACUTE PAIN;SURGICAL PAIN
TYPE: SURGICAL PAIN

## 2022-08-17 NOTE — DISCHARGE SUMMARY
Physician Discharge Summary     Patient ID:  Flo Le  [id number]  19 y.o.  1940    Admit date: 8/13/2022    Discharge date and time: 8/17/22    Admitting Physician: Kalie Conley MD     Discharge Physician: Kalie Conley MD     Admission Diagnoses: Displaced fracture of left femoral neck ([facility] [S72.002A]  Closed fracture of left hip, initial encounter ([facility] [S72.002A]  Closed left hip fracture, initial encounter [facility]) [S72.002A]    Discharge Diagnoses: displaced fracture of left femoral neck ([facility] [S72.002A]    Admission Condition: good    Discharged Condition: good    Indication for Admission: unstable orthopaedic injury    Hospital Course: Patient is now s/p L hip hemiarthroplasty on 8/14/22 by Dr. Nava Powers. Patient presented to the ED on 8/13/22  after sustaining a femur fracture. On the day of surgery, patient was identified in the pre-operative holding area and agreeable to proceed with surgery. Written consent was obtained. Please see operative note for further details of this procedure. Patient received nikos-operative antibiotics. Patient recovered in PACU before transfer to telemetry floor and stepdown subsequently. Patient was started on tylenol and norco for pain control and Eliquis 2.5mg bid for dvt prophylaxis for 4 weeks. On the day of discharge, patient was afebrile with stable vital signs, stable upon physical exam. Patient was discharged with prescriptions for pain. Patient was deemed stable for discharge to [facility] as recommended by PT/OT. Patient will follow up with Dr Nava Powers in 2 weeks for post-operative visit. Discharge Exam:  Please see final progress note for appropriate discharge exam    Disposition: IPR    In process/preliminary results:  Outstanding Order Results       No orders found from 7/15/2022 to 8/14/2022.             Patient Instructions:   Current Discharge Medication List        START taking these medications    Details   HYDROcodone-acetaminophen (NORCO) 5-325 MG per tablet Take 1 tablet by mouth every 6 hours as needed for Pain for up to 7 days. Intended supply: 7 days. Take lowest dose possible to manage pain  Qty: 28 tablet, Refills: 0    Comments: Reduce doses taken as pain becomes manageable  Associated Diagnoses: S/P hip hemiarthroplasty      aspirin EC 81 MG EC tablet Take 1 tablet by mouth in the morning and 1 tablet before bedtime. Qty: 84 tablet, Refills: 0      pantoprazole (PROTONIX) 40 MG tablet Take 1 tablet by mouth daily as needed (stomach irritation from aspirin use)  Qty: 42 tablet, Refills: 0      docusate sodium (COLACE) 100 MG capsule Take 1 capsule by mouth in the morning and 1 capsule before bedtime. Qty: 60 capsule, Refills: 0      acetaminophen (TYLENOL) 500 MG tablet Take 1 tablet by mouth 3 times daily as needed for Pain  Qty: 60 tablet, Refills: 0      ondansetron (ZOFRAN) 4 MG tablet Take 1 tablet by mouth 3 times daily as needed for Nausea or Vomiting  Qty: 30 tablet, Refills: 0           CONTINUE these medications which have NOT CHANGED    Details   LORazepam (ATIVAN) 1 MG tablet Take 1 tablet by mouth daily as needed for Anxiety for up to 30 days. Qty: 30 tablet, Refills: 0    Associated Diagnoses: Anxiety      !! NONFORMULARY Eye shot-pt unsure of the name      !! NONFORMULARY preservision      furosemide (LASIX) 20 MG tablet Take 1 tablet by mouth daily  Qty: 60 tablet, Refills: 1      metoprolol tartrate (LOPRESSOR) 50 MG tablet TAKE 1 TABLET BY MOUTH TWO TIMES A DAY  Qty: 180 tablet, Refills: 2      Cranberry 125 MG TABS Take by mouth      Cholecalciferol (VITAMIN D3) 5000 units CAPS Take 1 capsule by mouth daily    Associated Diagnoses: Vitamin D deficiency       ! ! - Potential duplicate medications found. Please discuss with provider.         Activity: activity as tolerated  Diet: regular diet  Wound Care: keep wound clean and dry    Follow-up with Dr Tanvi Almanzar in 2 weeks     Signed:  Matheus Nielsen PA-C    8/17/2022  6:39 AM

## 2022-08-17 NOTE — PROGRESS NOTES
Patient transferred to [phone] via transport by wheelchair with belongings, IV, and cardiac event monitor in place. [facility] [address] [facility]

## 2022-08-17 NOTE — PROGRESS NOTES
lorazepam.  Recommend discontinuation/weaning down in the outpatient setting given advanced age and frequent falls  Anxiety  Received 1x dose of atarax on 8/15. Minimize ativan usage. Vitamin D deficiency  On supplementation      Thank you, Neelam Michael MD, for the consultation. Hospitalist service will sign off - patient stable for discharge. Discussed with Lyndon Wiseman with Ortho. Electronically signed by Rayshawn Moncada DO on 8/17/2022 at 12:49 PM      ===================================================================      Chief Complaint:  Fall, Left femur fracture. Subjective/24 hours: Patient seen at bedside, has several questions about her medications in the future which were answered. Patient reports no chest pain or palpitations, tenderness in the left leg and still having difficulty with ambulation however ready to get to rehab and get stronger. Having bowel movements without any issues. REVIEW OF SYSTEMS:   Pertinent positives as noted in the subjective portion. Otherwise complete ROS reviewed and negative/unchanged. PHYSICAL EXAM:  /73   Pulse 78   Temp 97.6 °F (36.4 °C) (Oral)   Resp 18   Ht 5' 6\" (1.676 m)   Wt 125 lb (56.7 kg)   SpO2 97%   BMI 20.18 kg/m²     General appearance: No apparent distress, appears stated age. Eyes:  Pupils equal, round, and reactive to light. Conjunctivae/corneas clear. HENT: Head normal in appearance. External nares normal.  Oral mucosa moist without lesions. Hearing grossly intact. Neck: Supple, with full range of motion. Trachea midline. No gross JVD appreciated. Respiratory:  Normal respiratory effort. Clear to auscultation, bilaterally without rales or wheezes or rhonchi. Cardiovascular: Normal rate, regular rhythm . No lower extremity edema. Midsystolic 1-6/7 murmur radiating to the apex  Abdomen: Soft, non-tender, non-distended with normal bowel sounds. No guarding.   Musculoskeletal: Tenderness of the left hip, bandages intact with no active bleeding. Limited range of motion of the left hip secondary to pain. Skin: Warm and dry. No rashes or lesions. Neurologic:  No focal sensory/motor deficits in the upper and lower extremities. Cranial nerves:  grossly non-focal 2-12. Psychiatric: Alert and oriented, normal insight and thought content. Capillary Refill: Brisk,< 3 seconds. Peripheral Pulses: +2 palpable, equal bilaterally. Labs:   Recent Labs     08/15/22  0333 08/16/22  0443   HGB 11.1* 11.0*   HCT 35.2* 35.2*     Recent Labs     08/15/22  0333 08/16/22 0443   * 132*   K 3.2* 4.2    104   CO2 17* 20*   BUN 8 9   CREATININE 0.3* 0.3*   CALCIUM 7.9* 8.2*     No results for input(s): AST, ALT, BILIDIR, BILITOT, ALKPHOS in the last 72 hours. No results for input(s): INR in the last 72 hours. No results for input(s): Curlie Lat in the last 72 hours. Lab Results   Component Value Date/Time    NITRU POSITIVE 08/13/2022 12:33 AM    WBCUA 10-15 08/13/2022 12:33 AM    BACTERIA MANY 08/13/2022 12:33 AM    RBCUA 0-2 08/13/2022 12:33 AM    BLOODU NEGATIVE 08/13/2022 12:33 AM    SPECGRAV 1.015 08/28/2019 01:34 PM    GLUCOSEU NEGATIVE 08/13/2022 12:33 AM       Radiology (last 48 hrs):  No results found. DVT prophylaxis: Eliquis 2-1/2 twice daily    Diet: ADULT DIET;  Regular    Fluids: Off    Code Status: Full Code    PT/OT Eval Status: Active and ongoing    Electronically signed by Constance Grayson DO on 8/17/2022 at 12:49 PM

## 2022-08-17 NOTE — FLOWSHEET NOTE
08/17/22 1834   Treatment Team Notification   Reason for Communication Evaluate  (clarify when surgical dressing can be removed, frequency of changes and what dressing to use)   Team Member Name Florence Parekh, [id number] (surgery)   Treatment Team Role Consulting Provider   Method of Communication Secure Message   Response See orders  (Dry dressings daily starting on post-op day 7 (8/21))   Notification Time [phone]

## 2022-08-17 NOTE — PROGRESS NOTES
[address] [place] [address]  Occupational Therapy  Daily Note  Time:   Time In: 6901  Time Out: 5802  Timed Code Treatment Minutes: 39 Minutes  Minutes: 45          Date: 2022  Patient Name: Lopez Ortega,   Gender: female      Room: [facility]/031-A  MRN: [id number]  : 1940  (78 y.o.)  Referring Practitioner: Glenford Bence, PA  Diagnosis: displaced fracture of left femoral neck  Additional Pertinent Hx: Per ER note on 2022 :78 y.o. female. Patient was bending down to pick something up and apparently had a fall injuring the left hip and arrives with shortening and external rotation consistent with a likely hip fracture. She thinks she probably did not hit her head or neck. Does have a history of a prior left knee surgery. s/p Hemiarthroplasty of left hip on 2022. had Afib RVR post op, -160,    Restrictions/Precautions:  Restrictions/Precautions: Surgical Protocols, Weight Bearing, General Precautions, Fall Risk  Left Lower Extremity Weight Bearing: Weight Bearing As Tolerated  Position Activity Restriction  Hip Precautions: No hip flexion > 90 degrees, No hip internal rotation, Posterior hip precautions, No ADduction  Other position/activity restrictions: KATHY precautions     SUBJECTIVE: patient supine in bed upon OT arrival and agreeable to tx, however nervous. Patient stated she is soiled and edu patient on purpose/benefits of OT with patient agreeable to participate. A & O x 4. Able to recall 2/3 KATHY precautions. Patient fearful of falling t/o tx and was constantly reassured of her safety. PAIN: 7-8/10: L LE/hip    Vitals: Vitals not assessed per clinical judgement, see nursing flowsheet    COGNITION: Slow Processing, Decreased Recall, Decreased Insight, Decreased Problem Solving, Decreased Safety Awareness, and anxious, fearful of falling    ADL:   Grooming: with set-up. To apply deodorant seated EOB following UB bathing  Bathing: Stand By Assistance.   For UB, cues to initiate in task, MIN  A for back, stood and bathed nikos area with increased time x 2 trials due to incontinent of BM and MOD A support due to post lean with 2 w/w for support and seated rest break b/t nikos area bathing  Upper Extremity Dressing: Minimal Assistance. To change hospital gown  Lower Extremity Dressing: Maximum Assistance. For depend, donning socks and shoes secondary to KATHY precautions MAX A to pull up depend once standing. Rafat Sunshine BALANCE:  Sitting Balance:  Contact Guard Assistance. Slight post lean and R lateral lean to avoid sitting directly on L hip/buttock  Standing Balance: Moderate Assistance. < > MAX A due to post lean and pushing posteriorly bracing LE s against EOB with cues for wt shifting to toes. This writer demo patient's posture and correct posture with use of 2 w/w for support. Multiple trials of standing due to increased time to bathe patient from incontinent of BM    BED MOBILITY:  Supine to Sit: Moderate Assistance cues for tech and sequencing, use of bed rails and cues to assist with task. Patient frequently yelling out when assisted to scoot toward EOB due to fear of falling    TRANSFERS:  Sit to Stand: Moderate Assistance. With cues for tech,  post push/COG. .completed multiple trials of sit to stand t/o tx with reassurance of her safety each time due to fearful of falling. FUNCTIONAL MOBILITY:  Assistive Device: Rolling Walker  Assist Level:  Contact Guard Assistance and Minimal Assistance. Distance:  from EOB to recliner  Heavily wt bearing thru UE s and slow gait, tactile cues to 2 w/w to guide to recliner safely. Edu patient on requirements for IPR and importance of participating in ADLs to progress toward PLOF; patient demo understanding. Informed nursing that patient has diarrhea and requesting meds. RN reports patient is not permitted to have anti-diarrhea meds at this time. ASSESSMENT:  Activity Tolerance:  Patient tolerance of  treatment: fair. Discharge Recommendations: Continue to assess pending progress, Inpatient Rehabilitation, 24 hour assistance or supervision, and Patient would benefit from continued OT at discharge  Equipment Recommendations: Equipment Needed: Yes  Other: Pt may benefit from [facility] AE kit, RW, shower chair vs tub t/f bench, BSC  Plan: Times per Week: 6x  Current Treatment Recommendations: Functional mobility training, Balance training, Self-Care / ADL, Patient/Caregiver education & training, Safety education & training    Patient Education  Patient Education: Role of OT, Plan of Care, ADL's, Precautions, Reviewed Prior Education, Importance of Increasing Activity, and Assistive Device Safety    Goals  Short Term Goals  Time Frame for Short term goals: until discharge  Short Term Goal 1: Pt will complete various sit-stand t/fs including BSC with CGA & min vcs for UE placement  Short Term Goal 2: Pt will complete standing 2-3 min with CGA for increased ease of sinkside grooming  Short Term Goal 3: Pt will complete mobility to/from bathroom with CGA, RW, & 0-2 vcs for safety  Short Term Goal 4: Pt will complete LE dressing with mod A, LH AE, & 0-2 vcs for THR precautions  Long Term Goals  Time Frame for Long term goals : No LTG set d/t short ELOS    Following session, patient left in safe position with all fall risk precautions in place.

## 2022-08-17 NOTE — PROGRESS NOTES
Admitted to the Inpatient Rehabilitation Unit via wheelchair. Patient was then oriented to room and unit. Education provided on the rehabilitation routine: three hours of therapy five days per week. Explained patients right to have family, representative or physician notified of their admission. Patient has Declined for physician to be notified. Patient has Declined for family/representative to be notified. Admitting medication orders compared with acute stay medications; home medication list reviewed with patient/family. Medication issues identified No  Medication issue: n/a  If yes, physician notified  n/a . Transportation:   Has transportation kept you from medical appointments, meetings, work, or from getting things needed for daily living? (Check all that apply)  No.      Health Literacy:   How often do you need to have someone help you when you read instructions, pamphlets, or other written material from your doctor or pharmacy? 0. - Never    Social Isolation:  How often do you feel lonely or isolated from those around you?  0. Never    Patient Mood Interview (PHQ-2 to 9) (from [employer])   Say to Patient: \"Over the last 2 weeks, have you been bothered by any of the following problems? \"   If symptom is present, enter yes in column 1 (Symptom Presence)  If yes in column 1, then ask the patient: About how often have you been bothered by this?  Indicate response in column 2, Symptom Frequency. Symptom Presence  No    Yes   9. No response  Symptom Frequency  Never or 1 day  2-6 days (several days)  7-11 days (half or more of the days)  12-14 days (nearly every day)    Symptom Presence Symptom Frequency   Little interest or pleasure in doing things 0. No 0. Never or 1 day   Feeling down, depressed, or hopeless 0. No 0. Never or 1 day   If either A or B above has symptom frequency coded 2 or 3, CONTINUE asking questions below.       If not, END the interview  and right click on table to delete. Trouble falling or staying asleep, or sleeping too much 0. No 0. Never or 1 day   Feeling tired or having little energy 1. Yes 1. 2-6 days (several days)   Poor appetite or overeating 0. No 0. Never or 1 day   Feeling bad about yourself-or that you are a failure or have let yourself or your family down 0. No 0. Never or 1 day   Trouble concentrating on things, such as reading the newspaper or watching television 0. No 0. Never or 1 day    Moving or speaking so slowly that other people could have noticed. Or the opposite-being so fidgety or restless that you have been moving around a lot more than usual 0. No 0. Never or 1 day   Thoughts that you would be better off dead, or of hurting yourself in some way 0. No 0. Never or 1 day         Pain:  Pain Effect on Sleep    Ask patient: \"Over the past 5 days, how much of the time has pain made it hard for you to sleep at night?\" 1.  Rarely or not at all     If no pain is reported, Stop here. If pain is reported, continue with the additional questions.    Pain Interference with Therapy Activities   Ask patient: Tawanna Carlos the past 5 days, how often have you limited your participation in rehabilitation therapy sessions due to pain?\" 1.  Rarely or not at all   Pain Interference with Day to Day Activities   Ask patient: Tawanna Carlos the past 5 days, how often have you limited your day to day activities (excluding rehabilitation therapy sessions) because of pain?\" 1.  Rarely or not at all         Bladder and Bowel Function Assessment:  Prior history of bladder problems: no problems with bladder  Number of pads used per day: 1  Frequency of night time voiding: twice  Fluid intake volume and pattern: regular fluid intake orally   Last BM: 8/17/22  Bowel problems (prior or current) yes     Incontinence   x   Frequent diarrhea      No BM in 3 days this stay or history of constipation      Hemorrhoids      Diverticulitis      Bowel Surgery     Two nurse skin assessment performed by Margo Winchester RN  and Lauren Leija RN . Weight: 141.5 lb       Care plan was created with patient's input and goals were agreed upon. Admission folder provided with education regarding patients diagnoses, fall prevention, and skin care. \"Data Collection Information Summary for Patients in Inpatient Rehabilitation Facilities\" and \"Privacy Act Statement - Health Care Records\" provided. Please refer to the admission navigator for further information.

## 2022-08-17 NOTE — PROGRESS NOTES
[id number] msg sent to [facility] regarding d/c from ortho standpoint to inpt rehab, says Dr Arturo Owusu will be seeing pt today    [phone] Dr Arturo Owusu called, update given over night, ok with DC, will talk to ortho to see about d/c pt on [address] given post op surgery and SVT/A-fib during this stay

## 2022-08-17 NOTE — PROGRESS NOTES
Orthopaedic Progress Note      SUBJECTIVE   Ms. Byron Orr is post op day # 3 L hip hemiarthroplasty  Dr Sarah Landeros     Seen at bedside this morning, well appearing  no adverse events overnight  transferred to  initially due to prior rapid response, now in NSR, amio gtt stopped  Pain well controlled, tolerating oral diet  Denies any numbness/paresthesia to LLE  Hgb 11.0 yesterday, AM pending  Worked with therapy, tolerated this well      OBJECTIVE      Physical    VITALS:  /65   Pulse 72   Temp 98 °F (36.7 °C) (Oral)   Resp 18   Ht 5' 6\" (1.676 m)   Wt 125 lb (56.7 kg)   SpO2 95%   BMI 20.18 kg/m²   I/O last 3 completed shifts: In: 8310 [P.O.:700;  I.V.:2024; IV Piggyback:50]  Out: 500 [Urine:500]    2/10 pain  Gen: alert and oriented  Head: normorcephalic, atraumatic  Resp: unlabored  Pelvis: stable  LLE incision c/d/I, no drainage, no bandage saturation  Abductor pillow in place  Sensation to light touch intact  Gastroc TA EHL intact  Distal pulses palpable, warm well perfused  Calf supple nontender to palpation       Data  CBC:   Lab Results   Component Value Date/Time    WBC 8.6 08/14/2022 05:28 AM    HGB 11.0 08/16/2022 04:43 AM     08/14/2022 05:28 AM     BMP:    Lab Results   Component Value Date/Time     08/16/2022 04:43 AM    K 4.2 08/16/2022 04:43 AM    K 3.8 08/14/2022 05:28 AM     08/16/2022 04:43 AM    CO2 20 08/16/2022 04:43 AM    BUN 9 08/16/2022 04:43 AM    CREATININE 0.3 08/16/2022 04:43 AM    CALCIUM 8.2 08/16/2022 04:43 AM    GLUCOSE 107 08/16/2022 04:43 AM     Uric Acid:  No components found for: URIC  PT/INR:    Lab Results   Component Value Date/Time    PROTIME 10.8 08/27/2014 05:44 AM    INR 1.15 08/14/2022 05:28 AM     Troponin:  No results found for: TROPONINI  Urine Culture:  No components found for: PATRIZIAINE      Current Inpatient Medications    Current Facility-Administered Medications: metoprolol tartrate (LOPRESSOR) tablet 12.5 mg, 12.5 mg, Oral, BID  sodium chloride flush 0.9 % injection 5-40 mL, 5-40 mL, IntraVENous, 2 times per day  sodium chloride flush 0.9 % injection 5-40 mL, 5-40 mL, IntraVENous, PRN  0.9 % sodium chloride infusion, , IntraVENous, PRN  acetaminophen (TYLENOL) tablet 650 mg, 650 mg, Oral, Q6H  ondansetron (ZOFRAN) injection 4 mg, 4 mg, IntraVENous, Q4H PRN  HYDROmorphone (DILAUDID) injection 0.25 mg, 0.25 mg, IntraVENous, Q3H PRN **OR** HYDROmorphone (DILAUDID) injection 0.5 mg, 0.5 mg, IntraVENous, Q3H PRN  traMADol (ULTRAM) tablet 50 mg, 50 mg, Oral, Q6H PRN **OR** traMADol (ULTRAM) tablet 100 mg, 100 mg, Oral, Q6H PRN  aspirin chewable tablet 81 mg, 81 mg, Oral, BID  polyethylene glycol (GLYCOLAX) packet 17 g, 17 g, Oral, Daily PRN  bisacodyl (DULCOLAX) suppository 10 mg, 10 mg, Rectal, Daily PRN  LORazepam (ATIVAN) tablet 0.5 mg, 0.5 mg, Oral, Q4H PRN        PLAN    Ms. Damir Sharif is post op day # 3 L hip hemiarthroplasty  Dr Micah Bloom, now in NSR  Continue to ADAT  WBAT LLE- posterior hip precautions  Abductor brace- While in bed and while turning for 48 hours post operatively then use regular pillow.   Hgb Hct stable  Continue PT OT  Dvt ppx-ASA   Dispo- IPR likely today, stable for discharge from orthopaedic standpoint, will place discharge order for later today, however confirm with medicine patient is stable for IPR prior to transfer, can hold if need be  Medicine consulted, appreciate recs

## 2022-08-17 NOTE — PROGRESS NOTES
[address] [address]  Acute Inpatient Rehab Preadmission Assessment    Patient Name: Orlando Alas        Ethnicity:Not of , Terisa Settle, or Beninese origin  Race:White  MRN: [id number]    : 1940  (78 y.o.)  Gender: female     Admitted from:[address]  Initial Assessment    Date of admission to the hospital: 2022 12:11 AM  Date patient eligible for admission:2022    Primary Diagnosis: S/p left hemiarthroplasty      Did patient have surgery? yes - hemiarthroplasty 2022 Dr. Anna Osborne     Physicians: Elliott Bustamante MD, Dr. Bonnie Choudhury, Dr. Selma Avalos for clinical complications/co-morbidities:   Past Medical History:   Diagnosis Date    Osteoporosis of multiple sites without pathological fracture 10/2017    Squamous cell carcinoma of skin     Vascular headache     Vitamin D deficiency 10/2017       Financial Information  Primary insurance: Medicare    Secondary Insurance:   [employer]    Has the patient had two or more falls in the past year or any fall with injury in the past year? yes    Did the patient have major surgery during the 100 days prior to admission? yes    Precautions: Restrictions/Precautions: Surgical Protocols, Weight Bearing, General Precautions, Fall Risk  Hip Precautions: No hip flexion > 90 degrees, No hip internal rotation, Posterior hip precautions, No ADduction  Other position/activity restrictions: KATHY precautions  Left Lower Extremity Weight Bearing: Weight Bearing As Tolerated      Isolation Precautions: None       Physiatrist: Dr. Bonnie Choudhury    Patients Occupation: Retired    Reviewed Lab and Diagnostic reports from Current Admission: Yes    Patients Prior Functional  Level: Prior Function  ADL Assistance: Independent  Homemaking Assistance: Independent  Ambulation Assistance: Independent  Transfer Assistance: Independent  Additional Comments: son recently (retired) moved in with pt.  Pt amb with SC intermittently in the home, and uses SC for longer distances. Current functional status for upper extremity ADLs: Minimal assistance    Current functional status for lower extremity ADLs: Minimal assistance    Current functional status for bed, chair, wheelchair transfers: Minimal assistance    Current functional status for toilet transfers: Minimal assistance    Current functional status for locomotion: Minimal assistance    Current functional status for bladder management: Moderate assistance    Current functional status for bowel management:Moderate assistance    Current functional status for comprehension: Complete independence    Current functional status for expression: Complete independence    Current functional status for social interaction: Complete independence    Current functional status for problem solving: Complete independence    Current functional status for memory: Complete independence    Expected level of Improvement in Self-Care:  Modified independence    Expected level of Improvement in Sphincter Control:  Modified independence    Expected level of Improvement in Transfers: Modified independence    Expected level of Improvement in Locomotion:  Modified independence    Expected level of Improvement in Communication and Social Cognition: Complete independence    Expected length of time to achieve that level of improvement: 2 weeks    Current rehab issues: ADL dysfunction,bladder management, bowel management,carry over of therapy techniques, discharge planning, disease and co-morbidity management, gait/mobility dysfunction, medication management, nutrition and hydration management, Ongoing assessment of safety, Pain management, Patient and family education, Prevention of secondary complications, Skin Integrity. Required therapy: Physical Therapy and Occupational Therapy 3 hours per day, 5-6 days per week. Recreational Therapy 1 hour per week.     Expected Discharge Destination: Home    Expected Post Discharge Treatments: Home Care    Other information relevant to the care needs:   Lives With: Son  Type of Home: House  Home Layout: Two level, Able to Live on Main level with bedroom/bathroom, Performs ADL's on one level, Laundry in basement  Home Access: Stairs to enter with rails, Stairs to enter without rails  Entrance Stairs - Number of Steps: 2 ELOY  Bathroom Shower/Tub: Tub/Shower unit  Bathroom Toilet: Standard  Home Equipment: Cane  Has the patient had two or more falls in the past year or any fall with injury in the past year?: Yes  ADL Assistance: Independent  Homemaking Assistance: Independent  Ambulation Assistance: Independent  Transfer Assistance: Independent  Active : Yes  Mode of Transportation: Car  Additional Comments: son recently (retired) moved in with pt. Pt amb with SC intermittently in the home, and uses SC for longer distances. Acute Inpatient Rehabilitation Disclosure Statement provided to patient. Patient verbalized understanding. I have reviewed and concur with the findings and results of the pre-admission screening assessment completed by the Inpatient Rehabilitation Admissions Coordinator.           Kiersten Hale M.D.

## 2022-08-17 NOTE — CARE COORDINATION
8/17/22, 11:44 AM EDT    Patient goals/plan/ treatment preferences discussed by  and . Patient goals/plan/ treatment preferences reviewed with patient/ family. Patient/ family verbalize understanding of discharge plan and are in agreement with goal/plan/treatment preferences. Understanding was demonstrated using the teach back method. AVS provided by RN at time of discharge, which includes all necessary medical information pertaining to the patients current course of illness, treatment, post-discharge goals of care, and treatment preferences. Services At/After Discharge: Inpatient rehab       IMM Letter  IMM Letter given to Patient/Family/Significant other/Guardian/POA/by[de-identified] Yari Landon RN CM  IMM Letter date given[de-identified] 08/17/22  IMM Letter time given[de-identified] 8203     Planning IPR today. Pt verbalized understanding and gave permission for possible discharge within 4 hours of receiving IMM.

## 2022-08-17 NOTE — PROGRESS NOTES
[facility]    Date of Admission: 2022 12:11 AM    Patient Name: Ronna Desai      MRN: [id number]    : 1940  (78 y.o.)  Gender: female     Payor Source: Payor: MEDICARE / Plan: MEDICARE PART A AND B / Product Type: *No Product type* /   Secondary Payor Source:  [employer]    Isolation Status: No active isolations    Inpatient Rehabilitation Admission Status: Planning admission to acute inpatient rehabilitation. Patient is a DISCHARGE/READMIT to 6k34. Please call report to [phone]. Nu Herrera CM updated.

## 2022-08-17 NOTE — H&P
Physical Medicine & Rehabilitation   History and Physical    Chief Complaint and Reason for Rehabilitation Admission:   Left hip fracture. S/p left hemiarthroplasty. Rehab needs       History of Present Illness:  Shelby Haley  is a 78 y.o. female admitted to the inpatient rehabilitation unit on 8/17/2022. She was originally admitted to [address] [place] [address] on 8/13/2022. Patient  has a past medical history of Osteoporosis of multiple sites without pathological fracture, Squamous cell carcinoma of skin, Vascular headache, and Vitamin D deficiency. Patient presented to [facility] after suffering a fall at home, landing on her left side. Patient was unable to ambulate and was found to have left femoral neck fracture. Patient was admitted and taken for left hemiarthroplasty with Dr Nichole Khan on 8/14/22. Postoperatively, patient developed narrow complex tachyarrhythmia concerning for SVT versus A. fib with RVR status post adenosine x2 and DCCV x3. Patient currently off amiodarone infusion and is in normal sinus rhythm. Patient is now on Lopressor 12.5 mg twice daily. Patient's son lives with her, helps her around the house. However she would still like to work to improve her own mobility, ADLs, endurance, pain management skills. Her bowels have been moving. She has been sleeping okay at night. She has no new concerns or questions this afternoon.       Current Rehabilitation Assessments:  PT:         OBJECTIVE:  Bed Mobility:  Supine to Sit: Minimal Assistance, X 1, with head of bed raised, with rail, with verbal cues , with increased time for completion  Assist for L LE and trunk support with increased time for completion due to weakness, pain and anxiety  Transfers:  Sit to Stand: Contact Guard Assistance, Minimal Assistance, X 1, with increased time for completion, cues for hand placement, with verbal cues  Stand to Sit:Minimal Assistance, X 1, cues for hand placement, with verbal cues  X2 transfers from bed to complete mobility  Ambulation:  Minimal Assistance, X 1, with cues for safety, with verbal cues , with increased time for completion  Distance: 3 feet to chair  Surface: Level Tile  Device:Rolling Walker  Gait Deviations: Forward Flexed Posture, Slow Mague, Decreased Step Length Bilaterally, Decreased Weight Shift Left, Lean to Right, Decreased Gait Speed, Decreased Heel Strike Bilaterally, Unsteady Gait, and Decreased Terminal Knee Extension  Cues required for safety with completion with step by step commands for tasks and weight shifting  Balance:  Static Sitting Balance:  Supervision, Stand By Assistance  Dynamic Sitting Balance: Stand By Assistance  Static Standing Balance: Contact Guard Assistance, Minimal Assistance, X 1  Sitting on EOB to complete bathing tasks, change gown, complete grooming tasks and brushing teeth, no LOB, completed within hip precautions. Pt with increased time sitting on EOB to complete tasks. Pt assisted with nikos care in standing. Pt required assist for donning shoes and socks sitting on EOB to prepare for transfers and mobility. Exercise:  Patient was guided in 1 set(s) 10 reps of exercise to both lower extremities. Ankle pumps, Glut sets, Quad sets, Heelslides, Hip abduction/adduction, and Straight leg raises. Exercises were completed for increased independence with functional mobility. VC for proper technique of exercises for completion with good demo. Functional Outcome Measures: Completed  AM-PAC Inpatient Mobility Raw Score : 16  AM-PAC Inpatient T-Scale Score : 40.78     ASSESSMENT:  Assessment: Patient progressing toward established goals. Activity Tolerance:  Patient tolerance of  treatment: fair.  Increased weakness and pain noted     Equipment Recommendations:Equipment Needed:  (depending on d/c plans)  Mobility Devices: Korin Salines: Rolling  Discharge Recommendations: Continue to assess pending progress and Inpatient Therapy Stay        OT: participating in ADLs to progress toward PLOF; patient demo understanding. Informed nursing that patient has diarrhea and requesting meds. RN reports patient is not permitted to have anti-diarrhea meds at this time. ASSESSMENT:  Activity Tolerance:  Patient tolerance of  treatment: fair.        Discharge Recommendations: Continue to assess pending progress, Inpatient Rehabilitation, 24 hour assistance or supervision, and Patient would benefit from continued OT at discharge  Equipment Recommendations: Equipment Needed: Yes  Other: Pt may benefit from [address] AE kit, RW, shower chair [place] t[place]f bench[place] [facility] [place]            Past Medical History:      Diagnosis Date    Osteoporosis of multiple sites without pathological fracture 10/2017    Squamous cell carcinoma of skin 2016    Vascular headache 1980's    Vitamin D deficiency 10/2017       Primary care provider: Angy Doty MD     Past Surgical History:      Procedure Laterality Date    BLADDER REPAIR  9/2014    HIP SURGERY Left 8/14/2022    LEFT HIP BEE  ARTHROPLASTY performed by Clarence Killian MD at [facility] Left 5/2013    lateral    SKIN CANCER EXCISION  09/2016    Dr Jordan Valente Left 4/2015       Allergies:    Ciprofloxacin, Clindamycin/lincomycin, Hydrocod polst-cpm polst er, Macrobid [nitrofurantoin macrocrystal], Paxil [paroxetine hcl], and Morphine    Current Medications:    Current Facility-Administered Medications: acetaminophen (TYLENOL) tablet 650 mg, 650 mg, Oral, Q6H  apixaban (ELIQUIS) tablet 2.5 mg, 2.5 mg, Oral, BID  bisacodyl (DULCOLAX) suppository 10 mg, 10 mg, Rectal, Daily PRN  [START ON 8/18/2022] famotidine (PEPCID) tablet 20 mg, 20 mg, Oral, Daily  LORazepam (ATIVAN) tablet 0.5 mg, 0.5 mg, Oral, Q4H PRN  metoprolol tartrate (LOPRESSOR) tablet 12.5 mg, 12.5 mg, Oral, BID  ondansetron (ZOFRAN-ODT) disintegrating tablet 4 mg, 4 mg, Oral, Q8H PRN  polyethylene glycol (GLYCOLAX) packet 17 g, 17 g, Oral, Daily file.    Review of Systems:  CONSTITUTIONAL:  positive for  fatigue  EYES:  positive for  glasses & bilateral cataracts. HEENT:  negative  RESPIRATORY:  negative  CARDIOVASCULAR:  positive for  palpitations, fatigue, heart murmur  GASTROINTESTINAL: Bowels have been moving postoperatively  GENITOURINARY:  negative  SKIN:  left hip incision  HEMATOLOGIC/LYMPHATIC:  positive for swelling/edema  MUSCULOSKELETAL:  positive for  pain  NEUROLOGICAL:  positive for gait problems and pain  BEHAVIOR/PSYCH:  negative  System review otherwise negative    Physical Exam:  /73   Pulse 78   Temp 97.6 °F (36.4 °C) (Oral)   Resp 18   Ht 5' 6\" (1.676 m)   Wt 125 lb (56.7 kg)   SpO2 97%   BMI 20.18 kg/m²      awake  Orientation:   person, place, time  Mood: within normal limits  Affect: calm  General appearance: Patient is well nourished, well developed, well groomed and in no acute distress     Memory:   normal,  Attention/Concentration: normal  Language:  normal     Cranial Nerves:  cranial nerves II-XII are grossly intact  ROM:  abnormal - left hip  Motor Exam:  Motor exam is 4 out of 5 all extremities with the exception of left leg not tested  Tone:  normal  Muscle bulk: within normal limits  Sensory:  Sensory intact     Heart: normal rate and regular rhythm, + murmur noted  Lungs: clear to auscultation without wheezes or rales  Abdomen: soft, non-tender, non-distended, normal bowel sounds, no masses or organomegaly     Skin: warm and dry, no rash or erythema. Left hip incision with dry dressing in place  Peripheral vascular: Pulses: Normal upper and lower extremity pulses; Edema: 1+    Diagnostics:  No results found for this or any previous visit (from the past 24 hour(s)). Impression:  Acute left displaced fmoral neck fracture  S/p left hip hemiarthroplasty with Dr. Argelia Delong on 8/14/22.    Mechanical ground level fall  SVT vs. AF with RVR; on Lopressor 12.5 mg p.o. twice daily  Asymptomatic bacteriuria; UA with gram-negative bacilli greater than 1000K CFU without symptoms  Moderate mitral valve regurgitation patient has had discussions regarding possible mitral clip; follow-up with Dr. Jas Sauceda as an outpatient  Chronic hyponatremia; monitor  HTN  Insomnia  Anxiety  Vitamin D deficiency     Plan:   Admit to the inpatient rehabilitation unit. The patient demonstrates good potential to participate in an inpatient rehabilitation program involving at least 3 hours per day, 5 days per week of intensive rehabilitation. Rehabilitation services will include PT and OT/RT in order to improve functional status prior to discharge. Family education and training will be completed. Equipment evaluations and recommendations will be completed as appropriate. Rehabilitation nursing will be involved for bowel, bladder, skin, and pain management. Nursing will also provide education and training to patient and family. Prophylaxis:  DVT: Eliquis 2.5 mg p.o. twice daily. GI: Pepcid 20 mg p.o. daily. Pain: Ultram 25 to 50 mg p.o. every 6 hours as needed, Tylenol 650 mg p.o. every 6 hours scheduled, Tylenol 650 mg p.o. every 4 hours as needed  Nutrition:  Consultation to dietician for nutritional counseling and recommendations. Prealbumin will be checked on admission. Bladder: Per rehab nursing  Bowel: Per rehab nursing; continue Dulcolax suppository 10 mg rectal daily as needed; GlycoLax 17 g p.o. daily as needed   and case management consultations for coordination of care and discharge planning    The main medical problem(s) and comorbidities being actively managed by the physicians and requiring 24 hour rehabilitation nursing care during this stay include:  Acute left displaced fmoral neck fracture  S/p left hip hemiarthroplasty with Dr. Tinajero Reason on 8/14/22.    Mechanical ground level fall  SVT vs. AF with RVR; on Lopressor 12.5 mg p.o. twice daily  Asymptomatic bacteriuria; UA with gram-negative bacilli greater than 1000K CFU without symptoms  Moderate mitral valve regurgitation patient has had discussions regarding possible mitral clip; follow-up with Dr. Hector Farfan as an outpatient  Chronic hyponatremia; monitor  HTN  Insomnia  Anxiety  Vitamin D deficiency      The domains of functional impairment present in this patient which will require an intensive and interdisciplinary rehabilitation environment include self care, mobility, motor dysfunction, bowel/bladder management, pain management, and safety. Estimated length of stay for this admission 14 days    Anticipated disposition: Home. The potential to achieve that is very good. The post admission physician evaluation (ARON) is consistent with the pre-admission assessment. See above findings to reflect the elements required in the ARON. Patient's admitting condition is consistent with the findings of the preadmission assessment by the rehabilitation admissions coordinator.     Diana Pinto MD

## 2022-08-17 NOTE — PROCEDURES
14 Day Cardiac Event Monitor was applied to patient. Instructions were given and skin/monitor prep and application was demonstrated. Patient was instructed to remove monitor on 08/31/2022 and mail back to [employer].

## 2022-08-17 NOTE — PLAN OF CARE
Individualized Plan of [facility] Rehabilitation Unit    Rehabilitation physician: Dr. Kreg Krabbe Date: 8/17/2022     Rehabilitation Diagnosis: Hip fracture requiring operative repair, left, closed, initial encounter ([facility] [place] [facility] [S72.002A]      Rehabilitation impairments: self care, mobility, motor dysfunction, bowel/bladder management, pain management, and safety    Factors facilitating achievement of predicted outcomes: Family support, Motivated, Cooperative, and Pleasant  Barriers to the achievement of predicted outcomes: Pain, Limited safety awareness, Limited insight into deficits, and Decreased endurance    Patient Goals: Improve independence with mobility, Improvement of mobility at a wheelchair level, Increase overall strength and endurance, Increase balance, Increase endurance, Increase independence with activities of daily living, Improve cognition, Increase self-awareness, Increase safety awareness, Increase community integration, Increase socialization, Functional communication with caregivers, Integrate appropriate pain management plan, Assure adequate nutritional option for discharge, Continence of bowel and bladder, and Provide appropriate patient and family education      NURSING:  Nursing goals for Candis Starr while on the rehabilitation unit will include:  Continence of bowel and bladder, Adequate number of bowel movements, Urinate with no urinary retention >300ml in bladder, Complete bladder protocol with marie removal, Maintain O2 SATs at an acceptable level during stay, Effective pain management while on the rehabilitation unit, Establish adequate pain control plan for discharge, Absence of skin breakdown while on the rehabilitation unit, Improved skin integrity via assessments including wound measurements, Avoidance of any hospital acquired infections, No signs/symptoms of infection at the wound site, Freedom from injury during hospitalization, and Complete education with patient/family with understanding demonstrated regarding disease process and resultant impairment     In order to achieve these goals, nursing interventions may include bowel/bladder training, education for medical assistive devices, medication education, O2 saturation management, energy conservation, stress management techniques, fall prevention, alarms protocol, seating and positioning, skin/wound care, pressure relief instruction, dressing changes, infection protection, DVT prophylaxis, assistance with safe transfers , and/or assistance with bathroom activities and hygiene. PHYSICAL THERAPY:  Goals:        Short Term Goals  Time Frame for Short term goals: 1 week  Short term goal 1: Patient will complete rolling and supine < > sit with min assist with no bed rail and head of bed flat and maintain hip precautions to transfer in and out of bed with decreased difficulty. Short term goal 2: Patient will complete sit < > stand with CGA from various surfaces to stand to ambulate with decreased difficulty. Short term goal 3: Patient will ambulate 36' with a RW and CGA to progress towards ambulating household distances  Short term goal 4: Patient will ascend/descend, 1, 4\" step in parallel bars with min assist to progress towards safe home entry. Short term goal 5: Patient will complete car transfer with min assist to transfer in and out of vehicle for home  Long Term Goals  Time Frame for Long term goals : 3 weeks from initial evaluation  Long term goal 1: Patient will complete supine < > sit with modified independence to transfer in and out of bed safely. Long term goal 2: Patient will complete sit < > stand with modified independence to stand to ambulate safely. Long term goal 3: Patient will bed < > chair transfer with a RW and modified independence to transfer surface to surface safely.   Long term goal 4: Patient will ambulate 80' with a RW and modified independence to navigate home safely. Long term goal 5: Patient will ascend/descend 2 steps with hand held assist and cane with min assist for home entry. Additional Goals?: Yes  Long term goal 6: Patient will complete car transfer with SBA to transfer in and out of vehicle for transport to home and appointments    Plan of Care: Patient to be seen by physical therapy services 90 minutes per day Monday through Friday and 30 minutes on Saturday or Sunday    Anticipated interventions may include therapeutic exercises, gait training, neuromuscular re-ed, transfer training, community reintegration, bed mobility, w/c mobility and training. OCCUPATIONAL THERAPY:  Goals:             Short Term Goals  Time Frame for Short term goals: 1 wk  Short Term Goal 1: Pt will tolerate 3 min of standing with 1-2 hand release and SBA for improved indep in sinkside grooming tasks  Short Term Goal 2: Pt will navigate room to gather needed supplies for ADL task with SBA and 0 vc for safety for PLOF in ADL routines.   Short Term Goal 3: Pt will complete mobility to/from the bathroom with no more than SBA and min A for safety to progress to PLOF in ADL routines  Short Term Goal 4: Pt will complete simple snack/meal prep task with SBA and min VC for safety/problem solving for safe indep in IADL routines  Short Term Goal 5: Pt will complete community reintegration activity with SBA and min A for problem solving/KATHY precautions to progress to PLOF  Long Term Goals  Time Frame for Long term goals : 2 wks from IPR eval  Long Term Goal 1: Pt will complete UB/LB dressing with LHAE prn and Sup with 0 vc for safety or KATHY precautions to progress ADL performance to PLOF  Long Term Goal 2: Pt will complete UB/LB bathing with LHAE prn and Sup with 0vc for safety or KATHY precautions for improved ADL indep  Long Term Goal 3: Pt will complete oral care mod I with 0 vc for safety or precautions to progress ADL performance to PLOF  Long Term Goal 4: Pt will complete toileting/transfer with Sup and 0vc for safety or to maintain precautions for improved indep in toileting routine  Long Term Goal 5: Pt will demo competence with Kikakeri Shannon for 100% of time during ADL tasks to maintain KATHY precautions and increase indep in daily occupations    Plan of Care: Patient to be seen by occupational therapy services 90 minutes per day Monday through Friday and 30 minutes on Saturday or Sunday    Anticipated interventions may include ADL and IADL retraining, strengthening, safety education and training, patient/caregiver education and training, equipment evaluation/ training/procurement, neuromuscular reeducation, wheelchair mobility training. CASE MANAGEMENT:  Goals:   Assist patient/family with discharge planning, patient/family counseling,  and coordination with insurance during the inpatient rehabilitation stay. Other members of the multidisciplinary rehabilitation team that will be involved in the patient's plan of care include recreational therapy, dietary, respiratory therapy, and neuropsychology. Medical issues being managed closely and that require 24 hour availability of a physician:  Swallowing precautions, Bowel/Bladder function, Weight bearing precautions, Wound care, Pain management, Infection protection, DVT prophylaxis, Fall precautions, Fluid/Electrolyte balance, Nutritional status, Respiratory needs, Anemia, and History of heart disease                                           Physician anticipated functional outcomes: Improved independence with functional measures   Estimated length of stay for this admission: 14 days  Medical Prognosis: Good  Anticipated disposition: Home. The potential to achieve the above medical and rehabilitative goals is very good. This plan of care has been developed with the assistance and input of the multidisciplinary rehabilitation team.  The plan was reviewed with the patient.   The patient has had the opportunity to provide input to the therapy team.    I have reviewed this Individualized Plan of Care and agree with its contents. Above documentation has been expanded, modified, adjusted to reflect the findings of my evaluations and goals for the patient. Physician:   Jennifer Herndon M.D.

## 2022-08-18 LAB
ANION GAP SERPL CALCULATED.3IONS-SCNC: 9 MEQ/L (ref 8–16)
BASOPHILS # BLD: 0.4 %
BASOPHILS ABSOLUTE: 0 THOU/MM3 (ref 0–0.1)
BUN BLDV-MCNC: 8 MG/DL (ref 7–22)
CALCIUM SERPL-MCNC: 9.1 MG/DL (ref 8.5–10.5)
CHLORIDE BLD-SCNC: 97 MEQ/L (ref 98–111)
CO2: 25 MEQ/L (ref 23–33)
CREAT SERPL-MCNC: 0.4 MG/DL (ref 0.4–1.2)
EOSINOPHIL # BLD: 0 %
EOSINOPHILS ABSOLUTE: 0 THOU/MM3 (ref 0–0.4)
ERYTHROCYTE [DISTWIDTH] IN BLOOD BY AUTOMATED COUNT: 13.5 % (ref 11.5–14.5)
ERYTHROCYTE [DISTWIDTH] IN BLOOD BY AUTOMATED COUNT: 46.4 FL (ref 35–45)
GFR SERPL CREATININE-BSD FRML MDRD: > 90 ML/MIN/1.73M2
GLUCOSE BLD-MCNC: 108 MG/DL (ref 70–108)
HCT VFR BLD CALC: 37.6 % (ref 37–47)
HEMOGLOBIN: 12.3 GM/DL (ref 12–16)
IMMATURE GRANS (ABS): 0.02 THOU/MM3 (ref 0–0.07)
IMMATURE GRANULOCYTES: 0.3 %
LYMPHOCYTES # BLD: 15.9 %
LYMPHOCYTES ABSOLUTE: 1.2 THOU/MM3 (ref 1–4.8)
MCH RBC QN AUTO: 30.4 PG (ref 26–33)
MCHC RBC AUTO-ENTMCNC: 32.7 GM/DL (ref 32.2–35.5)
MCV RBC AUTO: 92.8 FL (ref 81–99)
MONOCYTES # BLD: 9.4 %
MONOCYTES ABSOLUTE: 0.7 THOU/MM3 (ref 0.4–1.3)
NUCLEATED RED BLOOD CELLS: 0 /100 WBC
PLATELET # BLD: 213 THOU/MM3 (ref 130–400)
PMV BLD AUTO: 9.6 FL (ref 9.4–12.4)
POTASSIUM REFLEX MAGNESIUM: 4.1 MEQ/L (ref 3.5–5.2)
PREALBUMIN: 9.9 MG/DL (ref 20–40)
RBC # BLD: 4.05 MILL/MM3 (ref 4.2–5.4)
SEG NEUTROPHILS: 74 %
SEGMENTED NEUTROPHILS ABSOLUTE COUNT: 5.6 THOU/MM3 (ref 1.8–7.7)
SODIUM BLD-SCNC: 131 MEQ/L (ref 135–145)
VITAMIN D 25-HYDROXY: 29 NG/ML (ref 30–100)
WBC # BLD: 7.5 THOU/MM3 (ref 4.8–10.8)

## 2022-08-18 PROCEDURE — 84134 ASSAY OF PREALBUMIN: CPT

## 2022-08-18 PROCEDURE — 36415 COLL VENOUS BLD VENIPUNCTURE: CPT

## 2022-08-18 PROCEDURE — 97110 THERAPEUTIC EXERCISES: CPT

## 2022-08-18 PROCEDURE — 6370000000 HC RX 637 (ALT 250 FOR IP): Performed by: PHYSICAL MEDICINE & REHABILITATION

## 2022-08-18 PROCEDURE — 97535 SELF CARE MNGMENT TRAINING: CPT

## 2022-08-18 PROCEDURE — 6370000000 HC RX 637 (ALT 250 FOR IP): Performed by: NURSE PRACTITIONER

## 2022-08-18 PROCEDURE — 97530 THERAPEUTIC ACTIVITIES: CPT

## 2022-08-18 PROCEDURE — 80048 BASIC METABOLIC PNL TOTAL CA: CPT

## 2022-08-18 PROCEDURE — 82306 VITAMIN D 25 HYDROXY: CPT

## 2022-08-18 PROCEDURE — 99232 SBSQ HOSP IP/OBS MODERATE 35: CPT | Performed by: NURSE PRACTITIONER

## 2022-08-18 PROCEDURE — 97166 OT EVAL MOD COMPLEX 45 MIN: CPT

## 2022-08-18 PROCEDURE — 97162 PT EVAL MOD COMPLEX 30 MIN: CPT

## 2022-08-18 PROCEDURE — 6370000000 HC RX 637 (ALT 250 FOR IP): Performed by: FAMILY MEDICINE

## 2022-08-18 PROCEDURE — 85025 COMPLETE CBC W/AUTO DIFF WBC: CPT

## 2022-08-18 PROCEDURE — 1180000000 HC REHAB R&B

## 2022-08-18 RX ORDER — DIPHENHYDRAMINE HCL 25 MG
25 TABLET ORAL EVERY 6 HOURS PRN
Status: DISCONTINUED | OUTPATIENT
Start: 2022-08-18 | End: 2022-08-30 | Stop reason: HOSPADM

## 2022-08-18 RX ORDER — CETIRIZINE HYDROCHLORIDE 10 MG/1
10 TABLET ORAL DAILY
Status: DISCONTINUED | OUTPATIENT
Start: 2022-08-18 | End: 2022-08-30 | Stop reason: HOSPADM

## 2022-08-18 RX ORDER — DIAPER,BRIEF,INFANT-TODD,DISP
EACH MISCELLANEOUS 4 TIMES DAILY
Status: DISCONTINUED | OUTPATIENT
Start: 2022-08-18 | End: 2022-08-30 | Stop reason: HOSPADM

## 2022-08-18 RX ORDER — VITAMIN B COMPLEX
5000 TABLET ORAL
Status: DISCONTINUED | OUTPATIENT
Start: 2022-08-18 | End: 2022-08-30 | Stop reason: HOSPADM

## 2022-08-18 RX ORDER — LORAZEPAM 0.5 MG/1
0.5 TABLET ORAL 3 TIMES DAILY PRN
Status: DISCONTINUED | OUTPATIENT
Start: 2022-08-18 | End: 2022-08-30 | Stop reason: HOSPADM

## 2022-08-18 RX ORDER — DIAPER,BRIEF,INFANT-TODD,DISP
EACH MISCELLANEOUS 2 TIMES DAILY PRN
Status: DISCONTINUED | OUTPATIENT
Start: 2022-08-18 | End: 2022-08-18

## 2022-08-18 RX ADMIN — METOPROLOL TARTRATE 12.5 MG: 25 TABLET, FILM COATED ORAL at 08:41

## 2022-08-18 RX ADMIN — CETIRIZINE HYDROCHLORIDE 10 MG: 10 TABLET, FILM COATED ORAL at 11:47

## 2022-08-18 RX ADMIN — LORAZEPAM 0.5 MG: 0.5 TABLET ORAL at 01:53

## 2022-08-18 RX ADMIN — HYDROCORTISONE ACETATE: 1 CREAM TOPICAL at 11:52

## 2022-08-18 RX ADMIN — METOPROLOL TARTRATE 25 MG: 25 TABLET, FILM COATED ORAL at 21:28

## 2022-08-18 RX ADMIN — LORAZEPAM 0.5 MG: 0.5 TABLET ORAL at 05:47

## 2022-08-18 RX ADMIN — HYDROCORTISONE ACETATE: 1 CREAM TOPICAL at 17:27

## 2022-08-18 RX ADMIN — ACETAMINOPHEN 650 MG: 325 TABLET ORAL at 17:30

## 2022-08-18 RX ADMIN — APIXABAN 2.5 MG: 2.5 TABLET, FILM COATED ORAL at 08:40

## 2022-08-18 RX ADMIN — Medication 5000 UNITS: at 11:47

## 2022-08-18 RX ADMIN — ACETAMINOPHEN 650 MG: 325 TABLET ORAL at 01:48

## 2022-08-18 RX ADMIN — Medication 0.5 TABLET: at 08:41

## 2022-08-18 RX ADMIN — FAMOTIDINE 20 MG: 20 TABLET ORAL at 08:40

## 2022-08-18 RX ADMIN — LORAZEPAM 0.5 MG: 0.5 TABLET ORAL at 17:28

## 2022-08-18 RX ADMIN — HYDROCORTISONE ACETATE: 1 CREAM TOPICAL at 20:48

## 2022-08-18 RX ADMIN — ACETAMINOPHEN 650 MG: 325 TABLET ORAL at 21:27

## 2022-08-18 RX ADMIN — ACETAMINOPHEN 650 MG: 325 TABLET ORAL at 08:41

## 2022-08-18 RX ADMIN — APIXABAN 2.5 MG: 2.5 TABLET, FILM COATED ORAL at 21:28

## 2022-08-18 ASSESSMENT — PAIN DESCRIPTION - ORIENTATION
ORIENTATION: LOWER;RIGHT;LEFT
ORIENTATION: LEFT;UPPER;MID
ORIENTATION: LEFT;UPPER;MID
ORIENTATION: RIGHT;LEFT;LOWER
ORIENTATION: LEFT
ORIENTATION: LOWER;RIGHT;LEFT
ORIENTATION: LEFT
ORIENTATION: LEFT

## 2022-08-18 ASSESSMENT — PAIN DESCRIPTION - DESCRIPTORS
DESCRIPTORS: ACHING;DISCOMFORT
DESCRIPTORS: ACHING;DULL
DESCRIPTORS: ACHING;DULL
DESCRIPTORS: SORE
DESCRIPTORS: DISCOMFORT
DESCRIPTORS: DISCOMFORT;SORE
DESCRIPTORS: ACHING

## 2022-08-18 ASSESSMENT — PAIN DESCRIPTION - FREQUENCY
FREQUENCY: INTERMITTENT

## 2022-08-18 ASSESSMENT — PAIN SCALES - GENERAL
PAINLEVEL_OUTOF10: 2
PAINLEVEL_OUTOF10: 5
PAINLEVEL_OUTOF10: 6
PAINLEVEL_OUTOF10: 6
PAINLEVEL_OUTOF10: 4
PAINLEVEL_OUTOF10: 0
PAINLEVEL_OUTOF10: 0
PAINLEVEL_OUTOF10: 2
PAINLEVEL_OUTOF10: 3
PAINLEVEL_OUTOF10: 5

## 2022-08-18 ASSESSMENT — PAIN DESCRIPTION - PAIN TYPE
TYPE: ACUTE PAIN;SURGICAL PAIN
TYPE: ACUTE PAIN;SURGICAL PAIN
TYPE: CHRONIC PAIN

## 2022-08-18 ASSESSMENT — PAIN - FUNCTIONAL ASSESSMENT
PAIN_FUNCTIONAL_ASSESSMENT: ACTIVITIES ARE NOT PREVENTED
PAIN_FUNCTIONAL_ASSESSMENT: PREVENTS OR INTERFERES SOME ACTIVE ACTIVITIES AND ADLS

## 2022-08-18 ASSESSMENT — PAIN DESCRIPTION - ONSET
ONSET: ON-GOING

## 2022-08-18 ASSESSMENT — PAIN DESCRIPTION - LOCATION
LOCATION: LEG;HIP
LOCATION: HIP
LOCATION: HIP
LOCATION: BACK
LOCATION: BACK
LOCATION: KNEE;LEG
LOCATION: BACK
LOCATION: HIP

## 2022-08-18 NOTE — PROGRESS NOTES
[address]  EVALUATION    Time:   Time In: 0700  Time Out: 0830  Timed Code Treatment Minutes: 75 Minutes  Minutes: 90          Date: 2022  Patient Name: Urmila Pollard,   Gender: female      MRN: [id number]  : 1940  (78 y.o.)  Referring Practitioner: Pamela Rivera MD  Diagnosis: Hip fracture requiring operative repair, left, closed, initial encounter. Additional Pertinent Hx: Per chart review, pt is an 78 y.o. female with a PMH of \"Osteoporosis of multiple sites without pathological fracture, Squamous cell carcinoma of skin, Vascular headache, and Vitamin D deficiency\". Pt originally admitted to [facility] on 22 post fall at home, landing on left side. Pt reports she was unable to ambulate and fall resulted in femoral neck fracture, undergoing a left hemiarthoplasty on 22. \"Postoperatively, patient developed narrow complex tachyarrhythmia concerning for SVT versus A. fib with RVR status post adenosine x2 and DCCV x3.\" Pt presents to [facility] IPR unit on 22 s/p left hemiarthoplasty. Restrictions/Precautions:  Restrictions/Precautions: Surgical Protocols, Weight Bearing, General Precautions, Fall Risk  Left Lower Extremity Weight Bearing: Weight Bearing As Tolerated  Position Activity Restriction  Hip Precautions: No hip flexion > 90 degrees, No hip internal rotation, Posterior hip precautions, No ADduction  Other position/activity restrictions: KATHY precautions    Subjective: Pt seated in recliner on OT arrival, pleasant and agreeable to OT session.    Chart Reviewed: Yes, Orders, Progress Notes, History and Physical, Operative Notes      Pain: 0/10: pt denies     Vitals: Vitals not assessed per clinical judgement, see nursing flowsheet    Social/Functional History:  Lives With: Son  Type of Home: House  Home Layout: Two level, Able to Live on Main level with bedroom/bathroom, Performs ADL's on one level, Laundry in basement  Home Access: Stairs to enter without rails (grab bar at top of stairs near door)  Entrance Stairs - Number of Steps: 2 ELOY  Home Equipment: Cane   Bathroom Shower/Tub: Tub/Shower unit, Curtain  Bathroom Toilet: Standard    Receives Help From: Family, Friend(s) (Son, friends that can help with yard work)  ADL Assistance: Independent  Homemaking Assistance: Independent  Homemaking Responsibilities: Yes (Pt reports she took care of all her house keeping tasks, banking, grocery shopping)  Ambulation Assistance: Independent  Transfer Assistance: Independent    Active : Yes  Mode of Transportation: Car  Occupation: Retired  Type of Occupation:   Leisure & Hobbies: Spends time with neices and nephews  Additional Comments: son recently (retired) moved in with pt. Pt amb with SC intermittently in the home, and uses SC for longer distances. VISION:Corrected    HEARING:  WFL    COGNITION: Decreased Insight and Decreased Safety Awareness    RANGE OF MOTION:  Right Upper Extremity: WFL  Left Upper Extremity:  WFL    STRENGTH:  Right Upper Extremity: WFL  Left Upper Extremity:  WFL    SENSATION:   WFL    ADL:     EATING:Independent. for beverage management. CARE Score: 6. ORAL HYGIENE:Supervision or touching assistance. Supervision while seated. CARE Score: 4. TOILETING HYGIENE:Partial/moderate assistance. Min. A to stand and complete nikos care. CARE Score: 3. SHOWERING/BATHING:Partial/moderate assistance. Mod. A for LEs and dependent for distal/feet. CARE Score: 3.     UPPER BODY DRESSING:Supervision or touching assistance. SBA. CARE Score: 4. LOWER BODY DRESSING:Substantial/maximal assistance. Max A to thread BLE, Min-Mod A while standing to don at hips. CARE Score: 2. FOOTWEAR:Dependent   . CARE Score: 1.     TOILET TRANSFER: Partial/moderate assistance. Min. A and slow pace. CARE Score: 3.      . BALANCE:  Sitting Balance:  Supervision.  With back support  Standing Balance: Minimal Assistance. With 1-2 hand release    BED MOBILITY:  Not Tested    TRANSFERS:  Sit <> Stand: min A from various surfaces, VC for upright posture    FUNCTIONAL MOBILITY:  Assistive Device: Rolling Walker  Assist Level:  Minimal Assistance. Distance: from recliner to [facility], ~5 feet, very slow pace     Additional Activities:   Pt completed UB strengthening exercises with min resistance band x15 reps x1 set in all joints/planes while seated. Demoed min fatigue and good activity tolerance and technique. Completed to improve and maintain pt UE strength for ease and indep in UB dressing and bathing tasks. Activity Tolerance:  Patient tolerance of  treatment: fair. Assessment:  Assessment: Pt evaled on IPR on 8/18/22 and presents with below deficits, including decreased indep and functional performance in all ADLs/IADLS and functional mobility over PLOF. Pt currently requires min A for functional mobility and Max A for LB ADLs. Pt also requires consistent VCs to maintain KATHY precautions. Pt requires skilled OT interventions for skilled education and training on adaptive strategies and modifications for all ADLs and safety/fall prevention for returning to home environment and independent PLOF with KATHY precautions. Without skilled OT, pt is at risk for future falls and dependency on caregiver for all ADLs/IADLs, in which can greatly impact her QOL. Performance deficits / Impairments: Decreased functional mobility , Decreased endurance, Decreased ADL status, Decreased balance, Decreased strength, Decreased safe awareness, Decreased cognition, Decreased high-level IADLs  Prognosis: Fair  Decision Making: Medium Complexity    Treatment Initiated: Treatment and education initiated within context of evaluation.   Evaluation time included review of current medical information, gathering information related to past medical, social and functional history, completion of standardized testing, formal and informal observation of tasks, assessment of data and development of plan of care and goals. Treatment time included skilled education and facilitation of tasks to increase safety and independence with ADL's for improved functional independence and quality of life. Discharge Recommendations:  Continue to assess pending progress, Patient would benefit from continued therapy after discharge    Patient Education:     Patient Education  Education Given To: Patient  Education Provided: Role of Therapy, Plan of Care, Transfer Training, ADL Adaptive Strategies, Precautions, IADL Safety, Equipment, Fall Prevention Strategies  Education Method: Verbal, Demonstration  Barriers to Learning: None  Education Outcome: Continued education needed, Demonstrated understanding, Verbalized understanding    Equipment Recommendations: Other: Pt may benefit from [facility] kit, RW, shower chair vs tub t/f bench, BSC. Continue to Monitor    Plan:  Times per Week: 5x wk/90 min 1x wk/30min  Times per Day: Daily  Current Treatment Recommendations: Functional mobility training, Balance training, Self-Care / ADL, Patient/Caregiver education & training, Safety education & training, Strengthening, Endurance training, Equipment evaluation, education, & procurement. See long-term goal time frame for expected duration of plan of care. If no long-term goals established, a short length of stay is anticipated. Goals:  Patient goals : To go home and walk again  Short Term Goals  Time Frame for Short term goals: 1 wk  Short Term Goal 1: Pt will tolerate 3 min of standing with 1-2 hand release and SBA for improved indep in sinkside grooming tasks  Short Term Goal 2: Pt will navigate room to gather needed supplies for ADL task with SBA and 0 vc for safety for PLOF in ADL routines.   Short Term Goal 3: Pt will complete mobility to/from the bathroom with no more than SBA and min A for safety to progress to PLOF in ADL routines  Short Term Goal 4: Pt will complete simple snack/meal prep task with SBA and min VC for safety/problem solving for safe indep in IADL routines  Short Term Goal 5: Pt will complete community reintegration activity with SBA and min A for problem solving/KATHY precautions to progress to PLOF  Long Term Goals  Time Frame for Long term goals : 2 wks from IPR eval  Long Term Goal 1: Pt will complete UB/LB dressing with LHAE prn and Sup with 0 vc for safety or KATHY precautions to progress ADL performance to PLOF  Long Term Goal 2: Pt will complete UB/LB bathing with LHAE prn and Sup with 0vc for safety or KATHY precautions for improved ADL indep  Long Term Goal 3: Pt will complete oral care mod I with 0 vc for safety or precautions to progress ADL performance to PLOF  Long Term Goal 4: Pt will complete toileting/transfer with Sup and 0vc for safety or to maintain precautions for improved indep in toileting routine  Long Term Goal 5: Pt will demo competence with Indu Maze for 100% of time during ADL tasks to maintain KATHY precautions and increase indep in daily occupations         Following session, patient left in safe position with all fall risk precautions in place.

## 2022-08-18 NOTE — PLAN OF CARE
Problem: Discharge Planning  Goal: Discharge to home or other facility with appropriate resources  2022 1247 by Jamey Gillette RN  Outcome: Not Progressing  Flowsheets (Taken 2022 1247)  Discharge to home or other facility with appropriate resources:   Identify barriers to discharge with patient and caregiver   Arrange for needed discharge resources and transportation as appropriate   Identify discharge learning needs (meds, wound care, etc)   Refer to discharge planning if patient needs post-hospital services based on physician order or complex needs related to functional status, cognitive ability or social support system  Note: Patient plans to go home with son to her own home. Patient has been sleeping on couch due to bedrooms being upstairs. 2022 1226 by AGNES Wade  Note:   [facility]  Physical Medicine Case Management Assessment    [x] Inpatient Rehabilitation Unit    Patient Name: Omid Tabor        MRN: [id number]    : 1940  (78 y.o.)  Gender: female   Date of Admission: 2022  1:33 PM    Family/Social/Home Environment:   Prior to admission, patient was independent with her ADLs, finances, housekeeping, meal prep, some errands and driving. Patient's son, Washington Babb lives with her. He helps managing her some home maintenance and driving. Patient reports that her son is retired and available during the day. Patient is a retired . She is  for 2 years and talked at length about caring for her  at home. Patient has positive supports in her son and friends. Patient's family physician is Dr Breonna Kruse and cardiologist is Dr Kati Bourgeois. Patient was not on blood thinners or diabetic medications prior to admission. Patient prefers [address] Patient reports having a straight cane at home. Patient was not using community resources at home. Patient is motivated to work with therapy.      Social/Functional History  Lives With: Son  Type of Home: House  Home Layout: Two level, Able to Live on Main level with bedroom/bathroom, Performs ADL's on one level, Laundry in basement (pt's bedroom is upstairs but reports she could live on main floor)  Home Access: Stairs to enter without rails  Entrance Stairs - Number of Steps: 2 ELOY  Bathroom Shower/Tub: Tub/Shower unit, Curtain  Bathroom Toilet: Standard  Home Equipment: Bioniz  Has the patient had two or more falls in the past year or any fall with injury in the past year?: No (reports this past fall is the only in the last year)  Receives Help From: Family, Friend(s)  ADL Assistance: Independent  Homemaking Assistance: Independent  Homemaking Responsibilities: Yes  Ambulation Assistance: Independent (uses cane at night time only)  Transfer Assistance: Independent  Active : Yes  Mode of Transportation: Car  Occupation: Retired  Type of Occupation:   Leisure & Hobbies: Spends time with neices and nephews  Additional Comments: Son recently (retired) moved in with pt. Pt amb with SC intermittently in the home, and uses SC for longer distances and at night    Contact/Guardian Information: Ryan Worthington (son) [phone]    [employer] Utilized: Patient was not using community resources prior to admission. Sexuality/Intimacy: Patient did not express sexuality/intimacy concerns during SW assessment. Complementary Health Approaches: Patient did not express desires towards complementary health approaches during SW assessment. Anticipated Needs/Discharge Plans: SW will follow and maintain involvement in discharge planning. SW met with patient on this date to introduce self, complete SW assessment and initiate discharge planning. Prior to admission, patient was independent with her ADLs, finances, housekeeping, meal prep, some errands and driving. Patient's son, Washington Babb lives with her. He helps managing her some home maintenance and driving.  Patient reports that her son is level:   Encourage patient to monitor pain and request assistance   Assess pain using appropriate pain scale   Administer analgesics based on type and severity of pain and evaluate response   Implement non-pharmacological measures as appropriate and evaluate response   Notify Licensed Independent Practitioner if interventions unsuccessful or patient reports new pain  Note: Patients pain controlled with current pain regimen. 8/18/2022 0221 by Jermain Ibarra RN  Outcome: Progressing  8/18/2022 0218 by Jermain Ibarra RN  Outcome: Progressing     Problem: Skin/Tissue Integrity  Goal: Absence of new skin breakdown  Description: 1. Monitor for areas of redness and/or skin breakdown  2. Assess vascular access sites hourly  3. Every 4-6 hours minimum:  Change oxygen saturation probe site  4. Every 4-6 hours:  If on nasal continuous positive airway pressure, respiratory therapy assess nares and determine need for appliance change or resting period. 8/18/2022 1247 by Jamey Gillette RN  Outcome: Not Progressing  Note: Patient has developed a rash on back from unknown reason- MD aware.   8/18/2022 0221 by Jermain Ibarra RN  Outcome: Progressing  8/18/2022 0218 by Jermain Ibarra RN  Outcome: Progressing     Problem: Safety - Adult  Goal: Free from fall injury  8/18/2022 1247 by Jamey Gillette RN  Outcome: Progressing  Flowsheets (Taken 8/18/2022 1247)  Free From Fall Injury: Soni Alberto family/caregiver on patient safety  Note: Patient remains free from falls at this time  8/18/2022 0221 by Jermain Ibarra RN  Outcome: Progressing  8/18/2022 0218 by Jermain Ibarra RN  Outcome: Progressing     Problem: ABCDS Injury Assessment  Goal: Absence of physical injury  8/18/2022 1247 by Jamey Gillette RN  Outcome: Progressing  Flowsheets (Taken 8/18/2022 1247)  Absence of Physical Injury: Implement safety measures based on patient assessment  Note: Patient free of injury at this time  8/18/2022 0221 by Jermain Ibarra RN  Outcome: Progressing  2022 0218 by Chet Castro RN  Outcome: Progressing     Problem: Discharge Planning  Goal: Discharge to home or other facility with appropriate resources  2022 1247 by Amanda Peralta RN  Outcome: Not Progressing  Flowsheets (Taken 2022 1247)  Discharge to home or other facility with appropriate resources:   Identify barriers to discharge with patient and caregiver   Arrange for needed discharge resources and transportation as appropriate   Identify discharge learning needs (meds, wound care, etc)   Refer to discharge planning if patient needs post-hospital services based on physician order or complex needs related to functional status, cognitive ability or social support system  Note: Patient plans to go home with son to her own home. Patient has been sleeping on couch due to bedrooms being upstairs. 2022 1226 by AGNES Crisostomo  Note:   [address] [address]  Physical Medicine Case Management Assessment    [x] Inpatient Rehabilitation Unit    Patient Name: Carlos Kurtz        MRN: [id number]    : 1940  (78 y.o.)  Gender: female   Date of Admission: 2022  1:33 PM    Family/Social/Home Environment:   Prior to admission, patient was independent with her ADLs, finances, housekeeping, meal prep, some errands and driving. Patient's son, Sarah Garber lives with her. He helps managing her some home maintenance and driving. Patient reports that her son is retired and available during the day. Patient is a retired . She is  for 2 years and talked at length about caring for her  at home. Patient has positive supports in her son and friends. Patient's family physician is Dr Giorgi Cerna and cardiologist is Dr Galilea Huerta. Patient was not on blood thinners or diabetic medications prior to admission. Patient prefers [address] Patient reports having a straight cane at home. Patient was not using community resources at home.  Patient is motivated to work with therapy. Social/Functional History  Lives With: Son  Type of Home: House  Home Layout: Two level, Able to Live on Main level with bedroom/bathroom, Performs ADL's on one level, Laundry in basement (pt's bedroom is upstairs but reports she could live on main floor)  Home Access: Stairs to enter without rails  Entrance Stairs - Number of Steps: 2 ELOY  Bathroom Shower/Tub: Tub/Shower unit, Curtain  Bathroom Toilet: Standard  Home Equipment: Santiago Hemp  Has the patient had two or more falls in the past year or any fall with injury in the past year?: No (reports this past fall is the only in the last year)  Receives Help From: Family, Friend(s)  ADL Assistance: Independent  Homemaking Assistance: Independent  Homemaking Responsibilities: Yes  Ambulation Assistance: Independent (uses cane at night time only)  Transfer Assistance: Independent  Active : Yes  Mode of Transportation: Car  Occupation: Retired  Type of Occupation:   Leisure & Hobbies: Spends time with neices and nephews  Additional Comments: Son recently (retired) moved in with pt. Pt amb with SC intermittently in the home, and uses SC for longer distances and at night    Contact/Guardian Information: Mary Melvin (son) [phone]    [employer] Utilized: Patient was not using community resources prior to admission. Sexuality/Intimacy: Patient did not express sexuality/intimacy concerns during SW assessment. Complementary Health Approaches: Patient did not express desires towards complementary health approaches during SW assessment. Anticipated Needs/Discharge Plans: SW will follow and maintain involvement in discharge planning. SW met with patient on this date to introduce self, complete SW assessment and initiate discharge planning. Prior to admission, patient was independent with her ADLs, finances, housekeeping, meal prep, some errands and driving. Patient's son, Panfilo Brown lives with her.  He helps managing her some home maintenance and driving. Patient reports that her son is retired and available during the day. Patient is a retired . She is  for 2 years and talked at length about caring for her  at home. Patient has positive supports in her son and friends. Patient's family physician is Dr Zach Catalan and cardiologist is Dr Kang Romo. Patient was not on blood thinners or diabetic medications prior to admission. Patient prefers [address] Patient reports having a straight cane at home. Patient was not using community resources at home. Patient is motivated to work with therapy. SW educated patient on Tuesday, 8/30 Care Conference. SW will follow and maintain involvement in discharge planning. Read-Only, Retired: Discharge Planning  [address]: Children  Potential Assistance Needed: [place] Medications: No  DME: Walker, Shower Chair  Type of [address] PT, OT, Skilled Therapy, [address]  Patient expects to be discharged to[de-identified] House  Expected Discharge Date:  (Undetermined)  Follow Up Appointment: Best Day/Time : Monday AM      AGNES Garcia 8/18/2022 12:26 PM         8/18/2022 0221 by Faby Noble RN  Outcome: Progressing  8/18/2022 0218 by Faby Noble RN  Outcome: Progressing  Flowsheets (Taken 8/17/2022 1440 by Juan Baker RN)  Discharge to home or other facility with appropriate resources:   Identify barriers to discharge with patient and caregiver   Identify discharge learning needs (meds, wound care, etc)   Refer to discharge planning if patient needs post-hospital services based on physician order or complex needs related to functional status, cognitive ability or social support system     Problem: Skin/Tissue Integrity  Goal: Absence of new skin breakdown  Description: 1. Monitor for areas of redness and/or skin breakdown  2.   Assess vascular access sites hourly  3. Every 4-6 hours minimum:  Change oxygen saturation probe site  4. Every 4-6 hours:  If on nasal continuous positive airway pressure, respiratory therapy assess nares and determine need for appliance change or resting period. 8/18/2022 1247 by Eliu Aquino RN  Outcome: Not Progressing  Note: Patient has developed a rash on back from unknown reason- MD aware.   8/18/2022 0221 by Jai Cash RN  Outcome: Progressing  8/18/2022 0218 by Jai Cash RN  Outcome: Progressing

## 2022-08-18 NOTE — PROGRESS NOTES
[address] [facility]  Individualized Disclosure Statement      Patient: Carlos Kurtz      Scope of [address] provides 24 hour individualized service to patients with functional limitations due to, but not limited to: stroke, brain injury, spinal cord injury, major multiple trauma, fractures, amputation, and neurological disorders. The [address] [place] [address] provides rehabilitative nursing and medical services as well as physical, occupational, speech, and recreation therapies. [address] [facility] is fully accredited by the Commission on Accreditation of Rehabilitation Facilities (CARF) as a comprehensive provider of rehabilitation services. Patients admitted to the [address] [place] [address] receive a minimum of three hours of therapy per day, at least six days per week, with a revised therapy schedule on weekends and holidays. Physical therapy, occupational therapy, and speech therapy are provided seven days per week including holidays. Other therapeutic services are available on weekends and evenings as needed or scheduled. Intensity of Treatment  Your treatment program will consist of Nursing Care and:  1.5 hours of Physical Therapy, per day  1.5 hours of Occupational Therapy, per day   30-60 minutes of Speech Therapy, per day  1 hour of Recreational Therapy, per week    [facility] maintains contracts with most insurance plans. Depending on the type of coverage, the insurance may impose limits on the coverage for rehabilitation care. Coverage is based on the premise that you are able to fully participate in the rehabilitation program and show continued progress. Please verify your own insurance information A copy of this was given to the patient/ family on this date.   Insurance Coverage  Your insurance company has made the following determination relative to the length of your stay:   Your estimated length of stay is 14 days   Your insurance Coverage has been verified as follows:    Primary Insurance: Payor: Jeannette Marcial /  /  /    Aga Hathaway:   $[id number] Coverage: Active  Secondary Insurance:[employer]  secondary insurance policies often cover co-pay amounts, but to ensure payment please contact your insurance company.     Alternative Resources: Please ask the  for more information [phone]

## 2022-08-18 NOTE — PLAN OF CARE
Problem: Discharge Planning  Goal: Discharge to home or other facility with appropriate resources  2022 1226 by Charlaine Claude, LSW  Note:   [facility]  Physical Medicine Case Management Assessment    [x] Inpatient Rehabilitation Unit    Patient Name: Georgina Buerger        MRN: [id number]    : 1940  (78 y.o.)  Gender: female   Date of Admission: 2022  1:33 PM    Family/Social/Home Environment:   Prior to admission, patient was independent with her ADLs, finances, housekeeping, meal prep, some errands and driving. Patient's son, Kj Kendall lives with her. He helps managing her some home maintenance and driving. Patient reports that her son is retired and available during the day. Patient is a retired . She is  for 2 years and talked at length about caring for her  at home. Patient has positive supports in her son and friends. Patient's family physician is Dr Ariana Wilkinson and cardiologist is Dr Stephanie Fine. Patient was not on blood thinners or diabetic medications prior to admission. Patient prefers [address] [place] [address].. Patient reports having a straight cane at home. Patient was not using community resources at home. Patient is motivated to work with therapy.      Social/Functional History  Lives With: Son  Type of Home: House  Home Layout: Two level, Able to Live on Main level with bedroom/bathroom, Performs ADL's on one level, Laundry in basement (pt's bedroom is upstairs but reports she could live on main floor)  Home Access: Stairs to enter without rails  Entrance Stairs - Number of Steps: 2 ELOY  Bathroom Shower/Tub: Tub/Shower unit, Curtain  Bathroom Toilet: Standard  Home Equipment: [employer]  Has the patient had two or more falls in the past year or any fall with injury in the past year?: No (reports this past fall is the only in the last year)  Receives Help From: Family, Friend(s)  ADL Assistance: Independent  Homemaking Assistance: Independent  Homemaking Responsibilities: Yes  Ambulation Assistance: Independent (uses cane at night time only)  Transfer Assistance: Independent  Active : Yes  Mode of Transportation: Car  Occupation: Retired  Type of Occupation:   Leisure & Hobbies: Spends time with neices and nephews  Additional Comments: Son recently (retired) moved in with pt. Pt amb with SC intermittently in the home, and uses SC for longer distances and at night    Contact/Guardian Information: Tg Soares (son) [phone]    [employer] Utilized: Patient was not using community resources prior to admission. Sexuality/Intimacy: Patient did not express sexuality/intimacy concerns during SW assessment. Complementary Health Approaches: Patient did not express desires towards complementary health approaches during SW assessment. Anticipated Needs/Discharge Plans: SW will follow and maintain involvement in discharge planning. SW met with patient on this date to introduce self, complete SW assessment and initiate discharge planning. Prior to admission, patient was independent with her ADLs, finances, housekeeping, meal prep, some errands and driving. Patient's son, Mc Miranda lives with her. He helps managing her some home maintenance and driving. Patient reports that her son is retired and available during the day. Patient is a retired . She is  for 2 years and talked at length about caring for her  at home. Patient has positive supports in her son and friends. Patient's family physician is Dr Rosi Caba and cardiologist is Dr Dominique Canas. Patient was not on blood thinners or diabetic medications prior to admission. Patient prefers [address] [facility].. Patient reports having a straight cane at home. Patient was not using community resources at home. Patient is motivated to work with therapy. SW educated patient on Tuesday, 8/30 Care Conference. SW will follow and maintain involvement in discharge planning.

## 2022-08-18 NOTE — PROGRESS NOTES
[facility]  Acute Inpatient Rehab Preadmission Assessment     Patient Name: Julia Rivera        Ethnicity:Not of , Javi Splinter, or Maori origin  Race:White  MRN:   [id number]    : 1940  (78 y.o.)  Gender: female      Admitted from:[address]  Initial Assessment     Date of admission to the hospital: 2022 12:11 AM  Date patient eligible for admission:2022     Primary Diagnosis: S/p left hemiarthroplasty      Did patient have surgery? yes - hemiarthroplasty 2022 Dr. Anika Hernandez      Physicians: Duane Bates MD, Dr. Juan Mario, Dr. Dony Catalan for clinical complications/co-morbidities:   Past Medical History[]Expand by Default        Past Medical History:   Diagnosis Date    Osteoporosis of multiple sites without pathological fracture 10/2017    Squamous cell carcinoma of skin     Vascular headache     Vitamin D deficiency 10/2017            Financial Information  Primary insurance: Medicare     Secondary Insurance:   [facility] [place]     Has the patient had two or more falls in the past year or any fall with injury in the past year? yes     Did the patient have major surgery during the 100 days prior to admission?   yes     Precautions: Restrictions/Precautions: Surgical Protocols, Weight Bearing, General Precautions, Fall Risk  Hip Precautions: No hip flexion > 90 degrees, No hip internal rotation, Posterior hip precautions, No ADduction  Other position/activity restrictions: KATHY precautions  Left Lower Extremity Weight Bearing: Weight Bearing As Tolerated       Isolation Precautions: None                  Physiatrist: Dr. Juan Mario     Patients Occupation: Retired     Reviewed Lab and Diagnostic reports from Current Admission: Yes     Patients Prior Functional  Level: Prior Function  ADL Assistance: Independent  Homemaking Assistance: Independent  Ambulation Assistance: Independent  Transfer Assistance: Independent  Additional Comments: son recently (retired) moved in with pt. Pt amb with SC intermittently in the home, and uses SC for longer distances. Current functional status for upper extremity ADLs: Minimal assistance     Current functional status for lower extremity ADLs: Minimal assistance     Current functional status for bed, chair, wheelchair transfers: Minimal assistance     Current functional status for toilet transfers: Minimal assistance     Current functional status for locomotion: Minimal assistance     Current functional status for bladder management: Moderate assistance     Current functional status for bowel management:Moderate assistance     Current functional status for comprehension: Complete independence     Current functional status for expression: Complete independence     Current functional status for social interaction: Complete independence     Current functional status for problem solving: Complete independence     Current functional status for memory: Complete independence     Expected level of Improvement in Self-Care:  Modified independence     Expected level of Improvement in Sphincter Control:  Modified independence     Expected level of Improvement in Transfers: Modified independence     Expected level of Improvement in Locomotion:  Modified independence     Expected level of Improvement in Communication and Social Cognition: Complete independence     Expected length of time to achieve that level of improvement: 2 weeks     Current rehab issues: ADL dysfunction,bladder management, bowel management,carry over of therapy techniques, discharge planning, disease and co-morbidity management, gait/mobility dysfunction, medication management, nutrition and hydration management, Ongoing assessment of safety, Pain management, Patient and family education, Prevention of secondary complications, Skin Integrity. Required therapy: Physical Therapy and Occupational Therapy 3 hours per day, 5-6 days per week.   Recreational Therapy 1 hour per week. Expected Discharge Destination: Home     Expected Post Discharge Treatments: Home Care     Other information relevant to the care needs:   Lives With: Son  Type of Home: House  Home Layout: Two level, Able to Live on Main level with bedroom/bathroom, Performs ADL's on one level, Laundry in basement  Home Access: Stairs to enter with rails, Stairs to enter without rails  Entrance Stairs - Number of Steps: 2 ELOY  Bathroom Shower/Tub: Tub/Shower unit  Bathroom Toilet: Standard  Home Equipment: Cane  Has the patient had two or more falls in the past year or any fall with injury in the past year?: Yes  ADL Assistance: Independent  Homemaking Assistance: Independent  Ambulation Assistance: Independent  Transfer Assistance: Independent  Active : Yes  Mode of Transportation: Car  Additional Comments: son recently (retired) moved in with pt. Pt amb with SC intermittently in the home, and uses SC for longer distances. Acute Inpatient Rehabilitation Disclosure Statement provided to patient. Patient verbalized understanding. I have reviewed and concur with the findings and results of the pre-admission screening assessment completed by the Inpatient Rehabilitation Admissions Coordinator. Bruna Mcbride M.D.                     Revision History    Date/Time User Provider Type Action   8/17/2022  8:57 AM Bruna Mcbride MD Physician Sign   8/17/2022  7:01 AM Rudy Melendrez RN Registered Nurse Sign    View Details Report

## 2022-08-18 NOTE — PROGRESS NOTES
[facility]  Recreational Therapy  Daily Note  [address]    Time Spent with Patient: 25 minutes    Date:  8/18/2022       Patient Name: Antwan Hobbs      MRN: [id number]      YOB: 1940 (78 y.o.)       Gender: female  Diagnosis: Hip fracture requiring operative repair, left, closed, initial encounter. Referring Practitioner:  Cande Massey MD    RESTRICTIONS/PRECAUTIONS:  Restrictions/Precautions: Surgical Protocols, Weight Bearing, General Precautions, Fall Risk     Hearing: Within functional limits    PAIN: 0-no c/o pain     SUBJECTIVE:  this felt so good    OBJECTIVE:  Pt enjoyed getting her hair washed, trimmed and styled by our beautician-affect bright and social-talked about her days as a -so appreciative          Patient Education  New Education Provided: Importance of Leisure,     Electronically signed by: JUSTYN Waters  Date: 8/18/2022 25

## 2022-08-18 NOTE — PROGRESS NOTES
[facility]  INPATIENT PHYSICAL THERAPY  DAILY NOTE  [address]    Time In: 6103  Time Out: 1346  Timed Code Treatment Minutes: 15 Minutes  Minutes: 15          Date: 2022  Patient Name: Antwan Hobbs,  Gender:  female        MRN: [id number]  : 1940  (78 y.o.)  Referral Date : 22  Referring Practitioner: Cande Massey MD  Diagnosis: Hip fracture requiring operative repair, left, closed, initial encounter  Additional Pertinent Hx: Ga Briseno  is a 78 y.o. female admitted to the inpatient rehabilitation unit on 2022. She was originally admitted to [facility] on 2022. Patient  has a past medical history of Osteoporosis of multiple sites without pathological fracture, Squamous cell carcinoma of skin, Vascular headache, and Vitamin D deficiency. Patient presented to [facility] after suffering a fall at home, landing on her left side. Patient was unable to ambulate and was found to have left femoral neck fracture. Patient was admitted and taken for left hemiarthroplasty with Dr César Flynn on 22. Postoperatively, patient developed narrow complex tachyarrhythmia concerning for SVT versus A. fib with RVR status post adenosine x2 and DCCV x3. Patient currently off amiodarone infusion and is in normal sinus rhythm.   Patient is now on Lopressor 12.5 mg twice daily     Prior Level of Function:  Lives With: Son  Type of Home: House  Home Layout: Two level, Able to Live on Main level with bedroom/bathroom, Performs ADL's on one level, Laundry in basement (pt's bedroom is upstairs but reports she could live on main floor)  Home Access: Stairs to enter without rails  Entrance Stairs - Number of Steps: 2 ELOY  Home Equipment: Cane   Bathroom Shower/Tub: Tub/Shower unit, Curtain  Bathroom Toilet: Standard    Receives Help From: Family, Friend(s)  ADL Assistance: Independent  Homemaking Assistance: Independent  Homemaking Responsibilities: Yes  Ambulation Assistance: Independent (uses cane at night time only)  Transfer Assistance: Independent  Active : Yes  Additional Comments: Son recently (retired) moved in with pt. Pt amb with SC intermittently in the home, and uses SC for longer distances and at night    Restrictions/Precautions:  Restrictions/Precautions: Surgical Protocols, Weight Bearing, General Precautions, Fall Risk  Left Lower Extremity Weight Bearing: Weight Bearing As Tolerated  Position Activity Restriction  Hip Precautions: No hip flexion > 90 degrees, No hip internal rotation, Posterior hip precautions, No ADduction  Other position/activity restrictions: KATHY precautions     SUBJECTIVE: Patient in room in wheelchair, visitor present, pt agreeable to PT. Pt cooperative and pleasant. PAIN: left hip, not rated    Vitals: Vitals not assessed per clinical judgement, see nursing flowsheet    OBJECTIVE:  Bed Mobility:  Not Tested    Transfers:  Sit to Stand: Minimal Assistance  Stand to Sit:Contact Guard Assistance    Ambulation:  Minimal Assistance  Distance: 1'  Surface: Level Tile  Device:Rolling Walker  Gait Deviations: Forward Flexed Posture, Slow Mague, Decreased Step Length Bilaterally, Decreased Weight Shift Left, Decreased Gait Speed, and Decreased Heel Strike Bilaterally    Balance:  Static Standing Balance: Contact Guard Assistance  Dynamic Standing Balance: Minimal Assistance    Exercise:  Patient was guided in 1 set(s) 10 reps of exercise to both lower extremities. Seated: long arc quad, ankle pumps x20. Supine: quad sets 5\" hold, glut sets 5\" hold, hip abduction with active assist left side. Exercises were completed for increased independence with functional mobility. Functional Outcome Measures: Not completed       ASSESSMENT:  Assessment: Patient progressing toward established goals. Activity Tolerance:  Patient tolerance of  treatment: good.       Equipment Recommendations:Equipment Needed: Yes  Other: RW  Discharge Recommendations: Continue to assess pending progress and Patient would benefit from continued PT at discharge  Plan: Current Treatment Recommendations: Strengthening, Balance training, Functional mobility training, Transfer training, Neuromuscular re-education, Stair training, Gait training, Endurance training, Home exercise program, Safety education & training, Patient/Caregiver education & training, Equipment evaluation, education, & procurement, Therapeutic activities  Plan:  (5x/wk 90min, 1x/wk 30min)    Patient Education  Patient Education: Transfers, Verbal Exercise Instruction,  - Patient Verbalized Understanding, - Patient Requires Continued Education    Goals:  Patient goals : \"just feel better\"  Short Term Goals  Time Frame for Short term goals: 1 week  Short term goal 1: Patient will complete rolling and supine < > sit with min assist with no bed rail and head of bed flat and maintain hip precautions to transfer in and out of bed with decreased difficulty. Short term goal 2: Patient will complete sit < > stand with CGA from various surfaces to stand to ambulate with decreased difficulty. Short term goal 3: Patient will ambulate 36' with a RW and CGA to progress towards ambulating household distances  Short term goal 4: Patient will ascend/descend, 1, 4\" step in parallel bars with min assist to progress towards safe home entry. Short term goal 5: Patient will complete car transfer with min assist to transfer in and out of vehicle for home  Long Term Goals  Time Frame for Long term goals : 3 weeks from initial evaluation  Long term goal 1: Patient will complete supine < > sit with modified independence to transfer in and out of bed safely. Long term goal 2: Patient will complete sit < > stand with modified independence to stand to ambulate safely.   Long term goal 3: Patient will bed < > chair transfer with a RW and modified independence to transfer surface to surface safely. Long term goal 4: Patient will ambulate 80' with a RW and modified independence to navigate home safely. Long term goal 5: Patient will ascend/descend 2 steps with hand held assist and cane with min assist for home entry. Additional Goals?: Yes  Long term goal 6: Patient will complete car transfer with SBA to transfer in and out of vehicle for transport to home and appointments    Following session, patient left in safe position with all fall risk precautions in place.

## 2022-08-18 NOTE — PROGRESS NOTES
Comprehensive Nutrition Assessment    Type and Reason for Visit:  Initial, Consult, Positive Nutrition Screen (poor po; Oral Nutrition Supplements)    Nutrition Recommendations/Plan:   Recommend diet as tolerated. Encouraged food from home/outside as desired. Boost from home - encouraged consumption BID. Recommend MVI. Malnutrition Assessment:  Malnutrition Status: At risk for malnutrition (Comment) (08/18/22 8623)    Context:  Acute Illness     Findings of the 6 clinical characteristics of malnutrition:  Energy Intake:  Mild decrease in energy intake (Comment)  Weight Loss:  No significant weight loss     Body Fat Loss:  No significant body fat loss (denies)     Muscle Mass Loss:  No significant muscle mass loss (denies)    Fluid Accumulation:  Unable to assess     Strength:  Not Performed    Nutrition Assessment:     Pt. nutritionally compromised AEB consuming less than 50% of some meals. At risk for further nutrition compromise r/t increased nutrient needs for post-operative healing and underlying medical condition (hx skin cancer, osteoporosis, Vitamin D defiency). Nutrition Related Findings:      Wound Type: Surgical Incision (8/14 Hemiarthroplasty of left hip)     Pt. Report/Treatments/Miscellaneous: pt. Seen w/ visitor; drank Boost (from home) at lunch but didn't consume any of the chicken pot pie; Denies any nausea or any trouble tolerating diet; states family is bringing in food from home/outside and she seems to enjoy that  GI Status: 1 BM noted past 24 hours  Pertinent Labs: 8/18: Glucose 108, BUN 8, Cr 0.4, Sodium 131  Pertinent Meds: Glycolax, Zofran, Pepcid      Current Nutrition Intake & Therapies:    Average Meal Intake: 1-25%, 26-50%, %     ADULT DIET;  Regular    Anthropometric Measures:  Height: 5' 6\" (167.6 cm)  Ideal Body Weight (IBW): 130 lbs (59 kg)    Admission Body Weight: 141 lb 8 oz (64.2 kg) (8/17 +1 edema)  Current Body Weight: 141 lb 8 oz (64.2 kg) (8/17 +1 edema),       Current BMI (kg/m2): 22.8  Usual Body Weight:  (per pt. 120-130#; per EMR: 5/9/20: 133# 2 oz, 5/21/22: 131# 8 oz)                       BMI Categories: Normal Weight (BMI 22.0 to 24.9) age over 72    Estimated Daily Nutrient Needs:  Energy Requirements Based On: Kcal/kg  Weight Used for Energy Requirements: Other (Comment) (64 kgm)  Energy (kcal/day): 9154-3404 kcals (30-35)  Weight Used for Protein Requirements: Other (Comment) (64 kgm)  Protein (g/day): 77+ grams (1.2+)       Nutrition Diagnosis:   Inadequate oral intake related to inadequate protein-energy intake as evidenced by intake 0-25%, intake 26-50%    Nutrition Interventions:   Food and/or Nutrient Delivery: Continue Current Diet (Boost ONS from home (encouraged BID))  Nutrition Education/Counseling: Education initiated (8/18 Stressed importance of po, good nutrition at best efforts for healing.)  Coordination of Nutrition Care: Continue to monitor while inpatient       Goals:     Goals: PO intake 75% or greater, by next RD assessment       Nutrition Monitoring and Evaluation:      Food/Nutrient Intake Outcomes: Diet Advancement/Tolerance, Food and Nutrient Intake, Supplement Intake  Physical Signs/Symptoms Outcomes: Biochemical Data, Chewing or Swallowing, GI Status, Fluid Status or Edema, Nutrition Focused Physical Findings, Skin, Weight    Discharge Planning:     Too soon to determine     Paramjit Hopkins RD, LD  Contact: [phone]

## 2022-08-18 NOTE — PLAN OF CARE
Problem: Discharge Planning  Goal: Discharge to home or other facility with appropriate resources  8/18/2022 0221 by Gilberto Randle RN  Outcome: Progressing  8/18/2022 0218 by Gilberto Randle RN  Outcome: Progressing  Flowsheets (Taken 8/17/2022 1440 by Pat Frye RN)  Discharge to home or other facility with appropriate resources:   Identify barriers to discharge with patient and caregiver   Identify discharge learning needs (meds, wound care, etc)   Refer to discharge planning if patient needs post-hospital services based on physician order or complex needs related to functional status, cognitive ability or social support system     Problem: Pain  Goal: Verbalizes/displays adequate comfort level or baseline comfort level  8/18/2022 0221 by Gilberto Randle RN  Outcome: Progressing  8/18/2022 0218 by Gilberto Randle RN  Outcome: Progressing     Problem: Skin/Tissue Integrity  Goal: Absence of new skin breakdown  Description: 1. Monitor for areas of redness and/or skin breakdown  2. Assess vascular access sites hourly  3. Every 4-6 hours minimum:  Change oxygen saturation probe site  4. Every 4-6 hours:  If on nasal continuous positive airway pressure, respiratory therapy assess nares and determine need for appliance change or resting period.   8/18/2022 0221 by Gilberto Randle RN  Outcome: Progressing  8/18/2022 0218 by Gilberto Randle RN  Outcome: Progressing     Problem: Safety - Adult  Goal: Free from fall injury  8/18/2022 0221 by Gilberto Randle RN  Outcome: Progressing  8/18/2022 0218 by Gilberto Randle RN  Outcome: Progressing     Problem: ABCDS Injury Assessment  Goal: Absence of physical injury  8/18/2022 0221 by Gilberto Randle RN  Outcome: Progressing  8/18/2022 0218 by Gilberto Randle RN  Outcome: Progressing

## 2022-08-18 NOTE — CONSULTS
Department of Family Practice  Consult Note        Reason for Consult:  Medical management while on the Inpatient Rehab unit. Requesting Physician:  Dr Milton Mantilla:   The need to continue the intensive time with therapies following the acute hospital stay. History Obtained From:  patient, EMR    HISTORY OF PRESENT ILLNESS:              The patient is a 78 y.o. female with significant past medical history of       Diagnosis Date    Osteoporosis of multiple sites without pathological fracture 10/2017    Squamous cell carcinoma of skin 2016    Vascular headache 1980's    Vitamin D deficiency 10/2017      who presents with a fall at home which when evaluated in the ED showed a fracture of the left hip. She was medically cleared and then the hip was repaired. She tolerated the surgery well and post op had a tachycardia of undetermined rhythm either afib or SVT. Now that she is medically stable, she has come to the Inpatient Rehab Unit to continue the time with therapies prior to a discharge disposition being made.       Past Medical History:        Diagnosis Date    Osteoporosis of multiple sites without pathological fracture 10/2017    Squamous cell carcinoma of skin 2016    Vascular headache 1980's    Vitamin D deficiency 10/2017     Past Surgical History:        Procedure Laterality Date    BLADDER REPAIR  9/2014    HIP SURGERY Left 8/14/2022    LEFT HIP BEE  ARTHROPLASTY performed by Rubens Merritt MD at [facility] Left 5/2013    lateral    SKIN CANCER EXCISION  09/2016    Dr Jared Forrester Left 4/2015     Current Medications:   Current Facility-Administered Medications: acetaminophen (TYLENOL) tablet 650 mg, 650 mg, Oral, Q6H  apixaban (ELIQUIS) tablet 2.5 mg, 2.5 mg, Oral, BID  bisacodyl (DULCOLAX) suppository 10 mg, 10 mg, Rectal, Daily PRN  [START ON 8/18/2022] famotidine (PEPCID) tablet 20 mg, 20 mg, Oral, Daily  LORazepam (ATIVAN) tablet 0.5 mg, 0.5 mg, Oral, Q4H PRN  metoprolol tartrate (LOPRESSOR) tablet 12.5 mg, 12.5 mg, Oral, BID  ondansetron (ZOFRAN-ODT) disintegrating tablet 4 mg, 4 mg, Oral, Q8H PRN  polyethylene glycol (GLYCOLAX) packet 17 g, 17 g, Oral, Daily PRN  traMADol (ULTRAM) tablet 25 mg, 25 mg, Oral, Q6H PRN **OR** traMADol (ULTRAM) tablet 50 mg, 50 mg, Oral, Q6H PRN  acetaminophen (TYLENOL) tablet 650 mg, 650 mg, Oral, Q4H PRN  Allergies:  Ciprofloxacin, Clindamycin/lincomycin, Hydrocod polst-cpm polst er, Macrobid [nitrofurantoin macrocrystal], Paxil [paroxetine hcl], and Morphine    Social History:   MARITAL STATUS:      Family History:   No family history on file.   REVIEW OF SYSTEMS:    CONSTITUTIONAL:  positive for  fatigue  EYES:  positive for  glasses  HEENT:  negative for  nasal congestion  RESPIRATORY:  negative for  dyspnea  CARDIOVASCULAR:  negative for  chest pain  GASTROINTESTINAL:  positive for constipation  GENITOURINARY:  negative for dysuria  INTEGUMENT/BREAST:  negative for rash  HEMATOLOGIC/LYMPHATIC:  negative for petechiae  ALLERGIC/IMMUNOLOGIC:  negative for urticaria  ENDOCRINE:  negative for diabetic symptoms including polydipsia  MUSCULOSKELETAL:  positive for  myalgias, arthralgias, decreased range of motion, muscle weakness, and bone pain  NEUROLOGICAL:  positive for coordination problems, gait problems, and weakness  BEHAVIOR/PSYCH:  positive for anxiety  PHYSICAL EXAM:      Vitals:    /72   Pulse 85   Temp 98 °F (36.7 °C) (Oral)   Resp 18   Ht 5' 6\" (1.676 m)   Wt 141 lb 8 oz (64.2 kg)   SpO2 98%   BMI 22.84 kg/m²     Well developed well nourished white female who is awake alert and cooperative  Skin atrophic  Membranes moist  Head normocephalic  Neck without mass  Chest symmetrical expansion  Heart S1S2 without murmur  Lungs CTA  Abd soft, non tender, normoactive BS and no mass  Ext without edema  Neuro weak  Psy pleasant    IMPRESSION/RECOMMENDATIONS:      Active Hospital Problems    Diagnosis Date Noted

## 2022-08-18 NOTE — PROGRESS NOTES
Physical Medicine & Rehabilitation   Progress Note    Chief Complaint:   left hip fracture. S/p left hemiarthroplasty. Rehab needs    Subjective:  Patient seen today, sitting up in chair. Son present. Patient with new complaints of rash on her back and itching. Patient states she just noticed this morning the itching after her bath. Patient without any new medications started. Applied lotion to patients back this assessment. Discussed medication with zyrtec and PRN benadryl, along with hydrocortisone cream. Patient understanding. Discussed reducing ativan to TID PRN. Patient was only taking BID PRN at home. Rehabilitation:  PT: await IPR eval    OT:   RANGE OF MOTION:  Right Upper Extremity: WFL  Left Upper Extremity:  WFL  STRENGTH:  Right Upper Extremity: WFL  Left Upper Extremity:  WFL  SENSATION:   WFL  ADL:   EATING:Independent. for beverage management. CARE Score: 6. ORAL HYGIENE:Supervision or touching assistance. Supervision while seated. CARE Score: 4. TOILETING HYGIENE:Partial/moderate assistance. Min. A to stand and complete nikos care. CARE Score: 3. SHOWERING/BATHING:Partial/moderate assistance. Mod. A for LEs and dependent for distal/feet. CARE Score: 3.     UPPER BODY DRESSING:Supervision or touching assistance. SBA. CARE Score: 4. LOWER BODY DRESSING:Substantial/maximal assistance. Max A to thread BLE, Min-Mod A while standing to don at hips. CARE Score: 2. FOOTWEAR:Dependent   . CARE Score: 1.     TOILET TRANSFER: Partial/moderate assistance. Min. A and slow pace. CARE Score: 3. BALANCE:  Sitting Balance:  Supervision. With back support  Standing Balance: Minimal Assistance. With 1-2 hand release  TRANSFERS:  Sit <> Stand: min A from various surfaces, VC for upright posture  FUNCTIONAL MOBILITY:  Assistive Device: Rolling Walker  Assist Level:  Minimal Assistance.   Distance: from [facility] to [facility], ~5 feet, very slow pace      Review of Systems:  CONSTITUTIONAL:  positive for  fatigue  EYES:  positive for  glasses & bilateral cataracts.    HEENT:  negative  RESPIRATORY:  negative  CARDIOVASCULAR:  positive for  palpitations, fatigue, heart murmur  GASTROINTESTINAL:  positive for constipation and decreased appetite  GENITOURINARY:  negative  SKIN:  left hip incision  HEMATOLOGIC/LYMPHATIC:  positive for swelling/edema  MUSCULOSKELETAL:  positive for  pain  NEUROLOGICAL:  positive for gait problems and pain  BEHAVIOR/PSYCH:  negative  System review otherwise negative      Objective:  BP (!) 151/74   Pulse (!) 106   Temp 97.7 °F (36.5 °C) (Oral)   Resp 16   Ht 5' 6\" (1.676 m)   Wt 141 lb 8 oz (64.2 kg)   SpO2 98%   BMI 22.84 kg/m²   awake  Orientation:   person, place, time  Mood: within normal limits  Affect: calm  General appearance: Patient is well nourished, well developed, well groomed and in no acute distress     Memory:   normal,  Attention/Concentration: normal  Language:  normal     Cranial Nerves:  cranial nerves II-XII are grossly intact  ROM:  abnormal - left hip  Motor Exam:  Motor exam is 4 out of 5 all extremities with the exception of left leg not tested  Tone:  normal  Muscle bulk: within normal limits  Sensory:  Sensory intact     Skin: warm and dry, rash noted to back and buttock area, bilateral. Left hip incision with dry dressing in place  Peripheral vascular: Pulses: Normal upper and lower extremity pulses; Edema: 1+      Diagnostics:   Recent Results (from the past 24 hour(s))   CBC with Auto Differential    Collection Time: 08/18/22  6:03 AM   Result Value Ref Range    WBC 7.5 4.8 - 10.8 thou/mm3    RBC 4.05 (L) 4.20 - 5.40 mill/mm3    Hemoglobin 12.3 12.0 - 16.0 gm/dl    Hematocrit 37.6 37.0 - 47.0 %    MCV 92.8 81.0 - 99.0 fL    MCH 30.4 26.0 - 33.0 pg    MCHC 32.7 32.2 - 35.5 gm/dl    RDW-CV 13.5 11.5 - 14.5 %    RDW-SD 46.4 (H) 35.0 - 45.0 fL    Platelets 529 843 - 333 thou/mm3    MPV 9.6 9.4 - 12.4 fL    Seg Neutrophils 74.0 %    Lymphocytes 15.9 %    Monocytes 9.4 %    Eosinophils 0.0 %    Basophils 0.4 %    Immature Granulocytes 0.3 %    Segs Absolute 5.6 1.8 - 7.7 thou/mm3    Lymphocytes Absolute 1.2 1.0 - 4.8 thou/mm3    Monocytes Absolute 0.7 0.4 - 1.3 thou/mm3    Eosinophils Absolute 0.0 0.0 - 0.4 thou/mm3    Basophils Absolute 0.0 0.0 - 0.1 thou/mm3    Immature Grans (Abs) 0.02 0.00 - 0.07 thou/mm3    nRBC 0 /100 wbc   Basic Metabolic Panel w/ Reflex to MG    Collection Time: 08/18/22  6:03 AM   Result Value Ref Range    Sodium 131 (L) 135 - 145 meq/L    Potassium reflex Magnesium 4.1 3.5 - 5.2 meq/L    Chloride 97 (L) 98 - 111 meq/L    CO2 25 23 - 33 meq/L    Glucose 108 70 - 108 mg/dL    BUN 8 7 - 22 mg/dL    Creatinine 0.4 0.4 - 1.2 mg/dL    Calcium 9.1 8.5 - 10.5 mg/dL   Prealbumin    Collection Time: 08/18/22  6:03 AM   Result Value Ref Range    Prealbumin 9.9 (L) 20.0 - 40.0 mg/dl   Anion Gap    Collection Time: 08/18/22  6:03 AM   Result Value Ref Range    Anion Gap 9.0 8.0 - 16.0 meq/L   Glomerular Filtration Rate, Estimated    Collection Time: 08/18/22  6:03 AM   Result Value Ref Range    Est, Glom Filt Rate >90 ml/min/1.73m2       Impression:  Acute left displaced fmoral neck fracture  S/p left hip hemiarthroplasty with Dr. Virgie Coburn on 8/14/22. Mechanical ground level fall  SVT vs. AF with RVR  Asymptomatic bacteriuria  Moderate mitral valve regurgitation  Chronic hyponatremia  HTN  Insomnia  Anxiety  Vitamin D deficiency  Cataracts and macular degeneration     Plan:  Continue current therapies  Prophylaxis:  DVT: Eliquis 2.5 mg p.o. twice daily, GI: Pepcid 20 mg p.o. daily.   Pain: Ultram 25 to 50 mg p.o. every 6 hours as needed, Tylenol 650 mg p.o. every 6 hours scheduled, Tylenol 650 mg p.o. every 4 hours as needed  Bowels: Dulcolax suppository 10 mg rectal daily as needed; GlycoLax 17 g p.o. daily as needed  Anxiety: Ativan 0.5mg TID PRN  HTN: Lopressor 25mg BID   Rash: hydrocortisone cream, zyrtec daily, nickolas HEN  Team conference Tuesday    Missed Therapy Time:  None    Adal Graham, TERRENCE - CNP

## 2022-08-18 NOTE — PROGRESS NOTES
Patient: Rosi Mejias  Unit/Bed: 5N-54/076-E  YOB: 1940  MRN: [id number] Acct: [de-identified]   Admitting Diagnosis: Hip fracture requiring operative repair, left, closed, [facility]  8[facility]17[facility]  Hospital Day: 1    Assessment:     Principal Problem:    Hip fracture requiring operative repair, left, closed, initial encounter [facility])  Active Problems:    Essential hypertension    Paroxysmal atrial fibrillation ([address]  Resolved Problems:    * No resolved hospital problems. *      Plan:     Increase the steroid cream on her back to four times a day. Replace the vit D  Follow the sodium        Subjective:     Patient has no complaint of CP or SOB but does have an itchy rash to her back. .   Medication side effects: none    Scheduled Meds:   metoprolol tartrate  25 mg Oral BID    [START ON 8/19/2022] calcium-cholecalciferol  1 tablet Oral Daily    cetirizine  10 mg Oral Daily    Vitamin D  5,000 Units Oral Daily with breakfast    hydrocortisone   Topical 4x Daily    acetaminophen  650 mg Oral Q6H    apixaban  2.5 mg Oral BID    famotidine  20 mg Oral Daily     Continuous Infusions:  PRN Meds:diphenhydrAMINE, bisacodyl, LORazepam, ondansetron, polyethylene glycol, traMADol **OR** traMADol, acetaminophen    Review of Systems  Pertinent items are noted in HPI. Objective:     Patient Vitals for the past 8 hrs:   BP Temp Temp src Pulse Resp SpO2   08/18/22 0838 (!) 151/74 97.7 °F (36.5 °C) Oral (!) 106 16 98 %   08/18/22 0545 (!) 142/77 98.6 °F (37 °C) -- 86 18 97 %     I/O last 3 completed shifts:   In: 26 [P.O.:440]  Out: -   I/O this shift:  In: 120 [P.O.:120]  Out: -     BP (!) 151/74   Pulse (!) 106   Temp 97.7 °F (36.5 °C) (Oral)   Resp 16   Ht 5' 6\" (1.676 m)   Wt 141 lb 8 oz (64.2 kg)   SpO2 98%   BMI 22.84 kg/m²     General appearance: alert, appears stated age, and cooperative  Head: Normocephalic, without obvious abnormality, atraumatic  Lungs: clear to auscultation bilaterally  Heart: regular rate and rhythm, S1, S2 normal, no murmur, click, rub or gallop  Abdomen: soft, non-tender; bowel sounds normal; no masses,  no organomegaly  Extremities: extremities normal, atraumatic, no cyanosis or edema  Skin:  a diffuse red macular rash over her back without pustules or vesicles  Neurologic:  weak    Data Review:    Latest Reference Range & Units 8/18/22 06:03   Vit D, 25-Hydroxy 30 - 100 ng/ml 29 (L)   (L): Data is abnormally low      Electronically signed by Vikram Braxton MD on 8/18/2022 at 11:26 AM

## 2022-08-18 NOTE — PROGRESS NOTES
[address]    Time In: 1000  Time Out:1004  Time In: 1009  Time Out: 1120  *Session interrupted by visit with nurse practitioner  Timed Code Treatment Minutes: 54 Minutes  Minutes: 75          Date: 2022  Patient Name: Unique Knapp,  Gender:  female        MRN: [id number]  : 1940  (78 y.o.)  Referral Date : 22   Referring Practitioner: Neelam Wayne MD  Diagnosis: Hip fracture requiring operative repair, left, closed, initial encounter  Additional Pertinent Hx: [place]  is a 78 y.o. female admitted to the inpatient rehabilitation unit on 2022. She was originally admitted to [address] [place] [address] on 2022. Patient  has a past medical history of Osteoporosis of multiple sites without pathological fracture, Squamous cell carcinoma of skin, Vascular headache, and Vitamin D deficiency. Patient presented to [facility] after suffering a fall at home, landing on her left side. Patient was unable to ambulate and was found to have left femoral neck fracture. Patient was admitted and taken for left hemiarthroplasty with Dr Mariluz Dooley on 22. Postoperatively, patient developed narrow complex tachyarrhythmia concerning for SVT versus A. fib with RVR status post adenosine x2 and DCCV x3. Patient currently off amiodarone infusion and is in normal sinus rhythm.   Patient is now on Lopressor 12.5 mg twice daily     Restrictions/Precautions:  Restrictions/Precautions: Surgical Protocols, Weight Bearing, General Precautions, Fall Risk  Left Lower Extremity Weight Bearing: Weight Bearing As Tolerated  Position Activity Restriction  Hip Precautions: No hip flexion > 90 degrees, No hip internal rotation, Posterior hip precautions, No ADduction  Other position/activity restrictions: KATHY precautions    Subjective:  Patient assessed for rehabilitation services?: Yes  Family / Caregiver Present: Yes (son)  Subjective: Patient in room in recliner, agreeable, son present. Nurse practitioner into room at start of session to address pt's itchy rash on back    General:  Overall Orientation Status: Within Normal Limits  Orientation Level: Oriented X4  Vision: Impaired  Vision Exceptions: Wears glasses at all times  Hearing: Within functional limits       Pain: left hip, not rated    Vitals: Blood Pressure: 142/74, heart rate 75, O2 96--taken after pt reporting bout of dizziness after taking 2 steps in therapy gym. Pt also reported feeling shaky after completing. Pt anxious with mobility. PT notified RN of pt report of lightheadedness and feeling shaky. Social/Functional History:    Lives With: Son  Type of Home: House  Home Layout: Two level, Able to Live on Main level with bedroom/bathroom, Performs ADL's on one level, Laundry in basement (pt's bedroom is upstairs but reports she could live on main floor)  Home Access: Stairs to enter without rails  Entrance Stairs - Number of Steps: 2 ELOY  Home Equipment: Cane     Bathroom Shower/Tub: Tub/Shower unit, Curtain  Bathroom Toilet: Standard    Receives Help From: Family, Friend(s)  ADL Assistance: Independent  Homemaking Assistance: Independent  Homemaking Responsibilities: Yes  Ambulation Assistance: Independent (uses cane at night time only)  Transfer Assistance: Independent    Active : Yes  Mode of Transportation: Car  Occupation: Retired  Type of Occupation:   Leisure & Hobbies: Spends time with neices and nephews  Additional Comments: Son recently (retired) moved in with pt. Pt amb with SC intermittently in the home, and uses SC for longer distances and at night      SUBJECTIVE: Patient in room in recliner, agreeable to PT. Pt cooperative and pleasant, very talkative and difficult to keep on task and anxious throughout however.      OBJECTIVE:  Range of Motion:  Right Lower Extremity: WFL  Left Lower Extremity: decreased AROM due to pain and edema left hip and knee    Strength:  Right Lower Extremity:  not formally tested, but at least 3+/5 based on mobility  Left Lower Extremity:  not tested due to recent surgery and increased pain    Balance:  Static Sitting Balance:  Modified Independent  Static Standing Balance: Contact Guard Assistance  Dynamic Standing Balance: Minimal Assistance    Bed Mobility:  Roll to Right: moderate assistance to assist left LE and maintain hip precautions  Roll to Left: minimal assistance at trunk  Supine to Sit: Maximum assistance at bilateral lower extremities and trunk  Sit to Supine: Minimal Assistance to assist left LE, increased time to complete  *Completed with head of bed flat and no rails. Increased time to complete all bed mobility. Pt very hesitant to complete bed mobility without bed rails    Transfers:  Sit to Stand: Minimal Assistance  Stand to [place] [id number], Minimal Assistance  To/From Bed and Chair: Minimal Assistance  *Verbal cues to maintain hip precautions and avoid hip flexion past 90 degrees  *Verbal cues for hand placement follow initial assessment at times  *Pt completed sit < > stand from various surfaces    Ambulation:  Minimal Assistance  Distance: 5', 2', 2', 2', 1'  Surface: Level Tile  Device:Rolling Walker  Gait Deviations: Forward Flexed Posture, Slow Mague, Decreased Step Length Bilaterally, Decreased Weight Shift Left, Decreased Gait Speed, Decreased Heel Strike Bilaterally, and Unsteady Gait    Exercise:  Patient was guided in 1 set(s) 10 reps of exercise to both lower extremities. Supine: glut sets 5\" hold, quad sets 5\" hold, short arc quad left only, heel slides with active assist on the left, ankle pumps x20. Exercises were completed for increased independence with functional mobility. Functional Outcome Measures: Not completed       ASSESSMENT:  Activity Tolerance:  Patient tolerance of  treatment: fair.  Limited by lightheadness and pain      Treatment Initiated: Treatment and education initiated within context of evaluation. Evaluation time included review of current medical information, gathering information related to past medical, social and functional history, completion of standardized testing, formal and informal observation of tasks, assessment of data and development of plan of care and goals. Treatment time included skilled education and facilitation of tasks to increase safety and independence with functional mobility for improved independence and quality of life. Therapeutic exercise completed to improve patient's lower extremity strength to reduce difficulty with functional mobility. Therapeutic activity completed to improve patient's ability to complete functional transfers. Assessment: Body Structures, Functions, Activity Limitations Requiring Skilled Therapeutic Intervention: Decreased functional mobility , Decreased balance, Decreased endurance, Decreased strength, Increased pain  Assessment: Patient is an 78year old s/p left hip hemiarthroplasty who presents with decline in functional mobiltiy. Pt demonstrates impaired gait, balance, strength and transfers, requiring CGA/min assist to transfer and ambulate a few steps and min to max assistance to complete bed mobility. Patient limited by pain left hip and anxiety. Pt was independent at [facility] with intermittent use of cane. Concepción Hebert would benefit from skilled PT services to improve her ability to complete functional mobility, reduce her risk for falls and allow patient to return to [facility].   Therapy Prognosis: Good  Co-morbidities: osteoporosis       Discharge Recommendations:  Discharge Recommendations: Continue to assess pending progress, Patient would benefit from continued therapy after discharge    Patient Education:  Education  Education Given To: Patient  Education Provided: Plan of Care, Mobility Training, Precautions  Education Outcome: Verbalized understanding, Continued education needed   . Equipment Recommendations:  Equipment Needed: Yes  Other: RW    Plan:  Current Treatment Recommendations: Strengthening, Balance training, Functional mobility training, Transfer training, Neuromuscular re-education, Stair training, Gait training, Endurance training, Home exercise program, Safety education & training, Patient/Caregiver education & training, Equipment evaluation, education, & procurement, Therapeutic activities  Plan:  (5x/wk 90min, 1x/wk 30min)    Goals:  Patient goals : \"just feel better\"  Short Term Goals  Time Frame for Short term goals: 1 week  Short term goal 1: Patient will complete rolling and supine < > sit with min assist with no bed rail and head of bed flat and maintain hip precautions to transfer in and out of bed with decreased difficulty. Short term goal 2: Patient will complete sit < > stand with CGA from various surfaces to stand to ambulate with decreased difficulty. Short term goal 3: Patient will ambulate 36' with a RW and CGA to progress towards ambulating household distances  Short term goal 4: Patient will ascend/descend, 1, 4\" step in parallel bars with min assist to progress towards safe home entry. Short term goal 5: Patient will complete car transfer with min assist to transfer in and out of vehicle for home  Long Term Goals  Time Frame for Long term goals : 3 weeks from initial evaluation  Long term goal 1: Patient will complete supine < > sit with modified independence to transfer in and out of bed safely. Long term goal 2: Patient will complete sit < > stand with modified independence to stand to ambulate safely. Long term goal 3: Patient will bed < > chair transfer with a RW and modified independence to transfer surface to surface safely. Long term goal 4: Patient will ambulate 80' with a RW and modified independence to navigate home safely.   Long term goal 5: Patient will ascend/descend 2 steps with hand held assist and cane with min assist for home entry. Additional Goals?: Yes  Long term goal 6: Patient will complete car transfer with SBA to transfer in and out of vehicle for transport to home and appointments    Following session, patient left in safe position with all fall risk precautions in place.

## 2022-08-18 NOTE — PLAN OF CARE
Problem: Discharge Planning  Goal: Discharge to home or other facility with appropriate resources  Outcome: Progressing  Flowsheets (Taken 8/17/2022 1440 by Samina Hamlin RN)  Discharge to home or other facility with appropriate resources:   Identify barriers to discharge with patient and caregiver   Identify discharge learning needs (meds, wound care, etc)   Refer to discharge planning if patient needs post-hospital services based on physician order or complex needs related to functional status, cognitive ability or social support system     Problem: Pain  Goal: Verbalizes/displays adequate comfort level or baseline comfort level  Outcome: Progressing     Problem: Skin/Tissue Integrity  Goal: Absence of new skin breakdown  Description: 1. Monitor for areas of redness and/or skin breakdown  2. Assess vascular access sites hourly  3. Every 4-6 hours minimum:  Change oxygen saturation probe site  4. Every 4-6 hours:  If on nasal continuous positive airway pressure, respiratory therapy assess nares and determine need for appliance change or resting period.   Outcome: Progressing     Problem: Safety - Adult  Goal: Free from fall injury  Outcome: Progressing     Problem: ABCDS Injury Assessment  Goal: Absence of physical injury  Outcome: Progressing

## 2022-08-19 LAB
ANION GAP SERPL CALCULATED.3IONS-SCNC: 8 MEQ/L (ref 8–16)
BUN BLDV-MCNC: 7 MG/DL (ref 7–22)
CALCIUM SERPL-MCNC: 8.9 MG/DL (ref 8.5–10.5)
CHLORIDE BLD-SCNC: 100 MEQ/L (ref 98–111)
CO2: 24 MEQ/L (ref 23–33)
CREAT SERPL-MCNC: 0.3 MG/DL (ref 0.4–1.2)
GFR SERPL CREATININE-BSD FRML MDRD: > 90 ML/MIN/1.73M2
GLUCOSE BLD-MCNC: 107 MG/DL (ref 70–108)
POTASSIUM SERPL-SCNC: 3.6 MEQ/L (ref 3.5–5.2)
SODIUM BLD-SCNC: 132 MEQ/L (ref 135–145)

## 2022-08-19 PROCEDURE — 6370000000 HC RX 637 (ALT 250 FOR IP): Performed by: PHYSICAL MEDICINE & REHABILITATION

## 2022-08-19 PROCEDURE — 6370000000 HC RX 637 (ALT 250 FOR IP): Performed by: NURSE PRACTITIONER

## 2022-08-19 PROCEDURE — 97535 SELF CARE MNGMENT TRAINING: CPT

## 2022-08-19 PROCEDURE — 97116 GAIT TRAINING THERAPY: CPT

## 2022-08-19 PROCEDURE — 80048 BASIC METABOLIC PNL TOTAL CA: CPT

## 2022-08-19 PROCEDURE — 6370000000 HC RX 637 (ALT 250 FOR IP): Performed by: FAMILY MEDICINE

## 2022-08-19 PROCEDURE — 36415 COLL VENOUS BLD VENIPUNCTURE: CPT

## 2022-08-19 PROCEDURE — 97110 THERAPEUTIC EXERCISES: CPT

## 2022-08-19 PROCEDURE — 1180000000 HC REHAB R&B

## 2022-08-19 PROCEDURE — 97530 THERAPEUTIC ACTIVITIES: CPT

## 2022-08-19 PROCEDURE — 99232 SBSQ HOSP IP/OBS MODERATE 35: CPT | Performed by: NURSE PRACTITIONER

## 2022-08-19 RX ORDER — LOPERAMIDE HYDROCHLORIDE 2 MG/1
2 CAPSULE ORAL 4 TIMES DAILY PRN
Status: DISCONTINUED | OUTPATIENT
Start: 2022-08-19 | End: 2022-08-30 | Stop reason: HOSPADM

## 2022-08-19 RX ADMIN — ACETAMINOPHEN 650 MG: 325 TABLET ORAL at 08:14

## 2022-08-19 RX ADMIN — ACETAMINOPHEN 650 MG: 325 TABLET ORAL at 12:55

## 2022-08-19 RX ADMIN — CETIRIZINE HYDROCHLORIDE 10 MG: 10 TABLET, FILM COATED ORAL at 08:17

## 2022-08-19 RX ADMIN — APIXABAN 2.5 MG: 2.5 TABLET, FILM COATED ORAL at 08:15

## 2022-08-19 RX ADMIN — LOPERAMIDE HYDROCHLORIDE 2 MG: 2 CAPSULE ORAL at 12:54

## 2022-08-19 RX ADMIN — ACETAMINOPHEN 650 MG: 325 TABLET ORAL at 22:21

## 2022-08-19 RX ADMIN — LORAZEPAM 0.5 MG: 0.5 TABLET ORAL at 16:10

## 2022-08-19 RX ADMIN — Medication 1 TABLET: at 08:17

## 2022-08-19 RX ADMIN — METOPROLOL TARTRATE 25 MG: 25 TABLET, FILM COATED ORAL at 22:23

## 2022-08-19 RX ADMIN — HYDROCORTISONE ACETATE: 1 CREAM TOPICAL at 22:18

## 2022-08-19 RX ADMIN — APIXABAN 2.5 MG: 2.5 TABLET, FILM COATED ORAL at 22:21

## 2022-08-19 RX ADMIN — METOPROLOL TARTRATE 25 MG: 25 TABLET, FILM COATED ORAL at 08:17

## 2022-08-19 RX ADMIN — LORAZEPAM 0.5 MG: 0.5 TABLET ORAL at 01:57

## 2022-08-19 RX ADMIN — HYDROCORTISONE ACETATE: 1 CREAM TOPICAL at 12:56

## 2022-08-19 RX ADMIN — LORAZEPAM 0.5 MG: 0.5 TABLET ORAL at 08:14

## 2022-08-19 RX ADMIN — ACETAMINOPHEN 650 MG: 325 TABLET ORAL at 01:55

## 2022-08-19 RX ADMIN — HYDROCORTISONE ACETATE: 1 CREAM TOPICAL at 16:14

## 2022-08-19 RX ADMIN — HYDROCORTISONE ACETATE: 1 CREAM TOPICAL at 08:15

## 2022-08-19 RX ADMIN — FAMOTIDINE 20 MG: 20 TABLET ORAL at 08:18

## 2022-08-19 RX ADMIN — Medication 5000 UNITS: at 08:16

## 2022-08-19 ASSESSMENT — PAIN DESCRIPTION - LOCATION
LOCATION: HIP
LOCATION: LEG;HIP
LOCATION: HIP

## 2022-08-19 ASSESSMENT — PAIN SCALES - GENERAL
PAINLEVEL_OUTOF10: 2
PAINLEVEL_OUTOF10: 4
PAINLEVEL_OUTOF10: 6
PAINLEVEL_OUTOF10: 4
PAINLEVEL_OUTOF10: 2
PAINLEVEL_OUTOF10: 6
PAINLEVEL_OUTOF10: 0

## 2022-08-19 ASSESSMENT — PAIN DESCRIPTION - DESCRIPTORS
DESCRIPTORS: ACHING
DESCRIPTORS: ACHING;DISCOMFORT
DESCRIPTORS: ACHING

## 2022-08-19 ASSESSMENT — PAIN DESCRIPTION - ORIENTATION
ORIENTATION: LEFT

## 2022-08-19 ASSESSMENT — PAIN - FUNCTIONAL ASSESSMENT: PAIN_FUNCTIONAL_ASSESSMENT: PREVENTS OR INTERFERES SOME ACTIVE ACTIVITIES AND ADLS

## 2022-08-19 NOTE — PROGRESS NOTES
[address] [place] [address]  INPATIENT PHYSICAL THERAPY  DAILY NOTE  [facility] - 7E-54/054-A    Time In: 0840  Time Out: 0930  Timed Code Treatment Minutes: 50 Minutes  Minutes: 50     Minute Variance  Variance: -10 (due to late breakfast)    Date: 2022  Patient Name: Dany Murry,  Gender:  female        MRN: [id number]  : 1940  (78 y.o.)  Referral Date : 22  Referring Practitioner: Pedro Rinaldi MD  Diagnosis: Hip fracture requiring operative repair, left, closed, initial encounter  Additional Pertinent Hx: Ga Lozano  is a 78 y.o. female admitted to the inpatient rehabilitation unit on 2022. She was originally admitted to [address] [place] [address] on 2022. Patient  has a past medical history of Osteoporosis of multiple sites without pathological fracture, Squamous cell carcinoma of skin, Vascular headache, and Vitamin D deficiency. Patient presented to [facility] after suffering a fall at home, landing on her left side. Patient was unable to ambulate and was found to have left femoral neck fracture. Patient was admitted and taken for left hemiarthroplasty with Dr Tanvi Almanzar on 22. Postoperatively, patient developed narrow complex tachyarrhythmia concerning for SVT versus A. fib with RVR status post adenosine x2 and DCCV x3. Patient currently off amiodarone infusion and is in normal sinus rhythm.   Patient is now on Lopressor 12.5 mg twice daily     Prior Level of Function:  Lives With: Son  Type of Home: House  Home Layout: Two level, Able to Live on Main level with bedroom/bathroom, Performs ADL's on one level, Laundry in basement (pt's bedroom is upstairs but reports she could live on main floor)  Home Access: Stairs to enter without rails  Entrance Stairs - Number of Steps: 2 ELOY  Home Equipment: Cane   Bathroom Shower/Tub: Tub/Shower unit, Curtain  Bathroom Toilet: Standard    Receives Help From: Family, Friend(s)  ADL Assistance: Independent  Homemaking Assistance: Independent  Homemaking Responsibilities: Yes  Ambulation Assistance: Independent (uses cane at night time only)  Transfer Assistance: Independent  Active : Yes  Additional Comments: Son recently (retired) moved in with pt. Pt amb with SC intermittently in the home, and uses SC for longer distances and at night    Restrictions/Precautions:  Restrictions/Precautions: Surgical Protocols, Weight Bearing, General Precautions, Fall Risk  Left Lower Extremity Weight Bearing: Weight Bearing As Tolerated  Position Activity Restriction  Hip Precautions: No hip flexion > 90 degrees, No hip internal rotation, Posterior hip precautions, No ADduction  Other position/activity restrictions: KATHY precautions     SUBJECTIVE: Patient in recliner upon arrival, reports breakfast arriving late today and nursing in to give pills right before that, states \"everything has been behind today. \" Requesting to use the [facility] at start of session d/t having issues with diarrhea. Very talkative throughout session and requires verbal cues for staying on task. Mildly anxious during session. PAIN: 3/10: Left hip    Vitals: Vitals not assessed per clinical judgement, see nursing flowsheet    OBJECTIVE:  Bed Mobility:  Not Tested    Transfers:  Sit to Stand: Contact Guard Assistance, Minimal Assistance, X 1, with increased time for completion, cues for hand placement, with verbal cues  Stand to Sit:Contact Guard Assistance, X 1, cues for hand placement, with verbal cues  To/From Bed and Chair: Minimal Assistance, X 1, with increased time for completion, with verbal cues, using RW    Ambulation:  Minimal Assistance, X 1, with verbal cues , with increased time for completion  Distance: 2-3 ft. X2; 8 ft. X1   Surface: Level Tile  Device:Rolling Walker  Gait Deviations:   Forward Flexed Posture, Slow Mague, Decreased Step Length on Right, Decreased Weight Shift Left, Decreased Gait Speed, Decreased Heel Strike Bilaterally, Mild Path Deviations, and Unsteady Gait    Balance:  Static Standing Balance: Contact Guard Assistance  Dynamic Standing Balance: Contact Guard Assistance, Minimal Assistance  **Patient stood at AD with BUE support on AD while therapist completed nikos-care, was able to  briefly take 1 UE off walker to assist with nikos-care and clothing management, required prompting to assist.     Exercise:  Patient was guided in 1 set(s) 10 reps of exercise to both lower extremities. Seated marches, Seated hamstring curls, Seated heel/toe raises, Long arc quads, and Seated isometric hip adduction. Exercises were completed for increased independence with functional mobility. Functional Outcome Measures: Not completed       ASSESSMENT:  Assessment: Patient progressing toward established goals. Activity Tolerance:  Patient tolerance of  treatment: good.       Equipment Recommendations:Equipment Needed: Yes  Other: RW  Discharge Recommendations: Continue to assess pending progress and Patient would benefit from continued PT at discharge  Plan: Current Treatment Recommendations: Strengthening, Balance training, Functional mobility training, Transfer training, Neuromuscular re-education, Stair training, Gait training, Endurance training, Home exercise program, Safety education & training, Patient/Caregiver education & training, Equipment evaluation, education, & procurement, Therapeutic activities  Plan:  (5x/wk 90min, 1x/wk 30min)    Patient Education  Patient Education: Plan of Care, Precautions/Restrictions, Transfers, Reviewed Prior Education, Gait, Health Promotion and Wellness Education, - Patient Requires Continued Education    Goals:  Patient goals : \"just feel better\"  Short Term Goals  Time Frame for Short term goals: 1 week  Short term goal 1: Patient will complete rolling and supine < > sit with min assist with no bed rail and head of bed flat and maintain hip precautions to transfer in and out of bed with decreased difficulty. Short term goal 2: Patient will complete sit < > stand with CGA from various surfaces to stand to ambulate with decreased difficulty. Short term goal 3: Patient will ambulate 36' with a RW and CGA to progress towards ambulating household distances  Short term goal 4: Patient will ascend/descend, 1, 4\" step in parallel bars with min assist to progress towards safe home entry. Short term goal 5: Patient will complete car transfer with min assist to transfer in and out of vehicle for home  Long Term Goals  Time Frame for Long term goals : 3 weeks from initial evaluation  Long term goal 1: Patient will complete supine < > sit with modified independence to transfer in and out of bed safely. Long term goal 2: Patient will complete sit < > stand with modified independence to stand to ambulate safely. Long term goal 3: Patient will bed < > chair transfer with a RW and modified independence to transfer surface to surface safely. Long term goal 4: Patient will ambulate 80' with a RW and modified independence to navigate home safely. Long term goal 5: Patient will ascend/descend 2 steps with hand held assist and cane with min assist for home entry. Additional Goals?: Yes  Long term goal 6: Patient will complete car transfer with SBA to transfer in and out of vehicle for transport to home and appointments    Following session, patient left in safe position with all fall risk precautions in place.

## 2022-08-19 NOTE — PROGRESS NOTES
[address]  Occupational Therapy  Daily Note  Time:   Time In: 1430  Time Out: 1500  Timed Code Treatment Minutes: 30 Minutes  Minutes: 30          Date: 2022  Patient Name: Omid Tabor,   Gender: female      Room: [facility]/054-A  MRN: [id number]  : 1940  (78 y.o.)  Referring Practitioner: Carolyn Horner MD  Diagnosis: Hip fracture requiring operative repair, left, closed, initial encounter. Additional Pertinent Hx: Per chart review, pt is an 78 y.o. female with a PMH of \"Osteoporosis of multiple sites without pathological fracture, Squamous cell carcinoma of skin, Vascular headache, and Vitamin D deficiency\". Pt originally admitted to [facility] on 22 post fall at home, landing on left side. Pt reports she was unable to ambulate and fall resulted in femoral neck fracture, undergoing a left hemiarthoplasty on 22. \"Postoperatively, patient developed narrow complex tachyarrhythmia concerning for SVT versus A. fib with RVR status post adenosine x2 and DCCV x3.\" Pt presents to [facility] IPR unit on 22 s/p left hemiarthoplasty. Restrictions/Precautions:  Restrictions/Precautions: Surgical Protocols, Weight Bearing, General Precautions, Fall Risk  Left Lower Extremity Weight Bearing: Weight Bearing As Tolerated  Position Activity Restriction  Hip Precautions: No hip flexion > 90 degrees, No hip internal rotation, Posterior hip precautions, No ADduction  Other position/activity restrictions: KAHTY precautions     SUBJECTIVE: Pt seated in bedside chair upon arrival, agreeable to OT session. PAIN: Did not rate: HA    Vitals: Vitals not assessed per clinical judgement, see nursing flowsheet    COGNITION: Slow Processing and Decreased Recall    ADL:   No ADL's completed this session. [place] BALANCE:  Sitting Balance:  Modified Independent.  Bedside chair    BED MOBILITY:  Not Tested    TRANSFERS & FUNCTIONAL MOBILITY:  Not completed- pt declined d/t fatigue this PM.    ADDITIONAL ACTIVITIES:  Patient identified a personal goal to increase UB strength and improve overall endurance so they can complete their toilet & shower transfers; skilled edu on UE strengthening. Completed BUE exercises x10 reps x1 sets using mod resistance band in all joints/planes to increase strength and endurance required for ADLs. Pt required min rest break between each exercise and min v/c for proper technique. ASSESSMENT:     Activity Tolerance:  Patient tolerance of  treatment: fair. Discharge Recommendations: Continue to assess pending progress and Patient would benefit from continued OT at discharge  Equipment Recommendations: Other: Pt may benefit from [facility] kit, RW, shower chair vs tub t/f bench, BSC. Continue to Monitor  Plan: Times per Week: 5x wk/90 min 1x wk/30min  Times per Day: Daily  Current Treatment Recommendations: Functional mobility training, Balance training, Self-Care / ADL, Patient/Caregiver education & training, Safety education & training, Strengthening, Endurance training, Equipment evaluation, education, & procurement    Patient Education  Patient Education: Home Exercise Program and Precautions    Goals  Short Term Goals  Time Frame for Short term goals: 1 wk  Short Term Goal 1: Pt will tolerate 3 min of standing with 1-2 hand release and SBA for improved indep in sinkside grooming tasks  Short Term Goal 2: Pt will navigate room to gather needed supplies for ADL task with SBA and 0 vc for safety for PLOF in ADL routines.   Short Term Goal 3: Pt will complete mobility to/from the bathroom with no more than SBA and min A for safety to progress to PLOF in ADL routines  Short Term Goal 4: Pt will complete simple snack/meal prep task with SBA and min VC for safety/problem solving for safe indep in IADL routines  Short Term Goal 5: Pt will complete community reintegration activity with SBA and min A for problem solving/KATHY precautions to progress to PLOF  Long Term Goals  Time Frame for Long term goals : 2 wks from IPR eval  Long Term Goal 1: Pt will complete UB/LB dressing with LHAE prn and Sup with 0 vc for safety or KATHY precautions to progress ADL performance to PLOF  Long Term Goal 2: Pt will complete UB/LB bathing with LHAE prn and Sup with 0vc for safety or KATHY precautions for improved ADL indep  Long Term Goal 3: Pt will complete oral care mod I with 0 vc for safety or precautions to progress ADL performance to PLOF  Long Term Goal 4: Pt will complete toileting/transfer with Sup and 0vc for safety or to maintain precautions for improved indep in toileting routine  Long Term Goal 5: Pt will demo competence with Dustin Gunning for 100% of time during ADL tasks to maintain KATHY precautions and increase indep in daily occupations    Following session, patient left in safe position with all fall risk precautions in place.

## 2022-08-19 NOTE — PROGRESS NOTES
Physical Medicine & Rehabilitation   Progress Note    Chief Complaint:   left hip fracture. S/p left hemiarthroplasty. Rehab needs    Subjective:  Patient seen today, resting in chair. Patient reports decent sleep, similar to what she gets at home. Patient with loose stools, will add imodium PRN. Patient feels her pain is well controlled. Rash seems to itch a little less, but lower back continues to be red and worse than upper back. Patient feels therapy is going well. Denies any other needs or concerns at this time. +BM 8/19/22    Rehabilitation:  PT:   Transfers:  Sit to Stand: Minimal Assistance  Stand to Sit:Contact Guard Assistance  Ambulation:  Minimal Assistance  Distance: 1'  Surface: Level Tile  Device:Rolling Walker  Gait Deviations: Forward Flexed Posture, Slow Mague, Decreased Step Length Bilaterally, Decreased Weight Shift Left, Decreased Gait Speed, and Decreased Heel Strike Bilaterally  Balance:  Static Standing Balance: Contact Guard Assistance  Dynamic Standing Balance: Minimal Assistance  Exercise:  Patient was guided in 1 set(s) 10 reps of exercise to both lower extremities. Seated: long arc quad, ankle pumps x20. Supine: quad sets 5\" hold, glut sets 5\" hold, hip abduction with active assist left side. Exercises were completed for increased independence with functional mobility. OT:   RANGE OF MOTION:  Right Upper Extremity: WFL  Left Upper Extremity:  WFL  STRENGTH:  Right Upper Extremity: WFL  Left Upper Extremity:  WFL  SENSATION:   WFL  ADL:   EATING:Independent. for beverage management. CARE Score: 6. ORAL HYGIENE:Supervision or touching assistance. Supervision while seated. CARE Score: 4. TOILETING HYGIENE:Partial/moderate assistance. Min. A to stand and complete nikos care. CARE Score: 3. SHOWERING/BATHING:Partial/moderate assistance. Mod. A for LEs and dependent for distal/feet. CARE Score: 3.     UPPER BODY DRESSING:Supervision or touching assistance. SBA. CARE Score: 4. LOWER BODY DRESSING:Substantial/maximal assistance. Max A to thread BLE, Min-Mod A while standing to don at hips. CARE Score: 2. FOOTWEAR:Dependent   . CARE Score: 1.     TOILET TRANSFER: Partial/moderate assistance. Min. A and slow pace. CARE Score: 3.      .    Review of Systems:  CONSTITUTIONAL:  positive for  fatigue  EYES:  positive for  glasses & bilateral cataracts. HEENT:  negative  RESPIRATORY:  negative  CARDIOVASCULAR:  positive for  palpitations, fatigue, heart murmur  GASTROINTESTINAL:  positive for constipation and decreased appetite  GENITOURINARY:  negative  SKIN:  left hip incision.  Rash to back and buttock area  HEMATOLOGIC/LYMPHATIC:  positive for swelling/edema  MUSCULOSKELETAL:  positive for  pain  NEUROLOGICAL:  positive for gait problems and pain  BEHAVIOR/PSYCH:  negative  System review otherwise negative      Objective:  /72   Pulse 79   Temp 98.3 °F (36.8 °C) (Oral)   Resp 16   Ht 5' 6\" (1.676 m)   Wt 141 lb 8 oz (64.2 kg)   SpO2 96%   BMI 22.84 kg/m²   awake  Orientation:   person, place, time  Mood: within normal limits  Affect: calm  General appearance: Patient is well nourished, well developed, well groomed and in no acute distress     Memory:   normal,  Attention/Concentration: normal  Language:  normal     Cranial Nerves:  cranial nerves II-XII are grossly intact  ROM:  abnormal - left hip  Motor Exam:  Motor exam is 4 out of 5 all extremities with the exception of left leg not tested  Tone:  normal  Muscle bulk: within normal limits  Sensory:  Sensory intact     Skin: warm and dry, rash noted to back and buttock area, bilateral. Left hip incision with dry dressing in place  Peripheral vascular: Pulses: Normal upper and lower extremity pulses; Edema: 1+      Diagnostics:   Recent Results (from the past 24 hour(s))   Basic Metabolic Panel    Collection Time: 08/19/22  5:56 AM   Result Value Ref Range    Sodium 132 (L) 135 - 145 meq/L Potassium 3.6 3.5 - 5.2 meq/L    Chloride 100 98 - 111 meq/L    CO2 24 23 - 33 meq/L    Glucose 107 70 - 108 mg/dL    BUN 7 7 - 22 mg/dL    Creatinine 0.3 (L) 0.4 - 1.2 mg/dL    Calcium 8.9 8.5 - 10.5 mg/dL   Anion Gap    Collection Time: 08/19/22  5:56 AM   Result Value Ref Range    Anion Gap 8.0 8.0 - 16.0 meq/L   Glomerular Filtration Rate, Estimated    Collection Time: 08/19/22  5:56 AM   Result Value Ref Range    Est, Glom Filt Rate >90 ml/min/1.73m2       Impression:  Acute left displaced fmoral neck fracture  S/p left hip hemiarthroplasty with Dr. Haleigh Ronquillo on 8/14/22. Mechanical ground level fall  SVT vs. AF with RVR  Asymptomatic bacteriuria  Moderate mitral valve regurgitation  Chronic hyponatremia  HTN  Insomnia  Anxiety  Vitamin D deficiency  Cataracts and macular degeneration     Plan:  Continue current therapies  Prophylaxis:  DVT: Eliquis 2.5 mg p.o. twice daily, GI: Pepcid 20 mg p.o. daily. Pain: Ultram 25 to 50 mg p.o. every 6 hours as needed, Tylenol 650 mg p.o. every 6 hours scheduled, Tylenol 650 mg p.o. every 4 hours as needed  Bowels: Dulcolax suppository 10 mg rectal daily as needed; GlycoLax 17 g p.o. daily as needed.  Imodium PRN for loose stools  Anxiety: Ativan 0.5mg TID PRN  HTN: Lopressor 25mg BID   Rash: hydrocortisone cream QID, zyrtec daily, benadryl PRN  Team conference Tuesday    Missed Therapy Time:  None    Nallely Reardon, APRN - CNP

## 2022-08-19 NOTE — PROGRESS NOTES
[facility]  INPATIENT PHYSICAL THERAPY  DAILY NOTE  [address]  Time In: 1320  Time Out: 1400  Timed Code Treatment Minutes: 40 Minutes  Minutes: 40       Date: 2022  Patient Name: Shilpa Luna,  Gender:  female        MRN: [id number]  : 1940  (78 y.o.)  Referral Date : 22  Referring Practitioner: Jarrett Artis MD  Diagnosis: Hip fracture requiring operative repair, left, closed, initial encounter  Additional Pertinent Hx: Ga Garcia  is a 78 y.o. female admitted to the inpatient rehabilitation unit on 2022. She was originally admitted to [facility] on 2022. Patient  has a past medical history of Osteoporosis of multiple sites without pathological fracture, Squamous cell carcinoma of skin, Vascular headache, and Vitamin D deficiency. Patient presented to [facility] after suffering a fall at home, landing on her left side. Patient was unable to ambulate and was found to have left femoral neck fracture. Patient was admitted and taken for left hemiarthroplasty with Dr Candace Galindo on 22. Postoperatively, patient developed narrow complex tachyarrhythmia concerning for SVT versus A. fib with RVR status post adenosine x2 and DCCV x3. Patient currently off amiodarone infusion and is in normal sinus rhythm.   Patient is now on Lopressor 12.5 mg twice daily     Prior Level of Function:  Lives With: Son  Type of Home: House  Home Layout: Two level, Able to Live on Main level with bedroom/bathroom, Performs ADL's on one level, Laundry in basement (pt's bedroom is upstairs but reports she could live on main floor)  Home Access: Stairs to enter without rails  Entrance Stairs - Number of Steps: 2 ELOY  Home Equipment: Cane   Bathroom Shower/Tub: Tub/Shower unit, Curtain  Bathroom Toilet: Standard    Receives Help From: Family, Friend(s)  ADL Assistance: Independent  Homemaking Assistance: and lateral weight shifting onto left side. Completed 2 trials without seated rest break noted. Exercise:  Patient was guided in 1 set(s) 10 reps of exercise to both lower extremities. Ankle pumps, Glut sets, and Heelslides. Exercises were completed for increased independence with functional mobility. Functional Outcome Measures: Not completed       ASSESSMENT:  Assessment: Patient progressing toward established goals. Activity Tolerance:  Patient tolerance of  treatment: good. Equipment Recommendations:Equipment Needed: Yes  Other: RW  Discharge Recommendations: Continue to assess pending progress  Plan: Current Treatment Recommendations: Strengthening, Balance training, Functional mobility training, Transfer training, Neuromuscular re-education, Stair training, Gait training, Endurance training, Home exercise program, Safety education & training, Patient/Caregiver education & training, Equipment evaluation, education, & procurement, Therapeutic activities  Plan:  (5x/wk 90min, 1x/wk 30min)    Patient Education  Patient Education: Plan of Care, Precautions/Restrictions, Bed Mobility, Transfers, Reviewed Prior Education, Gait, Health Promotion and Wellness Education, - Patient Requires Continued Education    Goals:  Patient goals : \"just feel better\"  Short Term Goals  Time Frame for Short term goals: 1 week  Short term goal 1: Patient will complete rolling and supine < > sit with min assist with no bed rail and head of bed flat and maintain hip precautions to transfer in and out of bed with decreased difficulty. Short term goal 2: Patient will complete sit < > stand with CGA from various surfaces to stand to ambulate with decreased difficulty. Short term goal 3: Patient will ambulate 36' with a RW and CGA to progress towards ambulating household distances  Short term goal 4: Patient will ascend/descend, 1, 4\" step in parallel bars with min assist to progress towards safe home entry.   Short term goal 5: Patient will complete car transfer with min assist to transfer in and out of vehicle for home  Long Term Goals  Time Frame for Long term goals : 3 weeks from initial evaluation  Long term goal 1: Patient will complete supine < > sit with modified independence to transfer in and out of bed safely. Long term goal 2: Patient will complete sit < > stand with modified independence to stand to ambulate safely. Long term goal 3: Patient will bed < > chair transfer with a RW and modified independence to transfer surface to surface safely. Long term goal 4: Patient will ambulate 80' with a RW and modified independence to navigate home safely. Long term goal 5: Patient will ascend/descend 2 steps with hand held assist and cane with min assist for home entry. Additional Goals?: Yes  Long term goal 6: Patient will complete car transfer with SBA to transfer in and out of vehicle for transport to home and appointments    Following session, patient left in safe position with all fall risk precautions in place.

## 2022-08-19 NOTE — PLAN OF CARE
Problem: Safety - Adult  Goal: Free from fall injury  8/19/2022 0100 by Toshia Klein RN  Outcome: Progressing  Note:  Patient free from falls this evening. Problem: ABCDS Injury Assessment  Goal: Absence of physical injury  8/19/2022 0100 by Toshia Klien RN  Outcome: Progressing  Note: Patient free of injury at this time       Problem: Pain  Goal: Verbalizes/displays adequate comfort level or baseline comfort level  8/19/2022 0100 by Toshia Klein RN  Outcome: Progressing  8/18/2022 1247 by Jeri Erazo RN  Outcome: Progressing  Flowsheets (Taken 8/18/2022 1247)  Verbalizes/displays adequate comfort level or baseline comfort level:   Encourage patient to monitor pain and request assistance   Assess pain using appropriate pain scale   Administer analgesics based on type and severity of pain and evaluate response   Implement non-pharmacological measures as appropriate and evaluate response   Notify Licensed Independent Practitioner if interventions unsuccessful or patient reports new pain  Note: Patients pain controlled with current pain regimen.

## 2022-08-19 NOTE — PROGRESS NOTES
Patient: Candis Starr  Unit/Bed: 0D-68/624-A  YOB: 1940  MRN: [id number] Acct: [de-identified]   Admitting Diagnosis: Hip fracture requiring operative repair, left, closed, initial encounter [facility]) Murray Marcano Date:  8/17/2022  Hospital Day: 2    Assessment:     Principal Problem:    Hip fracture requiring operative repair, left, closed, initial encounter [facility])  Active Problems:    Essential hypertension    Paroxysmal atrial fibrillation ([facility] [address]  Resolved Problems:    * No resolved hospital problems. *      Plan:     Continue to follow        Subjective:     Patient has no complaint of CP or SOB. .   Medication side effects: none    Scheduled Meds:   metoprolol tartrate  25 mg Oral BID    calcium-cholecalciferol  1 tablet Oral Daily    cetirizine  10 mg Oral Daily    Vitamin D  5,000 Units Oral Daily with breakfast    hydrocortisone   Topical 4x Daily    acetaminophen  650 mg Oral Q6H    apixaban  2.5 mg Oral BID    famotidine  20 mg Oral Daily     Continuous Infusions:  PRN Meds:loperamide, diphenhydrAMINE, LORazepam, bisacodyl, ondansetron, polyethylene glycol, traMADol **OR** traMADol, acetaminophen    Review of Systems  Pertinent items are noted in HPI. Objective:     No data found. I/O last 3 completed shifts: In: 416 [P.O.:757]  Out: -   I/O this shift:   In: 5 [P.O.:420]  Out: -     /72   Pulse 79   Temp 98.3 °F (36.8 °C) (Oral)   Resp 16   Ht 5' 6\" (1.676 m)   Wt 141 lb 8 oz (64.2 kg)   SpO2 96%   BMI 22.84 kg/m²     General appearance: alert, appears stated age, and cooperative  Head: Normocephalic, without obvious abnormality, atraumatic  Lungs: clear to auscultation bilaterally  Heart: regular rate and rhythm, S1, S2 normal, no murmur, click, rub or gallop  Abdomen: soft, non-tender; bowel sounds normal; no masses,  no organomegaly  Extremities: extremities normal, atraumatic, no cyanosis or edema  Skin: Skin color, texture, turgor normal. No rashes or lesions  Neurologic:  weak    Electronically signed by Sindi Posada MD on 8/19/2022 at 6:42 PM

## 2022-08-19 NOTE — PROGRESS NOTES
[address] [place] [address]  Diagnosis List for Inpatient Rehab facility (IRF) - Patient Assessment Instrument (CHECO)    Patient Name: Antwan Hobbs        MRN: [id number]    : 1940  (78 y.o.)  Gender: female     Primary impairment requiring rehabilitation: 8.11 Unilateral Hip Fracture      Etiologic Diagnosis that led to the condition: Acute left displaced femoral  neck fracture    Comorbid conditions affecting rehabilitation:  Acute left displaced fmoral neck fracture  S/p left hip hemiarthroplasty with Dr. César Flynn on 22.    Mechanical ground level fall  SVT vs. AF with RVR  Asymptomatic bacteriuria  Moderate mitral valve regurgitation  Chronic hyponatremia  HTN  Insomnia  Anxiety  Vitamin D deficiency  Cataracts and macular degeneration  COVID 19+  22  *Mild COTY Massey M.D.

## 2022-08-19 NOTE — PROGRESS NOTES
[address]  Occupational Therapy  Daily Note  Time:   Time In: 1100  Time Out: 1200  Timed Code Treatment Minutes: 60 Minutes  Minutes: 60          Date: 2022  Patient Name: Rosi Mejias,   Gender: female      Room: -54/054-A  MRN: [id number]  : 1940  (78 y.o.)  Referring Practitioner: Claudetta Shilling, MD  Diagnosis: Hip fracture requiring operative repair, left, closed, initial encounter. Additional Pertinent Hx: Per chart review, pt is an 78 y.o. female with a PMH of \"Osteoporosis of multiple sites without pathological fracture, Squamous cell carcinoma of skin, Vascular headache, and Vitamin D deficiency\". Pt originally admitted to [facility] on 22 post fall at home, landing on left side. Pt reports she was unable to ambulate and fall resulted in femoral neck fracture, undergoing a left hemiarthoplasty on 22. \"Postoperatively, patient developed narrow complex tachyarrhythmia concerning for SVT versus A. fib with RVR status post adenosine x2 and DCCV x3.\" Pt presents to [facility] IPR unit on 22 s/p left hemiarthoplasty. Restrictions/Precautions:  Restrictions/Precautions: Surgical Protocols, Weight Bearing, General Precautions, Fall Risk  Left Lower Extremity Weight Bearing: Weight Bearing As Tolerated  Position Activity Restriction  Hip Precautions: No hip flexion > 90 degrees, No hip internal rotation, Posterior hip precautions, No ADduction  Other position/activity restrictions: KATHY precautions     SUBJECTIVE: Pt seated in bedside chair upon arrival, agreeable to OT session. PAIN: Did not rate: L Hip    Vitals: Vitals not assessed per clinical judgement, see nursing flowsheet    COGNITION: Decreased Insight, Decreased Problem Solving, and pt with min fear of falling. ADL:   Grooming: with set-up. For hair care seated in bedside chair  Lower Extremity Dressing: Moderate Assistance and Maximum Assistance.   Utilizing [facility] for education of hip kit (See Additional Activity). Toileting: Maximum Assistance. For nikos care after void. ModA to manage clothing up after void. Noted increased redness at bottom and B inner thighs- applied EPC to protect skin integrity. Toilet Transfer: Minimal Assistance. BSC . BALANCE:  Sitting Balance:  Supervision. Bedside chair  Standing Balance: Contact Guard Assistance, Minimal Assistance. Ken for 1 UE release at times. X1-2 minutes within ADL    BED MOBILITY:  Not Tested    TRANSFERS:  Sit to Stand:  Minimal Assistance. Stand to Sit: Contact Guard Assistance. FUNCTIONAL MOBILITY:  Assistive Device: Rolling Walker   Assist Level:  Contact Guard Assistance. Distance:   Completed functional mobility short distance within pt room chair<>BSC at slow pace, no LOB noted. Pt requires seated rest break after trial of mobility, min fatigue noted. ADDITIONAL ACTIVITIES:  Pt educated on benefits of LHAE to increase independence with LB care and maintain THR precautions. Therapist provided extensive time for verbal education and demonstration for reacher, sock aid, and long handled shoe horn/sponge. Pt able to trial reacher and sock aid with max cues for sequencing and verbalizes understanding for remaining items, however, needed encouragement to increase independence and needs to continue trials in order to maintain hip precautions. Continue to monitor    ASSESSMENT:     Activity Tolerance:  Patient tolerance of  treatment: good. Discharge Recommendations: Continue to assess pending progress and Patient would benefit from continued OT at discharge  Equipment Recommendations: Other: Pt may benefit from [facility] kit, RW, shower chair vs tub t/f bench, BSC.  Continue to Monitor  Plan: Times per Week: 5x wk/90 min 1x wk/30min  Times per Day: Daily  Current Treatment Recommendations: Functional mobility training, Balance training, Self-Care / ADL, Patient/Caregiver education & training, Safety education & training, Strengthening, Endurance training, Equipment evaluation, education, & procurement    Patient Education  Patient Education: ADL's, Precautions, Equipment Education, Importance of Increasing Activity, Assistive Device Safety, and Safety with transfers and mobility. Goals  Short Term Goals  Time Frame for Short term goals: 1 wk  Short Term Goal 1: Pt will tolerate 3 min of standing with 1-2 hand release and SBA for improved indep in sinkside grooming tasks  Short Term Goal 2: Pt will navigate room to gather needed supplies for ADL task with SBA and 0 vc for safety for PLOF in ADL routines. Short Term Goal 3: Pt will complete mobility to/from the bathroom with no more than SBA and min A for safety to progress to PLOF in ADL routines  Short Term Goal 4: Pt will complete simple snack/meal prep task with SBA and min VC for safety/problem solving for safe indep in IADL routines  Short Term Goal 5: Pt will complete community reintegration activity with SBA and min A for problem solving/KATHY precautions to progress to PLOF  Long Term Goals  Time Frame for Long term goals : 2 wks from IPR eval  Long Term Goal 1: Pt will complete UB/LB dressing with LHAE prn and Sup with 0 vc for safety or KATHY precautions to progress ADL performance to PLOF  Long Term Goal 2: Pt will complete UB/LB bathing with LHAE prn and Sup with 0vc for safety or KATHY precautions for improved ADL indep  Long Term Goal 3: Pt will complete oral care mod I with 0 vc for safety or precautions to progress ADL performance to PLOF  Long Term Goal 4: Pt will complete toileting/transfer with Sup and 0vc for safety or to maintain precautions for improved indep in toileting routine  Long Term Goal 5: Pt will demo competence with Karen Summers for 100% of time during ADL tasks to maintain KATHY precautions and increase indep in daily occupations    Following session, patient left in safe position with all fall risk precautions in place.

## 2022-08-20 PROCEDURE — 6370000000 HC RX 637 (ALT 250 FOR IP): Performed by: FAMILY MEDICINE

## 2022-08-20 PROCEDURE — 97530 THERAPEUTIC ACTIVITIES: CPT

## 2022-08-20 PROCEDURE — 6370000000 HC RX 637 (ALT 250 FOR IP): Performed by: NURSE PRACTITIONER

## 2022-08-20 PROCEDURE — 97535 SELF CARE MNGMENT TRAINING: CPT

## 2022-08-20 PROCEDURE — 97116 GAIT TRAINING THERAPY: CPT

## 2022-08-20 PROCEDURE — 1180000000 HC REHAB R&B

## 2022-08-20 PROCEDURE — 97110 THERAPEUTIC EXERCISES: CPT

## 2022-08-20 PROCEDURE — 6370000000 HC RX 637 (ALT 250 FOR IP): Performed by: PHYSICAL MEDICINE & REHABILITATION

## 2022-08-20 RX ADMIN — FAMOTIDINE 20 MG: 20 TABLET ORAL at 09:13

## 2022-08-20 RX ADMIN — HYDROCORTISONE ACETATE: 1 CREAM TOPICAL at 14:41

## 2022-08-20 RX ADMIN — HYDROCORTISONE ACETATE: 1 CREAM TOPICAL at 22:38

## 2022-08-20 RX ADMIN — ACETAMINOPHEN 650 MG: 325 TABLET ORAL at 03:52

## 2022-08-20 RX ADMIN — APIXABAN 2.5 MG: 2.5 TABLET, FILM COATED ORAL at 09:13

## 2022-08-20 RX ADMIN — Medication 5000 UNITS: at 09:13

## 2022-08-20 RX ADMIN — LORAZEPAM 0.5 MG: 0.5 TABLET ORAL at 22:37

## 2022-08-20 RX ADMIN — METOPROLOL TARTRATE 25 MG: 25 TABLET, FILM COATED ORAL at 22:39

## 2022-08-20 RX ADMIN — CETIRIZINE HYDROCHLORIDE 10 MG: 10 TABLET, FILM COATED ORAL at 09:13

## 2022-08-20 RX ADMIN — APIXABAN 2.5 MG: 2.5 TABLET, FILM COATED ORAL at 22:38

## 2022-08-20 RX ADMIN — ACETAMINOPHEN 650 MG: 325 TABLET ORAL at 07:48

## 2022-08-20 RX ADMIN — ACETAMINOPHEN 650 MG: 325 TABLET ORAL at 14:34

## 2022-08-20 RX ADMIN — LOPERAMIDE HYDROCHLORIDE 2 MG: 2 CAPSULE ORAL at 06:17

## 2022-08-20 RX ADMIN — Medication 1 TABLET: at 09:13

## 2022-08-20 RX ADMIN — LORAZEPAM 0.5 MG: 0.5 TABLET ORAL at 09:13

## 2022-08-20 RX ADMIN — LORAZEPAM 0.5 MG: 0.5 TABLET ORAL at 00:06

## 2022-08-20 RX ADMIN — METOPROLOL TARTRATE 25 MG: 25 TABLET, FILM COATED ORAL at 09:13

## 2022-08-20 RX ADMIN — HYDROCORTISONE ACETATE: 1 CREAM TOPICAL at 09:13

## 2022-08-20 RX ADMIN — ACETAMINOPHEN 650 MG: 325 TABLET ORAL at 22:37

## 2022-08-20 ASSESSMENT — PAIN SCALES - GENERAL
PAINLEVEL_OUTOF10: 4
PAINLEVEL_OUTOF10: 2
PAINLEVEL_OUTOF10: 2
PAINLEVEL_OUTOF10: 5
PAINLEVEL_OUTOF10: 2

## 2022-08-20 ASSESSMENT — PAIN DESCRIPTION - DESCRIPTORS
DESCRIPTORS: ACHING
DESCRIPTORS: ACHING

## 2022-08-20 ASSESSMENT — PAIN DESCRIPTION - ORIENTATION
ORIENTATION: LEFT

## 2022-08-20 ASSESSMENT — PAIN DESCRIPTION - PAIN TYPE: TYPE: ACUTE PAIN

## 2022-08-20 ASSESSMENT — PAIN DESCRIPTION - LOCATION
LOCATION: HEAD
LOCATION: LEG;HIP
LOCATION: HEAD
LOCATION: HEAD

## 2022-08-20 ASSESSMENT — PAIN DESCRIPTION - FREQUENCY: FREQUENCY: INTERMITTENT

## 2022-08-20 ASSESSMENT — PAIN DESCRIPTION - ONSET: ONSET: GRADUAL

## 2022-08-20 ASSESSMENT — PAIN - FUNCTIONAL ASSESSMENT: PAIN_FUNCTIONAL_ASSESSMENT: ACTIVITIES ARE NOT PREVENTED

## 2022-08-20 NOTE — PROGRESS NOTES
[address] [place] [address]  INPATIENT PHYSICAL THERAPY  DAILY NOTE  [facility] - 7E-54/054-A    Time In: 1400  Time Out: 1430  Timed Code Treatment Minutes: 30 Minutes  Minutes: 30          Date: 2022  Patient Name: Tra Coffman,  Gender:  female        MRN: [id number]  : 1940  (78 y.o.)  Referral Date : 22  Referring Practitioner: Pablo Schultz MD  Diagnosis: Hip fracture requiring operative repair, left, closed, initial encounter  Additional Pertinent Hx: Ga De Souza  is a 78 y.o. female admitted to the inpatient rehabilitation unit on 2022. She was originally admitted to [address] [place] [address] on 2022. Patient  has a past medical history of Osteoporosis of multiple sites without pathological fracture, Squamous cell carcinoma of skin, Vascular headache, and Vitamin D deficiency. Patient presented to [facility] after suffering a fall at home, landing on her left side. Patient was unable to ambulate and was found to have left femoral neck fracture. Patient was admitted and taken for left hemiarthroplasty with Dr Virgie Coburn on 22. Postoperatively, patient developed narrow complex tachyarrhythmia concerning for SVT versus A. fib with RVR status post adenosine x2 and DCCV x3. Patient currently off amiodarone infusion and is in normal sinus rhythm.   Patient is now on Lopressor 12.5 mg twice daily     Prior Level of Function:  Lives With: Son  Type of Home: House  Home Layout: Two level, Able to Live on Main level with bedroom/bathroom, Performs ADL's on one level, Laundry in basement (pt's bedroom is upstairs but reports she could live on main floor)  Home Access: Stairs to enter without rails  Entrance Stairs - Number of Steps: 2 ELOY  Home Equipment: Cane   Bathroom Shower/Tub: Tub/Shower unit, Curtain  Bathroom Toilet: Standard    Receives Help From: Family, Friend(s)  ADL Assistance: Independent  Homemaking Assistance: Independent  Homemaking Responsibilities: Yes  Ambulation Assistance: Independent (uses cane at night time only)  Transfer Assistance: Independent  Active : Yes  Additional Comments: Son recently (retired) moved in with pt. Pt amb with SC intermittently in the home, and uses SC for longer distances and at night    Restrictions/Precautions:  Restrictions/Precautions: Surgical Protocols, Weight Bearing, General Precautions, Fall Risk  Left Lower Extremity Weight Bearing: Weight Bearing As Tolerated  Position Activity Restriction  Hip Precautions: No hip flexion > 90 degrees, No hip internal rotation, Posterior hip precautions, No ADduction  Other position/activity restrictions: KATHY precautions     SUBJECTIVE: Pt initiating going to the restroom with her RN, approved PT session and allowing this therapist to assist with this. Time to remove pt's shoes and socks at end of session, RN aware of indentation from socks and swelling in B feet. B LE elevated with pillow. PAIN: states some soreness in her L knee, unrated    Vitals: Vitals not assessed per clinical judgement, see nursing flowsheet    OBJECTIVE:  Bed Mobility:  Not Tested    Transfers:  Sit to Stand: Contact Guard Assistance, Minimal Assistance  Stand to [address] with initial sit to stand transfer and transfers to and from the lower toilet surface, CGA with all others this date. Pt completed 2 sets of 5 reps of repeated sit to stand tranfers for improved technique and completion. Good carryover with this. Seated rest provided between sets. Ambulation:  Contact Guard Assistance, with verbal cues , with increased time for completion  Distance: 12'x2  Surface: Level Tile  Device:Rolling Walker  Gait Deviations:  Forward Flexed Posture, Slow Teresa, Decreased Step Length Bilaterally, Decreased Heel Strike Bilaterally, Mild Path Deviations, Unsteady Gait, and Decreased Terminal Knee Extension    Pt with improved teresa and fluidity of gait this afternoon, still with fwd flexion of the trunk, increased B UE support on RW and general antalgic gait pattern and unsteadiness. CGA provided to support this. Seated rest required following ambulation trials. Balance:  Static Sitting Balance:  Stand By Assistance, Contact Guard Assistance  Dynamic Sitting Balance: Stand By Assistance, Contact Guard Assistance  Static Standing Balance: Contact Guard Assistance  Dynamic Standing Balance: Contact Guard Assistance, Minimal Assistance  Pt completed functional tasks in bathroom with assist for stability and safety. Pt able to complete reaching tasks with B UE release from AD. Pt stood for ~2 mins. Pt demos zero LOB. Min A required for hygiene, however pt able to manage her pants this session. Exercise:  Patient was guided in 1 set(s) 12 reps of exercise to both lower extremities. Heelslides, Hip abduction/adduction, and Seated hamstring curls. Exercises were completed for increased independence with functional mobility. *Cues and demo provided for appropriate completion of exercises. Cues to maintain within a tolerable range provided. AAROM provided with L LE heelslides and hip abduction. Exercises completed within KATHY precautions. Functional Outcome Measures: Not completed       ASSESSMENT:  Assessment: Patient progressing toward established goals. Activity Tolerance:  Patient tolerance of  treatment: good. Pt tolerated further mobility this date with CGA provided with majority for safety and stability. pt with good participation and motivation.       Equipment Recommendations:Equipment Needed: Yes  Other: RW  Discharge Recommendations: Continue to assess pending progress and Patient would benefit from continued PT at discharge  Plan: Current Treatment Recommendations: Strengthening, Balance training, Functional mobility training, Transfer training, Neuromuscular re-education, Stair training, Gait training, Endurance training, Home exercise program, Safety education & training, Patient/Caregiver education & training, Equipment evaluation, education, & procurement, Therapeutic activities  Plan:  (5x/wk 90min, 1x/wk 30min)    Patient Education  Patient Education: Precautions/Restrictions, Reviewed Prior Education, Gait, Verbal Exercise Instruction, Activity Pacing    Goals:  Patient goals : \"just feel better\"  Short Term Goals  Time Frame for Short term goals: 1 week  Short term goal 1: Patient will complete rolling and supine < > sit with min assist with no bed rail and head of bed flat and maintain hip precautions to transfer in and out of bed with decreased difficulty. Short term goal 2: Patient will complete sit < > stand with CGA from various surfaces to stand to ambulate with decreased difficulty. Short term goal 3: Patient will ambulate 36' with a RW and CGA to progress towards ambulating household distances  Short term goal 4: Patient will ascend/descend, 1, 4\" step in parallel bars with min assist to progress towards safe home entry. Short term goal 5: Patient will complete car transfer with min assist to transfer in and out of vehicle for home  Long Term Goals  Time Frame for Long term goals : 3 weeks from initial evaluation  Long term goal 1: Patient will complete supine < > sit with modified independence to transfer in and out of bed safely. Long term goal 2: Patient will complete sit < > stand with modified independence to stand to ambulate safely. Long term goal 3: Patient will bed < > chair transfer with a RW and modified independence to transfer surface to surface safely. Long term goal 4: Patient will ambulate 80' with a RW and modified independence to navigate home safely. Long term goal 5: Patient will ascend/descend 2 steps with hand held assist and cane with min assist for home entry.   Additional Goals?: Yes  Long term goal 6: Patient will complete car transfer with SBA to transfer in and out of vehicle for transport to home and appointments    Following session, patient left in safe position with all fall risk precautions in place.

## 2022-08-20 NOTE — PROGRESS NOTES
[address]  Occupational Therapy  Daily Note  Time:   Time In: 0930  Time Out: 1100  Timed Code Treatment Minutes: 90 Minutes  Minutes: 90          Date: 2022  Patient Name: Gadiel Hampton,   Gender: female      Room: [facility]/054-A  MRN: [id number]  : 1940  (78 y.o.)  Referring Practitioner: Anamaria Landry MD  Diagnosis: Hip fracture requiring operative repair, left, closed, initial encounter. Additional Pertinent Hx: Per chart review, pt is an 78 y.o. female with a PMH of \"Osteoporosis of multiple sites without pathological fracture, Squamous cell carcinoma of skin, Vascular headache, and Vitamin D deficiency\". Pt originally admitted to [facility] on 22 post fall at home, landing on left side. Pt reports she was unable to ambulate and fall resulted in femoral neck fracture, undergoing a left hemiarthoplasty on 22. \"Postoperatively, patient developed narrow complex tachyarrhythmia concerning for SVT versus A. fib with RVR status post adenosine x2 and DCCV x3.\" Pt presents to [facility] IPR unit on 22 s/p left hemiarthoplasty. Restrictions/Precautions:  Restrictions/Precautions: Surgical Protocols, Weight Bearing, General Precautions, Fall Risk  Left Lower Extremity Weight Bearing: Weight Bearing As Tolerated  Position Activity Restriction  Hip Precautions: No hip flexion > 90 degrees, No hip internal rotation, Posterior hip precautions, No ADduction  Other position/activity restrictions: KATHY precautions     SUBJECTIVE: Up in chair upon arrival, agreeable to OT session    PAIN: 0/10:     Vitals: Vitals not assessed per clinical judgement, see nursing flowsheet    COGNITION: WFL    ADL:   Grooming: with set-up. Combed hair sitting in bedside chair  Toileting: Contact Guard Assistance. For clothing management  Toilet Transfer: Contact Guard Assistance. RTS . BALANCE:  Standing Balance: Contact Guard Assistance.  X 8 minutes with 1-2 UE support    BED MOBILITY:  Not Tested    TRANSFERS:  Sit to Stand:  Contact Guard Assistance. Bedside chair  Stand to Sit: [employer]. FUNCTIONAL MOBILITY:  Assistive Device: Rolling Walker  Assist Level:  Contact Guard Assistance. Distance: To and from bathroom  X 2 events, moderate VC for erect posture     ADDITIONAL ACTIVITIES:  Patient completed BUE strengthening exercises with skilled education on HEP: completed x10 reps x1 set with a orange band in all joints and all planes in order to improve UE strength and activity tolerance required for BADL routine and toilet / shower transfers. Patient tolerated well, requiring minimal rest breaks. Patient also required minimal cues for technique. Educated patient and son regarding purchasing options for reacher, voiced understanding Patient reported that son would help with socks and shoes    ASSESSMENT:     Activity Tolerance:  Patient tolerance of  treatment: good. Discharge Recommendations: Continue to assess pending progress  Equipment Recommendations: Other: Pt may benefit from [facility] kit, RW, shower chair vs tub t/f bench, BSC. Continue to Monitor  Plan: Times per Week: 5x wk/90 min 1x wk/30min  Times per Day: Daily  Current Treatment Recommendations: Functional mobility training, Balance training, Self-Care / ADL, Patient/Caregiver education & training, Safety education & training, Strengthening, Endurance training, Equipment evaluation, education, & procurement    Patient Education  Patient Education: ADL's, Home Exercise Program, and Precautions    Goals  Short Term Goals  Time Frame for Short term goals: 1 wk  Short Term Goal 1: Pt will tolerate 3 min of standing with 1-2 hand release and SBA for improved indep in sinkside grooming tasks  Short Term Goal 2: Pt will navigate room to gather needed supplies for ADL task with SBA and 0 vc for safety for PLOF in ADL routines.   Short Term Goal 3: Pt will complete mobility to/from the bathroom with no more than SBA and min A for safety to progress to PLOF in ADL routines  Short Term Goal 4: Pt will complete simple snack/meal prep task with SBA and min VC for safety/problem solving for safe indep in IADL routines  Short Term Goal 5: Pt will complete community reintegration activity with SBA and min A for problem solving/KATHY precautions to progress to PLOF  Long Term Goals  Time Frame for Long term goals : 2 wks from IPR eval  Long Term Goal 1: Pt will complete UB/LB dressing with LHAE prn and Sup with 0 vc for safety or KATHY precautions to progress ADL performance to PLOF  Long Term Goal 2: Pt will complete UB/LB bathing with LHAE prn and Sup with 0vc for safety or KATHY precautions for improved ADL indep  Long Term Goal 3: Pt will complete oral care mod I with 0 vc for safety or precautions to progress ADL performance to PLOF  Long Term Goal 4: Pt will complete toileting/transfer with Sup and 0vc for safety or to maintain precautions for improved indep in toileting routine  Long Term Goal 5: Pt will demo competence with Cathryn Hudson for 100% of time during ADL tasks to maintain KATHY precautions and increase indep in daily occupations    Following session, patient left in safe position with all fall risk precautions in place.

## 2022-08-20 NOTE — PLAN OF CARE
Problem: Discharge Planning  Goal: Discharge to home or other facility with appropriate resources  Outcome: Progressing  Flowsheets (Taken 8/20/2022 1121)  Discharge to home or other facility with appropriate resources:   Identify barriers to discharge with patient and caregiver   Identify discharge learning needs (meds, wound care, etc)   Arrange for needed discharge resources and transportation as appropriate   Refer to discharge planning if patient needs post-hospital services based on physician order or complex needs related to functional status, cognitive ability or social support system  Note: Patient will need either outpatient PT or home health     Problem: Pain  Goal: Verbalizes/displays adequate comfort level or baseline comfort level  Outcome: Progressing  Flowsheets (Taken 8/20/2022 1121)  Verbalizes/displays adequate comfort level or baseline comfort level:   Encourage patient to monitor pain and request assistance   Assess pain using appropriate pain scale   Administer analgesics based on type and severity of pain and evaluate response   Implement non-pharmacological measures as appropriate and evaluate response   Notify Licensed Independent Practitioner if interventions unsuccessful or patient reports new pain  Note: Patients pain currently controlled with current pain regimen     Problem: Skin/Tissue Integrity  Goal: Absence of new skin breakdown  Description: 1. Monitor for areas of redness and/or skin breakdown  2. Assess vascular access sites hourly  3. Every 4-6 hours minimum:  Change oxygen saturation probe site  4. Every 4-6 hours:  If on nasal continuous positive airway pressure, respiratory therapy assess nares and determine need for appliance change or resting period. Outcome: Progressing  Note: Patients rash on her back is resolving with current tx.      Problem: Safety - Adult  Goal: Free from fall injury  Flowsheets  Taken 8/20/2022 1121 by Preeti Aburto RN  Free From Fall Injury: Instruct family/caregiver on patient safety  Taken 8/20/2022 0059 by Cosme Ortiz RN  Free From Fall Injury: Instruct family/caregiver on patient safety  Note: Patient remains free of injury at this time     Problem: ABCDS Injury Assessment  Goal: Absence of physical injury  Outcome: Progressing  Flowsheets  Taken 8/20/2022 1121 by Eleuterio Moore RN  Absence of Physical Injury: Implement safety measures based on patient assessment  Taken 8/20/2022 0059 by Cosme Ortiz RN  Absence of Physical Injury: Implement safety measures based on patient assessment  Note: Patient free of injury at this time.

## 2022-08-20 NOTE — PROGRESS NOTES
[address] [address]  INPATIENT PHYSICAL THERAPY  DAILY NOTE  [facility] - [id number]    Time In: 0730  Time Out: 0830  Timed Code Treatment Minutes: 60 Minutes  Minutes: 60          Date: 2022  Patient Name: Vandana Reece,  Gender:  female        MRN: [id number]  : 1940  (78 y.o.)  Referral Date : 22  Referring Practitioner: Melissa Ortiz MD  Diagnosis: Hip fracture requiring operative repair, left, closed, initial encounter  Additional Pertinent Hx: Ga Mora  is a 78 y.o. female admitted to the inpatient rehabilitation unit on 2022. She was originally admitted to [address] [place] [address] on 2022. Patient  has a past medical history of Osteoporosis of multiple sites without pathological fracture, Squamous cell carcinoma of skin, Vascular headache, and Vitamin D deficiency. Patient presented to [facility] after suffering a fall at home, landing on her left side. Patient was unable to ambulate and was found to have left femoral neck fracture. Patient was admitted and taken for left hemiarthroplasty with Dr Florence Hernandez on 22. Postoperatively, patient developed narrow complex tachyarrhythmia concerning for SVT versus A. fib with RVR status post adenosine x2 and DCCV x3. Patient currently off amiodarone infusion and is in normal sinus rhythm.   Patient is now on Lopressor 12.5 mg twice daily     Prior Level of Function:  Lives With: Son  Type of Home: House  Home Layout: Two level, Able to Live on Main level with bedroom/bathroom, Performs ADL's on one level, Laundry in basement (pt's bedroom is upstairs but reports she could live on main floor)  Home Access: Stairs to enter without rails  Entrance Stairs - Number of Steps: 2 ELOY  Home Equipment: Cane   Bathroom Shower/Tub: Tub/Shower unit, Curtain  Bathroom Toilet: Standard    Receives Help From: Family, Friend(s)  ADL Assistance: Independent  Homemaking Assistance: Independent  Homemaking Responsibilities: Yes  Ambulation Assistance: Independent (uses cane at night time only)  Transfer Assistance: Independent  Active : Yes  Additional Comments: Son recently (retired) moved in with pt. Pt amb with SC intermittently in the home, and uses SC for longer distances and at night    Restrictions/Precautions:  Restrictions/Precautions: Surgical Protocols, Weight Bearing, General Precautions, Fall Risk  Left Lower Extremity Weight Bearing: Weight Bearing As Tolerated  Position Activity Restriction  Hip Precautions: No hip flexion > 90 degrees, No hip internal rotation, Posterior hip precautions, No ADduction  Other position/activity restrictions: KATHY precautions     SUBJECTIVE: Pt pleasantly agrees for PT session. Pt with need to use the [facility] quickly following activity in the rehab gym. Pt required time and assist for hygiene and change of pants following loose stool. RN aware. Pt also with noted redness on her bottom, RN aware. Pt did request anxiety medication from RN this session, but was not yet due for this. PAIN: 5/10: headache, RN aware and provided Tylenol     Vitals: Vitals not assessed per clinical judgement, see nursing flowsheet    OBJECTIVE:  Bed Mobility:  Supine to Sit: Minimal Assistance, with head of bed raised, with rail, with increased time for completion  Min A to bring L LE to the EOB and guide trunk to upright, cues for hip precautions with good demo with this   HOB~ 30degrees    Transfers:  Sit to Stand: Minimal Assistance, with increased time for completion, cues for hand placement  Stand to [address] [id number], with increased time for completion, cues for hand placement    Ambulation:  Contact Guard Assistance, Minimal Assistance, with verbal cues , with increased time for completion  Distance: 6', 3', 5'  Surface: Level Tile  Device:Rolling Walker  Gait Deviations:   Forward Flexed Posture, Slow Mague, Decreased Step Length Bilaterally, Decreased Heel Strike Bilaterally, Mild Path Deviations, Unsteady Gait, and Decreased Terminal Knee Extension  Pt noted to ambulate with increased B UE support on RW, and increased antalgic gait pattern. CGA to min A provided for stability with max cues for sequencing to destination. Balance:  Static Sitting Balance:  Stand By Assistance, Contact Guard Assistance  Dynamic Sitting Balance: Stand By Assistance, Contact Guard Assistance  Static Standing Balance: Contact Guard Assistance, Minimal Assistance  Dynamic Standing Balance: Minimal Assistance  Pt completed functional tasks in bathroom with assist for stability and safety. Pt able to complete reaching tasks with uni from AD. Pt stood for ~1 mins. Pt demos zero LOB. Pt required min A for hygiene and LE clothing management. Neuromuscular Facilitation:  Pt completed stance at the // bars with CGA to min A and completed x10 reps of B LE pod taps for improved foot clearance and force production at B LE and stability in stance. Pt participated in x10 reps of heel strike to marker on the ground within // bars with CGA to min A for improved stability with weight acceptance and ability to initiate swing phase at B LE. Exercise:  Patient was guided in 1 set(s) 12 reps of exercise to both lower extremities. Ankle pumps, Glut sets, Short arc quads, and Long arc quads. Exercises were completed for increased independence with functional mobility. *Cues and demo provided for appropriate completion of exercises. Cues to maintain within a tolerable range provided. All exercises completed within KATHY precautions     Functional Outcome Measures: Not completed       ASSESSMENT:  Assessment: Patient progressing toward established goals. Activity Tolerance:  Patient tolerance of  treatment: fair. Pt with some increased nervousness this morning, provided increased time for reassurance regarding loose stool she was experiencing this morning.  Pt required increased time and CGA to min A with increased cues for mobility this session. Equipment Recommendations:Equipment Needed: Yes  Other: RW  Discharge Recommendations: Continue to assess pending progress and Patient would benefit from continued PT at discharge  Plan: Current Treatment Recommendations: Strengthening, Balance training, Functional mobility training, Transfer training, Neuromuscular re-education, Stair training, Gait training, Endurance training, Home exercise program, Safety education & training, Patient/Caregiver education & training, Equipment evaluation, education, & procurement, Therapeutic activities  Plan:  (5x/wk 90min, 1x/wk 30min)    Patient Education  Patient Education: Precautions/Restrictions, [employer] Mobility, Equipment Education, Transfers, Gait, Verbal Exercise Instruction, Activity Pacing, Pursed Lip Breathing    Goals:  Patient goals : \"just feel better\"  Short Term Goals  Time Frame for Short term goals: 1 week  Short term goal 1: Patient will complete rolling and supine < > sit with min assist with no bed rail and head of bed flat and maintain hip precautions to transfer in and out of bed with decreased difficulty. Short term goal 2: Patient will complete sit < > stand with CGA from various surfaces to stand to ambulate with decreased difficulty. Short term goal 3: Patient will ambulate 36' with a RW and CGA to progress towards ambulating household distances  Short term goal 4: Patient will ascend/descend, 1, 4\" step in parallel bars with min assist to progress towards safe home entry. Short term goal 5: Patient will complete car transfer with min assist to transfer in and out of vehicle for home  Long Term Goals  Time Frame for Long term goals : 3 weeks from initial evaluation  Long term goal 1: Patient will complete supine < > sit with modified independence to transfer in and out of bed safely.   Long term goal 2: Patient will complete sit < > stand with modified independence to stand to ambulate safely. Long term goal 3: Patient will bed < > chair transfer with a RW and modified independence to transfer surface to surface safely. Long term goal 4: Patient will ambulate 80' with a RW and modified independence to navigate home safely. Long term goal 5: Patient will ascend/descend 2 steps with hand held assist and cane with min assist for home entry. Additional Goals?: Yes  Long term goal 6: Patient will complete car transfer with SBA to transfer in and out of vehicle for transport to home and appointments    Following session, patient left in safe position with all fall risk precautions in place.

## 2022-08-21 PROCEDURE — 6370000000 HC RX 637 (ALT 250 FOR IP): Performed by: PHYSICAL MEDICINE & REHABILITATION

## 2022-08-21 PROCEDURE — 1180000000 HC REHAB R&B

## 2022-08-21 PROCEDURE — 6370000000 HC RX 637 (ALT 250 FOR IP): Performed by: NURSE PRACTITIONER

## 2022-08-21 PROCEDURE — 6370000000 HC RX 637 (ALT 250 FOR IP): Performed by: FAMILY MEDICINE

## 2022-08-21 RX ADMIN — HYDROCORTISONE ACETATE: 1 CREAM TOPICAL at 08:27

## 2022-08-21 RX ADMIN — FAMOTIDINE 20 MG: 20 TABLET ORAL at 08:24

## 2022-08-21 RX ADMIN — LORAZEPAM 0.5 MG: 0.5 TABLET ORAL at 23:53

## 2022-08-21 RX ADMIN — METOPROLOL TARTRATE 25 MG: 25 TABLET, FILM COATED ORAL at 21:20

## 2022-08-21 RX ADMIN — APIXABAN 2.5 MG: 2.5 TABLET, FILM COATED ORAL at 08:23

## 2022-08-21 RX ADMIN — ACETAMINOPHEN 650 MG: 325 TABLET ORAL at 08:24

## 2022-08-21 RX ADMIN — ACETAMINOPHEN 650 MG: 325 TABLET ORAL at 05:58

## 2022-08-21 RX ADMIN — Medication 5000 UNITS: at 08:24

## 2022-08-21 RX ADMIN — ACETAMINOPHEN 650 MG: 325 TABLET ORAL at 14:30

## 2022-08-21 RX ADMIN — LORAZEPAM 0.5 MG: 0.5 TABLET ORAL at 08:24

## 2022-08-21 RX ADMIN — HYDROCORTISONE ACETATE: 1 CREAM TOPICAL at 13:22

## 2022-08-21 RX ADMIN — HYDROCORTISONE ACETATE: 1 CREAM TOPICAL at 17:19

## 2022-08-21 RX ADMIN — APIXABAN 2.5 MG: 2.5 TABLET, FILM COATED ORAL at 21:20

## 2022-08-21 RX ADMIN — CETIRIZINE HYDROCHLORIDE 10 MG: 10 TABLET, FILM COATED ORAL at 08:24

## 2022-08-21 RX ADMIN — ACETAMINOPHEN 650 MG: 325 TABLET ORAL at 21:20

## 2022-08-21 RX ADMIN — Medication 1 TABLET: at 08:23

## 2022-08-21 RX ADMIN — METOPROLOL TARTRATE 25 MG: 25 TABLET, FILM COATED ORAL at 08:23

## 2022-08-21 ASSESSMENT — PAIN SCALES - GENERAL
PAINLEVEL_OUTOF10: 0
PAINLEVEL_OUTOF10: 1
PAINLEVEL_OUTOF10: 1
PAINLEVEL_OUTOF10: 3
PAINLEVEL_OUTOF10: 2
PAINLEVEL_OUTOF10: 3

## 2022-08-21 ASSESSMENT — PAIN DESCRIPTION - DESCRIPTORS
DESCRIPTORS: ACHING

## 2022-08-21 ASSESSMENT — PAIN DESCRIPTION - PAIN TYPE
TYPE: SURGICAL PAIN
TYPE: SURGICAL PAIN

## 2022-08-21 ASSESSMENT — PAIN DESCRIPTION - FREQUENCY
FREQUENCY: INTERMITTENT
FREQUENCY: INTERMITTENT

## 2022-08-21 ASSESSMENT — PAIN DESCRIPTION - LOCATION
LOCATION: HIP

## 2022-08-21 ASSESSMENT — PAIN DESCRIPTION - ONSET
ONSET: GRADUAL
ONSET: GRADUAL

## 2022-08-21 ASSESSMENT — PAIN DESCRIPTION - ORIENTATION
ORIENTATION: LEFT

## 2022-08-21 NOTE — PLAN OF CARE
breakdown  Description: 1. Monitor for areas of redness and/or skin breakdown  2. Assess vascular access sites hourly  3. Every 4-6 hours minimum:  Change oxygen saturation probe site  4. Every 4-6 hours:  If on nasal continuous positive airway pressure, respiratory therapy assess nares and determine need for appliance change or resting period.   8/21/2022 1411 by Crystal Burr RN  Outcome: Progressing  8/21/2022 1406 by Crystal Burr RN  Outcome: Progressing  Note: Left hip healing without s/s infection     Problem: Safety - Adult  Goal: Free from fall injury  Outcome: Progressing  Flowsheets (Taken 8/21/2022 1406)  Free From Fall Injury: Instruct family/caregiver on patient safety  Note: Patient remains free from falls at this time

## 2022-08-21 NOTE — PLAN OF CARE
Problem: Discharge Planning  Goal: Discharge to home or other facility with appropriate resources  Outcome: Progressing  Flowsheets (Taken 8/21/2022 1406)  Discharge to home or other facility with appropriate resources:   Identify barriers to discharge with patient and caregiver   Arrange for needed discharge resources and transportation as appropriate   Identify discharge learning needs (meds, wound care, etc)   Arrange for interpreters to assist at discharge as needed   Refer to discharge planning if patient needs post-hospital services based on physician order or complex needs related to functional status, cognitive ability or social support system     Problem: Pain  Goal: Verbalizes/displays adequate comfort level or baseline comfort level  Outcome: Progressing  Flowsheets (Taken 8/21/2022 1406)  Verbalizes/displays adequate comfort level or baseline comfort level:   Encourage patient to monitor pain and request assistance   Assess pain using appropriate pain scale     Problem: Skin/Tissue Integrity  Goal: Absence of new skin breakdown  Description: 1. Monitor for areas of redness and/or skin breakdown  2. Assess vascular access sites hourly  3. Every 4-6 hours minimum:  Change oxygen saturation probe site  4. Every 4-6 hours:  If on nasal continuous positive airway pressure, respiratory therapy assess nares and determine need for appliance change or resting period.   Outcome: Progressing  Note: Left hip healing without s/s infection     Problem: Safety - Adult  Goal: Free from fall injury  Outcome: Progressing  Flowsheets (Taken 8/21/2022 1406)  Free From Fall Injury: Instruct family/caregiver on patient safety  Note: Patient remains free from falls at this time

## 2022-08-22 PROCEDURE — 97530 THERAPEUTIC ACTIVITIES: CPT

## 2022-08-22 PROCEDURE — 97535 SELF CARE MNGMENT TRAINING: CPT

## 2022-08-22 PROCEDURE — 99231 SBSQ HOSP IP/OBS SF/LOW 25: CPT | Performed by: NURSE PRACTITIONER

## 2022-08-22 PROCEDURE — 6370000000 HC RX 637 (ALT 250 FOR IP): Performed by: PHYSICAL MEDICINE & REHABILITATION

## 2022-08-22 PROCEDURE — 6370000000 HC RX 637 (ALT 250 FOR IP): Performed by: FAMILY MEDICINE

## 2022-08-22 PROCEDURE — 97110 THERAPEUTIC EXERCISES: CPT

## 2022-08-22 PROCEDURE — 6370000000 HC RX 637 (ALT 250 FOR IP): Performed by: NURSE PRACTITIONER

## 2022-08-22 PROCEDURE — 97116 GAIT TRAINING THERAPY: CPT

## 2022-08-22 PROCEDURE — 1180000000 HC REHAB R&B

## 2022-08-22 RX ADMIN — METOPROLOL TARTRATE 25 MG: 25 TABLET, FILM COATED ORAL at 21:22

## 2022-08-22 RX ADMIN — HYDROCORTISONE ACETATE: 1 CREAM TOPICAL at 21:21

## 2022-08-22 RX ADMIN — HYDROCORTISONE ACETATE: 1 CREAM TOPICAL at 09:43

## 2022-08-22 RX ADMIN — CETIRIZINE HYDROCHLORIDE 10 MG: 10 TABLET, FILM COATED ORAL at 09:40

## 2022-08-22 RX ADMIN — LORAZEPAM 0.5 MG: 0.5 TABLET ORAL at 09:40

## 2022-08-22 RX ADMIN — HYDROCORTISONE ACETATE: 1 CREAM TOPICAL at 13:22

## 2022-08-22 RX ADMIN — LORAZEPAM 0.5 MG: 0.5 TABLET ORAL at 17:23

## 2022-08-22 RX ADMIN — HYDROCORTISONE ACETATE: 1 CREAM TOPICAL at 17:23

## 2022-08-22 RX ADMIN — Medication 5000 UNITS: at 09:40

## 2022-08-22 RX ADMIN — METOPROLOL TARTRATE 25 MG: 25 TABLET, FILM COATED ORAL at 08:38

## 2022-08-22 RX ADMIN — APIXABAN 2.5 MG: 2.5 TABLET, FILM COATED ORAL at 08:38

## 2022-08-22 RX ADMIN — FAMOTIDINE 20 MG: 20 TABLET ORAL at 08:38

## 2022-08-22 RX ADMIN — ACETAMINOPHEN 650 MG: 325 TABLET ORAL at 21:22

## 2022-08-22 RX ADMIN — APIXABAN 2.5 MG: 2.5 TABLET, FILM COATED ORAL at 21:22

## 2022-08-22 RX ADMIN — ACETAMINOPHEN 650 MG: 325 TABLET ORAL at 13:54

## 2022-08-22 RX ADMIN — ACETAMINOPHEN 650 MG: 325 TABLET ORAL at 02:21

## 2022-08-22 RX ADMIN — Medication 1 TABLET: at 09:40

## 2022-08-22 RX ADMIN — ACETAMINOPHEN 650 MG: 325 TABLET ORAL at 08:38

## 2022-08-22 ASSESSMENT — PAIN DESCRIPTION - DESCRIPTORS
DESCRIPTORS: ACHING
DESCRIPTORS: ACHING;DISCOMFORT

## 2022-08-22 ASSESSMENT — PAIN DESCRIPTION - LOCATION
LOCATION: HIP
LOCATION: HIP;KNEE
LOCATION: HIP
LOCATION: FOOT;LEG

## 2022-08-22 ASSESSMENT — PAIN DESCRIPTION - PAIN TYPE: TYPE: SURGICAL PAIN

## 2022-08-22 ASSESSMENT — PAIN DESCRIPTION - FREQUENCY: FREQUENCY: INTERMITTENT

## 2022-08-22 ASSESSMENT — PAIN SCALES - GENERAL
PAINLEVEL_OUTOF10: 2
PAINLEVEL_OUTOF10: 0
PAINLEVEL_OUTOF10: 2

## 2022-08-22 ASSESSMENT — PAIN DESCRIPTION - ONSET: ONSET: GRADUAL

## 2022-08-22 ASSESSMENT — PAIN DESCRIPTION - ORIENTATION
ORIENTATION: LEFT

## 2022-08-22 ASSESSMENT — PAIN - FUNCTIONAL ASSESSMENT: PAIN_FUNCTIONAL_ASSESSMENT: ACTIVITIES ARE NOT PREVENTED

## 2022-08-22 ASSESSMENT — PAIN SCALES - WONG BAKER: WONGBAKER_NUMERICALRESPONSE: 2

## 2022-08-22 NOTE — PROGRESS NOTES
[address] NOTE    Conference Date: 2022  Admit Date:  2022  1:33 PM  Patient Name: Antwan Hobbs    MRN: [id number]    : 1940  (78 y.o.)  Rehabilitation Admitting Diagnosis:  Hip fracture requiring operative repair, left, closed, initial encounter [facility]) Sully Brown  Referring Practitioner: Cande Massey MD      CASE MANAGEMENT  Current issues/needs regarding patient and family discharge status: Prior to admission, patient was independent with her ADLs, finances, housekeeping, meal prep, some errands and driving. Patient's son, Teresa Cortes lives with her. He helps managing her some home maintenance and driving. Patient reports that her son is retired and available during the day. Patient is a retired . She is  for 2 years and talked at length about caring for her  at home. Patient has positive supports in her son and friends. Patient's family physician is Dr Leticia Rader and cardiologist is Dr Paula Dowling. Patient was not on blood thinners or diabetic medications prior to admission. Patient prefers [address] Patient reports having a straight cane at home. Patient was not using community resources at home. Patient is motivated to work with therapy. PHYSICAL THERAPY  Ms Ronny Lopez met 4/5 STG's and 0/6 LTG's. Pt is making gradual progress towards goals set for her, limited by pain, anxiousness and weakness. Pt has progressed sit < > stand from CGA? min assist to CGA, supine < > sit from max/min assist to min assist, gait from 5' with a RW and min assist to up to 36' with a RW and CGA. Patient has 2 steps to enter home with no handrails, and is only completing 4\" step in parallel bars with CGA at this time. Patient would benefit from skilled PT services to improve her ability to complete functional mobility, reduce her risk for falls and allow patient to return to OF.   Equipment Needed: Yes  Other: RW    SPEECH THERAPY       OCCUPATIONAL THERAPY  Zakia Brito is making good steady progress towards goals. She currently requires max-modA for LB ADLs, education has been started on use of LHAE with max cues for sequencing required. She is able to complete toileting tasks with modA for hygiene and Dominic for clothing management. At times patient requires encouragement for completion of tasks. She continues to required verbal cues for KATHY precautions, able to recall 2/3 precautions. Zakia Brito is able to complete IADL tasks with good problem solving however continues to require cueing for hip precautions and safety awareness with use of RW including proper placement. She continues to demonstrate with decreased balance, activity tolerance and strength which also cause barriers to patient meeting her goals. She requires skilled OT interventions for skilled education and training on adaptive strategies and modifications for all ADLs and safety/fall prevention for returning to home independently with KATHY precautions. Without skilled OT, she is at risk for future falls and dependency on caregiver for all ADLs/IADLs in which can greatly impact her quality of life. Other: Pt may benefit from [facility] kit, RW, shower chair vs tub t/f bench, BSC. Continue to Monitor    RECREATIONAL THERAPY  Patient has been offered participation in recreational therapy activities and participates as able. Pt enjoyed getting her hair washed, trimmed and styled by our beautician-affect bright and social-talked about her days as a -so appreciative  -Pt was independent at home with her cooking, cleaning and driving-she loves to read, has tv on all day and gets on her phone and plays word puzzles on it-states she likes to watch ball games and her son has a dog Rozanne Milling that is missing her but she will wait until she goes home to see him-pleasant and social  -Pt in bed after her last therapy resting and trying to find a radio station on her radio to listen to -she is content in her room with her tv, the newspaper and her radio-states she is an avid reader but not up to reading in here just yet-pt would like to get her hair washed and styled by our beautician again on Thursday-affect bright and social-no leisure needs at this time      NUTRITION  Weight: 141 lb 8 oz (64.2 kg) / Body mass index is 22.84 kg/m². Current diet: ADULT DIET; Regular  Please see nutrition note for details. NURSING  Continent of Bowel: Yes. Frequency: 8/20. Management: patient was taking immodium as needed for loose stools. Continent of Bladder: Yes. Frequency: 4-6 times day. Management: N/A. Pain is Managed:  Yes. Management: scheduled tylenol. Frequency of Intervention: every 6 hours. Adequately Controlled: Yes  Sleep: Adequate with lights on and music  Signs and Symptoms of Infection:  No.   Signs and Symptoms of Skin Breakdown:  Yes. Site: back. Wound Care: rash. Injury and/or Falls during Inpatient Rehabilitation Admission: No  Anticoagulants: Eliquis  Diabetic: No  Consultations/Labs/X-rays: N/A  Oxygen while on IP Rehab:  No Currently using  0 liters per 0  . Home oxygen: No    No results for input(s): POCGLU in the last 72 hours. No results found for: LDLCALC, LDLCHOLESTEROL, LDLDIRECT      Vitals:    08/21/22 0717 08/21/22 1440 08/21/22 2114 08/22/22 0726   BP: 128/64 131/60 134/64 (!) 143/62   Pulse: 67 63 68 69   Resp: 16  16 20   Temp: 97.6 °F (36.4 °C)  98.3 °F (36.8 °C) 97.4 °F (36.3 °C)   TempSrc: Oral  Oral Oral   SpO2: 96% 98% 98% 96%   Weight:       Height:              Family Education: {Blank single:19197::\"Family available and participating in education \",\"No family available for education\",\"Need to make contact with family to initiate education\"}  Fall Risk:  {Blank single:19197::\"No\",\"Falling star program initiated\",\"Ultra high fall risk program initiated\"}  Is the patient appropriate for a stay in the functional apartment?  {YES/NO:19670}    Discharge Plan   Estimated

## 2022-08-22 NOTE — PROGRESS NOTES
[address] [place] [address]  INPATIENT PHYSICAL THERAPY  PROGRESS NOTE  3 [facility]    Time In: 1330  Time Out: 1435  Timed Code Treatment Minutes: 60 Minutes  Minutes: 65          Date: 2022  Patient Name: Vandana Reece,  Gender:  female        MRN: [id number]  : 1940  (78 y.o.)  Referral Date : 22  Referring Practitioner: Melissa Ortiz MD  Diagnosis: Hip fracture requiring operative repair, left, closed, initial encounter  Additional Pertinent Hx: Ga Mora  is a 78 y.o. female admitted to the inpatient rehabilitation unit on 2022. She was originally admitted to [address] [place] [address] on 2022. Patient  has a past medical history of Osteoporosis of multiple sites without pathological fracture, Squamous cell carcinoma of skin, Vascular headache, and Vitamin D deficiency. Patient presented to [facility] after suffering a fall at home, landing on her left side. Patient was unable to ambulate and was found to have left femoral neck fracture. Patient was admitted and taken for left hemiarthroplasty with Dr Florence Hernandez on 22. Postoperatively, patient developed narrow complex tachyarrhythmia concerning for SVT versus A. fib with RVR status post adenosine x2 and DCCV x3. Patient currently off amiodarone infusion and is in normal sinus rhythm.   Patient is now on Lopressor 12.5 mg twice daily     Prior Level of Function:  Lives With: Son  Type of Home: House  Home Layout: Two level, Able to Live on Main level with bedroom/bathroom, Performs ADL's on one level, Laundry in basement (pt's bedroom is upstairs but reports she could live on main floor)  Home Access: Stairs to enter without rails  Entrance Stairs - Number of Steps: 2 ELOY  Home Equipment: Cane   Bathroom Shower/Tub: Tub/Shower unit, Curtain  Bathroom Toilet: Standard    Receives Help From: Family, Friend(s)  ADL Assistance: Independent  Homemaking Assistance: Independent  Homemaking Responsibilities: Yes  Ambulation Assistance: Independent (uses cane at night time only)  Transfer Assistance: Independent  Active : Yes  Additional Comments: Son recently (retired) moved in with pt. Pt amb with SC intermittently in the home, and uses SC for longer distances and at night    Restrictions/Precautions:  Restrictions/Precautions: Surgical Protocols, Weight Bearing, General Precautions, Fall Risk  Left Lower Extremity Weight Bearing: Weight Bearing As Tolerated  Position Activity Restriction  Hip Precautions: No hip flexion > 90 degrees, No hip internal rotation, Posterior hip precautions, No ADduction  Other position/activity restrictions: KATHY precautions     SUBJECTIVE: Patient in recliner upon arrival, agreed and cooperative for therapy. Multiple requests before leaving room, like using BR and needing pain meds before going down for therapy. PAIN: 3/10: Left hip    Vitals: Vitals not assessed per clinical judgement, see nursing flowsheet    OBJECTIVE:  Bed Mobility:  Supine to Sit: Minimal Assistance, X 1, with head of bed flat, without rail, with verbal cues , with increased time for completion  Sit to Supine: Minimal Assistance, X 1, with head of bed flat, without rail, with verbal cues , with increased time for completion     Transfers:  Sit to Stand: [address], with increased time for completion, cues for hand placement  Stand to [facility], with verbal cues for lining up with chair correctly   To/From Bed and Chair: Contact Guard Assistance, using [address], with increased time for completion, cues for hand placement, with verbal cues for hip precautions. Required Min. A to get LLE in/out of car and to maintain hip precautions. Ambulation:  Contact Guard Assistance, with verbal cues , with increased time for completion  Distance: 8 ft x1; 4 ft. X1; 6 ft.  X1   Surface: Level Tile  Device:Rolling Walker  Gait Deviations: Forward Flexed Posture, Slow Mague, Decreased Step Length on Right, Decreased Weight Shift Left, Decreased Gait Speed, and Decreased Heel Strike Bilaterally    Stairs:  Platform:  4\" platform X 2 using Parallel Bars and Contact Guard Assistance, with verbal cues , with increased time for completion. Patient reports thinking her steps are possibly double the size of this one. Balance:  Static Standing Balance: Stand By Assistance, Contact Guard Assistance  Dynamic Standing Balance: Contact Guard Assistance    Functional Outcome Measures: Not completed       ASSESSMENT:  Assessment: Patient progressing toward established goals. PT ASSESSMENT:  Ms Romaine Cristina met 3/7 STG's and 0/6 LTG's. Pt is making gradual progress towards goals set for her, limited by pain, anxiousness and weakness. Pt has progressed sit < > stand from CGA? min assist to CGA, supine < > sit from max/min assist to min assist, gait from 5' with a RW and min assist to up to 36' with a RW and CGA. Patient has 2 steps to enter home with no handrails, and is only completing 4\" step in parallel bars with CGA at this time. Patient would benefit from skilled PT services to improve her ability to complete functional mobility, reduce her risk for falls and allow patient to return to OF. Activity Tolerance:  Patient tolerance of  treatment: good.       Equipment Recommendations:Equipment Needed: Yes  Other: RW  Discharge Recommendations: Continue to assess pending progress and Patient would benefit from continued PT at discharge  Plan: Current Treatment Recommendations: Strengthening, Balance training, Functional mobility training, Transfer training, Neuromuscular re-education, Stair training, Gait training, Endurance training, Home exercise program, Safety education & training, Patient/Caregiver education & training, Equipment evaluation, education, & procurement, Therapeutic activities  Plan:  (5x/wk 90min, 1x/wk 30min)    Patient Education  Patient Education: Plan of Care, Precautions/Restrictions, Bed Mobility, Transfers, Reviewed Prior Education, Gait, Stairs, Car Transfers, Education Related to Prevention of Recurrence of Impairment/Illness/Injury, Health Promotion and Wellness Education, Home Safety Education, - Patient Requires Continued Education    Goals:  Patient goals : \"just feel better\"  Short Term Goals  Time Frame for Short term goals: 1 week  Short term goal 1: Patient will complete rolling and supine < > sit with min assist with no bed rail and head of bed flat and maintain hip precautions to transfer in and out of bed with decreased difficulty. MET, SEE LTG  Short term goal 2: Patient will complete sit < > stand with CGA from various surfaces to stand to ambulate with decreased difficulty. MET, SEE LTG  Short term goal 3: Patient will ambulate 36' with a RW and CGA to progress towards ambulating household distances MET, SEE LTG   Short term goal 4: Patient will ascend/descend, 1, 4\" step in parallel bars with min assist to progress towards safe home entry. MET, SEE LTG  Short term goal 5: Patient will complete car transfer with min assist to transfer in and out of vehicle for home NOT MET   Long Term Goals  Time Frame for Long term goals : 3 weeks from initial evaluation  Long term goal 1: Patient will complete supine < > sit with modified independence to transfer in and out of bed safely. NOT MET   Long term goal 2: Patient will complete sit < > stand with modified independence to stand to ambulate safely. NOT MET   Long term goal 3: Patient will bed < > chair transfer with a RW and modified independence to transfer surface to surface safely. NOT MET   Long term goal 4: Patient will ambulate 80' with a RW and modified independence to navigate home safely. NOT MET   Long term goal 5: Patient will ascend/descend 2 steps with hand held assist and cane with min assist for home entry.  NOT MET   Additional Goals?: Yes  Long term goal 6: Patient will complete car transfer with SBA to transfer in and out of vehicle for transport to home and appointments  NOT MET     Revised Short-Term Goals:    Short Term Goals  Time Frame for Short term goals: 1 week  Short term goal 1: Patient will complete rolling and supine < > sit with min assist with no bed rail and head of bed flat and maintain hip precautions to transfer in and out of bed with decreased difficulty. GOAL MET, SEE LTG  Short term goal 2: Patient will complete sit < > stand with CGA from various surfaces to stand to ambulate with decreased difficulty. GOAL MET, SEE LTG  Short term goal 3: Patient will ambulate Skip  1560' with a RW and SBA to progress towards ambulating household distances  Short term goal 4: Patient will ascend/descend 2, 6\" steps with 2 hand rails and CGA to progress towards safe home entry. Short term goal 5: Patient will complete car transfer with min assist to transfer in and out of vehicle for home   Revised Long-Term Goals  Long Term Goals  Time Frame for Long term goals : 3 weeks from initial evaluation  Long term goal 1: Patient will complete supine < > sit with modified independence to transfer in and out of bed safely. Long term goal 2: Patient will complete sit < > stand with modified independence to stand to ambulate safely. Long term goal 3: Patient will bed < > chair transfer with a RW and modified independence to transfer surface to surface safely. Long term goal 4: Patient will ambulate 80' with a RW and modified independence to navigate home safely. Long term goal 5: Patient will ascend/descend 2 steps with hand held assist and cane with min assist for home entry. Additional Goals?: Yes  Long term goal 6: Patient will complete car transfer with SBA to transfer in and out of vehicle for transport to home and appointments     Following session, patient left in safe position with all fall risk precautions in place.

## 2022-08-22 NOTE — PROGRESS NOTES
[facility]  Inpatient Rehabilitation  Occupational Therapy  Progress Note  Time:  Time In: 730  Time Out: 830  Timed Code Treatment Minutes: 60 Minutes  Minutes: 60          Date: 2022  Patient Name: Urmila Pollard,   Gender: female      Room: 7E-54/054-A  MRN: [id number]  : 1940  (78 y.o.)  Referring Practitioner: Pamela Rivera MD  Diagnosis: Hip fracture requiring operative repair, left, closed, initial encounter. Additional Pertinent Hx: Per chart review, pt is an 78 y.o. female with a PMH of \"Osteoporosis of multiple sites without pathological fracture, Squamous cell carcinoma of skin, Vascular headache, and Vitamin D deficiency\". Pt originally admitted to [facility] on 22 post fall at home, landing on left side. Pt reports she was unable to ambulate and fall resulted in femoral neck fracture, undergoing a left hemiarthoplasty on 22. \"Postoperatively, patient developed narrow complex tachyarrhythmia concerning for SVT versus A. fib with RVR status post adenosine x2 and DCCV x3.\" Pt presents to [facility] IPR unit on 22 s/p left hemiarthoplasty. Restrictions/Precautions:  Restrictions/Precautions: Surgical Protocols, Weight Bearing, General Precautions, Fall Risk  Left Lower Extremity Weight Bearing: Weight Bearing As Tolerated  Position Activity Restriction  Hip Precautions: No hip flexion > 90 degrees, No hip internal rotation, Posterior hip precautions, No ADduction  Other position/activity restrictions: KATHY precautions    SUBJECTIVE: Patient completing functional mobility to bathroom with nurse upon arrival, therapist took over at this time. Patient agreeable to OT session     PAIN: Complains of pain in L hip, did not rate    Vitals: Vitals not assessed per clinical judgement, see nursing flowsheet    COGNITION: WFL- decreased recall on hip precautions, able to recall 2/3 precautions    ADL:   Feeding: With setup for breakfast tray  Grooming: Contact Guard Assistance. - close SBA standing sinkside to wash hands after toileting tasks x2 trials  Toileting: Moderate Assistance. For hygiene after small BM x2 trials. Ken to pull up pants over L side of hip  Toilet Transfer: [address] To/from RTS x2 trials with increased time. BALANCE:  Sitting Balance:  Supervision. Standing Balance: Contact Guard Assistance. - close SBA     BED MOBILITY:  Not Tested    TRANSFERS:  Sit to Stand:  Contact Guard Assistance. From various surfaces with increased time  Stand to Sit: Contact Guard Assistance. To various surfaces with increased time     FUNCTIONAL MOBILITY:  Assistive Device: Rolling Walker  Assist Level:  Contact Guard Assistance. Distance: To and from bathroom x2 trials and throughout therapy kitchen   Slow pace, no LOB noted. Minimal verbal cues for upright posture throughout. ADDITIONAL ACTIVITIES:  Patient completed IADL homemaking task this date of making peanut butter sandwich - task was graded to challenge balance, safety, and activity tolerance. Patient was able to retrieve all items from graded heights with CGA for balance and 3-4 VCs for safety during the retrieval and transportation of items. Patient utilized reacher x1 occasion to maintain hip precautions. Patient able to problem solve adequately without cueing provided. Patient required 0 seated rest breaks throughout task with a standing endurance of 6 minutes. Patient required minimal verbal cues for walker safety/placement throughout. Patient completed functional mobility around therapy kitchen to retrieve cones from graded heights while maintaining hip precautions. 1 cue to maintain while reaching into lower cupboard, with education provided on use of reacher. Patient required minimal verbal cues for walker placement/safety throughout. Tolerated task for approx 5 minutes before requiring seated rest break.  Completed to increase activity tolerance and safety awareness with use of RW and maintaining hip precautions required for simple meal prep tasks. ASSESSMENT:  Activity Tolerance:  Patient tolerance of  treatment: fair. Assessment: Ragini Espinoza is making good steady progress towards goals. She currently requires max-modA for LB ADLs, education has been started on use of LHAE with max cues for sequencing required. She is able to complete toileting tasks with modA for hygiene and Dominic for clothing management. At times patient requires encouragement for completion of tasks. She continues to required verbal cues for KATHY precautions, able to recall 2/3 precautions. Ragini Espinoza is able to complete IADL tasks with good problem solving however continues to require cueing for hip precautions and safety awareness with use of RW including proper placement. She continues to demonstrate with decreased balance, activity tolerance and strength which also cause barriers to patient meeting her goals. She requires skilled OT interventions for skilled education and training on adaptive strategies and modifications for all ADLs and safety/fall prevention for returning to home independently with KATHY precautions. Without skilled OT, she is at risk for future falls and dependency on caregiver for all ADLs/IADLs in which can greatly impact her quality of life. Discharge Recommendations: Continue to assess pending progress, Patient would benefit from continued therapy after discharge  Equipment Recommendations: Other: Pt may benefit from [facility] kit, RW, shower chair vs tub t/f bench, BSC.  Continue to Monitor  Plan: Times per Week: 5x wk/90 min 1x wk/30min  Times per Day: Daily  Current Treatment Recommendations: Functional mobility training, Balance training, Self-Care / ADL, Patient/Caregiver education & training, Safety education & training, Strengthening, Endurance training, Equipment evaluation, education, & procurement    Patient Education  Patient Education:  safety with transfers and mobility, IADL strategies, ADL

## 2022-08-22 NOTE — PROGRESS NOTES
[address]  Occupational Therapy  Daily Note  Time:    Time In: 1000  Time Out: 1030  Timed Code Treatment Minutes: 30 Minutes  Minutes: 30          Date: 2022  Patient Name: Shilpa Luna,   Gender: female      Room: -54/054-A  MRN: [id number]  : 1940  (78 y.o.)  Referring Practitioner: Jarrett Artis MD  Diagnosis: Hip fracture requiring operative repair, left, closed, initial encounter. Additional Pertinent Hx: Per chart review, pt is an 78 y.o. female with a PMH of \"Osteoporosis of multiple sites without pathological fracture, Squamous cell carcinoma of skin, Vascular headache, and Vitamin D deficiency\". Pt originally admitted to [facility] on 22 post fall at home, landing on left side. Pt reports she was unable to ambulate and fall resulted in femoral neck fracture, undergoing a left hemiarthoplasty on 22. \"Postoperatively, patient developed narrow complex tachyarrhythmia concerning for SVT versus A. fib with RVR status post adenosine x2 and DCCV x3.\" Pt presents to [facility] IPR unit on 22 s/p left hemiarthoplasty. Restrictions/Precautions:  Restrictions/Precautions: Surgical Protocols, Weight Bearing, General Precautions, Fall Risk  Left Lower Extremity Weight Bearing: Weight Bearing As Tolerated  Position Activity Restriction  Hip Precautions: No hip flexion > 90 degrees, No hip internal rotation, Posterior hip precautions, No ADduction  Other position/activity restrictions: KATHY precautions      SUBJECTIVE: Pt was up in recliner upon arrival, very pleasant and agreeable to OT. PAIN: 2/10    Vitals: Vitals not assessed per clinical judgement, see nursing flowsheet    COGNITION: Decreased Insight, Decreased Problem Solving, and Decreased Safety Awareness    ADL:   Grooming: Contact Guard Assistance. Standing at sink to wash hands  Toileting: Minimal Assistance. For pulling pants up in back.  Pt able to pull pants down in standing with CGA and B hand release. Increased difficulty after completion of task. Toilet Transfer: Contact Guard Assistance. To/from [facility] over toilet . BALANCE:  Sitting Balance:  Stand By Assistance. Standing Balance: Contact Guard Assistance. With 1-2 UE release    BED MOBILITY:  Sit to Supine: Minimal Assistance to guide LLE onto bed with fatigue  Scooting: Minimal Assistance      TRANSFERS:  Sit to Stand:  Contact Guard Assistance. From recliner and w/c, increased time for completion and for translation of B hands from w/c to walker  Stand to Sit: [address] To EOB and w/c, good safety awareness of body positioning and recall of hand positioning    FUNCTIONAL MOBILITY:  Assistive Device: Rolling Walker  Assist Level:  Contact Guard Assistance. Distance: To and from bathroom and within therapy apartment  Very slow pace, steady with no LOB     ADDITIONAL ACTIVITIES:  Patient completed IADL laundry task that facilitated retrieving linens from graded planes and heights, including floor level with reacher, to challenge RW safety, standing endurance / balance - pt required CGA for balance and demo a standing endurance of 12 minutes. Pt transported linens to washer and dryer by draping over RW and stood to fold all linens with B hand release and CGA for balance. Pt demoed good balance throughout. Skilled education completed on RW safety techniques and fall prevention strategies with patient demo'ing fair carryover and recall of hip precautions - primarily requiring VC for no rotation when turning, requiring additional min cues for RW safety. ASSESSMENT:  Assessment: Unique Murillo is making good steady progress towards goals. She currently requires max-modA for LB ADLs, education has been started on use of LHAE with max cues for sequencing required. She is able to complete toileting tasks with modA for hygiene and Dominic for clothing management.  At times patient requires encouragement for completion of tasks. She continues to required verbal cues for KATHY precautions, able to recall 2/3 precautions. Sasha Paulino is able to complete IADL tasks with good problem solving however continues to require cueing for hip precautions and safety awareness with use of RW including proper placement. She continues to demonstrate with decreased balance, activity tolerance and strength which also cause barriers to patient meeting her goals. She requires skilled OT interventions for skilled education and training on adaptive strategies and modifications for all ADLs and safety/fall prevention for returning to home independently with KATHY precautions. Without skilled OT, she is at risk for future falls and dependency on caregiver for all ADLs/IADLs in which can greatly impact her quality of life. Activity Tolerance:  Patient tolerance of  treatment: good. Discharge Recommendations: Continue to assess pending progress and Patient would benefit from continued OT at discharge  Equipment Recommendations: Other: Pt may benefit from [facility] kit, RW, shower chair vs tub t/f bench, BSC. Continue to Monitor  Plan: Times per Week: 5x wk/90 min 1x wk/30min  Times per Day: Daily  Current Treatment Recommendations: Functional mobility training, Balance training, Self-Care / ADL, Patient/Caregiver education & training, Safety education & training, Strengthening, Endurance training, Equipment evaluation, education, & procurement    Patient Education  Patient Education: Plan of Care, Home Exercise Program, Reviewed Prior Education, and Importance of Increasing Activity    Goals  Short Term Goals  Time Frame for Short term goals: 1 wk  Short Term Goal 1: Pt will tolerate 3 min of standing with 1-2 hand release and SBA for improved indep in sinkside grooming tasks  Short Term Goal 2: Pt will navigate room to gather needed supplies for ADL task with SBA and 0 vc for safety for PLOF in ADL routines.   Short Term Goal 3: Pt will complete mobility to/from the bathroom with no more than SBA and min A for safety to progress to PLOF in ADL routines  Short Term Goal 4: Pt will complete simple snack/meal prep task with SBA and min VC for safety/problem solving for safe indep in IADL routines  Short Term Goal 5: Pt will complete community reintegration activity with SBA and min A for problem solving/KATHY precautions to progress to PLOF  Long Term Goals  Time Frame for Long term goals : 2 wks from IPR eval  Long Term Goal 1: Pt will complete UB/LB dressing with LHAE prn and Sup with 0 vc for safety or KATHY precautions to progress ADL performance to PLOF  Long Term Goal 2: Pt will complete UB/LB bathing with LHAE prn and Sup with 0vc for safety or KATHY precautions for improved ADL indep  Long Term Goal 3: Pt will complete oral care mod I with 0 vc for safety or precautions to progress ADL performance to PLOF  Long Term Goal 4: Pt will complete toileting/transfer with Sup and 0vc for safety or to maintain precautions for improved indep in toileting routine  Long Term Goal 5: Pt will demo competence with Shilpa Nieves for 100% of time during ADL tasks to maintain KATHY precautions and increase indep in daily occupations    Following session, patient left in safe position with all fall risk precautions in place.

## 2022-08-22 NOTE — PLAN OF CARE
Problem: Discharge Planning  Goal: Discharge to home or other facility with appropriate resources  Outcome: Progressing  Flowsheets (Taken 8/22/2022 1522)  Discharge to home or other facility with appropriate resources:   Identify barriers to discharge with patient and caregiver   Arrange for needed discharge resources and transportation as appropriate   Identify discharge learning needs (meds, wound care, etc)   Arrange for interpreters to assist at discharge as needed   Refer to discharge planning if patient needs post-hospital services based on physician order or complex needs related to functional status, cognitive ability or social support system     Problem: Pain  Goal: Verbalizes/displays adequate comfort level or baseline comfort level  Outcome: Progressing  Flowsheets (Taken 8/22/2022 1522)  Verbalizes/displays adequate comfort level or baseline comfort level:   Encourage patient to monitor pain and request assistance   Assess pain using appropriate pain scale   Administer analgesics based on type and severity of pain and evaluate response     Problem: Skin/Tissue Integrity  Goal: Absence of new skin breakdown  Description: 1. Monitor for areas of redness and/or skin breakdown  2. Assess vascular access sites hourly  3. Every 4-6 hours minimum:  Change oxygen saturation probe site  4. Every 4-6 hours:  If on nasal continuous positive airway pressure, respiratory therapy assess nares and determine need for appliance change or resting period.   Outcome: Progressing  Note: Left hip incision healing without s/s of infection

## 2022-08-22 NOTE — PROGRESS NOTES
[address] [place] [address]  INPATIENT PHYSICAL THERAPY  DAILY NOTE  [facility] - 7E-54/[id number]    Time In: 1200  Time Out: 1230  Timed Code Treatment Minutes: 30 Minutes  Minutes: 30          Date: 2022  Patient Name: Rama Herrera,  Gender:  female        MRN: [id number]  : 1940  (78 y.o.)  Referral Date : 22  Referring Practitioner: Brittney Amor MD  Diagnosis: Hip fracture requiring operative repair, left, closed, initial encounter  Additional Pertinent Hx: Ga Tucker  is a 78 y.o. female admitted to the inpatient rehabilitation unit on 2022. She was originally admitted to [address] [place] [address] on 2022. Patient  has a past medical history of Osteoporosis of multiple sites without pathological fracture, Squamous cell carcinoma of skin, Vascular headache, and Vitamin D deficiency. Patient presented to [facility] after suffering a fall at home, landing on her left side. Patient was unable to ambulate and was found to have left femoral neck fracture. Patient was admitted and taken for left hemiarthroplasty with Dr Marium Berger on 22. Postoperatively, patient developed narrow complex tachyarrhythmia concerning for SVT versus A. fib with RVR status post adenosine x2 and DCCV x3. Patient currently off amiodarone infusion and is in normal sinus rhythm.   Patient is now on Lopressor 12.5 mg twice daily     Prior Level of Function:  Lives With: Son  Type of Home: House  Home Layout: Two level, Able to Live on Main level with bedroom/bathroom, Performs ADL's on one level, Laundry in basement (pt's bedroom is upstairs but reports she could live on main floor)  Home Access: Stairs to enter without rails  Entrance Stairs - Number of Steps: 2 ELOY  Home Equipment: Cane   Bathroom Shower/Tub: Tub/Shower unit, Curtain  Bathroom Toilet: Standard    Receives Help From: Family, Friend(s)  ADL Assistance: Independent  Homemaking Assistance: Independent  Homemaking Responsibilities: Yes  Ambulation Assistance: Independent (uses cane at night time only)  Transfer Assistance: Independent  Active : Yes  Additional Comments: Son recently (retired) moved in with pt. Pt amb with SC intermittently in the home, and uses SC for longer distances and at night    Restrictions/Precautions:  Restrictions/Precautions: Surgical Protocols, Weight Bearing, General Precautions, Fall Risk  Left Lower Extremity Weight Bearing: Weight Bearing As Tolerated  Position Activity Restriction  Hip Precautions: No hip flexion > 90 degrees, No hip internal rotation, Posterior hip precautions, No ADduction  Other position/activity restrictions: KATHY precautions     SUBJECTIVE: Patient ambulating to bathroom with nursing staff upon arrival. This therapist took over from there. Patient agreeable to therapy. PAIN: 4/10: left knee    Vitals: Vitals not assessed per clinical judgement, see nursing flowsheet    OBJECTIVE:  Bed Mobility:  Not Tested    Transfers:  Sit to Stand: Stand By Assistance, [employer], X 1  Stand to Sit:Stand By Assistance, [employer], X 1    Ambulation:  Contact Guard Assistance  Distance: 40ft x1  Surface: Level Tile  Device:Rolling Walker  Gait Deviations: Forward Flexed Posture, Slow Mague, Decreased Step Length Bilaterally, Decreased Weight Shift Left, Decreased Gait Speed, and Decreased Heel Strike Bilaterally, very slow pace    Balance:  Dynamic Standing Balance: Stand By Assistance, during bathroom tasks. Exercise:  Patient was guided in 1 set(s) 15 reps of exercise to both lower extremities. Ankle pumps, Glut sets, Quad sets, Heelslides, Hip abduction/adduction, Straight leg raises, Seated marches, Seated heel/toe raises, Long arc quads, and Seated isometric hip adduction. Exercises were completed for increased independence with functional mobility.     Functional Outcome Measures: Not completed ASSESSMENT:  Assessment: Patient progressing toward established goals. Activity Tolerance:  Patient tolerance of  treatment: good. Equipment Recommendations:Equipment Needed: Yes  Other: RW  Discharge Recommendations: Continue to assess pending progress  Plan: Current Treatment Recommendations: Strengthening, Balance training, Functional mobility training, Transfer training, Neuromuscular re-education, Stair training, Gait training, Endurance training, Home exercise program, Safety education & training, Patient/Caregiver education & training, Equipment evaluation, education, & procurement, Therapeutic activities  Plan:  (5x/wk 90min, 1x/wk 30min)    Patient Education  Patient Education: Plan of Care, Precautions/Restrictions, Transfers, Gait, Up in Chair for All Meals, Verbal Exercise Instruction    Goals:  Patient goals : \"just feel better\"  Short Term Goals  Time Frame for Short term goals: 1 week  Short term goal 1: Patient will complete rolling and supine < > sit with min assist with no bed rail and head of bed flat and maintain hip precautions to transfer in and out of bed with decreased difficulty. Short term goal 2: Patient will complete sit < > stand with CGA from various surfaces to stand to ambulate with decreased difficulty. Short term goal 3: Patient will ambulate 36' with a RW and CGA to progress towards ambulating household distances  Short term goal 4: Patient will ascend/descend, 1, 4\" step in parallel bars with min assist to progress towards safe home entry. Short term goal 5: Patient will complete car transfer with min assist to transfer in and out of vehicle for home  Long Term Goals  Time Frame for Long term goals : 3 weeks from initial evaluation  Long term goal 1: Patient will complete supine < > sit with modified independence to transfer in and out of bed safely. Long term goal 2: Patient will complete sit < > stand with modified independence to stand to ambulate safely.   Long term goal 3: Patient will bed < > chair transfer with a RW and modified independence to transfer surface to surface safely. Long term goal 4: Patient will ambulate 80' with a RW and modified independence to navigate home safely. Long term goal 5: Patient will ascend/descend 2 steps with hand held assist and cane with min assist for home entry. Additional Goals?: Yes  Long term goal 6: Patient will complete car transfer with SBA to transfer in and out of vehicle for transport to home and appointments    Following session, patient left in safe position with all fall risk precautions in place.

## 2022-08-22 NOTE — PROGRESS NOTES
[address] [address]  Recreational Therapy  Daily Note  [address]    Time Spent with Patient: 25 minutes    Date:  8/22/2022       Patient Name: Georgina Buerger      MRN: [id number]      YOB: 1940 (78 y.o.)       Gender: female  Diagnosis: Hip fracture requiring operative repair, left, closed, initial encounter. Referring Practitioner:  Leslee Pierre MD    RESTRICTIONS/PRECAUTIONS:  Restrictions/Precautions: Surgical Protocols, Weight Bearing, General Precautions, Fall Risk     Hearing: Within functional limits    PAIN: 0    SUBJECTIVE:  I am doing well    OBJECTIVE:  Pt in bed after her last therapy resting and trying to find a radio station on her radio to listen to -she is content in her room with her tv, the newspaper and her radio-states she is an avid reader but not up to reading in here just yet-pt would like to get her hair washed and styled by our beautician again on Thursday-affect bright and social-no leisure needs at this time          Patient Education  New Education Provided: Importance of Leisure,     Electronically signed by: JUSTYN Goetz  Date: 8/22/2022

## 2022-08-22 NOTE — PROGRESS NOTES
Patient: Tra Coffman  Unit/Bed: 0T-85/068-J  YOB: 1940  MRN: [id number] Acct: [de-identified]   Admitting Diagnosis: Hip fracture requiring operative repair, left, closed, initial encounter [facility]) Hilda Chase Date:  8/17/2022  Hospital Day: 5    Assessment:     Principal Problem:    Hip fracture requiring operative repair, left, closed, initial encounter [facility])  Active Problems:    Essential hypertension    Paroxysmal atrial fibrillation ([address]  Resolved Problems:    * No resolved hospital problems. *      Plan:     Continue to follow        Subjective:     Patient has no complaint of CP or SOB. .   Medication side effects: none    Scheduled Meds:   metoprolol tartrate  25 mg Oral BID    calcium-cholecalciferol  1 tablet Oral Daily    cetirizine  10 mg Oral Daily    Vitamin D  5,000 Units Oral Daily with breakfast    hydrocortisone   Topical 4x Daily    acetaminophen  650 mg Oral Q6H    apixaban  2.5 mg Oral BID    famotidine  20 mg Oral Daily     Continuous Infusions:  PRN Meds:loperamide, diphenhydrAMINE, LORazepam, bisacodyl, ondansetron, polyethylene glycol, traMADol **OR** traMADol, acetaminophen    Review of Systems  Pertinent items are noted in HPI. Objective:     No data found. I/O last 3 completed shifts: In: 840 [P.O.:840]  Out: -   I/O this shift:   In: 840 [P.O.:840]  Out: -     BP (!) 143/62   Pulse 69   Temp 97.4 °F (36.3 °C) (Oral)   Resp 20   Ht 5' 6\" (1.676 m)   Wt 141 lb 8 oz (64.2 kg)   SpO2 96%   BMI 22.84 kg/m²     General appearance: alert, appears stated age, and cooperative  Head: Normocephalic, without obvious abnormality, atraumatic  Lungs: clear to auscultation bilaterally  Heart: regular rate and rhythm, S1, S2 normal, no murmur, click, rub or gallop  Abdomen: soft, non-tender; bowel sounds normal; no masses,  no organomegaly  Extremities: extremities normal, atraumatic, no cyanosis or edema  Skin: Skin color, texture, turgor normal. No rashes or

## 2022-08-22 NOTE — PROGRESS NOTES
Physical Medicine & Rehabilitation   Progress Note    Chief Complaint:   left hip fracture. S/p left hemiarthroplasty. Rehab needs    Subjective:  Patient seen today, resting in bed, visiting with Rec therapist. Patient feels weekend went well, therapy going well. Patient feels pain is well controlled with scheduled tylenol. Patient happy with progress. Care conference tomorrow. Denies any other needs or concerns at this time. +BM 8/21/22    Rehabilitation:  PT:   Transfers:  Sit to Stand: [employer], Minimal Assistance  Stand to [address] with initial sit to stand transfer and transfers to and from the lower toilet surface, CGA with all others this date. Pt completed 2 sets of 5 reps of repeated sit to stand tranfers for improved technique and completion. Good carryover with this. Seated rest provided between sets. Ambulation:  Contact Guard Assistance, with verbal cues , with increased time for completion  Distance: 12'x2  Surface: Level Tile  Device:Rolling Walker  Gait Deviations: Forward Flexed Posture, Slow Teresa, Decreased Step Length Bilaterally, Decreased Heel Strike Bilaterally, Mild Path Deviations, Unsteady Gait, and Decreased Terminal Knee Extension  Pt with improved teresa and fluidity of gait this afternoon, still with fwd flexion of the trunk, increased B UE support on RW and general antalgic gait pattern and unsteadiness. CGA provided to support this. Seated rest required following ambulation trials. Balance:  Static Sitting Balance:  Stand By Assistance, Contact Guard Assistance  Dynamic Sitting Balance: Stand By Assistance, Contact Guard Assistance  Static Standing Balance: Contact Guard Assistance  Dynamic Standing Balance: Contact Guard Assistance, Minimal Assistance  Pt completed functional tasks in bathroom with assist for stability and safety. Pt able to complete reaching tasks with B UE release from AD. Pt stood for ~2 mins.  Pt demos appearance: Patient is well nourished, well developed, well groomed and in no acute distress     Memory:   normal,  Attention/Concentration: normal  Language:  normal     Cranial Nerves:  cranial nerves II-XII are grossly intact  ROM:  abnormal - left hip  Tone:  normal  Muscle bulk: within normal limits  Sensory:  Sensory intact     Skin: Left hip incision not visualized this assessment   Peripheral vascular: Pulses: Normal upper and lower extremity pulses; Edema: 1+      Diagnostics:   No results found for this or any previous visit (from the past 24 hour(s)). Impression:  Acute left displaced fmoral neck fracture  S/p left hip hemiarthroplasty with Dr. Nava Powers on 8/14/22. Mechanical ground level fall  SVT vs. AF with RVR  Asymptomatic bacteriuria  Moderate mitral valve regurgitation  Chronic hyponatremia  HTN  Insomnia  Anxiety  Vitamin D deficiency  Cataracts and macular degeneration     Plan:  Continue current therapies  Prophylaxis:  DVT: Eliquis 2.5 mg p.o. twice daily, GI: Pepcid 20 mg p.o. daily. Pain: Ultram 25 to 50 mg p.o. every 6 hours as needed, Tylenol 650 mg p.o. every 6 hours scheduled, Tylenol 650 mg p.o. every 4 hours as needed  Bowels: Dulcolax suppository 10 mg rectal daily as needed; GlycoLax 17 g p.o. daily as needed.  Imodium PRN for loose stools  Anxiety: Ativan 0.5mg TID PRN  HTN: Lopressor 25mg BID   Rash: hydrocortisone cream QID, zyrtec daily, benadryl PRN  Team conference Tuesday    Missed Therapy Time:  None    Nallely Reardon, APRN - CNP

## 2022-08-23 PROCEDURE — 97530 THERAPEUTIC ACTIVITIES: CPT

## 2022-08-23 PROCEDURE — 99233 SBSQ HOSP IP/OBS HIGH 50: CPT | Performed by: PHYSICAL MEDICINE & REHABILITATION

## 2022-08-23 PROCEDURE — 97535 SELF CARE MNGMENT TRAINING: CPT

## 2022-08-23 PROCEDURE — 97110 THERAPEUTIC EXERCISES: CPT

## 2022-08-23 PROCEDURE — 1180000000 HC REHAB R&B

## 2022-08-23 PROCEDURE — 6370000000 HC RX 637 (ALT 250 FOR IP): Performed by: PHYSICAL MEDICINE & REHABILITATION

## 2022-08-23 PROCEDURE — 6370000000 HC RX 637 (ALT 250 FOR IP): Performed by: NURSE PRACTITIONER

## 2022-08-23 PROCEDURE — 6370000000 HC RX 637 (ALT 250 FOR IP): Performed by: FAMILY MEDICINE

## 2022-08-23 PROCEDURE — 97116 GAIT TRAINING THERAPY: CPT

## 2022-08-23 RX ADMIN — LORAZEPAM 0.5 MG: 0.5 TABLET ORAL at 11:04

## 2022-08-23 RX ADMIN — ACETAMINOPHEN 650 MG: 325 TABLET ORAL at 23:36

## 2022-08-23 RX ADMIN — LORAZEPAM 0.5 MG: 0.5 TABLET ORAL at 23:36

## 2022-08-23 RX ADMIN — HYDROCORTISONE ACETATE: 1 CREAM TOPICAL at 23:33

## 2022-08-23 RX ADMIN — METOPROLOL TARTRATE 25 MG: 25 TABLET, FILM COATED ORAL at 08:41

## 2022-08-23 RX ADMIN — HYDROCORTISONE ACETATE: 1 CREAM TOPICAL at 18:37

## 2022-08-23 RX ADMIN — APIXABAN 2.5 MG: 2.5 TABLET, FILM COATED ORAL at 08:41

## 2022-08-23 RX ADMIN — APIXABAN 2.5 MG: 2.5 TABLET, FILM COATED ORAL at 23:32

## 2022-08-23 RX ADMIN — METOPROLOL TARTRATE 25 MG: 25 TABLET, FILM COATED ORAL at 23:32

## 2022-08-23 RX ADMIN — Medication 5000 UNITS: at 11:03

## 2022-08-23 RX ADMIN — DIPHENHYDRAMINE HYDROCHLORIDE 25 MG: 25 TABLET ORAL at 23:32

## 2022-08-23 RX ADMIN — HYDROCORTISONE ACETATE: 1 CREAM TOPICAL at 08:42

## 2022-08-23 RX ADMIN — ACETAMINOPHEN 650 MG: 325 TABLET ORAL at 02:49

## 2022-08-23 RX ADMIN — Medication 1 TABLET: at 11:04

## 2022-08-23 RX ADMIN — FAMOTIDINE 20 MG: 20 TABLET ORAL at 08:41

## 2022-08-23 RX ADMIN — ACETAMINOPHEN 650 MG: 325 TABLET ORAL at 18:36

## 2022-08-23 RX ADMIN — CETIRIZINE HYDROCHLORIDE 10 MG: 10 TABLET, FILM COATED ORAL at 08:41

## 2022-08-23 RX ADMIN — ACETAMINOPHEN 650 MG: 325 TABLET ORAL at 08:39

## 2022-08-23 ASSESSMENT — PAIN DESCRIPTION - PAIN TYPE
TYPE: SURGICAL PAIN

## 2022-08-23 ASSESSMENT — PAIN SCALES - GENERAL
PAINLEVEL_OUTOF10: 3
PAINLEVEL_OUTOF10: 0
PAINLEVEL_OUTOF10: 4
PAINLEVEL_OUTOF10: 4

## 2022-08-23 ASSESSMENT — PAIN DESCRIPTION - FREQUENCY
FREQUENCY: INTERMITTENT

## 2022-08-23 ASSESSMENT — PAIN - FUNCTIONAL ASSESSMENT
PAIN_FUNCTIONAL_ASSESSMENT: ACTIVITIES ARE NOT PREVENTED

## 2022-08-23 ASSESSMENT — PAIN DESCRIPTION - ONSET
ONSET: GRADUAL

## 2022-08-23 ASSESSMENT — PAIN DESCRIPTION - DESCRIPTORS
DESCRIPTORS: ACHING;CRAMPING
DESCRIPTORS: ACHING;DISCOMFORT
DESCRIPTORS: ACHING;CRAMPING
DESCRIPTORS: PATIENT UNABLE TO DESCRIBE

## 2022-08-23 ASSESSMENT — PAIN DESCRIPTION - ORIENTATION
ORIENTATION: LEFT

## 2022-08-23 ASSESSMENT — PAIN DESCRIPTION - LOCATION
LOCATION: LEG;HIP;KNEE
LOCATION: HIP
LOCATION: HIP;BACK
LOCATION: HIP
LOCATION: HIP;BACK
LOCATION: HIP
LOCATION: HIP

## 2022-08-23 ASSESSMENT — PAIN SCALES - WONG BAKER
WONGBAKER_NUMERICALRESPONSE: 2
WONGBAKER_NUMERICALRESPONSE: 0
WONGBAKER_NUMERICALRESPONSE: 2

## 2022-08-23 NOTE — PROGRESS NOTES
Physical Medicine & Rehabilitation   Progress Note    Chief Complaint:   left hip fracture. S/p left hemiarthroplasty. Rehab needs    Subjective:  Patient seen on rounds with AGNES Sanches MSW, following patient's inpatient Rehab Team Conference. We updated her regarding her planned date of discharge from the inpatient rehabilitation hospital to home on 8/30/2022 with home health services including PT, OT, nursing, and home health aide. Her son was here visiting today and is very supportive. He will be available to help her at at home after discharge. Her bowels have been moving and she has been sleeping well at night. She denied headache, shortness of breath, chest pain, nausea, and vomiting. Rehabilitation:  PT: Reviewed during team conference  OT: Reviewed during team conference      Review of Systems:  CONSTITUTIONAL:  positive for  fatigue  EYES:  positive for  glasses & bilateral cataracts. HEENT:  negative  RESPIRATORY:  negative  CARDIOVASCULAR:  positive for  palpitations, fatigue, heart murmur  GASTROINTESTINAL:  positive for constipation and decreased appetite  GENITOURINARY:  negative  SKIN:  left hip incision.  Rash to back and buttock area  HEMATOLOGIC/LYMPHATIC:  positive for swelling/edema  MUSCULOSKELETAL:  positive for  pain  NEUROLOGICAL:  positive for gait problems and pain  BEHAVIOR/PSYCH:  negative  System review otherwise negative      Objective:  Vitals:    08/23/22 0730   BP: (!) 139/55   Pulse: 72   Resp: 18   Temp: 98.3 °F (36.8 °C)   SpO2:       awake  Orientation:   person, place, time  Mood: within normal limits  Affect: calm  General appearance: Patient is well nourished, well developed, well groomed and in no acute distress     Memory:   normal,  Attention/Concentration: normal  Language:  normal     Cranial Nerves:  cranial nerves II-XII are grossly intact  ROM:  abnormal - left hip  Tone:  normal  Muscle bulk: within normal limits  Sensory:  Sensory intact Skin: Left hip incision not visualized this assessment   Peripheral vascular: Pulses: Normal upper and lower extremity pulses; Edema: 1+      Diagnostics:   No results found for this or any previous visit (from the past 24 hour(s)). Impression:  Acute left displaced fmoral neck fracture  S/p left hip hemiarthroplasty with Dr. Yudith Jasso on 8/14/22. Mechanical ground level fall  SVT vs. AF with RVR  Asymptomatic bacteriuria  Moderate mitral valve regurgitation  Chronic hyponatremia  HTN  Insomnia  Anxiety  Vitamin D deficiency  Cataracts and macular degeneration     Plan:  Continue current therapies  Prophylaxis:  DVT: Eliquis 2.5 mg p.o. twice daily, GI: Pepcid 20 mg p.o. daily. Pain: Ultram 25 to 50 mg p.o. every 6 hours as needed, Tylenol 650 mg p.o. every 6 hours scheduled, Tylenol 650 mg p.o. every 4 hours as needed  Bowels: Dulcolax suppository 10 mg rectal daily as needed; GlycoLax 17 g p.o. daily as needed. Imodium PRN for loose stools  Anxiety: Ativan 0.5mg TID PRN  HTN: Lopressor 25mg BID   Rash: hydrocortisone cream QID, zyrtec daily, benadryl PRN  Team conference Tuesday  Discharge plan to home on 8/30/2022 with home health services including PT, OT, nursing, and home health aide.       Missed Therapy Time:  None    Claudette Sanches MD

## 2022-08-23 NOTE — PROGRESS NOTES
Alert and oriented to person, place, time. Anxious and appropriate with staff. JORGE 4mm to 3mm. Mucous membranes pink and moist. Tongue midline. Smile symmetrical. Hair shiny. Speech clear. Denies difficulty with swallowing. Respirations regular, unlabored. Lung sounds clear anterior, lateral. No cough noted. Heart sounds regular. Abdomen soft, flat, non tender to palpation. Bowel sounds active in all four quadrants. States last bowel movement was 8/22/22. Rash on lumbar spine. Denies difficulty with urination. Radial pulses equal, strong. Hand grasps strong bilaterally. Arm drift negative, bilateral. Capillary refill less than three seconds. Skin turgor brisk. +1 pitting edema in lower left extremity. +1 edema in lower right extremity. Pedal pulses strong and equal, bilaterally. Pedal push and strong and equal, bilaterally. Denies numbness and tingling. Ermelinda's negative. Resting in bed with side rails x2. Wheels locked. Call light and over bed table within reach. Denies needs at this time.

## 2022-08-23 NOTE — PROGRESS NOTES
[address]  Occupational Therapy  Daily Note  Time:   Time In: 1100  Time Out: 1115  Timed Code Treatment Minutes: 15 Minutes  Minutes: 15          Date: 2022  Patient Name: Patricia Manning,   Gender: female      Room: [facility]54/054-A  MRN: [id number]  : 1940  (78 y.o.)  Referring Practitioner: Faraz Dyson MD  Diagnosis: Hip fracture requiring operative repair, left, closed, initial encounter. Additional Pertinent Hx: Per chart review, pt is an 78 y.o. female with a PMH of \"Osteoporosis of multiple sites without pathological fracture, Squamous cell carcinoma of skin, Vascular headache, and Vitamin D deficiency\". Pt originally admitted to [facility] on 22 post fall at home, landing on left side. Pt reports she was unable to ambulate and fall resulted in femoral neck fracture, undergoing a left hemiarthoplasty on 22. \"Postoperatively, patient developed narrow complex tachyarrhythmia concerning for SVT versus A. fib with RVR status post adenosine x2 and DCCV x3.\" Pt presents to [facility] IPR unit on 22 s/p left hemiarthoplasty. Restrictions/Precautions:  Restrictions/Precautions: Surgical Protocols, Weight Bearing, General Precautions, Fall Risk  Left Lower Extremity Weight Bearing: Weight Bearing As Tolerated  Position Activity Restriction  Hip Precautions: No hip flexion > 90 degrees, No hip internal rotation, Posterior hip precautions, No ADduction  Other position/activity restrictions: KATHY precautions     SUBJECTIVE: Upon arrival pt seated in bedside chair with son present. Pt. Agreeable to OT session. Pt. Scheduled for BADL however pt declined due to being washed up prior to OT session. PAIN: Pt. Did not express pain during session. However, pt did have an ice pack on knee.      Vitals: Vitals not assessed per clinical judgement, see nursing flowsheet    COGNITION: Decreased Insight    ADL:   OTR provided pt and pt's son with education in regards to d/c recommendations of long handled shoe horn, sock aide and tub transfer bench vs. Shower chair. Pt. Reports she would like to think about further recommendations and reports son has purchased long handled reacher for her. OTR questioned pt how she has been getting dressed with staff and she reports \"they just do it\" and reports  at home her son will be able to complete for her. OTR educated on importance of learning to complete footwear management with use of Doneta Graham just in times when son is not available and to improve pt's level of independence with daily occupations. Pt. Verbalized understanding. Pt.'s son is going to measure toilet to see if a BSC or toilet riser will be necessary for home additionally education provided in regards to importance of shower chair/TTB to promote proper rest breaks and optimize performance during ADL routines. ASSESSMENT:     Activity Tolerance:  Patient tolerance of  treatment: good. Discharge Recommendations: Home with Home Health OT and Patient would benefit from continued OT at discharge  Equipment Recommendations: Other: Son has ordered [address], continue to monitor need for TTB/Shower chair and sock aide/shoe horn  Plan: Times per Week: 5x wk/90 min 1x wk/30min  Times per Day: Daily  Current Treatment Recommendations: Functional mobility training, Balance training, Self-Care / ADL, Patient/Caregiver education & training, Safety education & training, Strengthening, Endurance training, Equipment evaluation, education, & procurement    Patient Education  Patient Education: ADL's and Equipment Education    Goals  Short Term Goals  Time Frame for Short term goals: 1 wk  Short Term Goal 1: Pt will tolerate 3 min of standing with 1-2 hand release and SBA for improved indep in sinkside grooming tasks  Short Term Goal 2: Pt will navigate room to gather needed supplies for ADL task with SBA and 0 vc for safety for PLOF in ADL routines.   Short Term Goal 3: Pt will complete mobility to/from the bathroom with no more than SBA and min A for safety to progress to PLOF in ADL routines  Short Term Goal 4: Pt will complete simple snack/meal prep task with SBA and min VC for safety/problem solving for safe indep in IADL routines  Short Term Goal 5: Pt will complete community reintegration activity with SBA and min A for problem solving/KATHY precautions to progress to PLOF  Long Term Goals  Time Frame for Long term goals : 2 wks from IPR eval  Long Term Goal 1: Pt will complete UB/LB dressing with LHAE prn and Sup with 0 vc for safety or KATHY precautions to progress ADL performance to PLOF  Long Term Goal 2: Pt will complete UB/LB bathing with LHAE prn and Sup with 0vc for safety or KATHY precautions for improved ADL indep  Long Term Goal 3: Pt will complete oral care mod I with 0 vc for safety or precautions to progress ADL performance to PLOF  Long Term Goal 4: Pt will complete toileting/transfer with Sup and 0vc for safety or to maintain precautions for improved indep in toileting routine  Long Term Goal 5: Pt will demo competence with Elham Lynne for 100% of time during ADL tasks to maintain KATHY precautions and increase indep in daily occupations    Following session, patient left in safe position with all fall risk precautions in place.

## 2022-08-23 NOTE — PROGRESS NOTES
[address] [place] [address]  INPATIENT PHYSICAL THERAPY  DAILY NOTE  [facility] - 7E-54/054-A    Time In: 0930  Time Out: 1030  Timed Code Treatment Minutes: 60 Minutes  Minutes: 60          Date: 2022  Patient Name: Tra Coffman,  Gender:  female        MRN: [id number]  : 1940  (78 y.o.)  Referral Date : 22  Referring Practitioner: Pablo Schultz MD  Diagnosis: Hip fracture requiring operative repair, left, closed, initial encounter  Additional Pertinent Hx: Ga De Souza  is a 78 y.o. female admitted to the inpatient rehabilitation unit on 2022. She was originally admitted to [address] [place] [address] on 2022. Patient  has a past medical history of Osteoporosis of multiple sites without pathological fracture, Squamous cell carcinoma of skin, Vascular headache, and Vitamin D deficiency. Patient presented to [facility] after suffering a fall at home, landing on her left side. Patient was unable to ambulate and was found to have left femoral neck fracture. Patient was admitted and taken for left hemiarthroplasty with Dr Virgie Coburn on 22. Postoperatively, patient developed narrow complex tachyarrhythmia concerning for SVT versus A. fib with RVR status post adenosine x2 and DCCV x3. Patient currently off amiodarone infusion and is in normal sinus rhythm.   Patient is now on Lopressor 12.5 mg twice daily     Prior Level of Function:  Lives With: Son  Type of Home: House  Home Layout: Two level, Able to Live on Main level with bedroom/bathroom, Performs ADL's on one level, Laundry in basement (pt's bedroom is upstairs but reports she could live on main floor)  Home Access: Stairs to enter without rails  Entrance Stairs - Number of Steps: 2 ELOY  Home Equipment: Cane   Bathroom Shower/Tub: Tub/Shower unit, Curtain  Bathroom Toilet: Standard    Receives Help From: Family, Friend(s)  ADL Assistance: Independent  Homemaking Assistance: Independent  Homemaking Responsibilities: Yes  Ambulation Assistance: Independent (uses cane at night time only)  Transfer Assistance: Independent  Active : Yes  Additional Comments: Son recently (retired) moved in with pt. Pt amb with SC intermittently in the home, and uses SC for longer distances and at night    Restrictions/Precautions:  Restrictions/Precautions: Surgical Protocols, Weight Bearing, General Precautions, Fall Risk  Left Lower Extremity Weight Bearing: Weight Bearing As Tolerated  Position Activity Restriction  Hip Precautions: No hip flexion > 90 degrees, No hip internal rotation, Posterior hip precautions, No ADduction  Other position/activity restrictions: KATHY precautions     SUBJECTIVE: Patient in recliner upon arrival, agreed and cooperative for therapy. Son present during session. Patient requesting to use the BR at start of session, required extra time to complete. Spoke with patient's son about putting up another grab bar garage entry at home to improve patient's ability and safety to get in/out of home. Also spoke with him about getting patient a RW for home. PAIN: Yes, pain in left hip noted with movement, not rated. Also complained of headache pain. Vitals: Vitals not assessed per clinical judgement, see nursing flowsheet    OBJECTIVE:  Bed Mobility:  Supine to Sit: Stand By Assistance, with head of bed flat, without rail, with verbal cues , with increased time for completion  Sit to Supine: Minimal Assistance, X 1, with head of bed flat, without rail, with verbal cues , with increased time for completion     Transfers:  Sit to Stand: [employer], with increased time for completion, cues for hand placement  Stand to [place] [id number], cues for hand placement  To/From Bed and Chair: Contact Guard Assistance, using RW    Ambulation:  [employer], with verbal cues , WC to follow from therapist.  Distance: 54 ft. X1; 10 ft x1; 5 ft.  X1 Surface: Level Tile  Device:Rolling Walker  Gait Deviations: Forward Flexed Posture, Slow Mague, Decreased Step Length Bilaterally, Decreased Weight Shift Left, and Decreased Heel Strike Bilaterally    Balance:  Static Standing Balance: Stand By Assistance  Dynamic Standing Balance: Stand By Assistance, Contact Guard Assistance, while completing functional bathroom tasks, able to take both hands off walker to wei/doff brief and pants. **Standing with 1 UE support, patient completed dynamic reaching outside [place] to challenge dynamic standing balance. Able to stand for ~3 minutes with activity until needing a seated rest break. Exercise:  Patient was guided in  1 set(s) 10 reps of exercise to both lower extremities. Glut sets, Quad sets, and abdominal isometrics, scapular retractions into pillow . Exercises were completed for increased independence with functional mobility. Functional Outcome Measures: Not completed       ASSESSMENT:  Assessment: Patient progressing toward established goals. Activity Tolerance:  Patient tolerance of  treatment: good.       Equipment Recommendations:Equipment Needed: Yes  Other: RW ordered from [facility] 8/23/22  Discharge Recommendations: Continue to assess pending progress and Patient would benefit from continued PT at discharge  Plan: Current Treatment Recommendations: Strengthening, Balance training, Functional mobility training, Transfer training, Neuromuscular re-education, Stair training, Gait training, Endurance training, Home exercise program, Safety education & training, Patient/Caregiver education & training, Equipment evaluation, education, & procurement, Therapeutic activities  Plan:  (5x/wk 90min, 1x/wk 30min)    Patient Education  Patient Education: Plan of Care, Precautions/Restrictions, Family Education, [employer] Mobility, Transfers, Reviewed Prior Education, Gait, Health Promotion and Wellness Education, Home Safety Education, - Patient Requires Continued Education    Goals:  Patient goals : \"just feel better\"  Short Term Goals  Time Frame for Short term goals: 1 week  Short term goal 1: Patient will complete rolling and supine < > sit with min assist with no bed rail and head of bed flat and maintain hip precautions to transfer in and out of bed with decreased difficulty. GOAL MET, SEE LTG  Short term goal 2: Patient will complete sit < > stand with CGA from various surfaces to stand to ambulate with decreased difficulty. GOAL MET, SEE LTG  Short term goal 3: Patient will ambulate Skip  1560' with a RW and SBA to progress towards ambulating household distances  Short term goal 4: Patient will ascend/descend 2, 6\" steps with 2 hand rails and CGA to progress towards safe home entry. Short term goal 5: Patient will complete car transfer with min assist to transfer in and out of vehicle for home  Long Term Goals  Time Frame for Long term goals : 3 weeks from initial evaluation  Long term goal 1: Patient will complete supine < > sit with modified independence to transfer in and out of bed safely. Long term goal 2: Patient will complete sit < > stand with modified independence to stand to ambulate safely. Long term goal 3: Patient will bed < > chair transfer with a RW and modified independence to transfer surface to surface safely. Long term goal 4: Patient will ambulate 80' with a RW and modified independence to navigate home safely. Long term goal 5: Patient will ascend/descend 2 steps with hand held assist and cane with min assist for home entry. Additional Goals?: Yes  Long term goal 6: Patient will complete car transfer with SBA to transfer in and out of vehicle for transport to home and appointments    Following session, patient left in safe position with all fall risk precautions in place.

## 2022-08-23 NOTE — PLAN OF CARE
I have reviewed the patient's plan of care and based on this review, the patient treatment plan has been updated. Problem: Discharge Planning  Goal: Discharge to home or other facility with appropriate resources  8/23/2022 1617 by Uday Hector RN  Outcome: Progressing  Flowsheets (Taken 8/22/2022 1522 by Harshil Chan RN)  Discharge to home or other facility with appropriate resources:   Identify barriers to discharge with patient and caregiver   Arrange for needed discharge resources and transportation as appropriate   Identify discharge learning needs (meds, wound care, etc)   Arrange for interpreters to assist at discharge as needed   Refer to discharge planning if patient needs post-hospital services based on physician order or complex needs related to functional status, cognitive ability or social support system  Note: Working toward goal of discharge to home. 8/23/2022 1133 by AGNES Roblero  Note: Team conference held Tuesday, 8/23. Recommendations of the team were explained to the patient and her son, Lum Nurse by Dr Mary Banks and NETTA. Team is recommending that patient continue on acute inpatient rehab for PT and OT, with expected discharge date of Tuesday, 8/30. Following discharge, team is recommending home health services for RN, PT, OT and HHA. Care plan reviewed with patient and Lum Nurse. Lum Nurse reported that he is working with a neighbor to have handrails installed. SW and Lum Nurse scheduled family education for Monday, 8/29 at 8:30 AM. Patient and Lum Nurse verbalized understanding of the plan of care and contributed to goal setting. SW to follow and maintain involvement in discharge planning.       Problem: Pain  Goal: Verbalizes/displays adequate comfort level or baseline comfort level  Outcome: Progressing  Flowsheets (Taken 8/22/2022 2115 by Talia Cali LPN)  Verbalizes/displays adequate comfort level or baseline comfort level: Encourage patient to monitor pain and request assistance  Note: Patient satisfied with pain control. Problem: Skin/Tissue Integrity  Goal: Absence of new skin breakdown  Description: 1. Monitor for areas of redness and/or skin breakdown  2. Assess vascular access sites hourly  3. Every 4-6 hours minimum:  Change oxygen saturation probe site  4. Every 4-6 hours:  If on nasal continuous positive airway pressure, respiratory therapy assess nares and determine need for appliance change or resting period. Outcome: Progressing  Note: Skin remains clean dry and intact. Problem: Safety - Adult  Goal: Free from fall injury  Outcome: Progressing  Flowsheets (Taken 8/23/2022 1031)  Free From Fall Injury: Instruct family/caregiver on patient safety  Note: Patient verbalizes understanding of fall precautions, uses call light appropriately.       Problem: ABCDS Injury Assessment  Goal: Absence of physical injury  Outcome: Progressing  Flowsheets (Taken 8/23/2022 1031)  Absence of Physical Injury: Implement safety measures based on patient assessment

## 2022-08-23 NOTE — CARE COORDINATION
Leatha Herrera was evaluated today and a DME order was entered for a wheeled walker because she requires this to successfully complete daily living tasks of ambulating, dressing lower body, and meal preparation. A wheeled walker is necessary due to the patient's unsteady gait, upper body weakness, and inability to  an ambulation device; and she can ambulate only by pushing a walker instead of a lesser assistive device such as a cane, crutch, or standard walker. The need for this equipment was discussed with the patient and she understands and is in agreement.

## 2022-08-23 NOTE — PROGRESS NOTES
[address]  Occupational Therapy  Daily Note  Time:   Time In: 1694  Time Out: 1230  Timed Code Treatment Minutes: 75 Minutes  Minutes: 75          Date: 2022  Patient Name: Tra Coffman,   Gender: female      Room: [facility]/054-A  MRN: [id number]  : 1940  (78 y.o.)  Referring Practitioner: Pablo Schultz MD  Diagnosis: Hip fracture requiring operative repair, left, closed, initial encounter. Additional Pertinent Hx: Per chart review, pt is an 78 y.o. female with a PMH of \"Osteoporosis of multiple sites without pathological fracture, Squamous cell carcinoma of skin, Vascular headache, and Vitamin D deficiency\". Pt originally admitted to [facility] on 22 post fall at home, landing on left side. Pt reports she was unable to ambulate and fall resulted in femoral neck fracture, undergoing a left hemiarthoplasty on 22. \"Postoperatively, patient developed narrow complex tachyarrhythmia concerning for SVT versus A. fib with RVR status post adenosine x2 and DCCV x3.\" Pt presents to [facility] IPR unit on 22 s/p left hemiarthoplasty. Restrictions/Precautions:  Restrictions/Precautions: Surgical Protocols, Weight Bearing, General Precautions, Fall Risk  Left Lower Extremity Weight Bearing: Weight Bearing As Tolerated  Position Activity Restriction  Hip Precautions: No hip flexion > 90 degrees, No hip internal rotation, Posterior hip precautions, No ADduction  Other position/activity restrictions: KATHY precautions     SUBJECTIVE: Patient seated in bedside chair upon arrival. Agreeable to complete remainder of OT session    PAIN: 3/10: complains of pain in L hip     Vitals: Vitals not assessed per clinical judgement, see nursing flowsheet    COGNITION: Decreased Insight and Decreased Safety Awareness    ADL:   Grooming: Stand By Assistance. Standing sinkside to wash hands after toileting tasks  Toileting: Contact Guard Assistance.   SBA for clothing safety techniques and fall prevention strategies with patient demo'ing fair carry over, requiring additional 1 cues for RW safety. ASSESSMENT:     Activity Tolerance:  Patient tolerance of  treatment: fair. Discharge Recommendations: Home with Home Health OT  Equipment Recommendations: Other: Son has ordered [address], continue to monitor need for TTB/Shower chair and sock aide/shoe horn  Plan: Times per Week: 5x wk/90 min 1x wk/30min  Times per Day: Daily  Current Treatment Recommendations: Functional mobility training, Balance training, Self-Care / ADL, Patient/Caregiver education & training, Safety education & training, Strengthening, Endurance training, Equipment evaluation, education, & procurement    Patient Education  Patient Education:  safety with transfers and mobility, ADL strategies, IADL strategies, HEP    Goals  Short Term Goals  Time Frame for Short term goals: 1 wk  Short Term Goal 1: Pt will tolerate 3 min of standing with 1-2 hand release and SBA for improved indep in sinkside grooming tasks  Short Term Goal 2: Pt will navigate room to gather needed supplies for ADL task with SBA and 0 vc for safety for PLOF in ADL routines.   Short Term Goal 3: Pt will complete mobility to/from the bathroom with no more than SBA and min A for safety to progress to PLOF in ADL routines  Short Term Goal 4: Pt will complete simple snack/meal prep task with SBA and min VC for safety/problem solving for safe indep in IADL routines  Short Term Goal 5: Pt will complete community reintegration activity with SBA and min A for problem solving/KATHY precautions to progress to PLOF  Long Term Goals  Time Frame for Long term goals : 2 wks from IPR eval  Long Term Goal 1: Pt will complete UB/LB dressing with LHAE prn and Sup with 0 vc for safety or KATHY precautions to progress ADL performance to PLOF  Long Term Goal 2: Pt will complete UB/LB bathing with LHAE prn and Sup with 0vc for safety or KATHY precautions for improved ADL indep  Long Term Goal 3: Pt will complete oral care mod I with 0 vc for safety or precautions to progress ADL performance to PLOF  Long Term Goal 4: Pt will complete toileting/transfer with Sup and 0vc for safety or to maintain precautions for improved indep in toileting routine  Long Term Goal 5: Pt will demo competence with Jose C Avila for 100% of time during ADL tasks to maintain KATHY precautions and increase indep in daily occupations    Following session, patient left in safe position with all fall risk precautions in place.

## 2022-08-23 NOTE — PROGRESS NOTES
[address] [place] [address]  INPATIENT PHYSICAL THERAPY  DAILY NOTE  [address]    Time In: 0046  Time Out: 1514  Timed Code Treatment Minutes: 42 Minutes  Minutes: 42          Date: 2022  Patient Name: Rama Herrera,  Gender:  female        MRN: [id number]  : 1940  (78 y.o.)  Referral Date : 22  Referring Practitioner: Brittney Amor MD  Diagnosis: Hip fracture requiring operative repair, left, closed, initial encounter  Additional Pertinent Hx: Ga Tucker  is a 78 y.o. female admitted to the inpatient rehabilitation unit on 2022. She was originally admitted to [address] [place] [address] on 2022. Patient  has a past medical history of Osteoporosis of multiple sites without pathological fracture, Squamous cell carcinoma of skin, Vascular headache, and Vitamin D deficiency. Patient presented to [facility] after suffering a fall at home, landing on her left side. Patient was unable to ambulate and was found to have left femoral neck fracture. Patient was admitted and taken for left hemiarthroplasty with Dr Marium Berger on 22. Postoperatively, patient developed narrow complex tachyarrhythmia concerning for SVT versus A. fib with RVR status post adenosine x2 and DCCV x3. Patient currently off amiodarone infusion and is in normal sinus rhythm.   Patient is now on Lopressor 12.5 mg twice daily     Prior Level of Function:  Lives With: Son  Type of Home: House  Home Layout: Two level, Able to Live on Main level with bedroom/bathroom, Performs ADL's on one level, Laundry in basement (pt's bedroom is upstairs but reports she could live on main floor)  Home Access: Stairs to enter without rails  Entrance Stairs - Number of Steps: 2 ELOY  Home Equipment: Cane   Bathroom Shower/Tub: Tub/Shower unit, Curtain  Bathroom Toilet: Standard    Receives Help From: Family, Friend(s)  ADL Assistance: Independent  Homemaking Assistance: Independent  Homemaking Responsibilities: Yes  Ambulation Assistance: Independent (uses cane at night time only)  Transfer Assistance: Independent  Active : Yes  Additional Comments: Son recently (retired) moved in with pt. Pt amb with SC intermittently in the home, and uses SC for longer distances and at night    Restrictions/Precautions:  Restrictions/Precautions: Surgical Protocols, Weight Bearing, General Precautions, Fall Risk  Left Lower Extremity Weight Bearing: Weight Bearing As Tolerated  Position Activity Restriction  Hip Precautions: No hip flexion > 90 degrees, No hip internal rotation, Posterior hip precautions, No ADduction  Other position/activity restrictions: KATHY precautions     SUBJECTIVE: Patient in recliner upon arrival, agreed and cooperative for therapy. Reports son measuring height of both her car and their SUV, therapist recommending that patient trialing small SUV before discharge to ensure she can get in/out safely. PAIN: Yes, increased pain noted in left knee and thigh, not rated. Vitals: Vitals not assessed per clinical judgement, see nursing flowsheet    OBJECTIVE:  Bed Mobility:  Sit to Supine: Contact Guard Assistance, with head of bed flat, without rail, with verbal cues , with increased time for completion   Scooting: Stand By Assistance    Transfers:  Sit to Stand: Contact Guard Assistance  Stand to [place] [id number]    Ambulation:  [address], with verbal cues , with increased time for completion  Distance: 40 ft. X1; 10 ft. X2   Surface: Level Tile  Device:Rolling Walker  Gait Deviations: Forward Flexed Posture, Slow Mague, Decreased Step Length on Right, Decreased Weight Shift Left, Decreased Gait Speed, and Decreased Heel Strike Bilaterally    Stairs:  Platform:  6\" platform X 4 using Parallel Bars and Contact Guard Assistance, with verbal cues .      Balance:  Dynamic Standing Balance: Contact Guard Assistance  **While completing functional bathroom tasks with intermittent UE support. Exercise:  Patient was guided in 1 set(s) 10 reps of exercise to both lower extremities. Seated hamstring curls, Seated heel/toe raises, Long arc quads, and Seated isometric hip adduction. Exercises were completed for increased independence with functional mobility. Functional Outcome Measures: Not completed       ASSESSMENT:  Assessment: Patient progressing toward established goals. Activity Tolerance:  Patient tolerance of  treatment: good. Equipment Recommendations:Equipment Needed: Yes  Other: RW ordered from E 8/23/22  Discharge Recommendations: Continue to assess pending progress and Patient would benefit from continued PT at discharge  Plan: Current Treatment Recommendations: Strengthening, Balance training, Functional mobility training, Transfer training, Neuromuscular re-education, Stair training, Gait training, Endurance training, Home exercise program, Safety education & training, Patient/Caregiver education & training, Equipment evaluation, education, & procurement, Therapeutic activities  Plan:  (5x/wk 90min, 1x/wk 30min)    Patient Education  Patient Education: Plan of Care, Precautions/Restrictions, Bed Mobility, Transfers, Reviewed Prior Education, Gait, Stairs, Health Promotion and Wellness Education, Home Safety Education, - Patient Requires Continued Education    Goals:  Patient goals : \"just feel better\"  Short Term Goals  Time Frame for Short term goals: 1 week  Short term goal 1: Patient will complete rolling and supine < > sit with min assist with no bed rail and head of bed flat and maintain hip precautions to transfer in and out of bed with decreased difficulty. GOAL MET, SEE LTG  Short term goal 2: Patient will complete sit < > stand with CGA from various surfaces to stand to ambulate with decreased difficulty.  GOAL MET, SEE LTG  Short term goal 3: Patient will ambulate 100' with a RW and SBA to progress towards ambulating household distances  Short term goal 4: Patient will ascend/descend 2, 6\" steps with 2 hand rails and CGA to progress towards safe home entry. Short term goal 5: Patient will complete car transfer with min assist to transfer in and out of vehicle for home  Long Term Goals  Time Frame for Long term goals : 3 weeks from initial evaluation  Long term goal 1: Patient will complete supine < > sit with modified independence to transfer in and out of bed safely. Long term goal 2: Patient will complete sit < > stand with modified independence to stand to ambulate safely. Long term goal 3: Patient will bed < > chair transfer with a RW and modified independence to transfer surface to surface safely. Long term goal 4: Patient will ambulate 80' with a RW and modified independence to navigate home safely. Long term goal 5: Patient will ascend/descend 2 steps with hand held assist and cane with min assist for home entry. Additional Goals?: Yes  Long term goal 6: Patient will complete car transfer with SBA to transfer in and out of vehicle for transport to home and appointments    Following session, patient left in safe position with all fall risk precautions in place.

## 2022-08-23 NOTE — PLAN OF CARE
Problem: Discharge Planning  Goal: Discharge to home or other facility with appropriate resources  Note: Team conference held Tuesday, 8/23. Recommendations of the team were explained to the patient and her son, Sunshine Cuevas by Dr Laurie Sandoval and NETTA. Team is recommending that patient continue on acute inpatient rehab for PT and OT, with expected discharge date of Tuesday, 8/30. Following discharge, team is recommending home health services for RN, PT, OT and HHA. Care plan reviewed with patient and Sunshine Cuevas. Sunshine Cuevas reported that he is working with a neighbor to have handrails installed. NETTA and Sunshine Cuevas scheduled family education for Monday, 8/29 at 8:30 AM. Patient and Sunshine Cuevas verbalized understanding of the plan of care and contributed to goal setting. SW to follow and maintain involvement in discharge planning.

## 2022-08-23 NOTE — PROGRESS NOTES
[address] NOTE    Conference Date: 2022  Admit Date:  2022  1:33 PM  Patient Name: Monica Bertrand    MRN: [id number]    : 1940  (78 y.o.)  Rehabilitation Admitting Diagnosis:  Hip fracture requiring operative repair, left, closed, initial encounter [facility]) Marina Moralez  Referring Practitioner: Kiersten Hale MD      CASE MANAGEMENT  Current issues/needs regarding patient and family discharge status: Prior to admission, patient was independent with her ADLs, finances, housekeeping, meal prep, some errands and driving. Patient's son, Kris Osei lives with her. He helps managing her some home maintenance and driving. Patient reports that her son is retired and available during the day. Patient is a retired . She is  for 2 years and talked at length about caring for her  at home. Patient has positive supports in her son and friends. Patient's family physician is Dr Inés Law and cardiologist is Dr Frederick Montemayor. Patient was not on blood thinners or diabetic medications prior to admission. Patient prefers [address] Patient reports having a straight cane at home. Patient was not using community resources at home. Patient is motivated to work with therapy. PHYSICAL THERAPY  Ms Glen Benitez met 4/5 STG's and 0/6 LTG's. Pt is making gradual progress towards goals set for her, limited by pain, anxiousness and weakness. Pt has progressed sit < > stand from CGA? min assist to CGA, supine < > sit from max/min assist to min assist, gait from 5' with a RW and min assist to up to 36' with a RW and CGA. Patient has 2 steps to enter home with no handrails, and is only completing 4\" step in parallel bars with CGA at this time. Patient would benefit from skilled PT services to improve her ability to complete functional mobility, reduce her risk for falls and allow patient to return to OF.   Equipment Needed: Yes  Other: RW    SPEECH in her room with her tv, the newspaper and her radio-states she is an avid reader but not up to reading in here just yet-pt would like to get her hair washed and styled by our beautician again on Thursday-affect bright and social-no leisure needs at this time      NUTRITION  Weight: 141 lb 8 oz (64.2 kg) / Body mass index is 22.84 kg/m². Current diet: ADULT DIET; Regular  Please see nutrition note for details. NURSING  Continent of Bowel: Yes. Frequency: 8/20. Management: patient was taking immodium as needed for loose stools. Continent of Bladder: Yes. Frequency: 4-6 times day. Management: N/A. Pain is Managed:  Yes. Management: scheduled tylenol. Frequency of Intervention: every 6 hours. Adequately Controlled: Yes  Sleep: Adequate with lights on and music  Signs and Symptoms of Infection:  No.   Signs and Symptoms of Skin Breakdown:  Yes. Site: back. Wound Care: rash. Injury and/or Falls during Inpatient Rehabilitation Admission: No  Anticoagulants: Eliquis  Diabetic: No  Consultations/Labs/X-rays: N/A  Oxygen while on IP Rehab:  No Currently using  0 liters per 0  . Home oxygen: No    No results for input(s): POCGLU in the last 72 hours.     No results found for: LDLCALC, LDLCHOLESTEROL, LDLDIRECT      Vitals:    08/21/22 2114 08/22/22 0726 08/22/22 2115 08/23/22 0730   BP: 134/64 (!) 143/62 122/60 (!) 139/55   Pulse: 68 69 75 72   Resp: 16 20 16 18   Temp: 98.3 °F (36.8 °C) 97.4 °F (36.3 °C) 98.4 °F (36.9 °C) 98.3 °F (36.8 °C)   TempSrc: Oral Oral Oral Oral   SpO2: 98% 96% 97%    Weight:       Height:              Family Education: Family available and participating in education   Fall Risk:  Falling star program initiated  Is the patient appropriate for a stay in the functional apartment? no    Discharge Plan   Estimated Discharge Date: 8/30/22  Destination: discharge home with supervision  Services at Discharge: [address] [facility], Occupational Therapy, Nursing, and HHA 2x week  Is patient appropriate for an outpatient driving evaluation? no  Equipment at Discharge: Other: Pt may benefit from [facility] kit, RW, shower chair vs tub t/f bench, BSC. Continue to Monitor  Other: RW  Factors facilitating achievement of predicted outcomes: Family support, Motivated, Cooperative, and Pleasant  Barriers to the achievement of predicted outcomes: Pain, Depression, Anxiety, and Decreased endurance  Follow up with physiatrist? no      Team Members Present at Conference:  Case Randee Ybarra, LSW, MSW   Occupational Therapist:Sienna Lundberg OTR/L [id number]  Physical Therapist:Vandana Martinez, [facility]  Speech Therapist:N/A  Guadalupe Hanson, RN  Psychologist: Pierre Han, PhD.    I approve the established interdisciplinary plan of care as documented within the medical record of Jose.     Yvonne Anthony MD

## 2022-08-24 ENCOUNTER — APPOINTMENT (OUTPATIENT)
Dept: GENERAL RADIOLOGY | Age: 82
DRG: 559 | End: 2022-08-24
Attending: PHYSICAL MEDICINE & REHABILITATION
Payer: MEDICARE

## 2022-08-24 LAB
ANION GAP SERPL CALCULATED.3IONS-SCNC: 13 MEQ/L (ref 8–16)
BASOPHILS # BLD: 0.3 %
BASOPHILS ABSOLUTE: 0 THOU/MM3 (ref 0–0.1)
BUN BLDV-MCNC: 8 MG/DL (ref 7–22)
CALCIUM SERPL-MCNC: 8.3 MG/DL (ref 8.5–10.5)
CHLORIDE BLD-SCNC: 87 MEQ/L (ref 98–111)
CO2: 21 MEQ/L (ref 23–33)
CREAT SERPL-MCNC: 0.7 MG/DL (ref 0.4–1.2)
EOSINOPHIL # BLD: 0 %
EOSINOPHILS ABSOLUTE: 0 THOU/MM3 (ref 0–0.4)
ERYTHROCYTE [DISTWIDTH] IN BLOOD BY AUTOMATED COUNT: 14.1 % (ref 11.5–14.5)
ERYTHROCYTE [DISTWIDTH] IN BLOOD BY AUTOMATED COUNT: 47.7 FL (ref 35–45)
GFR SERPL CREATININE-BSD FRML MDRD: 80 ML/MIN/1.73M2
GLUCOSE BLD-MCNC: 190 MG/DL (ref 70–108)
HCT VFR BLD CALC: 33.4 % (ref 37–47)
HEMOGLOBIN: 10.9 GM/DL (ref 12–16)
IMMATURE GRANS (ABS): 0.03 THOU/MM3 (ref 0–0.07)
IMMATURE GRANULOCYTES: 0.5 %
LACTIC ACID: 1.9 MMOL/L (ref 0.5–2)
LYMPHOCYTES # BLD: 12.9 %
LYMPHOCYTES ABSOLUTE: 0.8 THOU/MM3 (ref 1–4.8)
MCH RBC QN AUTO: 30.5 PG (ref 26–33)
MCHC RBC AUTO-ENTMCNC: 32.6 GM/DL (ref 32.2–35.5)
MCV RBC AUTO: 93.6 FL (ref 81–99)
MONOCYTES # BLD: 8.6 %
MONOCYTES ABSOLUTE: 0.6 THOU/MM3 (ref 0.4–1.3)
NUCLEATED RED BLOOD CELLS: 0 /100 WBC
PLATELET # BLD: 264 THOU/MM3 (ref 130–400)
PMV BLD AUTO: 9.1 FL (ref 9.4–12.4)
POTASSIUM REFLEX MAGNESIUM: 3.9 MEQ/L (ref 3.5–5.2)
PROCALCITONIN: 0.15 NG/ML (ref 0.01–0.09)
RBC # BLD: 3.57 MILL/MM3 (ref 4.2–5.4)
SEG NEUTROPHILS: 77.7 %
SEGMENTED NEUTROPHILS ABSOLUTE COUNT: 5 THOU/MM3 (ref 1.8–7.7)
SODIUM BLD-SCNC: 121 MEQ/L (ref 135–145)
WBC # BLD: 6.4 THOU/MM3 (ref 4.8–10.8)

## 2022-08-24 PROCEDURE — 97535 SELF CARE MNGMENT TRAINING: CPT

## 2022-08-24 PROCEDURE — 71046 X-RAY EXAM CHEST 2 VIEWS: CPT

## 2022-08-24 PROCEDURE — 81003 URINALYSIS AUTO W/O SCOPE: CPT

## 2022-08-24 PROCEDURE — 87040 BLOOD CULTURE FOR BACTERIA: CPT

## 2022-08-24 PROCEDURE — 83605 ASSAY OF LACTIC ACID: CPT

## 2022-08-24 PROCEDURE — 36415 COLL VENOUS BLD VENIPUNCTURE: CPT

## 2022-08-24 PROCEDURE — 84145 PROCALCITONIN (PCT): CPT

## 2022-08-24 PROCEDURE — 80048 BASIC METABOLIC PNL TOTAL CA: CPT

## 2022-08-24 PROCEDURE — 6370000000 HC RX 637 (ALT 250 FOR IP): Performed by: NURSE PRACTITIONER

## 2022-08-24 PROCEDURE — 1180000000 HC REHAB R&B

## 2022-08-24 PROCEDURE — 6370000000 HC RX 637 (ALT 250 FOR IP): Performed by: PHYSICAL MEDICINE & REHABILITATION

## 2022-08-24 PROCEDURE — 97110 THERAPEUTIC EXERCISES: CPT

## 2022-08-24 PROCEDURE — 97116 GAIT TRAINING THERAPY: CPT

## 2022-08-24 PROCEDURE — 85025 COMPLETE CBC W/AUTO DIFF WBC: CPT

## 2022-08-24 PROCEDURE — 99232 SBSQ HOSP IP/OBS MODERATE 35: CPT | Performed by: PHYSICAL MEDICINE & REHABILITATION

## 2022-08-24 PROCEDURE — 97530 THERAPEUTIC ACTIVITIES: CPT

## 2022-08-24 PROCEDURE — 6370000000 HC RX 637 (ALT 250 FOR IP): Performed by: FAMILY MEDICINE

## 2022-08-24 RX ADMIN — HYDROCORTISONE ACETATE: 1 CREAM TOPICAL at 13:11

## 2022-08-24 RX ADMIN — METOPROLOL TARTRATE 25 MG: 25 TABLET, FILM COATED ORAL at 09:41

## 2022-08-24 RX ADMIN — APIXABAN 2.5 MG: 2.5 TABLET, FILM COATED ORAL at 09:39

## 2022-08-24 RX ADMIN — LOPERAMIDE HYDROCHLORIDE 2 MG: 2 CAPSULE ORAL at 12:15

## 2022-08-24 RX ADMIN — HYDROCORTISONE ACETATE: 1 CREAM TOPICAL at 17:55

## 2022-08-24 RX ADMIN — ACETAMINOPHEN 650 MG: 325 TABLET ORAL at 21:25

## 2022-08-24 RX ADMIN — FAMOTIDINE 20 MG: 20 TABLET ORAL at 09:41

## 2022-08-24 RX ADMIN — APIXABAN 2.5 MG: 2.5 TABLET, FILM COATED ORAL at 21:26

## 2022-08-24 RX ADMIN — HYDROCORTISONE ACETATE: 1 CREAM TOPICAL at 21:26

## 2022-08-24 RX ADMIN — ACETAMINOPHEN 650 MG: 325 TABLET ORAL at 05:36

## 2022-08-24 RX ADMIN — LOPERAMIDE HYDROCHLORIDE 2 MG: 2 CAPSULE ORAL at 17:54

## 2022-08-24 RX ADMIN — ACETAMINOPHEN 650 MG: 325 TABLET ORAL at 09:39

## 2022-08-24 RX ADMIN — CETIRIZINE HYDROCHLORIDE 10 MG: 10 TABLET, FILM COATED ORAL at 09:41

## 2022-08-24 RX ADMIN — ACETAMINOPHEN 650 MG: 325 TABLET ORAL at 13:10

## 2022-08-24 RX ADMIN — LORAZEPAM 0.5 MG: 0.5 TABLET ORAL at 09:40

## 2022-08-24 RX ADMIN — Medication 1 TABLET: at 11:10

## 2022-08-24 RX ADMIN — HYDROCORTISONE ACETATE: 1 CREAM TOPICAL at 09:41

## 2022-08-24 RX ADMIN — Medication 5000 UNITS: at 11:09

## 2022-08-24 RX ADMIN — LORAZEPAM 0.5 MG: 0.5 TABLET ORAL at 21:59

## 2022-08-24 ASSESSMENT — PAIN DESCRIPTION - DESCRIPTORS
DESCRIPTORS: ACHING
DESCRIPTORS: ACHING;SORE
DESCRIPTORS: ACHING
DESCRIPTORS: PATIENT UNABLE TO DESCRIBE

## 2022-08-24 ASSESSMENT — PAIN SCALES - WONG BAKER
WONGBAKER_NUMERICALRESPONSE: 2

## 2022-08-24 ASSESSMENT — PAIN SCALES - GENERAL
PAINLEVEL_OUTOF10: 2
PAINLEVEL_OUTOF10: 0
PAINLEVEL_OUTOF10: 2
PAINLEVEL_OUTOF10: 0

## 2022-08-24 ASSESSMENT — PAIN DESCRIPTION - LOCATION
LOCATION: LEG;BACK
LOCATION: HIP
LOCATION: HIP;LEG
LOCATION: HIP

## 2022-08-24 ASSESSMENT — PAIN DESCRIPTION - ORIENTATION
ORIENTATION: LEFT

## 2022-08-24 ASSESSMENT — PAIN - FUNCTIONAL ASSESSMENT
PAIN_FUNCTIONAL_ASSESSMENT: ACTIVITIES ARE NOT PREVENTED
PAIN_FUNCTIONAL_ASSESSMENT: ACTIVITIES ARE NOT PREVENTED

## 2022-08-24 ASSESSMENT — PAIN DESCRIPTION - FREQUENCY: FREQUENCY: INTERMITTENT

## 2022-08-24 ASSESSMENT — PAIN DESCRIPTION - ONSET: ONSET: GRADUAL

## 2022-08-24 ASSESSMENT — PAIN DESCRIPTION - PAIN TYPE: TYPE: SURGICAL PAIN

## 2022-08-24 NOTE — PROGRESS NOTES
Physical Medicine & Rehabilitation   Progress Note    Chief Complaint:   left hip fracture. S/p left hemiarthroplasty. Rehab needs    Subjective:  Patient seen on rounds this morning. She has had 4 bouts of loose stools; Imodium ordered qid prn. Other bowel medications are as needed. She has been working well with her therapies and has been tolerating them. Effectively using Ultram and Tylenol for pain control. She has been sleeping well at night. Denied any other new problems today. Worked with PT on LE strengthening exercises with therapeutic bands. Rehabilitation:  PT:     OBJECTIVE:  Bed Mobility:  Sit to Supine: Contact Guard Assistance, with head of bed flat, without rail, with verbal cues , with increased time for completion   Scooting: Stand By Assistance     Transfers:  Sit to Stand: Contact Guard Assistance  Stand to [place] [id number]     Ambulation:  Contact Guard Assistance, with verbal cues , with increased time for completion  Distance: 40 ft. X1; 10 ft. X2   Surface: Level Tile  Device:Rolling Walker  Gait Deviations: Forward Flexed Posture, Slow Mague, Decreased Step Length on Right, Decreased Weight Shift Left, Decreased Gait Speed, and Decreased Heel Strike Bilaterally     Stairs:  Platform:  6\" platform X 4 using Parallel Bars and Contact Guard Assistance, with verbal cues . Balance:  Dynamic Standing Balance: Contact Guard Assistance  **While completing functional bathroom tasks with intermittent UE support. Exercise:  Patient was guided in 1 set(s) 10 reps of exercise to both lower extremities. Seated hamstring curls, Seated heel/toe raises, Long arc quads, and Seated isometric hip adduction. Exercises were completed for increased independence with functional mobility. Functional Outcome Measures: Not completed     ASSESSMENT:  Assessment: Patient progressing toward established goals. Activity Tolerance:  Patient tolerance of  treatment: good. Equipment Recommendations:Equipment Needed: Yes  Other: RW ordered from [facility] 8/23/22  Discharge Recommendations: Continue to assess pending progress and Patient would benefit from continued PT at discharge  Plan: Current Treatment Recommendations: Strengthening, Balance training, Functional mobility training, Transfer training, Neuromuscular re-education, Stair training, Gait training, Endurance training, Home exercise program, Safety education & training, Patient/Caregiver education & training, Equipment evaluation, education, & procurement, Therapeutic activities  Plan:  (5x/wk 90min, 1x/wk 30min)       OT:     COGNITION: Decreased Recall, Decreased Problem Solving, and Decreased Safety Awareness     ADL:   Grooming: with set-up. For hair care seated in chair  Bathing: Minimal Assistance. For BLEs below knees due to hip precautions, education provided on use of long handled sponge brush. CGA-close SBA for balance while washing nikos area/bottom holding onto grab bar for stability. Able to wash remaining areas seated in chair with supervision. Increased time provided during tasks  Upper Extremity Dressing: Minimal Assistance. To fasten bra. Patient able to doff gown and don sweatshirt with increased time to pull over head and adjust  Lower Extremity Dressing: Moderate Assistance. To don L shoe and tie B shoes. Ken to thread LEs into pants/undergarement with use of reacher, with difficulty noted problem solving how to properly use. Able to don R shoe using shoe horn and B socks using sock aid. Patient required moderate verbal cues for problem solving use of LHAE, would benefit from continued skilled education on use of LHAE to help increase independence with LB dressing while maintaining hip precautions   Toileting: Contact Guard Assistance. - close SBA for clothing management and hygiene   Toilet Transfer: [address] - SBA to/from RTS with increased time  Tub Transfer: [address] To get in/out of tub by bringing LEs over ledge with increased time, minimal verbal cues provided for technique. CGA to/from tub bench with use of grab bars . BALANCE:  Sitting Balance:  Stand By Assistance. - supervision  Standing Balance: Contact Guard Assistance. - SBA     BED MOBILITY:  Supine to Sit: Stand By Assistance - increased time to bring LLE over EOB, HOB slightly elevated      TRANSFERS:  Sit to Stand:  Contact Guard Assistance. - SBA from various surfaces with increased time  Stand to Sit: Contact Guard Assistance. - SBA to various surfaces with increased time     FUNCTIONAL MOBILITY:  Assistive Device: Rolling Walker  Assist Level:  Contact Guard Assistance. - SBA   Distance: To and from bathroom and in/out of shower room       ADDITIONAL ACTIVITIES:  Patient completed BUE strengthening exercises with skilled education on HEP: completed x20 reps x1 set with a mod resistance band in all joints and all planes in order to improve UE strength and activity tolerance required for BADL routine and toilet / shower transfers. Patient tolerated fair, requiring rest breaks. Patient also required cues for technique. Review of Systems:  CONSTITUTIONAL:  positive for  fatigue  EYES:  positive for  glasses & bilateral cataracts. HEENT:  negative  RESPIRATORY:  negative  CARDIOVASCULAR:  positive for  palpitations, fatigue, heart murmur  GASTROINTESTINAL:  positive for constipation and decreased appetite  GENITOURINARY:  negative  SKIN:  left hip incision.  Rash to back and buttock area  HEMATOLOGIC/LYMPHATIC:  positive for swelling/edema  MUSCULOSKELETAL:  positive for  pain  NEUROLOGICAL:  positive for gait problems and pain  BEHAVIOR/PSYCH:  negative  System review otherwise negative      Objective:  Vitals:    08/24/22 0920   BP: (!) 118/57   Pulse: 71   Resp: 18   Temp: 97.7 °F (36.5 °C)   SpO2:       awake  Orientation:   person, place, time  Mood: within normal limits  Affect: calm  General appearance: Patient is well nourished, well developed, well groomed and in no acute distress     Memory:   normal,  Attention/Concentration: normal  Language:  normal     Cranial Nerves:  cranial nerves II-XII are grossly intact  ROM:  abnormal - left hip  Tone:  normal  Muscle bulk: within normal limits  Sensory:  Sensory intact     Skin: Left hip incision not visualized this assessment   Peripheral vascular: Pulses: Normal upper and lower extremity pulses; Edema: 1+      Diagnostics:   No results found for this or any previous visit (from the past 24 hour(s)). Impression:  Acute left displaced fmoral neck fracture  S/p left hip hemiarthroplasty with Dr. Luis Angel Traylor on 8/14/22. Mechanical ground level fall  SVT vs. AF with RVR  Asymptomatic bacteriuria  Moderate mitral valve regurgitation  Chronic hyponatremia  HTN  Insomnia  Anxiety  Vitamin D deficiency  Cataracts and macular degeneration     Plan:  Continue current therapies  Prophylaxis:  DVT: Eliquis 2.5 mg p.o. twice daily, GI: Pepcid 20 mg p.o. daily. Pain: Ultram 25 to 50 mg p.o. every 6 hours as needed, Tylenol 650 mg p.o. every 6 hours scheduled, Tylenol 650 mg p.o. every 4 hours as needed  Bowels: Dulcolax suppository 10 mg rectal daily as needed; GlycoLax 17 g p.o. daily as needed. Imodium PRN for loose stools  Anxiety: Ativan 0.5mg TID PRN  HTN: Lopressor 25mg BID   Rash: hydrocortisone cream QID, zyrtec daily, benadryl PRN  Team conference Tuesday  Discharge plan to home on 8/30/2022 with home health services including PT, OT, nursing, and home health aide.       Missed Therapy Time:  None    Carolyn Horner MD

## 2022-08-24 NOTE — PROGRESS NOTES
[address]  Occupational Therapy  Daily Note  Time:   Time In: 0700  Time Out: 0830  Timed Code Treatment Minutes: 90 Minutes  Minutes: 90          Date: 2022  Patient Name: Huber Parker,   Gender: female      Room: [facility]/054-A  MRN: [id number]  : 1940  (78 y.o.)  Referring Practitioner: Namita Valentin MD  Diagnosis: Hip fracture requiring operative repair, left, closed, initial encounter. Additional Pertinent Hx: Per chart review, pt is an 78 y.o. female with a PMH of \"Osteoporosis of multiple sites without pathological fracture, Squamous cell carcinoma of skin, Vascular headache, and Vitamin D deficiency\". Pt originally admitted to [facility] on 22 post fall at home, landing on left side. Pt reports she was unable to ambulate and fall resulted in femoral neck fracture, undergoing a left hemiarthoplasty on 22. \"Postoperatively, patient developed narrow complex tachyarrhythmia concerning for SVT versus A. fib with RVR status post adenosine x2 and DCCV x3.\" Pt presents to [facility] IPR unit on 22 s/p left hemiarthoplasty. Restrictions/Precautions:  Restrictions/Precautions: Surgical Protocols, Weight Bearing, General Precautions, Fall Risk  Left Lower Extremity Weight Bearing: Weight Bearing As Tolerated  Position Activity Restriction  Hip Precautions: No hip flexion > 90 degrees, No hip internal rotation, Posterior hip precautions, No ADduction  Other position/activity restrictions: KATHY precautions     SUBJECTIVE: Patient lying in bed upon arrival. Agreeable to OT session    PAIN: Complains of pain in L hip, 4/10    Vitals: Vitals not assessed per clinical judgement, see nursing flowsheet    COGNITION: Decreased Recall, Decreased Problem Solving, and Decreased Safety Awareness    ADL:   Grooming: with set-up. For hair care seated in chair  Bathing: Minimal Assistance.   For BLEs below knees due to hip precautions, education provided on use of long handled sponge brush. CGA-close SBA for balance while washing nikos area/bottom holding onto grab bar for stability. Able to wash remaining areas seated in chair with supervision. Increased time provided during tasks  Upper Extremity Dressing: Minimal Assistance. To fasten bra. Patient able to doff gown and don sweatshirt with increased time to pull over head and adjust  Lower Extremity Dressing: Moderate Assistance. To don L shoe and tie B shoes. Ken to thread LEs into pants/undergarement with use of reacher, with difficulty noted problem solving how to properly use. Able to don R shoe using shoe horn and B socks using sock aid. Patient required moderate verbal cues for problem solving use of LHAE, would benefit from continued skilled education on use of LHAE to help increase independence with LB dressing while maintaining hip precautions   Toileting: Contact Guard Assistance. - close SBA for clothing management and hygiene   Toilet Transfer: [employer]. - SBA to/from RTS with increased time  Tub Transfer: [employer]. To get in/out of tub by bringing LEs over ledge with increased time, minimal verbal cues provided for technique. CGA to/from tub bench with use of grab bars . BALANCE:  Sitting Balance:  Stand By Assistance. - supervision  Standing Balance: Contact Guard Assistance. - SBA    BED MOBILITY:  Supine to Sit: Stand By Assistance - increased time to bring LLE over EOB, HOB slightly elevated     TRANSFERS:  Sit to Stand:  Contact Guard Assistance. - SBA from various surfaces with increased time  Stand to Sit: Contact Guard Assistance. - SBA to various surfaces with increased time    FUNCTIONAL MOBILITY:  Assistive Device: Rolling Walker  Assist Level:  Contact Guard Assistance. - SBA   Distance:  To and from bathroom and in/out of shower room      ADDITIONAL ACTIVITIES:  Patient completed BUE strengthening exercises with skilled education on HEP: completed x20 reps x1 set with a mod resistance band in all joints and all planes in order to improve UE strength and activity tolerance required for BADL routine and toilet / shower transfers. Patient tolerated fair, requiring rest breaks. Patient also required cues for technique. ASSESSMENT:     Activity Tolerance:  Patient tolerance of  treatment: fair. Discharge Recommendations: Home with Home Health OT  Equipment Recommendations: Other: Recommend tub transfer bench and installation of grab bar in shower. Patient would benefit from sock aid and shoe horn but is unsure if she wants them at this time. Son ordered [address] [facility]. Son is measuring toilet height to see if we need riser/BSC. Continue to monitor needs. Plan: Times per Week: 5x wk/90 min 1x wk/30min  Times per Day: Daily  Current Treatment Recommendations: Functional mobility training, Balance training, Self-Care / ADL, Patient/Caregiver education & training, Safety education & training, Strengthening, Endurance training, Equipment evaluation, education, & procurement    Patient Education  Patient Education:  safety with transfers and mobility, ADL strategies with use of LHAE, equipment needs    Goals  Short Term Goals  Time Frame for Short term goals: 1 wk  Short Term Goal 1: Pt will tolerate 3 min of standing with 1-2 hand release and SBA for improved indep in sinkside grooming tasks  Short Term Goal 2: Pt will navigate room to gather needed supplies for ADL task with SBA and 0 vc for safety for PLOF in ADL routines.   Short Term Goal 3: Pt will complete mobility to/from the bathroom with no more than SBA and min A for safety to progress to PLOF in ADL routines  Short Term Goal 4: Pt will complete simple snack/meal prep task with SBA and min VC for safety/problem solving for safe indep in IADL routines  Short Term Goal 5: Pt will complete community reintegration activity with SBA and min A for problem solving/KATHY precautions to progress to PLOF  Long Term

## 2022-08-24 NOTE — PROGRESS NOTES
increased independence with functional mobility. Functional Outcome Measures: Not completed       ASSESSMENT:  Assessment: Patient progressing toward established goals. Activity Tolerance:  Patient tolerance of  treatment: good. Equipment Recommendations:Equipment Needed: Yes  Other: RW ordered from E 22  Discharge Recommendations: Continue to assess pending progress, Patient would benefit from continued therapy after discharge     Plan: Current Treatment Recommendations: Strengthening, Balance training, Functional mobility training, Transfer training, Neuromuscular re-education, Stair training, Gait training, Endurance training, Home exercise program, Safety education & training, Patient/Caregiver education & training, Equipment evaluation, education, & procurement, Therapeutic activities  Plan:  (5x/wk 90min, 1x/wk 30min)    Patient Education  Patient Education: Plan of Care, Functional Mobility, Reviewed Prior Education, Health Promotion and Wellness Education, Safety, Verbal Exercise Instruction    Goals:  Patient goals : \"just feel better\"  Short Term Goals  Time Frame for Short term goals: 1 week  Short term goal 1: Patient will complete rolling and supine < > sit with min assist with no bed rail and head of bed flat and maintain hip precautions to transfer in and out of bed with decreased difficulty. GOAL MET, SEE LTG  Short term goal 2: Patient will complete sit < > stand with CGA from various surfaces to stand to ambulate with decreased difficulty. GOAL MET, SEE LTG  Short term goal 3: Patient will ambulate Skip Natanael 1560' with a RW and SBA to progress towards ambulating household distances  Short term goal 4: Patient will ascend/descend 2, 6\" steps with 2 hand rails and CGA to progress towards safe home entry.   Short term goal 5: Patient will complete car transfer with min assist to transfer in and out of vehicle for home  Long Term Goals  Time Frame for Long term goals : 3 weeks from initial evaluation  Long term goal 1: Patient will complete supine < > sit with modified independence to transfer in and out of bed safely. Long term goal 2: Patient will complete sit < > stand with modified independence to stand to ambulate safely. Long term goal 3: Patient will bed < > chair transfer with a RW and modified independence to transfer surface to surface safely. Long term goal 4: Patient will ambulate 80' with a RW and modified independence to navigate home safely. Long term goal 5: Patient will ascend/descend 2 steps with hand held assist and cane with min assist for home entry. Additional Goals?: Yes  Long term goal 6: Patient will complete car transfer with SBA to transfer in and out of vehicle for transport to home and appointments    Following session, patient left in safe position with all fall risk precautions in place.

## 2022-08-24 NOTE — PROGRESS NOTES
Patient: Cralos [facility]  Unit/Bed: 6Z-55/126-Z  YOB: 1940  MRN: [id number] Acct: [de-identified]   Admitting Diagnosis: Hip fracture requiring operative repair, left, closed, initial encounter [facility]) Kike Roxanne Date:  8/17/2022  Hospital Day: 6    Assessment:     Principal Problem:    Hip fracture requiring operative repair, left, closed, initial encounter [facility])  Active Problems:    Essential hypertension    Paroxysmal atrial fibrillation ([facility] [address]  Resolved Problems:    * No resolved hospital problems. *      Plan:     [facility]  Unit/Bed: 2U-85/850-S  YOB: 1940  MRN: [id number] Acct: [de-identified]   Admitting Diagnosis: Hip fracture requiring operative repair, left, closed, initial encounter [facility]) [facility] Date:  8/17/2022  Hospital Day: 6    Assessment:     Principal Problem:    Hip fracture requiring operative repair, left, closed, initial encounter [facility])  Active Problems:    Essential hypertension    Paroxysmal atrial fibrillation ([facility] [address]  Resolved Problems:    * No resolved hospital problems. *      Plan:     Continue to follow        Subjective:     Patient has no complaint of CP or SOB. .   Medication side effects: none    Scheduled Meds:   metoprolol tartrate  25 mg Oral BID    calcium-cholecalciferol  1 tablet Oral Daily    cetirizine  10 mg Oral Daily    Vitamin D  5,000 Units Oral Daily with breakfast    hydrocortisone   Topical 4x Daily    acetaminophen  650 mg Oral Q6H    apixaban  2.5 mg Oral BID    famotidine  20 mg Oral Daily     Continuous Infusions:  PRN Meds:loperamide, diphenhydrAMINE, LORazepam, bisacodyl, ondansetron, polyethylene glycol, traMADol **OR** traMADol, acetaminophen    Review of Systems  Pertinent items are noted in HPI. Objective:     No data found. I/O last 3 completed shifts: In: 1200 [P.O.:1200]  Out: -   No intake/output data recorded.     BP (!) 139/55   Pulse 72   Temp 98.3 °F (36.8 °C) (Oral)   Resp 18   Ht 5' 6\" (1.676 m)   Wt 141 lb 8 oz (64.2 kg)   SpO2 97%   BMI 22.84 kg/m²     General appearance: alert, appears stated age, and cooperative  Head: Normocephalic, without obvious abnormality, atraumatic  Lungs: clear to auscultation bilaterally  Heart: regular rate and rhythm, S1, S2 normal, no murmur, click, rub or gallop  Abdomen: soft, non-tender; bowel sounds normal; no masses,  no organomegaly  Extremities: extremities normal, atraumatic, no cyanosis or edema  Skin: Skin color, texture, turgor normal. No rashes or lesions  Neurologic:  weak    Electronically signed by Jyothi Upton MD on 8/23/2022 at 8:10 PM

## 2022-08-24 NOTE — PROGRESS NOTES
Alert and oriented to person, place, time. Patient calm and appropriate to staff. JORGE 5 mm to 4 mm. Mucous membranes pink and moist. Skin warm and dry. Hair shiny. Smile symmetrical. Tongue midline. Denies difficulty with swallowing. Speech clear. Lung sounds clear anterior, lateral. Respirations strong, unlabored. No cough noted. Heart sounds regular, strong. Bowel sounds active in all four quadrants. Abdomen flat, soft, and non tender to palpation. Denies difficulty with urination. Last bowel movement was 8/24/22. Radial pulses strong and equal, bilateral. Hand grasp strong and equal. Arm drift negative. Capillary refill less than 3 seconds. Skin turgor brisk. +1 pitting edema in lower extremities, bilaterally. Pedal pulses strong and equal. Pedal push and pul strong and equal, bilaterally. Ermelinda's sign negative. Denies numbness and tingling. Resting in chair with wheels locked. Call light and beside table within reach. Denied any further needs.

## 2022-08-24 NOTE — PLAN OF CARE
Problem: Discharge Planning  Goal: Discharge to home or other facility with appropriate resources  8/24/2022 1546 by AGNES Maki  Note: SW met with patient on this date to discuss discharge planning. SW educated patient on home health services and what will be provided to her. Patient is wanting to move forward with a referral to [facility]. SW answered patient's questions regarding discharge. SW spoke with Joshua Oviedo with [facility]. Referral made for RN, PT, OT and HHA. SW will follow and maintain involvement in discharge planning.

## 2022-08-24 NOTE — PLAN OF CARE
Problem: Discharge Planning  Goal: Discharge to home or other facility with appropriate resources  8/24/2022 0139 by Adela Shaver RN  Outcome: Progressing  8/23/2022 1617 by Everette Claudio RN  Outcome: Progressing  Flowsheets (Taken 8/22/2022 1522 by Rola Stahl RN)  Discharge to home or other facility with appropriate resources:   Identify barriers to discharge with patient and caregiver   Arrange for needed discharge resources and transportation as appropriate   Identify discharge learning needs (meds, wound care, etc)   Arrange for interpreters to assist at discharge as needed   Refer to discharge planning if patient needs post-hospital services based on physician order or complex needs related to functional status, cognitive ability or social support system  Note: Working toward goal of discharge to home. Problem: Pain  Goal: Verbalizes/displays adequate comfort level or baseline comfort level  8/24/2022 0139 by Adela Shaver RN  Outcome: Progressing  8/23/2022 1617 by Everette Claudio RN  Outcome: Progressing  Flowsheets (Taken 8/22/2022 2115 by Phill Sims LPN)  Verbalizes/displays adequate comfort level or baseline comfort level: Encourage patient to monitor pain and request assistance  Note: Patient satisfied with pain control. Problem: Skin/Tissue Integrity  Goal: Absence of new skin breakdown  Description: 1. Monitor for areas of redness and/or skin breakdown  2. Assess vascular access sites hourly  3. Every 4-6 hours minimum:  Change oxygen saturation probe site  4. Every 4-6 hours:  If on nasal continuous positive airway pressure, respiratory therapy assess nares and determine need for appliance change or resting period. 8/24/2022 0139 by Adela Shaver RN  Outcome: Progressing  8/23/2022 1617 by Everette Claudio RN  Outcome: Progressing  Note: Skin remains clean dry and intact.       Problem: Safety - Adult  Goal: Free from fall injury  8/24/2022 0139 by Adela Shaver RN  Outcome: Progressing  8/23/2022 1617 by April Wisdom RN  Outcome: Progressing  Flowsheets (Taken 8/23/2022 1031)  Free From Fall Injury: Instruct family/caregiver on patient safety  Note: Patient verbalizes understanding of fall precautions, uses call light appropriately.       Problem: ABCDS Injury Assessment  Goal: Absence of physical injury  8/24/2022 0139 by Kay Sawyer RN  Outcome: Progressing  8/23/2022 1617 by April Wisdom RN  Outcome: Progressing  Flowsheets (Taken 8/23/2022 1031)  Absence of Physical Injury: Implement safety measures based on patient assessment

## 2022-08-24 NOTE — PROGRESS NOTES
[facility]  INPATIENT PHYSICAL THERAPY  Daily Note  [address]    Time In: 1000  Time Out: 1100  Timed Code Treatment Minutes: 60 Minutes  Minutes: 60          Date: 2022  Patient Name: Oma Mendoza,  Gender:  female        MRN: [id number]  : 1940  (78 y.o.)  Referral Date : 22  Referring Practitioner: Arjun Nunez MD  Diagnosis: Hip fracture requiring operative repair, left, closed, initial encounter  Additional Pertinent Hx: Ga Price  is a 78 y.o. female admitted to the inpatient rehabilitation unit on 2022. She was originally admitted to [facility] on 2022. Patient  has a past medical history of Osteoporosis of multiple sites without pathological fracture, Squamous cell carcinoma of skin, Vascular headache, and Vitamin D deficiency. Patient presented to [facility] after suffering a fall at home, landing on her left side. Patient was unable to ambulate and was found to have left femoral neck fracture. Patient was admitted and taken for left hemiarthroplasty with Dr Jelena Martin on 22. Postoperatively, patient developed narrow complex tachyarrhythmia concerning for SVT versus A. fib with RVR status post adenosine x2 and DCCV x3. Patient currently off amiodarone infusion and is in normal sinus rhythm.   Patient is now on Lopressor 12.5 mg twice daily     Prior Level of Function:  Lives With: Son  Type of Home: House  Home Layout: Two level, Able to Live on Main level with bedroom/bathroom, Performs ADL's on one level, Laundry in basement (pt's bedroom is upstairs but reports she could live on main floor)  Home Access: Stairs to enter without rails  Entrance Stairs - Number of Steps: 2 ELOY  Home Equipment: Cane   Bathroom Shower/Tub: Tub/Shower unit, Curtain  Bathroom Toilet: Standard    Receives Help From: Family, Friend(s)  ADL Assistance: Independent  Homemaking Assistance: Independent  Homemaking Responsibilities: Yes  Ambulation Assistance: Independent (uses cane at night time only)  Transfer Assistance: Independent  Active : Yes  Additional Comments: Son recently (retired) moved in with pt. Pt amb with SC intermittently in the home, and uses SC for longer distances and at night    Restrictions/Precautions:  Restrictions/Precautions: Surgical Protocols, Weight Bearing, General Precautions, Fall Risk  Left Lower Extremity Weight Bearing: Weight Bearing As Tolerated  Position Activity Restriction  Hip Precautions: No hip flexion > 90 degrees, No hip internal rotation, Posterior hip precautions, No ADduction  Other position/activity restrictions: KATHY precautions     SUBJECTIVE: Pt. Seated on BS chair and pleasantly agrees to therapy session. Pt. Requests to use restroom at beginning and at end of session secondary to diarrhea. Pt. Requiring extra time in restroom. Pt. Talkative throughout session as well requiring cues to stay on task at times. PAIN: Not rated: L hip    Vitals: Vitals not assessed per clinical judgement, see nursing flowsheet    OBJECTIVE:  Bed Mobility:  Sit to Supine: Minimal Assistance, with increased time for completion     Transfers:  Sit to Stand: Stand By Assistance  Stand to Sit:Stand By Assistance  Education provided on proper technique to reduce hip flexion and maintain precautions    Ambulation:  Stand By Assistance, [employer] Assistance  Distance: 12' x 1, 5' x 2, 15' x 1, 30' x 1, 40' x 1  Surface: Level Tile  Device:Rolling Walker  Gait Deviations:  Slow Mague, Decreased Step Length Bilaterally, Decreased Weight Shift Left, Decreased Gait Speed, and Decreased Heel Strike Bilaterally    Balance:  Pt. Completed standing dynamic balance activity: tapping pods with feet in reciprocal pattern with Normal SALVADOR on level tile and Bilateral UE support on Rolling Walker with Contact Guard Assistance.  Activity completed to improve balance, enhance functional mobility, and reduce risk of falls. Pt. Stood with SBA while completing clothing management, pericare, and hand hygiene 2x during session. Pt. Steady with no LOB throughout. Exercise:  Patient was guided in 1 set(s) 10 reps of exercises: Glut sets, Seated hamstring curls, Seated heel/toe raises, Long arc quads, Seated isometric hip adduction, Seated abduction/adduction, and abdominal isometrics. Exercises were completed for increased independence with functional mobility. Functional Outcome Measures: Not completed       ASSESSMENT:  Assessment: Patient progressing toward established goals. Activity Tolerance:  Patient tolerance of  treatment: good. Equipment Recommendations:Equipment Needed: Yes  Other: RW ordered from [facility] 8/23/22  Discharge Recommendations: Continue to assess pending progress, Patient would benefit from continued therapy after discharge     Plan: Current Treatment Recommendations: Strengthening, Balance training, Functional mobility training, Transfer training, Neuromuscular re-education, Stair training, Gait training, Endurance training, Home exercise program, Safety education & training, Patient/Caregiver education & training, Equipment evaluation, education, & procurement, Therapeutic activities  Plan:  (5x/wk 90min, 1x/wk 30min)    Patient Education  Patient Education: Plan of Care, Functional Mobility, Reviewed Prior Education, Health Promotion and Wellness Education, Safety, Verbal Exercise Instruction    Goals:  Patient goals : \"just feel better\"  Short Term Goals  Time Frame for Short term goals: 1 week  Short term goal 1: Patient will complete rolling and supine < > sit with min assist with no bed rail and head of bed flat and maintain hip precautions to transfer in and out of bed with decreased difficulty. GOAL MET, SEE LTG  Short term goal 2: Patient will complete sit < > stand with CGA from various surfaces to stand to ambulate with decreased difficulty.  GOAL MET, SEE LTG  Short term goal 3: Patient will ambulate Skip Natanael 1560' with a RW and SBA to progress towards ambulating household distances  Short term goal 4: Patient will ascend/descend 2, 6\" steps with 2 hand rails and CGA to progress towards safe home entry. Short term goal 5: Patient will complete car transfer with min assist to transfer in and out of vehicle for home  Long Term Goals  Time Frame for Long term goals : 3 weeks from initial evaluation  Long term goal 1: Patient will complete supine < > sit with modified independence to transfer in and out of bed safely. Long term goal 2: Patient will complete sit < > stand with modified independence to stand to ambulate safely. Long term goal 3: Patient will bed < > chair transfer with a RW and modified independence to transfer surface to surface safely. Long term goal 4: Patient will ambulate 80' with a RW and modified independence to navigate home safely. Long term goal 5: Patient will ascend/descend 2 steps with hand held assist and cane with min assist for home entry. Additional Goals?: Yes  Long term goal 6: Patient will complete car transfer with SBA to transfer in and out of vehicle for transport to home and appointments    Following session, patient left in safe position with all fall risk precautions in place.

## 2022-08-24 NOTE — PLAN OF CARE
Skin remains clean dry and intact. Problem: Discharge Planning  Goal: Discharge to home or other facility with appropriate resources  8/24/2022 1302 by Violette Toribio RN  Outcome: Progressing  Flowsheets (Taken 8/22/2022 1522 by Eleuterio Moore RN)  Discharge to home or other facility with appropriate resources:   Identify barriers to discharge with patient and caregiver   Arrange for needed discharge resources and transportation as appropriate   Identify discharge learning needs (meds, wound care, etc)   Arrange for interpreters to assist at discharge as needed   Refer to discharge planning if patient needs post-hospital services based on physician order or complex needs related to functional status, cognitive ability or social support system  Note: Working toward goal of discharge to home. 8/24/2022 0139 by Gilberto Randle RN  Outcome: Progressing     Problem: Pain  Goal: Verbalizes/displays adequate comfort level or baseline comfort level  8/24/2022 0139 by Gilberto Randle RN  Outcome: Progressing     Problem: Skin/Tissue Integrity  Goal: Absence of new skin breakdown  Description: 1. Monitor for areas of redness and/or skin breakdown  2. Assess vascular access sites hourly  3. Every 4-6 hours minimum:  Change oxygen saturation probe site  4. Every 4-6 hours:  If on nasal continuous positive airway pressure, respiratory therapy assess nares and determine need for appliance change or resting period. 8/24/2022 1302 by Violette Toribio RN  Outcome: Progressing  Note: Patient satisfied with pain control. 8/24/2022 0139 by Gilberto Randle RN  Outcome: Progressing     Problem: Safety - Adult  Goal: Free from fall injury  8/24/2022 1302 by Violette Toribio RN  Outcome: Progressing  Flowsheets (Taken 8/23/2022 1031)  Free From Fall Injury: Instruct family/caregiver on patient safety  Note: Patient verbalizes understanding of fall precautions, uses call light appropriately.    8/24/2022 0139 by Gilberto Randle RN  Outcome: Progressing     Problem: ABCDS Injury Assessment  Goal: Absence of physical injury  8/24/2022 1302 by Fidel lAan RN  Outcome: Progressing  8/24/2022 0139 by Dru Deluca RN  Outcome: Progressing     Problem: Nutrition Deficit:  Goal: Optimize nutritional status  Outcome: Progressing  Flowsheets (Taken 8/24/2022 1302)  Nutrient intake appropriate for improving, restoring, or maintaining nutritional needs: Assess nutritional status and recommend course of action  Note: Eating % of most meals.

## 2022-08-24 NOTE — PROGRESS NOTES
Comprehensive Nutrition Assessment    Type and Reason for Visit:  Initial, Consult, Positive Nutrition Screen (poor po; Oral Nutrition Supplements)    Nutrition Recommendations/Plan:   Recommend diet as tolerated. Encouraged food from home/outside as desired. Boost from home - encouraged consumption BID or as needed. Recommend MVI. Malnutrition Assessment:  Malnutrition Status: At risk for malnutrition (Comment) (08/18/22 5513)    Context:  Acute Illness     Findings of the 6 clinical characteristics of malnutrition:  Energy Intake:  Mild decrease in energy intake (Comment)  Weight Loss:  No significant weight loss     Body Fat Loss:  No significant body fat loss (denies)     Muscle Mass Loss:  No significant muscle mass loss (denies)    Fluid Accumulation:  Unable to assess     Strength:  Not Performed    Nutrition Assessment:      Pt. nutritionally stable AEB consuming less than 50% of some meals. At risk for further nutrition compromise r/t increased nutrient needs for post-operative healing and underlying medical condition (hx skin cancer, osteoporosis, Vitamin D defiency). Nutrition Related Findings:    Pt. Report/Treatments/Miscellaneous: Pt seen- reported eating enough; stated family brings in food and she eats well; still drinks Boost as needed; Pt stated she is excited to get her hair done tomorrow. GI Status: BM 8/23  Pertinent Labs: 8/19: Glucose 107, BUN 7, Cr 0.3, Sodium 132  Pertinent Meds: Glycolax, Zofran, Pepcid, Vitamin D    Wound Type: Surgical Incision (8/14 Hemiarthroplasty of left hip)       Current Nutrition Intake & Therapies:    Average Meal Intake: 26-50%, 51-75%, %  Average Supplements Intake:  (when needed)  ADULT DIET; Regular    Anthropometric Measures:  Height: 5' 6\" (167.6 cm)  Ideal Body Weight (IBW): 130 lbs (59 kg)    Admission Body Weight: 141 lb 8 oz (64.2 kg) (8/17 +1 edema)  Current Body Weight: 141 lb 8 oz (64.2 kg) (8/17 +1 edema),   IBW.     Current BMI (kg/m2): 22.8  Usual Body Weight:  (per pt. 120-130#; per EMR: 5/9/20: 133# 2 oz, 5/21/22: 131# 8 oz)                       BMI Categories: Normal Weight (BMI 22.0 to 24.9) age over 72    Estimated Daily Nutrient Needs:  Energy Requirements Based On: Kcal/kg  Weight Used for Energy Requirements: Other (Comment) (64 kgm)  Energy (kcal/day): 2708-9892 kcals (30-35)  Weight Used for Protein Requirements: Other (Comment) (64 kgm)  Protein (g/day): 77+ grams (1.2+)         Nutrition Diagnosis:   Inadequate oral intake related to inadequate protein-energy intake as evidenced by intake 0-25%, intake 26-50%    Nutrition Interventions:   Food and/or Nutrient Delivery: Continue Current Diet (Boost ONS from home (encouraged BID))  Nutrition Education/Counseling: Education initiated (8/18 Stressed importance of po, good nutrition at best efforts for healing.)  Coordination of Nutrition Care: Continue to monitor while inpatient       Goals:     Goals: PO intake 75% or greater, by next RD assessment       Nutrition Monitoring and Evaluation:      Food/Nutrient Intake Outcomes: Diet Advancement/Tolerance, Food and Nutrient Intake, Supplement Intake  Physical Signs/Symptoms Outcomes: Biochemical Data, Chewing or Swallowing, GI Status, Fluid Status or Edema, Nutrition Focused Physical Findings, Skin, Weight    Discharge Planning:     Too soon to determine     Dilcia Horne, [address] [facility] [address]  Contact: [phone]

## 2022-08-25 PROBLEM — E87.1 HYPONATREMIA: Status: ACTIVE | Noted: 2022-08-25

## 2022-08-25 LAB
ALBUMIN SERPL-MCNC: 3.5 G/DL (ref 3.5–5.1)
ALP BLD-CCNC: 77 U/L (ref 38–126)
ALT SERPL-CCNC: 17 U/L (ref 11–66)
ANION GAP SERPL CALCULATED.3IONS-SCNC: 12 MEQ/L (ref 8–16)
AST SERPL-CCNC: 25 U/L (ref 5–40)
BILIRUB SERPL-MCNC: 0.3 MG/DL (ref 0.3–1.2)
BILIRUBIN URINE: NEGATIVE
BLOOD, URINE: NEGATIVE
BUN BLDV-MCNC: 5 MG/DL (ref 7–22)
CALCIUM SERPL-MCNC: 8.8 MG/DL (ref 8.5–10.5)
CHARACTER, URINE: CLEAR
CHLORIDE BLD-SCNC: 93 MEQ/L (ref 98–111)
CO2: 23 MEQ/L (ref 23–33)
COLOR: YELLOW
CREAT SERPL-MCNC: 0.3 MG/DL (ref 0.4–1.2)
GFR SERPL CREATININE-BSD FRML MDRD: > 90 ML/MIN/1.73M2
GLUCOSE BLD-MCNC: 109 MG/DL (ref 70–108)
GLUCOSE URINE: NEGATIVE MG/DL
KETONES, URINE: NEGATIVE
LEUKOCYTE ESTERASE, URINE: NEGATIVE
NITRITE, URINE: NEGATIVE
OSMOLALITY URINE: 173 MOSMOL/KG (ref 250–750)
OSMOLALITY: 265 MOSMOL/KG (ref 275–295)
PH UA: 7 (ref 5–9)
POTASSIUM REFLEX MAGNESIUM: 3.6 MEQ/L (ref 3.5–5.2)
PROTEIN UA: NEGATIVE
SARS-COV-2, NAAT: DETECTED
SODIUM BLD-SCNC: 128 MEQ/L (ref 135–145)
SODIUM URINE: < 20 MEQ/L
SPECIFIC GRAVITY, URINE: 1.01 (ref 1–1.03)
TOTAL PROTEIN: 5.9 G/DL (ref 6.1–8)
URIC ACID: 2.4 MG/DL (ref 2.4–5.7)
UROBILINOGEN, URINE: 0.2 EU/DL (ref 0–1)

## 2022-08-25 PROCEDURE — 97530 THERAPEUTIC ACTIVITIES: CPT

## 2022-08-25 PROCEDURE — 80053 COMPREHEN METABOLIC PANEL: CPT

## 2022-08-25 PROCEDURE — 99223 1ST HOSP IP/OBS HIGH 75: CPT | Performed by: INTERNAL MEDICINE

## 2022-08-25 PROCEDURE — 83935 ASSAY OF URINE OSMOLALITY: CPT

## 2022-08-25 PROCEDURE — 83930 ASSAY OF BLOOD OSMOLALITY: CPT

## 2022-08-25 PROCEDURE — 99231 SBSQ HOSP IP/OBS SF/LOW 25: CPT | Performed by: NURSE PRACTITIONER

## 2022-08-25 PROCEDURE — 6370000000 HC RX 637 (ALT 250 FOR IP): Performed by: PHYSICAL MEDICINE & REHABILITATION

## 2022-08-25 PROCEDURE — 97535 SELF CARE MNGMENT TRAINING: CPT

## 2022-08-25 PROCEDURE — 6370000000 HC RX 637 (ALT 250 FOR IP): Performed by: NURSE PRACTITIONER

## 2022-08-25 PROCEDURE — 84300 ASSAY OF URINE SODIUM: CPT

## 2022-08-25 PROCEDURE — 87635 SARS-COV-2 COVID-19 AMP PRB: CPT

## 2022-08-25 PROCEDURE — 84550 ASSAY OF BLOOD/URIC ACID: CPT

## 2022-08-25 PROCEDURE — 97110 THERAPEUTIC EXERCISES: CPT

## 2022-08-25 PROCEDURE — 1180000000 HC REHAB R&B

## 2022-08-25 PROCEDURE — 97116 GAIT TRAINING THERAPY: CPT

## 2022-08-25 PROCEDURE — 6370000000 HC RX 637 (ALT 250 FOR IP): Performed by: FAMILY MEDICINE

## 2022-08-25 PROCEDURE — 36415 COLL VENOUS BLD VENIPUNCTURE: CPT

## 2022-08-25 RX ADMIN — Medication 1 TABLET: at 10:37

## 2022-08-25 RX ADMIN — APIXABAN 2.5 MG: 2.5 TABLET, FILM COATED ORAL at 21:46

## 2022-08-25 RX ADMIN — METOPROLOL TARTRATE 25 MG: 25 TABLET, FILM COATED ORAL at 10:37

## 2022-08-25 RX ADMIN — ACETAMINOPHEN 650 MG: 325 TABLET ORAL at 01:54

## 2022-08-25 RX ADMIN — HYDROCORTISONE ACETATE: 1 CREAM TOPICAL at 13:13

## 2022-08-25 RX ADMIN — CETIRIZINE HYDROCHLORIDE 10 MG: 10 TABLET, FILM COATED ORAL at 10:37

## 2022-08-25 RX ADMIN — METOPROLOL TARTRATE 25 MG: 25 TABLET, FILM COATED ORAL at 21:52

## 2022-08-25 RX ADMIN — LORAZEPAM 0.5 MG: 0.5 TABLET ORAL at 06:13

## 2022-08-25 RX ADMIN — LORAZEPAM 0.5 MG: 0.5 TABLET ORAL at 10:36

## 2022-08-25 RX ADMIN — FAMOTIDINE 20 MG: 20 TABLET ORAL at 10:37

## 2022-08-25 RX ADMIN — ACETAMINOPHEN 650 MG: 325 TABLET ORAL at 14:47

## 2022-08-25 RX ADMIN — APIXABAN 2.5 MG: 2.5 TABLET, FILM COATED ORAL at 10:37

## 2022-08-25 RX ADMIN — HYDROCORTISONE ACETATE: 1 CREAM TOPICAL at 10:39

## 2022-08-25 RX ADMIN — ACETAMINOPHEN 650 MG: 325 TABLET ORAL at 10:37

## 2022-08-25 RX ADMIN — LORAZEPAM 0.5 MG: 0.5 TABLET ORAL at 21:42

## 2022-08-25 RX ADMIN — Medication 5000 UNITS: at 10:37

## 2022-08-25 RX ADMIN — ACETAMINOPHEN 650 MG: 325 TABLET ORAL at 21:42

## 2022-08-25 RX ADMIN — HYDROCORTISONE ACETATE: 1 CREAM TOPICAL at 21:56

## 2022-08-25 ASSESSMENT — PAIN SCALES - WONG BAKER
WONGBAKER_NUMERICALRESPONSE: 2

## 2022-08-25 ASSESSMENT — ENCOUNTER SYMPTOMS
VOMITING: 0
ABDOMINAL PAIN: 0
NAUSEA: 0
SHORTNESS OF BREATH: 0
CONSTIPATION: 0
COUGH: 0
DIARRHEA: 0
WHEEZING: 0
PHOTOPHOBIA: 0
BLOOD IN STOOL: 0

## 2022-08-25 ASSESSMENT — PAIN DESCRIPTION - DESCRIPTORS
DESCRIPTORS: ACHING
DESCRIPTORS: ACHING;DISCOMFORT

## 2022-08-25 ASSESSMENT — PAIN DESCRIPTION - ORIENTATION
ORIENTATION: LEFT

## 2022-08-25 ASSESSMENT — PAIN SCALES - GENERAL
PAINLEVEL_OUTOF10: 2
PAINLEVEL_OUTOF10: 4
PAINLEVEL_OUTOF10: 2

## 2022-08-25 ASSESSMENT — PAIN DESCRIPTION - LOCATION
LOCATION: HIP
LOCATION: HEAD
LOCATION: HIP
LOCATION: HIP

## 2022-08-25 ASSESSMENT — PAIN - FUNCTIONAL ASSESSMENT: PAIN_FUNCTIONAL_ASSESSMENT: ACTIVITIES ARE NOT PREVENTED

## 2022-08-25 NOTE — CONSULTS
[facility]          [facility]        [address]        [place], [address] [id number]           Inpatient Initial consult note         8/25/2022 12:13 PM    Patient Name:   Carmen Powell:    1940  Primary Care Physician:  David Johnson MD     History Obtained From:  patient, electronic medical record     Consultation requested by : Pamela Rivera MD    requested for  : Evaluation of  hyponatremia     History of presentingillmanjeet Pollard is a 78 y.o.   female with Past Medical History of osteoporosis, mitral valve regurgitation, and further elaborated below, who presented for left femoral neck fracture of the hip after a mechanical fall at home 8/13/22. She was unable to ambulate but denies LOC. She had no associated symptoms before the fall. She was taken by EMS to [facility] ED. She had Hemiarthroplasty of the left hip 8/14 . Postoperatively she developed narrow complex tachyrhythmia concerning for SVT vs A.Fib. She completed amiodaron infusion and is on lopressor BID. She was admitted to inpatient rehabilitation. Today she tested positive for COVID-19. Her hyponatremia was present on admission, but yesterday dropped from 132 on 8/19 to 121 by 8/24 (corrected Na 122.8). She has had low sodium chronically since 2021 per EMR. She denies any symptoms. Notably she had episodes of low BP.      Past History      Past Medical History:   Diagnosis Date    Osteoporosis of multiple sites without pathological fracture 10/2017    Squamous cell carcinoma of skin 2016    Vascular headache 1980's    Vitamin D deficiency 10/2017     Past Surgical History:   Procedure Laterality Date    BLADDER REPAIR  9/2014    HIP SURGERY Left 8/14/2022    LEFT HIP BEE  ARTHROPLASTY performed by Rubens Merritt MD at [facility] Left 5/2013    lateral    SKIN CANCER EXCISION  09/2016    Dr Jared Forrester Left 4/2015     Social History     Socioeconomic History    Marital status:      Spouse name: Not on file    Number of children: Not on file    Years of education: Not on file    Highest education level: Not on file   Occupational History    Not on file   Tobacco Use    Smoking status: Never    Smokeless tobacco: Never   Substance and Sexual Activity    Alcohol use: No    Drug use: No    Sexual activity: Never   Other Topics Concern    Not on file   Social History Narrative    Not on file     Social Determinants of Health     Financial Resource Strain: Low Risk     Difficulty of Paying Living Expenses: Not hard at all   Food Insecurity: No Food Insecurity    Worried About Running Out of Food in the Last Year: Never true    Ran Out of Food in the Last Year: Never true   Transportation Needs: Not on file   Physical Activity: Not on file   Stress: Not on file   Social Connections: Not on file   Intimate Partner Violence: Not on file   Housing Stability: Not on file     No family history on file. Medications & Allergies      Prior to Admission medications    Medication Sig Start Date End Date Taking? Authorizing Provider   Multiple Vitamins-Minerals (PRESERVISION AREDS) CAPS Take 2 capsules by mouth daily   Yes Historical Provider, MD   apixaban (ELIQUIS) 2.5 MG TABS tablet Take 1 tablet by mouth 2 times daily for 21 days  Patient not taking: Reported on 8/17/2022 8/17/22 9/7/22  Satish Steiner DO   pantoprazole (PROTONIX) 40 MG tablet Take 1 tablet by mouth daily as needed (stomach irritation from aspirin use)  Patient not taking: Reported on 8/17/2022 8/14/22   Claudene Clayman, PA   docusate sodium (COLACE) 100 MG capsule Take 1 capsule by mouth in the morning and 1 capsule before bedtime.   Patient not taking: Reported on 8/17/2022 8/14/22 9/13/22  Claudene Clayman, PA   acetaminophen (TYLENOL) 500 MG tablet Take 1 tablet by mouth 3 times daily as needed for Pain  Patient not taking: Reported on 8/17/2022 8/14/22   Claudene Clayman, PA   ondansetron (ZOFRAN) 4 MG tablet Take 1 tablet by mouth 3 times daily as needed for Nausea or Vomiting  Patient not taking: Reported on 8/17/2022 8/14/22   DARA Blanc   aspirin EC 81 MG EC tablet Take 1 tablet by mouth in the morning and 1 tablet before bedtime. 8/14/22 8/17/22  DARA Blanc   LORazepam (ATIVAN) 1 MG tablet Take 1 tablet by mouth daily as needed for Anxiety for up to 30 days. 8/12/22 9/11/22  Benoit Pabon MD   furosemide (LASIX) 20 MG tablet Take 1 tablet by mouth daily  Patient not taking: Reported on 7/5/2022 6/21/22 8/17/22  Kristin Pina, APRN - CNP   metoprolol tartrate (LOPRESSOR) 50 MG tablet TAKE 1 TABLET BY MOUTH TWO TIMES A DAY 3/1/22   Yolie Barnes MD   Cranberry 125 MG TABS Take by mouth  Patient not taking: Reported on 5/24/2022 8/17/22  Historical Provider, MD   Cholecalciferol (VITAMIN D3) 5000 units CAPS Take 1 capsule by mouth daily 10/27/17   Benoit Pabon MD     Allergies: Ciprofloxacin, Clindamycin/lincomycin, Hydrocod polst-cpm polst er, Macrobid [nitrofurantoin macrocrystal], Paxil [paroxetine hcl], and Morphine  IP meds : Scheduled Meds:   metoprolol tartrate  25 mg Oral BID    calcium-cholecalciferol  1 tablet Oral Daily    cetirizine  10 mg Oral Daily    Vitamin D  5,000 Units Oral Daily with breakfast    hydrocortisone   Topical 4x Daily    acetaminophen  650 mg Oral Q6H    apixaban  2.5 mg Oral BID    famotidine  20 mg Oral Daily     Continuous Infusions:  Review of Systems Physical Exam   Review of Systems   Constitutional:  Positive for activity change. Negative for chills, fatigue and fever. Broke L hip, recent surgery, doing rehab   HENT:  Negative for dental problem, hearing loss and sneezing. Eyes:  Negative for photophobia and visual disturbance. Respiratory:  Negative for cough, shortness of breath and wheezing. Cardiovascular:  Negative for chest pain, palpitations and leg swelling.    Gastrointestinal:  Negative for abdominal pain, blood in stool, constipation, diarrhea, nausea and vomiting. Genitourinary:  Negative for difficulty urinating, dysuria and hematuria. Musculoskeletal:  Positive for arthralgias. Negative for gait problem, neck pain and neck stiffness. Pain in L hip, improving   Skin:  Negative for pallor, rash and wound. Allergic/Immunologic: Negative for immunocompromised state. Neurological:  Negative for dizziness, seizures, weakness and headaches. Hematological:  Does not bruise/bleed easily. Psychiatric/Behavioral:  Negative for confusion and dysphoric mood. The patient is not nervous/anxious. Physical Exam  Vitals reviewed. Constitutional:       General: She is not in acute distress. Appearance: Normal appearance. She is normal weight. She is not ill-appearing, toxic-appearing or diaphoretic. HENT:      Head: Normocephalic and atraumatic. Nose: Nose normal.      Mouth/Throat:      Mouth: Mucous membranes are moist.      Pharynx: Oropharynx is clear. Eyes:      General: No scleral icterus. Extraocular Movements: Extraocular movements intact. Cardiovascular:      Rate and Rhythm: Normal rate and regular rhythm. Pulses: Normal pulses. Heart sounds: Normal heart sounds. No murmur heard. No friction rub. No gallop. Pulmonary:      Effort: Pulmonary effort is normal. No respiratory distress. Breath sounds: Normal breath sounds. No wheezing, rhonchi or rales. Abdominal:      General: Abdomen is flat. Bowel sounds are normal. There is no distension. Palpations: Abdomen is soft. Tenderness: There is no abdominal tenderness. There is no guarding. Musculoskeletal:         General: Swelling and signs of injury present. Cervical back: Normal range of motion. Left hip: Decreased range of motion. Right lower leg: No edema. Left lower leg: Edema present. Lymphadenopathy:      Cervical: No cervical adenopathy.    Skin:     General: Skin is warm and dry. Coloration: Skin is not jaundiced or pale. Neurological:      General: No focal deficit present. Mental Status: She is alert and oriented to person, place, and time. Psychiatric:         Mood and Affect: Mood normal.         Behavior: Behavior normal.         Thought Content: Thought content normal.         Judgment: Judgment normal.         Vitals:    08/25/22 1035   BP: 136/68   Pulse: 79   Resp:    Temp:    SpO2:      Labs, Radiology and Tests       Recent Labs     08/24/22 2137   WBC 6.4   RBC 3.57*   HGB 10.9*   HCT 33.4*   MCV 93.6   MCH 30.5   MCHC 32.6        Recent Labs     08/24/22  2137   *   K 3.9   CL 87*   CO2 21*   BUN 8   CREATININE 0.7   CALCIUM 8.3*       Radiology : Chest XR  8/24/2022 - Lungs are clear without acute process or pulmonary congestion. Cardiomegaly. Other : Creatinine Baseline: 0.4    ECHO 7/15/22 - EF 55%    Assessment    Renal - COTY secondary to low BP, with possible contribution to poor fluid intake due to mild COVID and or hypotension. Cr up to 0.7 from baseline 0.4 yesterday, but is back to baseline today. Cr this afternoon 0.3. Urine sodium <20 and osmolality 173, will fluid restrict. Start NS 60 ml/hr. Fluid restict to 1500 ml  Will recheck BMP tomorrow. Electrolytes - Hyponatremia to 121 yesterday, 128 today. Likely to correct with with NS. Hypotension - Possible from volume depletion, will start NS  COVID-19 - Mild  S/p femoral neck fracture and arthroplasty 8/14/22  SVT vs A.  Fib with RVR - stable   Meds reviewed and discussed with pt and RN      Franko Mir DO, PGY-1

## 2022-08-25 NOTE — PLAN OF CARE
Problem: Discharge Planning  Goal: Discharge to home or other facility with appropriate resources  Outcome: Progressing  Flowsheets (Taken 8/25/2022 1428)  Discharge to home or other facility with appropriate resources:   Identify barriers to discharge with patient and caregiver   Identify discharge learning needs (meds, wound care, etc)   Refer to discharge planning if patient needs post-hospital services based on physician order or complex needs related to functional status, cognitive ability or social support system   Arrange for needed discharge resources and transportation as appropriate  Note: Patient going home 8/30 to her own home with assistance from her son     Problem: Pain  Goal: Verbalizes/displays adequate comfort level or baseline comfort level  Outcome: Progressing  Flowsheets (Taken 8/25/2022 1428)  Verbalizes/displays adequate comfort level or baseline comfort level:   Encourage patient to monitor pain and request assistance   Assess pain using appropriate pain scale   Administer analgesics based on type and severity of pain and evaluate response   Implement non-pharmacological measures as appropriate and evaluate response   Notify Licensed Independent Practitioner if interventions unsuccessful or patient reports new pain  Note: Patients pain controlled with scheduled tylenol every 6 hours     Problem: Skin/Tissue Integrity  Goal: Absence of new skin breakdown  Description: 1. Monitor for areas of redness and/or skin breakdown  2. Assess vascular access sites hourly  3. Every 4-6 hours minimum:  Change oxygen saturation probe site  4. Every 4-6 hours:  If on nasal continuous positive airway pressure, respiratory therapy assess nares and determine need for appliance change or resting period.   Outcome: Progressing  Note: Patient free of any new skin breakdown     Problem: Safety - Adult  Goal: Free from fall injury  Outcome: Progressing  Flowsheets (Taken 8/25/2022 1428)  Free From Fall Injury:   Instruct family/caregiver on patient safety   Based on caregiver fall risk screen, instruct family/caregiver to ask for assistance with transferring infant if caregiver noted to have fall risk factors  Note: Patient free from falls at this time     Problem: ABCDS Injury Assessment  Goal: Absence of physical injury  Outcome: Progressing  Flowsheets (Taken 8/25/2022 1428)  Absence of Physical Injury: Implement safety measures based on patient assessment  Note: Patient free from injury at this time

## 2022-08-25 NOTE — PROGRESS NOTES
[address]  Occupational Therapy  Daily Note  Time:   Time In:   Time Out: 0935  Timed Code Treatment Minutes: 60 Minutes  Minutes: 60    Date: 2022  Patient Name: Shelby Haley,   Gender: female      Room: [facility]54/054-A  MRN: [id number]  : 1940  (78 y.o.)  Referring Practitioner: Marika Flores MD  Diagnosis: Hip fracture requiring operative repair, left, closed, initial encounter. Additional Pertinent Hx: Per chart review, pt is an 78 y.o. female with a PMH of \"Osteoporosis of multiple sites without pathological fracture, Squamous cell carcinoma of skin, Vascular headache, and Vitamin D deficiency\". Pt originally admitted to [facility] on 22 post fall at home, landing on left side. Pt reports she was unable to ambulate and fall resulted in femoral neck fracture, undergoing a left hemiarthoplasty on 22. \"Postoperatively, patient developed narrow complex tachyarrhythmia concerning for SVT versus A. fib with RVR status post adenosine x2 and DCCV x3.\" Pt presents to [facility] IPR unit on 22 s/p left hemiarthoplasty. Restrictions/Precautions:  Restrictions/Precautions: Surgical Protocols, Weight Bearing, General Precautions, Fall Risk  Left Lower Extremity Weight Bearing: Weight Bearing As Tolerated  Position Activity Restriction  Hip Precautions: No hip flexion > 90 degrees, No hip internal rotation, Posterior hip precautions, No ADduction  Other position/activity restrictions: KATHY precautions     SUBJECTIVE: patient cooperative, agreeable to OT. Pt upset that she is now COVID + and has to miss her hair appointment today. Pt anxious and requires reassurance throughout. PAIN: c/o soreness in L hip, doesn't rate    Vitals: Vitals not assessed per clinical judgement, see nursing flowsheet    COGNITION: WFL    ADL:   Grooming: Minimal Assistance. To wash hair over sink. Pt demo good awareness of KATHY precautions.  Set-up for face care.  Bathing: Contact Guard Assistance. Standing for nikos care. Pt declined to wash lower legs as she showered yesterday. Set-up for upper body. Upper Extremity Dressing: with set-up. Donning sweatshirt   Lower Extremity Dressing: Minimal Assistance. With use of reacher, min A to pull up in back as she demo anxiousness when standing   Toileting: Contact Guard Assistance. During hygiene and clothing mgmt   Toilet Transfer: [address] ETS . BALANCE:  Sitting Balance:  Supervision. Standing Balance: Contact Guard Assistance. BED MOBILITY:  Not Tested    TRANSFERS:  Sit to Stand:  Contact Guard Assistance. Recliner, ETS   Stand to Sit: [address] FUNCTIONAL MOBILITY:  Assistive Device: Rolling Walker  Assist Level:  Contact Guard Assistance. Distance: To and from bathroom  Cues for safety, reassurance cues required        ASSESSMENT:     Activity Tolerance:  Patient tolerance of  treatment: good. Discharge Recommendations: Continue to assess pending progress and Home with Home Health OT  Equipment Recommendations: Other: Recommend tub transfer bench and installation of grab bar in shower. Patient would benefit from sock aid and shoe horn but is unsure if she wants them at this time. Son ordered [address] [facility]. Son is measuring toilet height to see if we need riser/BSC. Continue to monitor needs.   Plan: Times per Week: 5x wk/90 min 1x wk/30min  Times per Day: Daily  Current Treatment Recommendations: Functional mobility training, Balance training, Self-Care / ADL, Patient/Caregiver education & training, Safety education & training, Strengthening, Endurance training, Equipment evaluation, education, & procurement    Patient Education  Patient Education: ADL's, Precautions, Equipment Education, Reviewed Prior Education, and Assistive Device Safety    Goals  Short Term Goals  Time Frame for Short term goals: 1 wk  Short Term Goal 1: Pt will tolerate 3 min of standing with 1-2 hand release and SBA for improved indep in sinkside grooming tasks  Short Term Goal 2: Pt will navigate room to gather needed supplies for ADL task with SBA and 0 vc for safety for PLOF in ADL routines. Short Term Goal 3: Pt will complete mobility to/from the bathroom with no more than SBA and min A for safety to progress to PLOF in ADL routines  Short Term Goal 4: Pt will complete simple snack/meal prep task with SBA and min VC for safety/problem solving for safe indep in IADL routines  Short Term Goal 5: Pt will complete community reintegration activity with SBA and min A for problem solving/KATHY precautions to progress to PLOF  Long Term Goals  Time Frame for Long term goals : 2 wks from [facility] eval  Long Term Goal 1: Pt will complete UB/LB dressing with LHAE prn and Sup with 0 vc for safety or KATHY precautions to progress ADL performance to PLOF  Long Term Goal 2: Pt will complete UB/LB bathing with LHAE prn and Sup with 0vc for safety or KATHY precautions for improved ADL indep  Long Term Goal 3: Pt will complete oral care mod I with 0 vc for safety or precautions to progress ADL performance to PLOF  Long Term Goal 4: Pt will complete toileting/transfer with Sup and 0vc for safety or to maintain precautions for improved indep in toileting routine  Long Term Goal 5: Pt will demo competence with [place] for 100% of time during ADL tasks to maintain KATYH precautions and increase indep in daily occupations    Following session, patient left in safe position with all fall risk precautions in place.

## 2022-08-25 NOTE — PROGRESS NOTES
Physical Medicine & Rehabilitation   Progress Note    Chief Complaint:   left hip fracture. S/p left hemiarthroplasty. Rehab needs    Subjective:  Patient seen today, sitting up at bedside, working with therapy. Patient with drop in BP last evening. Work up per Dr Giovanna Swartz. Patient +COVID, now in droplet isolation. Discussed with patient about therapy in her room. Hyponatremia and elevated procalcitonin noted. Patient noted to be feeling better this AM. /78. Patient denies any dizziness or lightheadedness while up, states some anxiety. RN present and has medications for her. Patient denies any other needs or concern at this time. Rehabilitation:  PT:   Bed Mobility:  Sit to Supine: Minimal Assistance   Scooting: Minimal Assistance  Extra time required. Transfers:  Sit to Stand: Stand By Assistance  Stand to Sit:Stand By Assistance  Ambulation:  Stand By Assistance  Distance: 150' x 1, 12' x 1, 5' x 1  Surface: Level Tile  Device:Rolling Walker  Gait Deviations: Forward Flexed Posture, Slow Mague, Decreased Step Length Bilaterally, Decreased Weight Shift Left, Decreased Gait Speed, Decreased Heel Strike Bilaterally, and cues for upright posture. Balance:  Static Standing Balance: Supervision  Dynamic Standing Balance: Stand By Assistance  While completing clothing management and pericare for toileting. Exercise:  Patient was guided in 1 set(s) 10 reps of exercises: Glut sets, Seated hamstring curls, Seated heel/toe raises, Long arc quads, Seated isometric hip adduction, Seated abduction/adduction, and abdominal isometrics. Exercises were completed for increased independence with functional mobility. OT:   ADL:   Grooming: with set-up. For hair care seated in chair  Bathing: Minimal Assistance. For BLEs below knees due to hip precautions, education provided on use of long handled sponge brush. CGA-close SBA for balance while washing nikos area/bottom holding onto grab bar for stability. Able to wash remaining areas seated in chair with supervision. Increased time provided during tasks  Upper Extremity Dressing: Minimal Assistance. To fasten bra. Patient able to doff gown and don sweatshirt with increased time to pull over head and adjust  Lower Extremity Dressing: Moderate Assistance. To don L shoe and tie B shoes. Ken to thread LEs into pants/undergarement with use of reacher, with difficulty noted problem solving how to properly use. Able to don R shoe using shoe horn and B socks using sock aid. Patient required moderate verbal cues for problem solving use of LHAE, would benefit from continued skilled education on use of LHAE to help increase independence with LB dressing while maintaining hip precautions   Toileting: Contact Guard Assistance. - close SBA for clothing management and hygiene   Toilet Transfer: [employer]. - SBA to/from RTS with increased time  Tub Transfer: [employer]. To get in/out of tub by bringing LEs over ledge with increased time, minimal verbal cues provided for technique. CGA to/from tub bench with use of grab bars . BALANCE:  Sitting Balance:  Stand By Assistance. - supervision  Standing Balance: Contact Guard Assistance. - SBA  BED MOBILITY:  Supine to Sit: Stand By Assistance - increased time to bring LLE over EOB, HOB slightly elevated   TRANSFERS:  Sit to Stand:  Contact Guard Assistance. - SBA from various surfaces with increased time  Stand to Sit: Contact Guard Assistance. - SBA to various surfaces with increased time  FUNCTIONAL MOBILITY:  Assistive Device: Rolling Walker  Assist Level:  Contact Guard Assistance. - SBA   Distance:  To and from bathroom and in/out of shower room    ADDITIONAL ACTIVITIES:  Patient completed BUE strengthening exercises with skilled education on HEP: completed x20 reps x1 set with a mod resistance band in all joints and all planes in order to improve UE strength and activity tolerance required for BADL routine and toilet / shower transfers. Patient tolerated fair, requiring rest breaks. Patient also required cues for technique. Review of Systems:  CONSTITUTIONAL:  positive for  fatigue  EYES:  positive for  glasses & bilateral cataracts. HEENT:  negative  RESPIRATORY:  negative  CARDIOVASCULAR:  positive for  palpitations, fatigue, heart murmur  GASTROINTESTINAL:  positive for constipation and decreased appetite  GENITOURINARY:  negative  SKIN:  left hip incision.  Rash to back and buttock area  HEMATOLOGIC/LYMPHATIC:  positive for swelling/edema  MUSCULOSKELETAL:  positive for  pain  NEUROLOGICAL:  positive for gait problems and pain  BEHAVIOR/PSYCH:  negative  System review otherwise negative      Objective:  Vitals:    08/25/22 0133   BP: (!) 114/55   Pulse: 69   Resp: 18   Temp: 98.2 °F (36.8 °C)   SpO2:       awake  Orientation:   person, place, time  Mood: within normal limits  Affect: calm  General appearance: Patient is well nourished, well developed, well groomed and in no acute distress     Memory:   normal,  Attention/Concentration: normal  Language:  normal     Cranial Nerves:  cranial nerves II-XII are grossly intact  ROM:  abnormal - left hip  Tone:  normal  Muscle bulk: within normal limits  Sensory:  Sensory intact     Skin: Left hip incision not visualized this assessment   Peripheral vascular: Pulses: Normal upper and lower extremity pulses; Edema: 1+      Diagnostics:   Recent Results (from the past 24 hour(s))   CBC with Auto Differential    Collection Time: 08/24/22  9:37 PM   Result Value Ref Range    WBC 6.4 4.8 - 10.8 thou/mm3    RBC 3.57 (L) 4.20 - 5.40 mill/mm3    Hemoglobin 10.9 (L) 12.0 - 16.0 gm/dl    Hematocrit 33.4 (L) 37.0 - 47.0 %    MCV 93.6 81.0 - 99.0 fL    MCH 30.5 26.0 - 33.0 pg    MCHC 32.6 32.2 - 35.5 gm/dl    RDW-CV 14.1 11.5 - 14.5 %    RDW-SD 47.7 (H) 35.0 - 45.0 fL    Platelets 559 273 - 126 thou/mm3    MPV 9.1 (L) 9.4 - 12.4 fL    Seg Neutrophils 77.7 % Lymphocytes 12.9 %    Monocytes 8.6 %    Eosinophils 0.0 %    Basophils 0.3 %    Immature Granulocytes 0.5 %    Segs Absolute 5.0 1.8 - 7.7 thou/mm3    Lymphocytes Absolute 0.8 (L) 1.0 - 4.8 thou/mm3    Monocytes Absolute 0.6 0.4 - 1.3 thou/mm3    Eosinophils Absolute 0.0 0.0 - 0.4 thou/mm3    Basophils Absolute 0.0 0.0 - 0.1 thou/mm3    Immature Grans (Abs) 0.03 0.00 - 0.07 thou/mm3    nRBC 0 /100 wbc   Lactic Acid    Collection Time: 08/24/22  9:37 PM   Result Value Ref Range    Lactic Acid 1.9 0.5 - 2.0 mmol/L   Basic Metabolic Panel w/ Reflex to MG    Collection Time: 08/24/22  9:37 PM   Result Value Ref Range    Sodium 121 (L) 135 - 145 meq/L    Potassium reflex Magnesium 3.9 3.5 - 5.2 meq/L    Chloride 87 (L) 98 - 111 meq/L    CO2 21 (L) 23 - 33 meq/L    Glucose 190 (H) 70 - 108 mg/dL    BUN 8 7 - 22 mg/dL    Creatinine 0.7 0.4 - 1.2 mg/dL    Calcium 8.3 (L) 8.5 - 10.5 mg/dL   Procalcitonin    Collection Time: 08/24/22  9:37 PM   Result Value Ref Range    Procalcitonin 0.15 (H) 0.01 - 0.09 ng/mL   Anion Gap    Collection Time: 08/24/22  9:37 PM   Result Value Ref Range    Anion Gap 13.0 8.0 - 16.0 meq/L   Glomerular Filtration Rate, Estimated    Collection Time: 08/24/22  9:37 PM   Result Value Ref Range    Est, Glom Filt Rate 80 (A) ml/min/1.73m2   Urinalysis with Reflex to Culture    Collection Time: 08/24/22 11:20 PM    Specimen: Urine   Result Value Ref Range    Glucose, Ur NEGATIVE NEGATIVE mg/dl    Bilirubin Urine NEGATIVE NEGATIVE    Ketones, Urine NEGATIVE NEGATIVE    Specific Gravity, Urine 1.009 1.002 - 1.030    Blood, Urine NEGATIVE NEGATIVE    pH, UA 7.0 5.0 - 9.0    Protein, UA NEGATIVE NEGATIVE    Urobilinogen, Urine 0.2 0.0 - 1.0 eu/dl    Nitrite, Urine NEGATIVE NEGATIVE    Leukocyte Esterase, Urine NEGATIVE NEGATIVE    Color, UA YELLOW STRAW-YELLOW    Character, Urine CLEAR CLEAR-SL CLOUD   COVID-19, Rapid    Collection Time: 08/25/22  2:20 AM    Specimen: Nasopharyngeal Swab   Result Value Ref Range    SARS-CoV-2, NAAT DETECTED (AA) NOT DETECTED         Impression:  Acute left displaced fmoral neck fracture  S/p left hip hemiarthroplasty with Dr. Rob Andrews on 8/14/22. Mechanical ground level fall  SVT vs. AF with RVR  Asymptomatic bacteriuria  Moderate mitral valve regurgitation  Chronic hyponatremia  HTN  Insomnia  Anxiety  Vitamin D deficiency  Cataracts and macular degeneration   COVID 19+  8/25/22    Plan:  Continue current therapies  Prophylaxis:  DVT: Eliquis 2.5 mg p.o. twice daily, GI: Pepcid 20 mg p.o. daily. Pain: Ultram 25 to 50 mg p.o. every 6 hours as needed, Tylenol 650 mg p.o. every 6 hours scheduled, Tylenol 650 mg p.o. every 4 hours as needed  Bowels: Dulcolax suppository 10 mg rectal daily as needed; GlycoLax 17 g p.o. daily as needed. Imodium PRN for loose stools  Anxiety: Ativan 0.5mg TID PRN  HTN: Lopressor 25mg BID   Rash: hydrocortisone cream QID, zyrtec daily, benadryl PRN  Dr Radames Larose following for medical management. Team conference Tuesday  Discharge plan to home on 8/30/2022 with home health services including PT, OT, nursing, and home health aide.       Missed Therapy Time:  None    Elena Martins, APRN - CNP

## 2022-08-25 NOTE — PROGRESS NOTES
Independent  Homemaking Responsibilities: Yes  Ambulation Assistance: Independent (uses cane at night time only)  Transfer Assistance: Independent  Active : Yes  Additional Comments: Son recently (retired) moved in with pt. Pt amb with SC intermittently in the home, and uses SC for longer distances and at night    Restrictions/Precautions:  Restrictions/Precautions: Isolation  Left Lower Extremity Weight Bearing: Weight Bearing As Tolerated  Position Activity Restriction  Hip Precautions: No hip flexion > 90 degrees, No hip internal rotation, Posterior hip precautions, No ADduction  Other position/activity restrictions: Droplet + precautions, KAHTY precautions     SUBJECTIVE: Patient in room in recliner, agreeable to PT. Pt anxious throughout PT session. RN into room towards end of treatment to provided medication. PAIN: left hip, mild, not rated    Vitals: Blood Pressure: 120/60 in reclined position, 122/64 standing--no reports of dizziness/lightheadedness    OBJECTIVE:  Bed Mobility:  Not Tested    Transfers:  Sit to Stand: Stand By Assistance  Stand to Sit:Contact Guard Assistance    Ambulation:  Contact Guard Assistance  Distance: 10', 15', 39', 24', 5'  Surface: Level Tile  Device:Rolling Walker  Gait Deviations: Forward Flexed Posture, Slow Mague, Decreased Step Length Bilaterally, Decreased Gait Speed, and Decreased Heel Strike Bilaterally  *Verbal cues for upright posture due to flexed forward head, verbal cues for increased stride length    Balance:  Static Standing Balance: Contact Guard Assistance  Dynamic Standing Balance: Contact Guard Assistance    Exercise:  Patient was guided in 1 set(s) 10 reps of exercise to both lower extremities. Seated: long arc quad, ankle pumps x20. Reclined: glut sets 5\" hold, quad sets 5\" hold, heel slides, short arc quad. Exercises were completed for increased independence with functional mobility.     Functional Outcome Measures: Not completed ASSESSMENT:  Assessment: Patient progressing toward established goals. Activity Tolerance:  Patient tolerance of  treatment: good. Equipment Recommendations:Equipment Needed: Yes  Other: RW ordered from E 8/23/22  Discharge Recommendations: Continue to assess pending progress and Patient would benefit from continued PT at discharge  Plan: Current Treatment Recommendations: Strengthening, Balance training, Functional mobility training, Transfer training, Neuromuscular re-education, Stair training, Gait training, Endurance training, Home exercise program, Safety education & training, Patient/Caregiver education & training, Equipment evaluation, education, & procurement, Therapeutic activities  Plan:  (5x/wk 90min, 1x/wk 30min)    Patient Education  Patient Education: Gait, Verbal Exercise Instruction,  - Patient Verbalized Understanding, - Patient Requires Continued Education    Goals:  Patient goals : \"just feel better\"  Short Term Goals  Time Frame for Short term goals: 1 week  Short term goal 1: Patient will complete rolling and supine < > sit with min assist with no bed rail and head of bed flat and maintain hip precautions to transfer in and out of bed with decreased difficulty. GOAL MET, SEE LTG  Short term goal 2: Patient will complete sit < > stand with CGA from various surfaces to stand to ambulate with decreased difficulty. GOAL MET, SEE LTG  Short term goal 3: Patient will ambulate 80' with a RW and SBA to progress towards ambulating household distances  Short term goal 4: Patient will ascend/descend 2, 6\" steps with 2 hand rails and CGA to progress towards safe home entry. Short term goal 5: Patient will complete car transfer with min assist to transfer in and out of vehicle for home  Long Term Goals  Time Frame for Long term goals : 3 weeks from initial evaluation  Long term goal 1: Patient will complete supine < > sit with modified independence to transfer in and out of bed safely.   Long term goal 2: Patient will complete sit < > stand with modified independence to stand to ambulate safely. Long term goal 3: Patient will bed < > chair transfer with a RW and modified independence to transfer surface to surface safely. Long term goal 4: Patient will ambulate 80' with a RW and modified independence to navigate home safely. Long term goal 5: Patient will ascend/descend 2 steps with hand held assist and cane with min assist for home entry. Additional Goals?: Yes  Long term goal 6: Patient will complete car transfer with SBA to transfer in and out of vehicle for transport to home and appointments    Following session, patient left in safe position with all fall risk precautions in place.

## 2022-08-25 NOTE — PROGRESS NOTES
BED MOBILITY:  Not Tested    TRANSFERS:  Sit to Stand:  Contact Guard Assistance. Recliner, ETS   Stand to Sit: [address] FUNCTIONAL MOBILITY:  Assistive Device: Rolling Walker  Assist Level:  Contact Guard Assistance. Distance: To and from bathroom  No LOB, slow pace      ADDITIONAL ACTIVITIES:  Patient completed BUE strengthening exercises with skilled education on HEP: completed x15 reps x1 set with a medium resistive band in all joints and all planes in order to improve UE strength and activity tolerance required for BADL routine and toilet / shower transfers. Patient tolerated well, requiring min rest breaks. Patient also required min cues for technique. ASSESSMENT:     Activity Tolerance:  Patient tolerance of  treatment: good. Discharge Recommendations: Home with Home Health OT  Equipment Recommendations: Other: Recommend tub transfer bench and installation of grab bar in shower. Patient would benefit from sock aid and shoe horn but is unsure if she wants them at this time. Son ordered [address] [facility]. Son is measuring toilet height to see if we need riser/BSC. Continue to monitor needs. Plan: Times per Week: 5x wk/90 min 1x wk/30min  Times per Day: Daily  Current Treatment Recommendations: Functional mobility training, Balance training, Self-Care / ADL, Patient/Caregiver education & training, Safety education & training, Strengthening, Endurance training, Equipment evaluation, education, & procurement    Patient Education  Patient Education: ADL's, Home Exercise Program, Precautions, and Reviewed Prior Education    Goals  Short Term Goals  Time Frame for Short term goals: 1 wk  Short Term Goal 1: Pt will tolerate 3 min of standing with 1-2 hand release and SBA for improved indep in sinkside grooming tasks  Short Term Goal 2: Pt will navigate room to gather needed supplies for ADL task with SBA and 0 vc for safety for PLOF in ADL routines.   Short Term Goal 3: Pt will complete mobility to/from the bathroom with no more than SBA and min A for safety to progress to PLOF in ADL routines  Short Term Goal 4: Pt will complete simple snack/meal prep task with SBA and min VC for safety/problem solving for safe indep in IADL routines  Short Term Goal 5: Pt will complete community reintegration activity with SBA and min A for problem solving/KATHY precautions to progress to PLOF  Long Term Goals  Time Frame for Long term goals : 2 wks from IPR eval  Long Term Goal 1: Pt will complete UB/LB dressing with LHAE prn and Sup with 0 vc for safety or KATHY precautions to progress ADL performance to PLOF  Long Term Goal 2: Pt will complete UB/LB bathing with LHAE prn and Sup with 0vc for safety or KATHY precautions for improved ADL indep  Long Term Goal 3: Pt will complete oral care mod I with 0 vc for safety or precautions to progress ADL performance to PLOF  Long Term Goal 4: Pt will complete toileting/transfer with Sup and 0vc for safety or to maintain precautions for improved indep in toileting routine  Long Term Goal 5: Pt will demo competence with Cathryn Hudson for 100% of time during ADL tasks to maintain KATHY precautions and increase indep in daily occupations    Following session, patient left in safe position with all fall risk precautions in place.

## 2022-08-25 NOTE — CONSULTS
Renal Attending Note  8/25/2022 5:46 PM    Patient seen and examined independently by me. Face to face evaluation and examination performed. I Have discussed with the resident about this patient in detail and agree with her assessment and plan    History-patient has sustained a fall and has fractured her left femoral neck for which she has undergone left hemiarthroplasty on 8/14/2022. Subsequently was transferred to rehab. Patient's currently complains of pain, minimal on the left hip, associated with some swelling of the left leg some ambulation makes the pain slightly worse no other modifying factors no associated symptoms of fever chills nausea vomiting. Physical examination-oral mucosa reasonably moist mild edema of the left leg, lungs clear abdomen soft nontender CNS grossly intact psych not agitated    Laboratory data-yesterday without sodium 121 chloride of 87 BUN of 8 creatinine 0.7  Chest x-ray-shows cardiomegaly  Old laboratory data have been reviewed and noted that patient does have a history of some hyponatremia on and off  Echocardiogram -ejection fraction 55%    Assessment / Plan    Renal -patient may be having mild acute kidney injury her creatinine has rising from 0.3 to 0.7  May be due to some hypotension/poor oral intake  The recheck of labs were normal    Electrolytes -hyponatremia again etiology unclear however I repeated some stat labs, which shows the sodium is 128 and creatinine of 0.3, uric acid 2.4. Not clear whether this is an error. Even if it is not overall appropriate correction. For now we will start the patient on some normal saline at 60 mm/h, urine studies show urine sodium less than 20 and osmolality of 173 consistent with increased oral intake of liquids. Placed on 1500 mL of fluid restriction.   Status post left femoral neck fracture and arthroplasty done on 8/14/2022  COVID-19 infection  Essential hypertension  Meds reviewed and discussed with patient  Please see the resident's note for further details      Mazin Lindquist MD,M.D  [facility]

## 2022-08-25 NOTE — PROGRESS NOTES
[address] [place] [address]  INPATIENT PHYSICAL THERAPY  DAILY NOTE  [facility] - [id number]    Time In: 1552  Time Out: 6764  Timed Code Treatment Minutes: 30 Minutes  Minutes: 30          Date: 2022  Patient Name: Leatha Herrera,  Gender:  female        MRN: [id number]  : 1940  (78 y.o.)  Referral Date : 22  Referring Practitioner: Eliceo Arreola MD  Diagnosis: Hip fracture requiring operative repair, left, closed, initial encounter  Additional Pertinent Hx: Ga Hummel  is a 78 y.o. female admitted to the inpatient rehabilitation unit on 2022. She was originally admitted to [address] [place] [address] on 2022. Patient  has a past medical history of Osteoporosis of multiple sites without pathological fracture, Squamous cell carcinoma of skin, Vascular headache, and Vitamin D deficiency. Patient presented to [address] [place] [address] after suffering a fall at home, landing on her left side. Patient was unable to ambulate and was found to have left femoral neck fracture. Patient was admitted and taken for left hemiarthroplasty with Dr Juice Braxton on 22. Postoperatively, patient developed narrow complex tachyarrhythmia concerning for SVT versus A. fib with RVR status post adenosine x2 and DCCV x3. Patient currently off amiodarone infusion and is in normal sinus rhythm.   Patient is now on Lopressor 12.5 mg twice daily     Prior Level of Function:  Lives With: Son  Type of Home: House  Home Layout: Two level, Able to Live on Main level with bedroom/bathroom, Performs ADL's on one level, Laundry in basement (pt's bedroom is upstairs but reports she could live on main floor)  Home Access: Stairs to enter without rails  Entrance Stairs - Number of Steps: 2 ELOY  Home Equipment: Cane   Bathroom Shower/Tub: Tub/Shower unit, Curtain  Bathroom Toilet: Standard    Receives Help From: Family, Friend(s)  ADL Assistance: Independent  Homemaking Assistance: Independent  Homemaking Responsibilities: Yes  Ambulation Assistance: Independent (uses cane at night time only)  Transfer Assistance: Independent  Active : Yes  Additional Comments: Son recently (retired) moved in with pt. Pt amb with SC intermittently in the home, and uses SC for longer distances and at night    Restrictions/Precautions:  Restrictions/Precautions: Surgical Protocols, Weight Bearing, General Precautions, Fall Risk  Left Lower Extremity Weight Bearing: Weight Bearing As Tolerated  Position Activity Restriction  Hip Precautions: No hip flexion > 90 degrees, No hip internal rotation, Posterior hip precautions, No ADduction  Other position/activity restrictions: KATHY precautions     SUBJECTIVE: Patient in bed upon arrival, agreed and cooperative for therapy. Requesting to use the BR during session. Mildly anxious throughout session. PAIN: Yes, pain in left hip, not rated. Vitals: Vitals not assessed per clinical judgement, see nursing flowsheet    OBJECTIVE:  Bed Mobility:  Supine to Sit: Stand By Assistance, with head of bed flat, with rail, with verbal cues , with increased time for completion    Transfers:  Sit to Stand: Contact Guard Assistance  Stand to [facility][1] Assistance    Ambulation:  Contact Guard Assistance, with increased time for completion  Distance: 12 ft x1; 10 ft. x1  Surface: Level Tile  Device:Rolling Walker  Gait Deviations: Forward Flexed Posture, Slow Mague, Decreased Step Length Bilaterally, Decreased Weight Shift Left, Decreased Gait Speed, and Decreased Heel Strike Bilaterally    Balance:  Static Standing Balance: Stand By Assistance  Dynamic Standing Balance: Stand By Assistance, Contact Guard Assistance, while completing functional bathroom tasks with intermittent UE support. Functional Outcome Measures: Not completed       ASSESSMENT:  Assessment: Patient progressing toward established goals.   Activity Tolerance:  Patient tolerance of  treatment: good.      Equipment Recommendations:Equipment Needed: Yes  Other: RW ordered from E 8/23/22  Discharge Recommendations: Continue to assess pending progress and Patient would benefit from continued PT at discharge  Plan: Current Treatment Recommendations: Strengthening, Balance training, Functional mobility training, Transfer training, Neuromuscular re-education, Stair training, Gait training, Endurance training, Home exercise program, Safety education & training, Patient/Caregiver education & training, Equipment evaluation, education, & procurement, Therapeutic activities  Plan:  (5x/wk 90min, 1x/wk 30min)    Patient Education  Patient Education: Plan of Care, Precautions/Restrictions, Bed Mobility, Transfers, Reviewed Prior Education, Gait, Health Promotion and Wellness Education, - Patient Requires Continued Education    Goals:  Patient goals : \"just feel better\"  Short Term Goals  Time Frame for Short term goals: 1 week  Short term goal 1: Patient will complete rolling and supine < > sit with min assist with no bed rail and head of bed flat and maintain hip precautions to transfer in and out of bed with decreased difficulty. GOAL MET, SEE LTG  Short term goal 2: Patient will complete sit < > stand with CGA from various surfaces to stand to ambulate with decreased difficulty. GOAL MET, SEE LTG  Short term goal 3: Patient will ambulate 80' with a RW and SBA to progress towards ambulating household distances  Short term goal 4: Patient will ascend/descend 2, 6\" steps with 2 hand rails and CGA to progress towards safe home entry. Short term goal 5: Patient will complete car transfer with min assist to transfer in and out of vehicle for home  Long Term Goals  Time Frame for Long term goals : 3 weeks from initial evaluation  Long term goal 1: Patient will complete supine < > sit with modified independence to transfer in and out of bed safely.   Long term goal 2: Patient will complete sit < > stand with modified independence to stand to ambulate safely. Long term goal 3: Patient will bed < > chair transfer with a RW and modified independence to transfer surface to surface safely. Long term goal 4: Patient will ambulate 80' with a RW and modified independence to navigate home safely. Long term goal 5: Patient will ascend/descend 2 steps with hand held assist and cane with min assist for home entry. Additional Goals?: Yes  Long term goal 6: Patient will complete car transfer with SBA to transfer in and out of vehicle for transport to home and appointments    Following session, patient left in safe position with all fall risk precautions in place.

## 2022-08-26 LAB
ANION GAP SERPL CALCULATED.3IONS-SCNC: 7 MEQ/L (ref 8–16)
ANION GAP SERPL CALCULATED.3IONS-SCNC: 9 MEQ/L (ref 8–16)
BUN BLDV-MCNC: 5 MG/DL (ref 7–22)
BUN BLDV-MCNC: 5 MG/DL (ref 7–22)
CALCIUM SERPL-MCNC: 8.3 MG/DL (ref 8.5–10.5)
CALCIUM SERPL-MCNC: 8.6 MG/DL (ref 8.5–10.5)
CHLORIDE BLD-SCNC: 96 MEQ/L (ref 98–111)
CHLORIDE BLD-SCNC: 96 MEQ/L (ref 98–111)
CO2: 23 MEQ/L (ref 23–33)
CO2: 24 MEQ/L (ref 23–33)
CREAT SERPL-MCNC: 0.3 MG/DL (ref 0.4–1.2)
CREAT SERPL-MCNC: 0.3 MG/DL (ref 0.4–1.2)
GFR SERPL CREATININE-BSD FRML MDRD: > 90 ML/MIN/1.73M2
GFR SERPL CREATININE-BSD FRML MDRD: > 90 ML/MIN/1.73M2
GLUCOSE BLD-MCNC: 100 MG/DL (ref 70–108)
GLUCOSE BLD-MCNC: 104 MG/DL (ref 70–108)
GLUCOSE BLD-MCNC: 98 MG/DL (ref 70–108)
POTASSIUM SERPL-SCNC: 3.7 MEQ/L (ref 3.5–5.2)
POTASSIUM SERPL-SCNC: 3.9 MEQ/L (ref 3.5–5.2)
SODIUM BLD-SCNC: 126 MEQ/L (ref 135–145)
SODIUM BLD-SCNC: 129 MEQ/L (ref 135–145)

## 2022-08-26 PROCEDURE — 99231 SBSQ HOSP IP/OBS SF/LOW 25: CPT | Performed by: NURSE PRACTITIONER

## 2022-08-26 PROCEDURE — 97535 SELF CARE MNGMENT TRAINING: CPT

## 2022-08-26 PROCEDURE — 99232 SBSQ HOSP IP/OBS MODERATE 35: CPT | Performed by: INTERNAL MEDICINE

## 2022-08-26 PROCEDURE — 6370000000 HC RX 637 (ALT 250 FOR IP)

## 2022-08-26 PROCEDURE — 6370000000 HC RX 637 (ALT 250 FOR IP): Performed by: PHYSICAL MEDICINE & REHABILITATION

## 2022-08-26 PROCEDURE — 1180000000 HC REHAB R&B

## 2022-08-26 PROCEDURE — 97530 THERAPEUTIC ACTIVITIES: CPT

## 2022-08-26 PROCEDURE — 80048 BASIC METABOLIC PNL TOTAL CA: CPT

## 2022-08-26 PROCEDURE — 97110 THERAPEUTIC EXERCISES: CPT

## 2022-08-26 PROCEDURE — 6370000000 HC RX 637 (ALT 250 FOR IP): Performed by: FAMILY MEDICINE

## 2022-08-26 PROCEDURE — 82948 REAGENT STRIP/BLOOD GLUCOSE: CPT

## 2022-08-26 PROCEDURE — 36415 COLL VENOUS BLD VENIPUNCTURE: CPT

## 2022-08-26 PROCEDURE — 97116 GAIT TRAINING THERAPY: CPT

## 2022-08-26 PROCEDURE — 6370000000 HC RX 637 (ALT 250 FOR IP): Performed by: NURSE PRACTITIONER

## 2022-08-26 RX ORDER — SODIUM CHLORIDE 1000 MG
2 TABLET, SOLUBLE MISCELLANEOUS ONCE
Status: COMPLETED | OUTPATIENT
Start: 2022-08-26 | End: 2022-08-26

## 2022-08-26 RX ORDER — SODIUM CHLORIDE 9 MG/ML
INJECTION, SOLUTION INTRAVENOUS CONTINUOUS
Status: DISCONTINUED | OUTPATIENT
Start: 2022-08-26 | End: 2022-08-26

## 2022-08-26 RX ADMIN — ACETAMINOPHEN 650 MG: 325 TABLET ORAL at 03:13

## 2022-08-26 RX ADMIN — FAMOTIDINE 20 MG: 20 TABLET ORAL at 07:14

## 2022-08-26 RX ADMIN — ACETAMINOPHEN 650 MG: 325 TABLET ORAL at 23:10

## 2022-08-26 RX ADMIN — APIXABAN 2.5 MG: 2.5 TABLET, FILM COATED ORAL at 22:56

## 2022-08-26 RX ADMIN — HYDROCORTISONE ACETATE: 1 CREAM TOPICAL at 12:35

## 2022-08-26 RX ADMIN — HYDROCORTISONE ACETATE: 1 CREAM TOPICAL at 15:42

## 2022-08-26 RX ADMIN — METOPROLOL TARTRATE 25 MG: 25 TABLET, FILM COATED ORAL at 22:57

## 2022-08-26 RX ADMIN — SODIUM CHLORIDE 2 G: 1 TABLET ORAL at 12:34

## 2022-08-26 RX ADMIN — LORAZEPAM 0.5 MG: 0.5 TABLET ORAL at 07:14

## 2022-08-26 RX ADMIN — METOPROLOL TARTRATE 25 MG: 25 TABLET, FILM COATED ORAL at 07:14

## 2022-08-26 RX ADMIN — APIXABAN 2.5 MG: 2.5 TABLET, FILM COATED ORAL at 07:14

## 2022-08-26 RX ADMIN — CETIRIZINE HYDROCHLORIDE 10 MG: 10 TABLET, FILM COATED ORAL at 07:14

## 2022-08-26 RX ADMIN — Medication 1 TABLET: at 07:14

## 2022-08-26 RX ADMIN — ACETAMINOPHEN 650 MG: 325 TABLET ORAL at 07:13

## 2022-08-26 RX ADMIN — Medication 5000 UNITS: at 07:14

## 2022-08-26 RX ADMIN — ACETAMINOPHEN 650 MG: 325 TABLET ORAL at 12:45

## 2022-08-26 RX ADMIN — HYDROCORTISONE ACETATE: 1 CREAM TOPICAL at 22:57

## 2022-08-26 RX ADMIN — HYDROCORTISONE ACETATE: 1 CREAM TOPICAL at 07:15

## 2022-08-26 RX ADMIN — LORAZEPAM 0.5 MG: 0.5 TABLET ORAL at 15:41

## 2022-08-26 ASSESSMENT — PAIN SCALES - WONG BAKER
WONGBAKER_NUMERICALRESPONSE: 2

## 2022-08-26 ASSESSMENT — PAIN SCALES - GENERAL
PAINLEVEL_OUTOF10: 2
PAINLEVEL_OUTOF10: 2
PAINLEVEL_OUTOF10: 1

## 2022-08-26 ASSESSMENT — PAIN DESCRIPTION - ORIENTATION: ORIENTATION: LEFT

## 2022-08-26 ASSESSMENT — PAIN DESCRIPTION - DESCRIPTORS
DESCRIPTORS: ACHING
DESCRIPTORS: ACHING

## 2022-08-26 ASSESSMENT — PAIN DESCRIPTION - PAIN TYPE: TYPE: SURGICAL PAIN

## 2022-08-26 ASSESSMENT — PAIN DESCRIPTION - LOCATION
LOCATION: HEAD
LOCATION: HEAD;HIP

## 2022-08-26 ASSESSMENT — PAIN DESCRIPTION - FREQUENCY: FREQUENCY: INTERMITTENT

## 2022-08-26 ASSESSMENT — PAIN DESCRIPTION - ONSET: ONSET: GRADUAL

## 2022-08-26 NOTE — PROGRESS NOTES
[address]  Occupational Therapy  Daily Note  Time:   Time In: 1000  Time Out: 1100  Timed Code Treatment Minutes: 60 Minutes  Minutes: 60          Date: 2022  Patient Name: Orlando Alas,   Gender: female      Room: [facility]54/054-A  MRN: [id number]  : 1940  (78 y.o.)  Referring Practitioner: Yvonne Anthony MD  Diagnosis: Hip fracture requiring operative repair, left, closed, initial encounter. Additional Pertinent Hx: Per chart review, pt is an 78 y.o. female with a PMH of \"Osteoporosis of multiple sites without pathological fracture, Squamous cell carcinoma of skin, Vascular headache, and Vitamin D deficiency\". Pt originally admitted to [facility] on 22 post fall at home, landing on left side. Pt reports she was unable to ambulate and fall resulted in femoral neck fracture, undergoing a left hemiarthoplasty on 22. \"Postoperatively, patient developed narrow complex tachyarrhythmia concerning for SVT versus A. fib with RVR status post adenosine x2 and DCCV x3.\" Pt presents to [facility] IPR unit on 22 s/p left hemiarthoplasty. Restrictions/Precautions:  Restrictions/Precautions: Isolation  Left Lower Extremity Weight Bearing: Weight Bearing As Tolerated  Position Activity Restriction  Hip Precautions: No hip flexion > 90 degrees, No hip internal rotation, Posterior hip precautions, No ADduction  Other position/activity restrictions: Droplet + precautions, KATHY precautions     SUBJECTIVE: Pt was up recliner upon arrival, pleasant and cooperative. PAIN: 3/10    Vitals: Vitals not assessed per clinical judgement, see nursing flowsheet    COGNITION: Slow Processing, Decreased Insight, Decreased Problem Solving, and Decreased Safety Awareness    ADL:   Grooming: Contact Guard Assistance. Standing at sink for washing hands  Bathing: Contact Guard Assistance. Standing at sink for sponge bath.  Pt required Mod A for LB bathing to ensure maintenance of hip precautions. Upper Extremity Dressing: with set-up. For donning sweatshirt while seated  Lower Extremity Dressing: Moderate Assistance. Pt used reacher to don pants and undergarment. Pt required Min VC for use of reacher to ensure maintenance of back precautions and for technique. Pt used sockaid for donning socks while seated  Toileting: Contact Guard Assistance. Toilet Transfer: Contact Guard Assistance. To/from [facility] over toilet  *Increased time required for completion of ADLs. Pt requiring frequent reassurance. BALANCE:  Sitting Balance:  Supervision. Standing Balance: Contact Guard Assistance. BED MOBILITY:  Not Tested    TRANSFERS:  Sit to Stand:  Contact Guard Assistance. Stand to Sit: Contact Guard Assistance. FUNCTIONAL MOBILITY:  Assistive Device: Rolling Walker  Assist Level:  Contact Guard Assistance. Distance: To and from bathroom and within room  Slow pace, steady       ASSESSMENT:     Activity Tolerance:  Patient tolerance of  treatment: good. Discharge Recommendations: Continue to assess pending progress and Home with Home Health OT  Equipment Recommendations: Other: Recommend tub transfer bench and installation of grab bar in shower. Patient would benefit from sock aid and shoe horn but is unsure if she wants them at this time. Son ordered [address]. Son is measuring toilet height to see if we need riser/BSC. Continue to monitor needs.   Plan: Times per Week: 5x wk/90 min 1x wk/30min  Times per Day: Daily  Current Treatment Recommendations: Functional mobility training, Balance training, Self-Care / ADL, Patient/Caregiver education & training, Safety education & training, Strengthening, Endurance training, Equipment evaluation, education, & procurement    Patient Education  Patient Education: Plan of Care, ADL's, and Reviewed Prior Education    Goals  Short Term Goals  Time Frame for Short term goals: 1 wk  Short Term Goal 1: Pt will tolerate 3 min of standing with 1-2 hand release and SBA for improved indep in sinkside grooming tasks  Short Term Goal 2: Pt will navigate room to gather needed supplies for ADL task with SBA and 0 vc for safety for PLOF in ADL routines. Short Term Goal 3: Pt will complete mobility to/from the bathroom with no more than SBA and min A for safety to progress to PLOF in ADL routines  Short Term Goal 4: Pt will complete simple snack/meal prep task with SBA and min VC for safety/problem solving for safe indep in IADL routines  Short Term Goal 5: Pt will complete community reintegration activity with SBA and min A for problem solving/KATHY precautions to progress to PLOF  Long Term Goals  Time Frame for Long term goals : 2 wks from [facility] eval  Long Term Goal 1: Pt will complete UB/LB dressing with LHAE prn and Sup with 0 vc for safety or KATHY precautions to progress ADL performance to PLOF  Long Term Goal 2: Pt will complete UB/LB bathing with LHAE prn and Sup with 0vc for safety or KATHY precautions for improved ADL indep  Long Term Goal 3: Pt will complete oral care mod I with 0 vc for safety or precautions to progress ADL performance to PLOF  Long Term Goal 4: Pt will complete toileting/transfer with Sup and 0vc for safety or to maintain precautions for improved indep in toileting routine  Long Term Goal 5: Pt will demo competence with Mitzi Hyde for 100% of time during ADL tasks to maintain KATHY precautions and increase indep in daily occupations    Following session, patient left in safe position with all fall risk precautions in place.

## 2022-08-26 NOTE — PROGRESS NOTES
[facility]  INPATIENT PHYSICAL THERAPY  DAILY NOTE  [address]    Time In: 1130  Time Out: 1230  Timed Code Treatment Minutes: 60 Minutes  Minutes: 60          Date: 2022  Patient Name: Shilpa Luna,  Gender:  female        MRN: [id number]  : 1940  (78 y.o.)  Referral Date : 22  Referring Practitioner: Jarrett Artis MD  Diagnosis: Hip fracture requiring operative repair, left, closed, initial encounter  Additional Pertinent Hx: Ga Garcia  is a 78 y.o. female admitted to the inpatient rehabilitation unit on 2022. She was originally admitted to [facility] on 2022. Patient  has a past medical history of Osteoporosis of multiple sites without pathological fracture, Squamous cell carcinoma of skin, Vascular headache, and Vitamin D deficiency. Patient presented to [facility] after suffering a fall at home, landing on her left side. Patient was unable to ambulate and was found to have left femoral neck fracture. Patient was admitted and taken for left hemiarthroplasty with Dr Candace Galindo on 22. Postoperatively, patient developed narrow complex tachyarrhythmia concerning for SVT versus A. fib with RVR status post adenosine x2 and DCCV x3. Patient currently off amiodarone infusion and is in normal sinus rhythm.   Patient is now on Lopressor 12.5 mg twice daily     Prior Level of Function:  Lives With: Son  Type of Home: House  Home Layout: Two level, Able to Live on Main level with bedroom/bathroom, Performs ADL's on one level, Laundry in basement (pt's bedroom is upstairs but reports she could live on main floor)  Home Access: Stairs to enter without rails  Entrance Stairs - Number of Steps: 2 ELOY  Home Equipment: Cane   Bathroom Shower/Tub: Tub/Shower unit, Curtain  Bathroom Toilet: Standard    Receives Help From: Family, Friend(s)  ADL Assistance: Independent  Homemaking Assistance: Independent  Homemaking Responsibilities: Yes  Ambulation Assistance: Independent (uses cane at night time only)  Transfer Assistance: Independent  Active : Yes  Additional Comments: Son recently (retired) moved in with pt. Pt amb with SC intermittently in the home, and uses SC for longer distances and at night    Restrictions/Precautions:  Restrictions/Precautions: Isolation  Left Lower Extremity Weight Bearing: Weight Bearing As Tolerated  Position Activity Restriction  Hip Precautions: No hip flexion > 90 degrees, No hip internal rotation, Posterior hip precautions, No ADduction  Other position/activity restrictions: Droplet + precautions, KATHY precautions     SUBJECTIVE: Patient in recliner upon arrival, agreed and cooperative for therapy. Mildly anxious throughout session due to low sodium levels. PAIN: Yes, pain in left hip/knee but not rated. Vitals: Blood Pressure: Sittin/62; Standin/58    OBJECTIVE:  Bed Mobility:  Not Tested    Transfers:  Sit to Stand: Stand By Assistance  Stand to Sit:Stand By Assistance, cues for hand placement    Ambulation:  Stand By Assistance, [employer], with verbal cues , with increased time for completion  Distance: ~60 ft. X1 (in room); ~120 ft. X1 (down hallway)  Surface: Level Tile  Device:Rolling Walker  Gait Deviations: Forward Flexed Posture, Slow Mague, Decreased Step Length Bilaterally, Decreased Weight Shift Left, Decreased Gait Speed, and Decreased Heel Strike Bilaterally    Stairs:  Stairs:  8\" steps. X 5 using Bilateral Handrails (bed rails on bed) and Contact Guard Assistance, cues for sequencing. Ascended forward/ descended retro. Balance:  Static Standing Balance: Stand By Assistance  Dynamic Standing Balance: Stand By Assistance  *While completing functional BR tasks with intermittent UE support. Exercise:  Patient was guided in 1 set(s) 10 reps of exercise to both lower extremities.   Glut sets, Quad sets, Hip abduction/adduction, Seated marches, Seated heel/toe raises, and Long arc quads. Exercises were completed for increased independence with functional mobility. Functional Outcome Measures: Not completed       ASSESSMENT:  Assessment: Patient progressing toward established goals. Activity Tolerance:  Patient tolerance of  treatment: good. Equipment Recommendations:Equipment Needed: Yes  Other: RW ordered from E 22  Discharge Recommendations: Continue to assess pending progress and Patient would benefit from continued PT at discharge  Plan: Current Treatment Recommendations: Strengthening, Balance training, Functional mobility training, Transfer training, Neuromuscular re-education, Stair training, Gait training, Endurance training, Home exercise program, Safety education & training, Patient/Caregiver education & training, Equipment evaluation, education, & procurement, Therapeutic activities  Plan:  (5x/wk 90min, 1x/wk 30min)    Patient Education  Patient Education: Plan of Care, Precautions/Restrictions, Transfers, Reviewed Prior Education, Gait, Stairs, Health Promotion and Wellness Education, Home Safety Education, - Patient Requires Continued Education    Goals:  Patient goals : \"just feel better\"  Short Term Goals  Time Frame for Short term goals: 1 week  Short term goal 1: Patient will complete rolling and supine < > sit with min assist with no bed rail and head of bed flat and maintain hip precautions to transfer in and out of bed with decreased difficulty. GOAL MET, SEE LTG  Short term goal 2: Patient will complete sit < > stand with CGA from various surfaces to stand to ambulate with decreased difficulty. GOAL MET, SEE LTG  Short term goal 3: Patient will ambulate Skip Natanael 1560' with a RW and SBA to progress towards ambulating household distances  Short term goal 4: Patient will ascend/descend 2, 6\" steps with 2 hand rails and CGA to progress towards safe home entry.   Short term goal 5: Patient will complete car transfer with min assist to transfer in and out of vehicle for home  Long Term Goals  Time Frame for Long term goals : 3 weeks from initial evaluation  Long term goal 1: Patient will complete supine < > sit with modified independence to transfer in and out of bed safely. Long term goal 2: Patient will complete sit < > stand with modified independence to stand to ambulate safely. Long term goal 3: Patient will bed < > chair transfer with a RW and modified independence to transfer surface to surface safely. Long term goal 4: Patient will ambulate 80' with a RW and modified independence to navigate home safely. Long term goal 5: Patient will ascend/descend 2 steps with hand held assist and cane with min assist for home entry. Additional Goals?: Yes  Long term goal 6: Patient will complete car transfer with SBA to transfer in and out of vehicle for transport to home and appointments    Following session, patient left in safe position with all fall risk precautions in place.

## 2022-08-26 NOTE — PROGRESS NOTES
[address]  Occupational Therapy  Daily Note  Time:   Time In: 0800  Time Out: 0830  Timed Code Treatment Minutes: 30 Minutes  Minutes: 30    Date: 2022  Patient Name: Patricia Manning,   Gender: female      Room: [facility]54/054-A  MRN: [id number]  : 1940  (78 y.o.)  Referring Practitioner: Faraz Dyson MD  Diagnosis: Hip fracture requiring operative repair, left, closed, initial encounter. Additional Pertinent Hx: Per chart review, pt is an 78 y.o. female with a PMH of \"Osteoporosis of multiple sites without pathological fracture, Squamous cell carcinoma of skin, Vascular headache, and Vitamin D deficiency\". Pt originally admitted to [facility] on 22 post fall at home, landing on left side. Pt reports she was unable to ambulate and fall resulted in femoral neck fracture, undergoing a left hemiarthoplasty on 22. \"Postoperatively, patient developed narrow complex tachyarrhythmia concerning for SVT versus A. fib with RVR status post adenosine x2 and DCCV x3.\" Pt presents to [facility] IPR unit on 22 s/p left hemiarthoplasty. Restrictions/Precautions:  Restrictions/Precautions: Isolation  Left Lower Extremity Weight Bearing: Weight Bearing As Tolerated  Position Activity Restriction  Hip Precautions: No hip flexion > 90 degrees, No hip internal rotation, Posterior hip precautions, No ADduction  Other position/activity restrictions: Droplet + precautions, KATHY precautions     SUBJECTIVE: Patient pleasant and cooperative. Agreeable to OT. PAIN: C/o tenderness in hip, doesn't rate     Vitals: Vitals not assessed per clinical judgement, see nursing flowsheet    COGNITION: WFL but demo min anxiety at times and requires cues for reassurance    ADL:   Grooming: Stand By Assistance. X1 min for hand hygiene   Toileting: Contact Guard Assistance. Standing for nikos care   Toilet Transfer: [address] ETS .     BALANCE:  Sitting Balance: mobility to/from the bathroom with no more than SBA and min A for safety to progress to PLOF in ADL routines  Short Term Goal 4: Pt will complete simple snack/meal prep task with SBA and min VC for safety/problem solving for safe indep in IADL routines  Short Term Goal 5: Pt will complete community reintegration activity with SBA and min A for problem solving/KATHY precautions to progress to PLOF  Long Term Goals  Time Frame for Long term goals : 2 wks from IPR eval  Long Term Goal 1: Pt will complete UB/LB dressing with LHAE prn and Sup with 0 vc for safety or KATHY precautions to progress ADL performance to PLOF  Long Term Goal 2: Pt will complete UB/LB bathing with LHAE prn and Sup with 0vc for safety or KATHY precautions for improved ADL indep  Long Term Goal 3: Pt will complete oral care mod I with 0 vc for safety or precautions to progress ADL performance to PLOF  Long Term Goal 4: Pt will complete toileting/transfer with Sup and 0vc for safety or to maintain precautions for improved indep in toileting routine  Long Term Goal 5: Pt will demo competence with Jessee Mohs for 100% of time during ADL tasks to maintain KATHY precautions and increase indep in daily occupations    Following session, patient left in safe position with all fall risk precautions in place.

## 2022-08-26 NOTE — PLAN OF CARE
Problem: Pain  Goal: Verbalizes/displays adequate comfort level or baseline comfort level  8/26/2022 0023 by Prachi Delcid RN  Outcome: Progressing  Flowsheets (Taken 8/26/2022 0023)  Verbalizes/displays adequate comfort level or baseline comfort level:   Encourage patient to monitor pain and request assistance   Assess pain using appropriate pain scale   Administer analgesics based on type and severity of pain and evaluate response  Note: Mckay Ruth is satisfied with Tylenol for c/o of a headache. Denies having left hip pain. Problem: Skin/Tissue Integrity  Goal: Absence of new skin breakdown  Description: 1. Monitor for areas of redness and/or skin breakdown  2. Assess vascular access sites hourly  3. Every 4-6 hours minimum:  Change oxygen saturation probe site  4. Every 4-6 hours:  If on nasal continuous positive airway pressure, respiratory therapy assess nares and determine need for appliance change or resting period. 8/26/2022 0023 by Prachi Delcid RN  Outcome: Progressing  Note: No observed or reported skin breakdown     Problem: Respiratory - Adult  Goal: Achieves optimal ventilation and oxygenation  8/26/2022 0041 by Prachi Delcid RN  Outcome: Progressing  Flowsheets (Taken 8/26/2022 0041)  Achieves optimal ventilation and oxygenation:   Assess for changes in respiratory status   Assess for changes in mentation and behavior   Position to facilitate oxygenation and minimize respiratory effort  Note: Mckay Pancoast V/S are WNL. V/S are being monitored.   Staff will continue to implement infection control/ contact/resp isolation and  monitor for adverse effects of Covid     Problem: Musculoskeletal - Adult  Goal: Return mobility to safest level of function  Outcome: Progressing  Flowsheets (Taken 8/24/2022 2049 by Alda Ramirez LPN)  Return Mobility to Safest Level of Function: Assess patient stability and activity tolerance for standing, transferring and ambulating with or without assistive devices  Note: Progressing towards her goal of returning to safest level of function. Requires 1 assist/gaitbelt and 2 wheeled walker. Problem: Anxiety  Goal: Will report anxiety at manageable levels  Description: INTERVENTIONS:  1. Administer medication as ordered  2. Teach and rehearse alternative coping skills  3. Provide emotional support with 1:1 interaction with staff  Outcome: Not Progressing  Flowsheets (Taken 8/26/2022 0041)  Will report anxiety at manageable levels:   Administer medication as ordered   Teach and rehearse alternative coping skills   Provide emotional support with 1:1 interaction with staff  Note: Mary Soltiario has anxiety issues and does not want her door shut. Due to covid protocol door must be shut. PRN Ativan is given at night.

## 2022-08-26 NOTE — PROGRESS NOTES
Physical Medicine & Rehabilitation   Progress Note    Chief Complaint:   left hip fracture. S/p left hemiarthroplasty. Rehab needs    Subjective:  Patient seen today, in her room. Patient +COVID, now in droplet isolation. Patient with some complaints of anxiety occasionally. Patient sleeping well. Pain well controlled with tylenol. Patient denies any other needs or concerns at this time. +BM 8/25/22      Rehabilitation:  PT:   Bed Mobility:  Sit to Supine: Minimal Assistance   Scooting: Minimal Assistance  Extra time required. Transfers:  Sit to Stand: Stand By Assistance  Stand to Sit:Stand By Assistance  Ambulation:  Stand By Assistance  Distance: 150' x 1, 12' x 1, 5' x 1  Surface: Level Tile  Device:Rolling Walker  Gait Deviations: Forward Flexed Posture, Slow Mague, Decreased Step Length Bilaterally, Decreased Weight Shift Left, Decreased Gait Speed, Decreased Heel Strike Bilaterally, and cues for upright posture. Balance:  Static Standing Balance: Supervision  Dynamic Standing Balance: Stand By Assistance  While completing clothing management and pericare for toileting. Exercise:  Patient was guided in 1 set(s) 10 reps of exercises: Glut sets, Seated hamstring curls, Seated heel/toe raises, Long arc quads, Seated isometric hip adduction, Seated abduction/adduction, and abdominal isometrics. Exercises were completed for increased independence with functional mobility. OT:   ADL:   Grooming: with set-up. For hair care seated in chair  Bathing: Minimal Assistance. For BLEs below knees due to hip precautions, education provided on use of long handled sponge brush. CGA-close SBA for balance while washing nikos area/bottom holding onto grab bar for stability. Able to wash remaining areas seated in chair with supervision. Increased time provided during tasks  Upper Extremity Dressing: Minimal Assistance. To fasten bra.  Patient able to doff gown and don sweatshirt with increased time to pull over head and adjust  Lower Extremity Dressing: Moderate Assistance. To don L shoe and tie B shoes. Ken to thread LEs into pants/undergarement with use of reacher, with difficulty noted problem solving how to properly use. Able to don R shoe using shoe horn and B socks using sock aid. Patient required moderate verbal cues for problem solving use of LHAE, would benefit from continued skilled education on use of LHAE to help increase independence with LB dressing while maintaining hip precautions   Toileting: Contact Guard Assistance. - close SBA for clothing management and hygiene   Toilet Transfer: [employer]. - SBA to/from RTS with increased time  Tub Transfer: [employer]. To get in/out of tub by bringing LEs over ledge with increased time, minimal verbal cues provided for technique. CGA to/from tub bench with use of grab bars . BALANCE:  Sitting Balance:  Stand By Assistance. - supervision  Standing Balance: Contact Guard Assistance. - SBA  BED MOBILITY:  Supine to Sit: Stand By Assistance - increased time to bring LLE over EOB, HOB slightly elevated   TRANSFERS:  Sit to Stand:  Contact Guard Assistance. - SBA from various surfaces with increased time  Stand to Sit: Contact Guard Assistance. - SBA to various surfaces with increased time  FUNCTIONAL MOBILITY:  Assistive Device: Rolling Walker  Assist Level:  Contact Guard Assistance. - SBA   Distance: To and from bathroom and in/out of shower room    ADDITIONAL ACTIVITIES:  Patient completed BUE strengthening exercises with skilled education on HEP: completed x20 reps x1 set with a mod resistance band in all joints and all planes in order to improve UE strength and activity tolerance required for BADL routine and toilet / shower transfers. Patient tolerated fair, requiring rest breaks. Patient also required cues for technique.      Review of Systems:  CONSTITUTIONAL:  positive for  fatigue  EYES:  positive for  glasses & bilateral cataracts. HEENT:  negative  RESPIRATORY:  negative  CARDIOVASCULAR:  positive for  palpitations, fatigue, heart murmur  GASTROINTESTINAL:  positive for constipation and decreased appetite  GENITOURINARY:  negative  SKIN:  left hip incision.  Rash to back and buttock area  HEMATOLOGIC/LYMPHATIC:  positive for swelling/edema  MUSCULOSKELETAL:  positive for  pain  NEUROLOGICAL:  positive for gait problems and pain  BEHAVIOR/PSYCH:  negative  System review otherwise negative      Objective:  Vitals:    08/26/22 0708   BP: 134/64   Pulse: 63   Resp: 18   Temp: 97.5 °F (36.4 °C)   SpO2: 97%      awake  Orientation:   person, place, time  Mood: within normal limits  Affect: calm  General appearance: Patient is well nourished, well developed, well groomed and in no acute distress     Memory:   normal,  Attention/Concentration: normal  Language:  normal     Cranial Nerves:  cranial nerves II-XII are grossly intact  ROM:  abnormal - left hip  Tone:  normal  Muscle bulk: within normal limits  Sensory:  Sensory intact     Skin: Left hip incision not visualized this assessment   Peripheral vascular: Pulses: Normal upper and lower extremity pulses; Edema: 1+      Diagnostics:   Recent Results (from the past 24 hour(s))   Osmolality, Serum    Collection Time: 08/25/22  2:14 PM   Result Value Ref Range    Osmolality 265 (L) 275 - 295 mosmol/kg   Uric Acid    Collection Time: 08/25/22  2:14 PM   Result Value Ref Range    Uric Acid 2.4 2.4 - 5.7 mg/dL   Comprehensive Metabolic Panel w/ Reflex to MG    Collection Time: 08/25/22  2:14 PM   Result Value Ref Range    Glucose 109 (H) 70 - 108 mg/dL    Creatinine 0.3 (L) 0.4 - 1.2 mg/dL    BUN 5 (L) 7 - 22 mg/dL    Sodium 128 (L) 135 - 145 meq/L    Potassium reflex Magnesium 3.6 3.5 - 5.2 meq/L    Chloride 93 (L) 98 - 111 meq/L    CO2 23 23 - 33 meq/L    Calcium 8.8 8.5 - 10.5 mg/dL    AST 25 5 - 40 U/L    Alkaline Phosphatase 77 38 - 126 U/L    Total Protein 5.9 (L) 6.1 - 8.0 g/dL suppository 10 mg rectal daily as needed; GlycoLax 17 g p.o. daily as needed. Imodium PRN for loose stools  Anxiety: Ativan 0.5mg TID PRN  HTN: Lopressor 25mg BID   Rash: hydrocortisone cream QID, zyrtec daily, benadryl PRN  Dr Med Plascencia following for medical management. Nephrology for electrolyte imbalance and COTY - fluid restriction 1500ml  Team conference Tuesday  Discharge plan to home on 8/30/2022 with home health services including PT, OT, nursing, and home health aide.       Missed Therapy Time:  None    Diana Kenney, APRN - CNP

## 2022-08-26 NOTE — PROGRESS NOTES
abnormality, atraumatic  Lungs: clear to auscultation bilaterally  Heart: regular rate and rhythm, S1, S2 normal, no murmur, click, rub or gallop  Abdomen: soft, non-tender; bowel sounds normal; no masses,  no organomegaly  Extremities: extremities normal, atraumatic, no cyanosis or edema  Skin: Skin color, texture, turgor normal. No rashes or lesions  Neurologic:  weak    Electronically signed by Daniel Lawrence MD on 8/25/2022 at 8:21 PM

## 2022-08-26 NOTE — DISCHARGE INSTR - COC
Continuity of Care Form    Patient Name: Dave Batista   :  1940  MRN:  [id number]    Admit date:  2022  Discharge date:  22    Code Status Order: Full Code   Advance Directives:     Admitting Physician: Vadim King MD  PCP: Bita Valenzuela MD    Discharging Nurse:  Kaylen Tang MSN, RN-BC  [address] Unit/Room#: 0V-30/364-S  Discharging Unit Phone Number: [phone]    Emergency Contact:   Extended Emergency Contact Information  Primary Emergency Contact: Amberly Hoyt Suresh Phone: [phone]  Mobile Phone: [phone]  Relation: Child    Past Surgical History:  Past Surgical History:   Procedure Laterality Date    BLADDER REPAIR  2014    HIP SURGERY Left 2022    LEFT HIP BEE  ARTHROPLASTY performed by Ebonie Ansari MD at [facility] Left 2013    lateral    SKIN CANCER EXCISION  2016    Dr Petty Roque Left 2015       Immunization History:   Immunization History   Administered Date(s) Administered    COVID-19, PFIZER PURPLE top, DILUTE for use, (age 15 y+), 30mcg/0.3mL 2021, 2021       Active Problems:  Patient Active Problem List   Diagnosis Code    Squamous cell carcinoma of skin C44.92    Osteoporosis of multiple sites without pathological fracture M81.0    Vitamin D deficiency E55.9    Essential hypertension I10    MVP (mitral valve prolapse) I34.1    Paroxysmal atrial fibrillation (HCC) I48.0    Age-related cataract of both eyes H25.9    Displaced fracture of left femoral neck (HCC) S72.002A    Closed fracture of left hip ([facility] [address] S72.002A    Hip fracture requiring operative repair, left, closed, initial encounter ([facility] [address] S72.002A    Hyponatremia E87.1       Isolation/Infection:   Isolation            Droplet Plus          Patient Infection Status       Infection Onset Added Last Indicated Last Indicated By Review Planned Expiration Resolved Resolved By    COVID-19 22 COVID-19, Rapid 09/03/22 09/08/22      S&S 8/24,  + 8/25      Resolved    COVID-19 (Rule Out) 08/25/22 08/25/22 08/25/22 COVID-19, Rapid (Ordered)   08/25/22 Rule-Out Test Resulted            Nurse Assessment:  Last Vital Signs: /64   Pulse 63   Temp 97.5 °F (36.4 °C) (Oral)   Resp 18   Ht 5' 6\" (1.676 m)   Wt 141 lb 8 oz (64.2 kg)   SpO2 97%   BMI 22.84 kg/m²     Last documented pain score (0-10 scale): Pain Level: 2  Last Weight:   Wt Readings from Last 1 Encounters:   08/17/22 141 lb 8 oz (64.2 kg)     Mental Status:  oriented    IV Access:  - None    Nursing Mobility/ADLs:  Walking   Assisted  Transfer  Assisted  Bathing  Assisted  Dressing  Assisted  Toileting  Assisted  Feeding  Independent  Med Admin  Assisted  Med Delivery   whole    Wound Care Documentation and Therapy:  Incision 08/14/22 Hip Lateral;Left (Active)   Wound Image   08/21/22 0828   Dressing Status Clean;Dry; Intact 08/24/22 2049   Dressing/Treatment Open to air 08/26/22 0708   Incision Length (cm) 16 08/23/22 2323   Incision Width (cm) 0 cm 08/23/22 2323   Incision Depth (cm) 0 cm 08/23/22 2323   Closure Steri-Strips 08/26/22 0708   Margins Approximated 08/26/22 0708   Incision Assessment Dry 08/26/22 0708   Drainage Amount None 08/26/22 0708   Jennifer-incision Assessment Intact 08/26/22 0708   Number of days: 11        Elimination:  Continence: Bowel: Yes  Bladder: Yes  Urinary Catheter: None   Colostomy/Ileostomy/Ileal Conduit: No       Date of Last BM: 8/30/22    Intake/Output Summary (Last 24 hours) at 8/26/2022 1401  Last data filed at 8/26/2022 1339  Gross per 24 hour   Intake 180 ml   Output --   Net 180 ml     No intake/output data recorded. Safety Concerns: At Risk for Falls    Impairments/Disabilities:      None    Nutrition Therapy:  Current Nutrition Therapy:   - Oral Diet:  General    Routes of Feeding: Oral  Liquids:  Thin Liquids  Daily Fluid Restriction: yes - amount 1200  Last Modified Barium Swallow with Video (Video Swallowing Test): not done    Treatments at the Time of Hospital Discharge:   Respiratory Treatments: None  Oxygen Therapy:  is not on home oxygen therapy. Ventilator:    - No ventilator support    Rehab Therapies: RN, Physical Therapy, Occupational Therapy, and aide    Weight Bearing Status/Restrictions: No weight bearing restrictions  Other Medical Equipment (for information only, NOT a DME order):  walker and bedside commode  Other Treatments: None    Patient's personal belongings (please select all that are sent with patient):  Katherine    RN SIGNATURE:  {Esignature:[id number]}    CASE MANAGEMENT/SOCIAL WORK SECTION    Inpatient Status Date: 8/17/22-8/30/22    Readmission Risk Assessment Score:  Readmission Risk              Risk of Unplanned Readmission:  15           / signature: Electronically signed by AGNES Funez on 8/29/22 at 10:51 AM EDT    PHYSICIAN SECTION    Prognosis: {Prognosis:2021694244}    Condition at Discharge: [address] [place] [address] Patient Condition:[id number]}    Rehab Potential (if transferring to Rehab): {Prognosis:6212649890}    Recommended Labs or Other Treatments After Discharge: ***    Physician Certification: I certify the above information and transfer of Estuardo Argueta  is necessary for the continuing treatment of the diagnosis listed and that she requires {Admit to Appropriate Level of Care:20895} for {GREATER/LESS:412117760} 30 days.      Update Admission H&P: [facility]5684[facility]}    PHYSICIAN SIGNATURE:  {Esignature:[id number]}

## 2022-08-26 NOTE — PROGRESS NOTES
[address] [place] [address]  INPATIENT PHYSICAL THERAPY  DAILY NOTE  [facility] - [id number]-[id number]    Time In: 0900  Time Out: 0930  Timed Code Treatment Minutes: 30 Minutes  Minutes: 30          Date: 2022  Patient Name: Patricia Manning,  Gender:  female        MRN: [id number]  : 1940  (78 y.o.)  Referral Date : 22  Referring Practitioner: Faraz Dyson MD  Diagnosis: Hip fracture requiring operative repair, left, closed, initial encounter  Additional Pertinent Hx: Ga Barrett  is a 78 y.o. female admitted to the inpatient rehabilitation unit on 2022. She was originally admitted to [address] [place] [address] on 2022. Patient  has a past medical history of Osteoporosis of multiple sites without pathological fracture, Squamous cell carcinoma of skin, Vascular headache, and Vitamin D deficiency. Patient presented to [facility] after suffering a fall at home, landing on her left side. Patient was unable to ambulate and was found to have left femoral neck fracture. Patient was admitted and taken for left hemiarthroplasty with Dr Romaine Herring on 22. Postoperatively, patient developed narrow complex tachyarrhythmia concerning for SVT versus A. fib with RVR status post adenosine x2 and DCCV x3. Patient currently off amiodarone infusion and is in normal sinus rhythm.   Patient is now on Lopressor 12.5 mg twice daily     Prior Level of Function:  Lives With: Son  Type of Home: House  Home Layout: Two level, Able to Live on Main level with bedroom/bathroom, Performs ADL's on one level, Laundry in basement (pt's bedroom is upstairs but reports she could live on main floor)  Home Access: Stairs to enter without rails  Entrance Stairs - Number of Steps: 2 ELOY  Home Equipment: Cane   Bathroom Shower/Tub: Tub/Shower unit, Curtain  Bathroom Toilet: Standard    Receives Help From: Family, Friend(s)  ADL Assistance: Independent  Homemaking Assistance: Independent  Homemaking Responsibilities: Yes  Ambulation Assistance: Independent (uses cane at night time only)  Transfer Assistance: Independent  Active : Yes  Additional Comments: Son recently (retired) moved in with pt. Pt amb with SC intermittently in the home, and uses SC for longer distances and at night    Restrictions/Precautions:  Restrictions/Precautions: Isolation  Left Lower Extremity Weight Bearing: Weight Bearing As Tolerated  Position Activity Restriction  Hip Precautions: No hip flexion > 90 degrees, No hip internal rotation, Posterior hip precautions, No ADduction  Other position/activity restrictions: Droplet + precautions, KATHY precautions     SUBJECTIVE: Patient in recliner upon arrival, agreed and cooperative for therapy. PAIN: Yes, pain in left hip/knee, not rated. Vitals: Vitals not assessed per clinical judgement, see nursing flowsheet    OBJECTIVE:  Bed Mobility:  Not Tested    Transfers:  Sit to Stand: Stand By Assistance  Stand to Sit:Stand By Assistance, cues for hand placement    Ambulation:  Stand By Assistance, [employer] Assistance  Distance: ~75 ft.x1 (in room)  Surface: Level Tile  Device:Rolling Walker  Gait Deviations: Forward Flexed Posture, Slow Mague, Decreased Step Length Bilaterally, Decreased Weight Shift Left, Decreased Gait Speed, and Decreased Heel Strike Bilaterally    Exercise:  Patient was guided in 1 set(s) 10-15 reps of exercise to both lower extremities. Seated marches, Seated hamstring curls, Seated heel/toe raises, and Long arc quads. Exercises were completed for increased independence with functional mobility. Functional Outcome Measures: Not completed       ASSESSMENT:  Assessment: Patient progressing toward established goals. Activity Tolerance:  Patient tolerance of  treatment: good.       Equipment Recommendations:Equipment Needed: Yes  Other: RW ordered from [facility] 8/23/22  Discharge Recommendations: Continue to assess pending progress and Patient would benefit from continued PT at discharge  Plan: Current Treatment Recommendations: Strengthening, Balance training, Functional mobility training, Transfer training, Neuromuscular re-education, Stair training, Gait training, Endurance training, Home exercise program, Safety education & training, Patient/Caregiver education & training, Equipment evaluation, education, & procurement, Therapeutic activities  Plan:  (5x/wk 90min, 1x/wk 30min)    Patient Education  Patient Education: Plan of Care, Precautions/Restrictions, Transfers, Reviewed Prior Education, Gait, Health Promotion and Wellness Education, Home Safety Education, - Patient Requires Continued Education    Goals:  Patient goals : \"just feel better\"  Short Term Goals  Time Frame for Short term goals: 1 week  Short term goal 1: Patient will complete rolling and supine < > sit with min assist with no bed rail and head of bed flat and maintain hip precautions to transfer in and out of bed with decreased difficulty. GOAL MET, SEE LTG  Short term goal 2: Patient will complete sit < > stand with CGA from various surfaces to stand to ambulate with decreased difficulty. GOAL MET, SEE LTG  Short term goal 3: Patient will ambulate Skip Natanael 1560' with a RW and SBA to progress towards ambulating household distances  Short term goal 4: Patient will ascend/descend 2, 6\" steps with 2 hand rails and CGA to progress towards safe home entry. Short term goal 5: Patient will complete car transfer with min assist to transfer in and out of vehicle for home  Long Term Goals  Time Frame for Long term goals : 3 weeks from initial evaluation  Long term goal 1: Patient will complete supine < > sit with modified independence to transfer in and out of bed safely. Long term goal 2: Patient will complete sit < > stand with modified independence to stand to ambulate safely.   Long term goal 3: Patient will bed < > chair transfer with a RW and modified independence to transfer surface to surface safely. Long term goal 4: Patient will ambulate 80' with a RW and modified independence to navigate home safely. Long term goal 5: Patient will ascend/descend 2 steps with hand held assist and cane with min assist for home entry. Additional Goals?: Yes  Long term goal 6: Patient will complete car transfer with SBA to transfer in and out of vehicle for transport to home and appointments    Following session, patient left in safe position with all fall risk precautions in place.

## 2022-08-26 NOTE — PROGRESS NOTES
[facility]   Nephrology progress note  8/26/2022, 9:32 AM      Pt Name:    Monica Bertrand  MRN:     [id number]     YOB: 1940  Admit Date:    8/17/2022  1:33 PM    Chief Complaint: Nephrology following for COTY    Subjective:  Patient was seen and examined this morning  No chest pain or shortness of breath  No Nausea/vomiting/diarrhea/constipation  Feels good, working with PT/OT    Objective:  24HR INTAKE/OUTPUT:    Intake/Output Summary (Last 24 hours) at 8/26/2022 0932  Last data filed at 8/25/2022 0946  Gross per 24 hour   Intake 0 ml   Output --   Net 0 ml      Admission weight: 141 lb 8 oz (64.2 kg)  Wt Readings from Last 3 Encounters:   08/17/22 141 lb 8 oz (64.2 kg)   08/13/22 125 lb (56.7 kg)   07/05/22 127 lb (57.6 kg)        Vitals :   Vitals:    08/25/22 1035 08/25/22 1315 08/25/22 2151 08/26/22 0708   BP: 136/68 (!) 99/52 128/61 134/64   Pulse: 79 64 73 63   Resp:  18 18 18   Temp:  97.8 °F (36.6 °C) 97.8 °F (36.6 °C) 97.5 °F (36.4 °C)   TempSrc: Oral Oral Oral Oral   SpO2: 94% 93% 97% 97%   Weight:       Height:           Physical examination  General Appearance: alert and cooperative with exam, appears comfortable, no distress  Mouth/Throat: Oral mucosa moist  Neck: No JVD  Lungs: Air entry B/L, no rales, no use of accessory muscles  Heart:  S1, S2 heard  GI: soft, non-tender, no guarding  Extremities: * LE edema    Medications:  Infusion:   Meds:    metoprolol tartrate  25 mg Oral BID    calcium-cholecalciferol  1 tablet Oral Daily    cetirizine  10 mg Oral Daily    Vitamin D  5,000 Units Oral Daily with breakfast    hydrocortisone   Topical 4x Daily    acetaminophen  650 mg Oral Q6H    apixaban  2.5 mg Oral BID    famotidine  20 mg Oral Daily     Meds prn: loperamide, diphenhydrAMINE, LORazepam, bisacodyl, ondansetron, polyethylene glycol, traMADol **OR** traMADol, acetaminophen     Lab Data :  CBC:   Recent Labs     08/24/22  2137   WBC 6.4   HGB 10.9*   HCT 33.4*        CMP:  Recent Labs     08/24/22  2137 08/25/22  1414 08/26/22  0739   * 128* 126*   K 3.9 3.6 3.9   CL 87* 93* 96*   CO2 21* 23 23   BUN 8 5* 5*   CREATININE 0.7 0.3* 0.3*   GLUCOSE 190* 109* 104   CALCIUM 8.3* 8.8 8.6     Hepatic:   Recent Labs     08/25/22  1414   LABALBU 3.5   AST 25   ALT 17   BILITOT 0.3   ALKPHOS 77     Creatinine Baseline: 0.4     ECHO 7/15/22 - EF 55%    Assessment and Plan:  Renal - COTY secondary to low BP renal hypoperfusion. Cr 0.3 at baseline and stable. Urine sodium <20 and osmolality 173, will fluid restrict. Give salt tab 2 g once  Fluid restict to 1200 ml  Will recheck BMP at 1600. Electrolytes - Hyponatremia to 121 on 8/24 could possible be error, however today her sodium is at 126  Hypotension - Possible from volume depletion, will start NS  COVID-19 - Mild  S/p femoral neck fracture and arthroplasty 8/14/22  SVT vs A.  Fib with RVR - stable     D/W patient, RN, and Dr. Efrain Tiwari, DO, PGY-1

## 2022-08-27 LAB
ANION GAP SERPL CALCULATED.3IONS-SCNC: 8 MEQ/L (ref 8–16)
BUN BLDV-MCNC: 6 MG/DL (ref 7–22)
CALCIUM SERPL-MCNC: 8.9 MG/DL (ref 8.5–10.5)
CHLORIDE BLD-SCNC: 96 MEQ/L (ref 98–111)
CO2: 25 MEQ/L (ref 23–33)
CREAT SERPL-MCNC: 0.4 MG/DL (ref 0.4–1.2)
GFR SERPL CREATININE-BSD FRML MDRD: > 90 ML/MIN/1.73M2
GLUCOSE BLD-MCNC: 102 MG/DL (ref 70–108)
POTASSIUM SERPL-SCNC: 4.2 MEQ/L (ref 3.5–5.2)
SODIUM BLD-SCNC: 129 MEQ/L (ref 135–145)

## 2022-08-27 PROCEDURE — 1180000000 HC REHAB R&B

## 2022-08-27 PROCEDURE — 6370000000 HC RX 637 (ALT 250 FOR IP): Performed by: PHYSICAL MEDICINE & REHABILITATION

## 2022-08-27 PROCEDURE — 36415 COLL VENOUS BLD VENIPUNCTURE: CPT

## 2022-08-27 PROCEDURE — 6370000000 HC RX 637 (ALT 250 FOR IP): Performed by: NURSE PRACTITIONER

## 2022-08-27 PROCEDURE — 80048 BASIC METABOLIC PNL TOTAL CA: CPT

## 2022-08-27 PROCEDURE — 6370000000 HC RX 637 (ALT 250 FOR IP): Performed by: FAMILY MEDICINE

## 2022-08-27 PROCEDURE — 99231 SBSQ HOSP IP/OBS SF/LOW 25: CPT | Performed by: INTERNAL MEDICINE

## 2022-08-27 PROCEDURE — 6370000000 HC RX 637 (ALT 250 FOR IP): Performed by: INTERNAL MEDICINE

## 2022-08-27 RX ORDER — SODIUM CHLORIDE 1000 MG
2 TABLET, SOLUBLE MISCELLANEOUS ONCE
Status: COMPLETED | OUTPATIENT
Start: 2022-08-27 | End: 2022-08-27

## 2022-08-27 RX ADMIN — ACETAMINOPHEN 650 MG: 325 TABLET ORAL at 22:37

## 2022-08-27 RX ADMIN — ACETAMINOPHEN 650 MG: 325 TABLET ORAL at 03:02

## 2022-08-27 RX ADMIN — ACETAMINOPHEN 650 MG: 325 TABLET ORAL at 09:14

## 2022-08-27 RX ADMIN — LORAZEPAM 0.5 MG: 0.5 TABLET ORAL at 18:41

## 2022-08-27 RX ADMIN — FAMOTIDINE 20 MG: 20 TABLET ORAL at 11:02

## 2022-08-27 RX ADMIN — APIXABAN 2.5 MG: 2.5 TABLET, FILM COATED ORAL at 22:38

## 2022-08-27 RX ADMIN — HYDROCORTISONE ACETATE: 1 CREAM TOPICAL at 18:25

## 2022-08-27 RX ADMIN — HYDROCORTISONE ACETATE: 1 CREAM TOPICAL at 22:40

## 2022-08-27 RX ADMIN — METOPROLOL TARTRATE 25 MG: 25 TABLET, FILM COATED ORAL at 22:38

## 2022-08-27 RX ADMIN — APIXABAN 2.5 MG: 2.5 TABLET, FILM COATED ORAL at 09:14

## 2022-08-27 RX ADMIN — LORAZEPAM 0.5 MG: 0.5 TABLET ORAL at 11:01

## 2022-08-27 RX ADMIN — ACETAMINOPHEN 650 MG: 325 TABLET ORAL at 11:03

## 2022-08-27 RX ADMIN — LORAZEPAM 0.5 MG: 0.5 TABLET ORAL at 02:58

## 2022-08-27 RX ADMIN — HYDROCORTISONE ACETATE: 1 CREAM TOPICAL at 13:33

## 2022-08-27 RX ADMIN — METOPROLOL TARTRATE 25 MG: 25 TABLET, FILM COATED ORAL at 09:14

## 2022-08-27 RX ADMIN — TRAMADOL HYDROCHLORIDE 50 MG: 50 TABLET, COATED ORAL at 22:42

## 2022-08-27 RX ADMIN — SODIUM CHLORIDE 2 G: 1 TABLET ORAL at 15:06

## 2022-08-27 ASSESSMENT — PAIN DESCRIPTION - LOCATION
LOCATION: HEAD
LOCATION: HIP;HEAD
LOCATION: HIP;HEAD
LOCATION: HEAD
LOCATION: HEAD;HIP
LOCATION: HEAD
LOCATION: HIP;HEAD
LOCATION: HIP;HEAD
LOCATION: HEAD

## 2022-08-27 ASSESSMENT — PAIN DESCRIPTION - ORIENTATION
ORIENTATION: LEFT

## 2022-08-27 ASSESSMENT — PAIN DESCRIPTION - DESCRIPTORS
DESCRIPTORS: ACHING

## 2022-08-27 ASSESSMENT — PAIN SCALES - WONG BAKER
WONGBAKER_NUMERICALRESPONSE: 0
WONGBAKER_NUMERICALRESPONSE: 2
WONGBAKER_NUMERICALRESPONSE: 0

## 2022-08-27 ASSESSMENT — PAIN DESCRIPTION - FREQUENCY
FREQUENCY: INTERMITTENT

## 2022-08-27 ASSESSMENT — PAIN SCALES - GENERAL
PAINLEVEL_OUTOF10: 4
PAINLEVEL_OUTOF10: 1
PAINLEVEL_OUTOF10: 0
PAINLEVEL_OUTOF10: 1
PAINLEVEL_OUTOF10: 3
PAINLEVEL_OUTOF10: 1
PAINLEVEL_OUTOF10: 1
PAINLEVEL_OUTOF10: 2
PAINLEVEL_OUTOF10: 2

## 2022-08-27 ASSESSMENT — PAIN DESCRIPTION - ONSET
ONSET: GRADUAL

## 2022-08-27 ASSESSMENT — PAIN DESCRIPTION - PAIN TYPE
TYPE: SURGICAL PAIN

## 2022-08-27 ASSESSMENT — PAIN - FUNCTIONAL ASSESSMENT
PAIN_FUNCTIONAL_ASSESSMENT: ACTIVITIES ARE NOT PREVENTED

## 2022-08-27 NOTE — PLAN OF CARE
Problem: Discharge Planning  Goal: Discharge to home or other facility with appropriate resources  Outcome: Progressing     Problem: Pain  Goal: Verbalizes/displays adequate comfort level or baseline comfort level  8/27/2022 0055 by James Meléndez RN  Outcome: Progressing  8/26/2022 1708 by Aundrea Downs RN  Flowsheets (Taken 8/26/2022 1708)  Verbalizes/displays adequate comfort level or baseline comfort level: Encourage patient to monitor pain and request assistance     Problem: Skin/Tissue Integrity  Goal: Absence of new skin breakdown  Description: 1. Monitor for areas of redness and/or skin breakdown  2. Assess vascular access sites hourly  3. Every 4-6 hours minimum:  Change oxygen saturation probe site  4. Every 4-6 hours:  If on nasal continuous positive airway pressure, respiratory therapy assess nares and determine need for appliance change or resting period.   8/27/2022 0055 by James Meléndez RN  Outcome: Progressing  8/26/2022 1708 by Aundrea Downs RN  Outcome: Progressing     Problem: Safety - Adult  Goal: Free from fall injury  8/27/2022 0055 by James Meléndez RN  Outcome: Progressing  8/26/2022 1708 by Aundrea Downs RN  Outcome: Progressing     Problem: ABCDS Injury Assessment  Goal: Absence of physical injury  8/27/2022 0055 by James Meléndez RN  Outcome: Progressing  8/26/2022 1708 by Aundrea Downs RN  Flowsheets (Taken 8/25/2022 1428 by Joe Dillard RN)  Absence of Physical Injury: Implement safety measures based on patient assessment     Problem: Nutrition Deficit:  Goal: Optimize nutritional status  8/27/2022 0055 by James Meléndez RN  Outcome: Progressing  8/26/2022 1708 by Aundrea Downs RN  Flowsheets (Taken 8/26/2022 1708)  Nutrient intake appropriate for improving, restoring, or maintaining nutritional needs: Monitor oral intake, labs, and treatment plans  Note: Educated on low sodium and fluid restrici     Problem: Respiratory - Adult  Goal: Achieves optimal ventilation and oxygenation  Outcome: Progressing     Problem: Skin/Tissue Integrity - Adult  Goal: Skin integrity remains intact  Outcome: Progressing  Goal: Incisions, wounds, or drain sites healing without S/S of infection  Outcome: Progressing     Problem: Musculoskeletal - Adult  Goal: Return mobility to safest level of function  Outcome: Progressing  Goal: Maintain proper alignment of affected body part  Outcome: Progressing  Goal: Return ADL status to a safe level of function  Outcome: Progressing     Problem: Anxiety  Goal: Will report anxiety at manageable levels  Description: INTERVENTIONS:  1. Administer medication as ordered  2. Teach and rehearse alternative coping skills  3.  Provide emotional support with 1:1 interaction with staff  Outcome: Progressing  Flowsheets (Taken 8/26/2022 3205)  Will report anxiety at manageable levels: Administer medication as ordered

## 2022-08-27 NOTE — PROGRESS NOTES
Patient: Yarelis Graft  Unit/Bed: 0K-27/815-W  YOB: 1940  MRN: [id number] Acct: [de-identified]   Admitting Diagnosis: Hip fracture requiring operative repair, left, closed, initial encounter [facility]) Analilia Dalal Date:  8/17/2022  Hospital Day: 9    Assessment:     Principal Problem:    Hip fracture requiring operative repair, left, closed, initial encounter [facility])  Active Problems:    Hyponatremia    Essential hypertension    Paroxysmal atrial fibrillation ([facility] [address]  Resolved Problems:    * No resolved hospital problems. *      Plan:     Continue to follow  Tolerating covid        Subjective:     Patient has no complaint of CP or SOB. .   Medication side effects: none    Scheduled Meds:   metoprolol tartrate  25 mg Oral BID    calcium-cholecalciferol  1 tablet Oral Daily    cetirizine  10 mg Oral Daily    Vitamin D  5,000 Units Oral Daily with breakfast    hydrocortisone   Topical 4x Daily    acetaminophen  650 mg Oral Q6H    apixaban  2.5 mg Oral BID    famotidine  20 mg Oral Daily     Continuous Infusions:  PRN Meds:loperamide, diphenhydrAMINE, LORazepam, bisacodyl, ondansetron, polyethylene glycol, traMADol **OR** traMADol, acetaminophen    Review of Systems  Pertinent items are noted in HPI. Objective:     No data found. I/O last 3 completed shifts: In: 180 [P.O.:180]  Out: -   No intake/output data recorded.     /64   Pulse 63   Temp 97.5 °F (36.4 °C) (Oral)   Resp 18   Ht 5' 6\" (1.676 m)   Wt 141 lb 8 oz (64.2 kg)   SpO2 97%   BMI 22.84 kg/m²     General appearance: alert, appears stated age, and cooperative  Head: Normocephalic, without obvious abnormality, atraumatic  Lungs: clear to auscultation bilaterally  Chest wall: no tenderness  Heart: regular rate and rhythm, S1, S2 normal, no murmur, click, rub or gallop  Abdomen: soft, non-tender; bowel sounds normal; no masses,  no organomegaly  Extremities: edema 1+ to the lower legs  Skin: Skin color, texture, turgor normal. No rashes or lesions  Neurologic:  weak    Electronically signed by Cheryl Martell MD on 8/26/2022 at 8:10 PM

## 2022-08-27 NOTE — PLAN OF CARE
Problem: Skin/Tissue Integrity  Goal: Absence of new skin breakdown  Description: 1. Monitor for areas of redness and/or skin breakdown  2. Assess vascular access sites hourly  3. Every 4-6 hours minimum:  Change oxygen saturation probe site  4. Every 4-6 hours:  If on nasal continuous positive airway pressure, respiratory therapy assess nares and determine need for appliance change or resting period. Outcome: Progressing  Skin assessment every shift. No new areas of skin breakdown, surgical incision is healing, steri strips intact. Problem: Safety - Adult  Goal: Free from fall injury  Outcome: Progressing  Pt will remain free of falls.   Pt is 1 assist with walker and gait belt, pt has a very slow gait, continually reminding pt to look up when walking

## 2022-08-27 NOTE — PROGRESS NOTES
[facility]   Nephrology progress note  8/27/2022, 1:10 PM      Pt Name:    Orlando Alas  MRN:     [id number]     YOB: 1940  Admit Date:    8/17/2022  1:33 PM    Chief Complaint: Nephrology following for hyponatremia    Subjective:  Patient was seen, case discussed with RN. No new events. Bps stable, no SOB. Objective:  24HR INTAKE/OUTPUT:    Intake/Output Summary (Last 24 hours) at 8/27/2022 1310  Last data filed at 8/27/2022 0829  Gross per 24 hour   Intake 750 ml   Output --   Net 750 ml         I/O last 3 completed shifts:   In: 630 [P.O.:630]  Out: -   I/O this shift:  In: 240 [P.O.:240]  Out: -    Admission weight: 141 lb 8 oz (64.2 kg)  Wt Readings from Last 3 Encounters:   08/17/22 141 lb 8 oz (64.2 kg)   08/13/22 125 lb (56.7 kg)   07/05/22 127 lb (57.6 kg)        Vitals :   Vitals:    08/25/22 2151 08/26/22 0708 08/26/22 2310 08/27/22 0829   BP: 128/61 134/64 (!) 140/67 139/64   Pulse: 73 63 60 60   Resp: 18 18 18 16   Temp: 97.8 °F (36.6 °C) 97.5 °F (36.4 °C) 97.5 °F (36.4 °C) 98.7 °F (37.1 °C)   TempSrc: Oral Oral Oral Oral   SpO2: 97% 97% 97% 97%   Weight:       Height:           Physical examination limited d/t COVID-19 isolation status, performed from [facility] and RN  General: no distress  Lungs: Air entry B/L,clear  Extremities: no LE edema    Medications:  Infusion:   Meds:    sodium chloride  2 g Oral Once    metoprolol tartrate  25 mg Oral BID    calcium-cholecalciferol  1 tablet Oral Daily    cetirizine  10 mg Oral Daily    Vitamin D  5,000 Units Oral Daily with breakfast    hydrocortisone   Topical 4x Daily    acetaminophen  650 mg Oral Q6H    apixaban  2.5 mg Oral BID    famotidine  20 mg Oral Daily     Meds prn: loperamide, diphenhydrAMINE, LORazepam, bisacodyl, ondansetron, polyethylene glycol, traMADol **OR** traMADol, acetaminophen     Lab Data :  CBC:   Recent Labs     08/24/22  2137   WBC 6.4   HGB 10.9*   HCT 33.4*        CMP:  Recent Labs 08/26/22  0739 08/26/22  1652 08/27/22  0724   * 129* 129*   K 3.9 3.7 4.2   CL 96* 96* 96*   CO2 23 24 25   BUN 5* 5* 6*   CREATININE 0.3* 0.3* 0.4   GLUCOSE 104 100 102   CALCIUM 8.6 8.3* 8.9     Hepatic:   Recent Labs     08/25/22  1414   LABALBU 3.5   AST 25   ALT 17   BILITOT 0.3   ALKPHOS 77         Assessment and Plan:    1.hyponatremia, urine studies suggestive of polydipsia. Improving. Cont fluid restriction. Will give another dose of sodium chloride 2 grams. Labs in AM  2. Renal: no evidence of CKC  3. HTN: stable  4. COVID-19+  5. S/p left femoral neck fx    D/W ESEQUIEL Cassidy,   [facility]    This report has been created using voice recognition software.  It may contain minor errors which are inherent in voice recognition technology

## 2022-08-28 LAB
ANION GAP SERPL CALCULATED.3IONS-SCNC: 9 MEQ/L (ref 8–16)
BUN BLDV-MCNC: 7 MG/DL (ref 7–22)
CALCIUM SERPL-MCNC: 9.1 MG/DL (ref 8.5–10.5)
CHLORIDE BLD-SCNC: 97 MEQ/L (ref 98–111)
CO2: 24 MEQ/L (ref 23–33)
CREAT SERPL-MCNC: 0.4 MG/DL (ref 0.4–1.2)
GFR SERPL CREATININE-BSD FRML MDRD: > 90 ML/MIN/1.73M2
GLUCOSE BLD-MCNC: 109 MG/DL (ref 70–108)
POTASSIUM SERPL-SCNC: 3.8 MEQ/L (ref 3.5–5.2)
SODIUM BLD-SCNC: 130 MEQ/L (ref 135–145)

## 2022-08-28 PROCEDURE — 6370000000 HC RX 637 (ALT 250 FOR IP): Performed by: NURSE PRACTITIONER

## 2022-08-28 PROCEDURE — 6370000000 HC RX 637 (ALT 250 FOR IP): Performed by: FAMILY MEDICINE

## 2022-08-28 PROCEDURE — 97530 THERAPEUTIC ACTIVITIES: CPT

## 2022-08-28 PROCEDURE — 6370000000 HC RX 637 (ALT 250 FOR IP): Performed by: PHYSICAL MEDICINE & REHABILITATION

## 2022-08-28 PROCEDURE — 97110 THERAPEUTIC EXERCISES: CPT

## 2022-08-28 PROCEDURE — 6370000000 HC RX 637 (ALT 250 FOR IP): Performed by: INTERNAL MEDICINE

## 2022-08-28 PROCEDURE — 97116 GAIT TRAINING THERAPY: CPT

## 2022-08-28 PROCEDURE — 80048 BASIC METABOLIC PNL TOTAL CA: CPT

## 2022-08-28 PROCEDURE — 1180000000 HC REHAB R&B

## 2022-08-28 PROCEDURE — 99231 SBSQ HOSP IP/OBS SF/LOW 25: CPT | Performed by: INTERNAL MEDICINE

## 2022-08-28 PROCEDURE — 36415 COLL VENOUS BLD VENIPUNCTURE: CPT

## 2022-08-28 RX ORDER — SODIUM CHLORIDE 1000 MG
2 TABLET, SOLUBLE MISCELLANEOUS ONCE
Status: COMPLETED | OUTPATIENT
Start: 2022-08-28 | End: 2022-08-28

## 2022-08-28 RX ADMIN — LORAZEPAM 0.5 MG: 0.5 TABLET ORAL at 18:05

## 2022-08-28 RX ADMIN — APIXABAN 2.5 MG: 2.5 TABLET, FILM COATED ORAL at 22:15

## 2022-08-28 RX ADMIN — ACETAMINOPHEN 650 MG: 325 TABLET ORAL at 22:14

## 2022-08-28 RX ADMIN — HYDROCORTISONE ACETATE: 1 CREAM TOPICAL at 22:15

## 2022-08-28 RX ADMIN — ACETAMINOPHEN 650 MG: 325 TABLET ORAL at 14:47

## 2022-08-28 RX ADMIN — LORAZEPAM 0.5 MG: 0.5 TABLET ORAL at 03:05

## 2022-08-28 RX ADMIN — LORAZEPAM 0.5 MG: 0.5 TABLET ORAL at 08:53

## 2022-08-28 RX ADMIN — HYDROCORTISONE ACETATE: 1 CREAM TOPICAL at 13:04

## 2022-08-28 RX ADMIN — ACETAMINOPHEN 650 MG: 325 TABLET ORAL at 03:05

## 2022-08-28 RX ADMIN — FAMOTIDINE 20 MG: 20 TABLET ORAL at 13:04

## 2022-08-28 RX ADMIN — HYDROCORTISONE ACETATE: 1 CREAM TOPICAL at 18:06

## 2022-08-28 RX ADMIN — ACETAMINOPHEN 650 MG: 325 TABLET ORAL at 06:41

## 2022-08-28 RX ADMIN — ACETAMINOPHEN 650 MG: 325 TABLET ORAL at 08:53

## 2022-08-28 RX ADMIN — METOPROLOL TARTRATE 25 MG: 25 TABLET, FILM COATED ORAL at 08:54

## 2022-08-28 RX ADMIN — SODIUM CHLORIDE 2 G: 1 TABLET ORAL at 14:48

## 2022-08-28 RX ADMIN — APIXABAN 2.5 MG: 2.5 TABLET, FILM COATED ORAL at 08:54

## 2022-08-28 RX ADMIN — HYDROCORTISONE ACETATE: 1 CREAM TOPICAL at 08:55

## 2022-08-28 RX ADMIN — METOPROLOL TARTRATE 25 MG: 25 TABLET, FILM COATED ORAL at 22:15

## 2022-08-28 ASSESSMENT — PAIN DESCRIPTION - PAIN TYPE
TYPE: SURGICAL PAIN
TYPE: ACUTE PAIN
TYPE: SURGICAL PAIN;ACUTE PAIN

## 2022-08-28 ASSESSMENT — PAIN SCALES - GENERAL
PAINLEVEL_OUTOF10: 2
PAINLEVEL_OUTOF10: 2
PAINLEVEL_OUTOF10: 0
PAINLEVEL_OUTOF10: 2
PAINLEVEL_OUTOF10: 0
PAINLEVEL_OUTOF10: 2
PAINLEVEL_OUTOF10: 0

## 2022-08-28 ASSESSMENT — PAIN DESCRIPTION - LOCATION
LOCATION: HIP
LOCATION: HIP
LOCATION: HEAD
LOCATION: BACK
LOCATION: HIP

## 2022-08-28 ASSESSMENT — PAIN DESCRIPTION - FREQUENCY
FREQUENCY: INTERMITTENT

## 2022-08-28 ASSESSMENT — PAIN SCALES - WONG BAKER
WONGBAKER_NUMERICALRESPONSE: 2

## 2022-08-28 ASSESSMENT — PAIN DESCRIPTION - ORIENTATION
ORIENTATION: MID
ORIENTATION: LEFT

## 2022-08-28 ASSESSMENT — PAIN - FUNCTIONAL ASSESSMENT
PAIN_FUNCTIONAL_ASSESSMENT: ACTIVITIES ARE NOT PREVENTED

## 2022-08-28 ASSESSMENT — PAIN DESCRIPTION - DESCRIPTORS
DESCRIPTORS: ACHING;DISCOMFORT
DESCRIPTORS: ACHING

## 2022-08-28 ASSESSMENT — PAIN DESCRIPTION - ONSET
ONSET: ON-GOING
ONSET: GRADUAL
ONSET: ON-GOING

## 2022-08-28 NOTE — PROGRESS NOTES
[facility]   Nephrology progress note  8/28/2022, 12:10 PM      Pt Name:    Antoine Bose  MRN:     [id number]     YOB: 1940  Admit Date:    8/17/2022  1:33 PM    Chief Complaint: Nephrology following for hyponatremia    Subjective:  Chart reviewed. Discussed patient with RN. She is asking when fluid restriction can be lifted. Objective:  24HR INTAKE/OUTPUT:  No intake or output data in the 24 hours ending 08/28/22 1210        I/O last 3 completed shifts: In: 690 [P.O.:690]  Out: -   No intake/output data recorded. Admission weight: 141 lb 8 oz (64.2 kg)  Wt Readings from Last 3 Encounters:   08/17/22 141 lb 8 oz (64.2 kg)   08/13/22 125 lb (56.7 kg)   07/05/22 127 lb (57.6 kg)        Vitals :   Vitals:    08/26/22 2310 08/27/22 0829 08/27/22 2215 08/28/22 0800   BP: (!) 140/67 139/64 (!) 140/69 (!) 151/73   Pulse: 60 60 60 65   Resp: 18 16 18 16   Temp: 97.5 °F (36.4 °C) 98.7 °F (37.1 °C) 98 °F (36.7 °C) 97.7 °F (36.5 °C)   TempSrc: Oral Oral Oral Oral   SpO2: 97% 97% 98% 96%   Weight:       Height:           Physical examination limited d/t COVID-19 isolation status, performed from doorway and RN  General: no distress  Lungs: Air entry B/L,clear  Extremities: no LE edema    Medications:  Infusion:   Meds:    sodium chloride  2 g Oral Once    metoprolol tartrate  25 mg Oral BID    calcium-cholecalciferol  1 tablet Oral Daily    cetirizine  10 mg Oral Daily    Vitamin D  5,000 Units Oral Daily with breakfast    hydrocortisone   Topical 4x Daily    acetaminophen  650 mg Oral Q6H    apixaban  2.5 mg Oral BID    famotidine  20 mg Oral Daily     Meds prn: loperamide, diphenhydrAMINE, LORazepam, bisacodyl, ondansetron, polyethylene glycol, traMADol **OR** traMADol, acetaminophen     Lab Data :  CBC:   No results for input(s): WBC, HGB, HCT, PLT in the last 72 hours.     CMP:  Recent Labs     08/26/22  1652 08/27/22  0724 08/28/22  0631   * 129* 130*   K 3.7 4.2 3.8 CL 96* 96* 97*   CO2 24 25 24   BUN 5* 6* 7   CREATININE 0.3* 0.4 0.4   GLUCOSE 100 102 109*   CALCIUM 8.3* 8.9 9.1     Hepatic:   Recent Labs     08/25/22  1414   LABALBU 3.5   AST 25   ALT 17   BILITOT 0.3   ALKPHOS 77         Assessment and Plan: 1. Hyponatremia, urine studies suggestive of polydipsia. Improving. Cont fluid restriction of 1200 mL for now and will give another dose of salt tabs 2 grams today. 2. Renal: no evidence of CKD  3. HTN: stable  4. COVID-19+  5. S/p left femoral neck fx    D/W RN    Sally Shahid DO  [facility]    This report has been created using voice recognition software.  It may contain minor errors which are inherent in voice recognition technology

## 2022-08-28 NOTE — PROGRESS NOTES
[address] [place] [address]  INPATIENT PHYSICAL THERAPY  DAILY NOTE  [address] [facility] - [id number]    Time In: 1200  Time Out: 1230  Timed Code Treatment Minutes: 30 Minutes  Minutes: 30          Date: 2022  Patient Name: Antwan Hobbs,  Gender:  female        MRN: [id number]  : 1940  (78 y.o.)  Referral Date : 22  Referring Practitioner: Cande Massey MD  Diagnosis: Hip fracture requiring operative repair, left, closed, initial encounter  Additional Pertinent Hx: Ga Briseno  is a 78 y.o. female admitted to the inpatient rehabilitation unit on 2022. She was originally admitted to [address] [place] [address] on 2022. Patient  has a past medical history of Osteoporosis of multiple sites without pathological fracture, Squamous cell carcinoma of skin, Vascular headache, and Vitamin D deficiency. Patient presented to [facility] after suffering a fall at home, landing on her left side. Patient was unable to ambulate and was found to have left femoral neck fracture. Patient was admitted and taken for left hemiarthroplasty with Dr César Flynn on 22. Postoperatively, patient developed narrow complex tachyarrhythmia concerning for SVT versus A. fib with RVR status post adenosine x2 and DCCV x3. Patient currently off amiodarone infusion and is in normal sinus rhythm.   Patient is now on Lopressor 12.5 mg twice daily     Prior Level of Function:  Lives With: Son  Type of Home: House  Home Layout: Two level, Able to Live on Main level with bedroom/bathroom, Performs ADL's on one level, Laundry in basement (pt's bedroom is upstairs but reports she could live on main floor)  Home Access: Stairs to enter without rails  Entrance Stairs - Number of Steps: 2 ELOY  Home Equipment: Cane   Bathroom Shower/Tub: Tub/Shower unit, Curtain  Bathroom Toilet: Standard    Receives Help From: Family, Friend(s)  ADL Assistance: Independent  Homemaking Assistance: Independent  Homemaking Responsibilities: Yes  Ambulation Assistance: Independent (uses cane at night time only)  Transfer Assistance: Independent  Active : Yes  Additional Comments: Son recently (retired) moved in with pt. Pt amb with SC intermittently in the home, and uses SC for longer distances and at night    Restrictions/Precautions:  Restrictions/Precautions: Isolation  Left Lower Extremity Weight Bearing: Weight Bearing As Tolerated  Position Activity Restriction  Hip Precautions: No hip flexion > 90 degrees, No hip internal rotation, Posterior hip precautions, No ADduction  Other position/activity restrictions: Droplet + precautions, KATHY precautions     SUBJECTIVE: Patient in bed at arrival and agreeable to therapy. Patient encouraged to sit in chair at end of session to eat lunch. PAIN: 0/10: Denied     Vitals: Vitals not assessed per clinical judgement, see nursing flowsheet    OBJECTIVE:  Bed Mobility:  Supine to Sit: Stand By Assistance    Transfers:  Sit to Stand: Contact Guard Assistance  Stand to [employer] Assistance    Ambulation:  Stand By Assistance  Distance: 120' (In hallway and room)   Surface: Level Tile  Device:Rolling Walker  Gait Deviations: Forward Flexed Posture, Slow Mague, Decreased Step Length Bilaterally, Decreased Weight Shift Left, Decreased Gait Speed, and Decreased Heel Strike Bilaterally      Exercise:  Patient was guided in 1 set(s) 10-15 reps of exercise to both lower extremities. Ankle pumps, Glut sets, Seated marches, Seated hamstring curls, Seated heel/toe raises, Long arc quads, and Seated abduction/adduction. Exercises were completed for increased independence with functional mobility. Functional Outcome Measures: Not completed       ASSESSMENT:  Assessment: Patient progressing toward established goals. Activity Tolerance:  Patient tolerance of  treatment: good.       Equipment Recommendations:Equipment Needed: Yes  Other: HUGH ordered from [facility] 8/23/22  Discharge Recommendations: Continue to assess pending progress and Patient would benefit from continued PT at discharge  Plan: Current Treatment Recommendations: Strengthening, Balance training, Functional mobility training, Transfer training, Neuromuscular re-education, Stair training, Gait training, Endurance training, Home exercise program, Safety education & training, Patient/Caregiver education & training, Equipment evaluation, education, & procurement, Therapeutic activities  Plan:  (5x/wk 90min, 1x/wk 30min)    Patient Education  Patient Education: Plan of Care, Bed Mobility, Transfers, Gait, Verbal Exercise Instruction    Goals:  Patient goals : \"just feel better\"  Short Term Goals  Time Frame for Short term goals: 1 week  Short term goal 1: Patient will complete rolling and supine < > sit with min assist with no bed rail and head of bed flat and maintain hip precautions to transfer in and out of bed with decreased difficulty. GOAL MET, SEE LTG  Short term goal 2: Patient will complete sit < > stand with CGA from various surfaces to stand to ambulate with decreased difficulty. GOAL MET, SEE LTG  Short term goal 3: Patient will ambulate 80' with a RW and SBA to progress towards ambulating household distances  Short term goal 4: Patient will ascend/descend 2, 6\" steps with 2 hand rails and CGA to progress towards safe home entry. Short term goal 5: Patient will complete car transfer with min assist to transfer in and out of vehicle for home  Long Term Goals  Time Frame for Long term goals : 3 weeks from initial evaluation  Long term goal 1: Patient will complete supine < > sit with modified independence to transfer in and out of bed safely. Long term goal 2: Patient will complete sit < > stand with modified independence to stand to ambulate safely. Long term goal 3: Patient will bed < > chair transfer with a RW and modified independence to transfer surface to surface safely.   Long term goal 4: Patient will ambulate 150' with a RW and modified independence to navigate home safely. Long term goal 5: Patient will ascend/descend 2 steps with hand held assist and cane with min assist for home entry. Additional Goals?: Yes  Long term goal 6: Patient will complete car transfer with SBA to transfer in and out of vehicle for transport to home and appointments    Following session, patient left in safe position with all fall risk precautions in place.

## 2022-08-28 NOTE — PROGRESS NOTES
[address]  Occupational Therapy  Daily Note  Time:   Time In: 0800  Time Out: 0830  Timed Code Treatment Minutes: 30 Minutes  Minutes: 30          Date: 2022  Patient Name: Estuardo Argueta,   Gender: female      Room: [facility]54/054-A  MRN: [id number]  : 1940  (78 y.o.)  Referring Practitioner: Gerri Artis MD  Diagnosis: Hip fracture requiring operative repair, left, closed, initial encounter. Additional Pertinent Hx: Per chart review, pt is an 78 y.o. female with a PMH of \"Osteoporosis of multiple sites without pathological fracture, Squamous cell carcinoma of skin, Vascular headache, and Vitamin D deficiency\". Pt originally admitted to [facility] on 22 post fall at home, landing on left side. Pt reports she was unable to ambulate and fall resulted in femoral neck fracture, undergoing a left hemiarthoplasty on 22. \"Postoperatively, patient developed narrow complex tachyarrhythmia concerning for SVT versus A. fib with RVR status post adenosine x2 and DCCV x3.\" Pt presents to [facility] IPR unit on 22 s/p left hemiarthoplasty. Restrictions/Precautions:  Restrictions/Precautions: Isolation  Left Lower Extremity Weight Bearing: Weight Bearing As Tolerated  Position Activity Restriction  Hip Precautions: No hip flexion > 90 degrees, No hip internal rotation, Posterior hip precautions, No ADduction  Other position/activity restrictions: Droplet + precautions, KATHY precautions     SUBJECTIVE: pt sitting in recliner when arrived. Pt agreeable to OT     PAIN: 0/10:     Vitals: Nurse checked vitals prior to session    COGNITION: Decreased Insight, Decreased Problem Solving, and Decreased Safety Awareness    ADL:   No ADL's completed this session. .pt stated all ADLS done prior to OT coming     BALANCE:  Standing Balance: Stand By Assistance. [place] [facility]     BED MOBILITY:  Not Tested    TRANSFERS:  Sit to Stand:  Stand By Assistance.  From recliner Stand to Sit: Stand By Assistance. To recliner     FUNCTIONAL MOBILITY:  Assistive Device: Rolling Walker  Assist Level:  Stand By Assistance. Distance: To and from bathroom  Pt had no LOB and moves slow     ADDITIONAL ACTIVITIES:  .Patient completed BUE strengthening exercises with skilled education on HEP: completed x15 reps x1 set with a minimal resistive band  in all joints and all planes in order to improve UE strength and activity tolerance required for BADL routine and toilet / shower transfers. Patient tolerated in sitting, requiring short  rest breaks. Patient also required min verbal  cues for technique. .Patient completed dynamic standing task that facilitated 1 hand release . Patient required CGA , and demo'ed an endurance of 1-3  minutes. Demo good  tolerance with short  rest breaks. Standing task completed to challenge endurance and balance required for ADL and IADL skills. ASSESSMENT:     Activity Tolerance:  Patient tolerance of  treatment: good. Discharge Recommendations: Home with Home Health OT and Patient would benefit from continued OT at discharge  Equipment Recommendations: Other: Recommend tub transfer bench and installation of grab bar in shower. Patient would benefit from sock aid and shoe horn but is unsure if she wants them at this time. Son ordered [address]. Son is measuring toilet height to see if we need riser/BSC. Continue to monitor needs.   Plan: Times per Week: 5x wk/90 min 1x wk/30min  Times per Day: Daily  Current Treatment Recommendations: Functional mobility training, Balance training, Self-Care / ADL, Patient/Caregiver education & training, Safety education & training, Strengthening, Endurance training, Equipment evaluation, education, & procurement    Patient Education  Patient Education: Home Exercise Program and Importance of Increasing Activity    Goals  Short Term Goals  Time Frame for Short term goals: 1 wk  Short Term Goal 1: Pt will tolerate 3 min of standing with 1-2 hand release and SBA for improved indep in sinkside grooming tasks  Short Term Goal 2: Pt will navigate room to gather needed supplies for ADL task with SBA and 0 vc for safety for PLOF in ADL routines. Short Term Goal 3: Pt will complete mobility to/from the bathroom with no more than SBA and min A for safety to progress to PLOF in ADL routines  Short Term Goal 4: Pt will complete simple snack/meal prep task with SBA and min VC for safety/problem solving for safe indep in IADL routines  Short Term Goal 5: Pt will complete community reintegration activity with SBA and min A for problem solving/KATHY precautions to progress to PLOF  Long Term Goals  Time Frame for Long term goals : 2 wks from IPR eval  Long Term Goal 1: Pt will complete UB/LB dressing with LHAE prn and Sup with 0 vc for safety or KATHY precautions to progress ADL performance to PLOF  Long Term Goal 2: Pt will complete UB/LB bathing with LHAE prn and Sup with 0vc for safety or KATHY precautions for improved ADL indep  Long Term Goal 3: Pt will complete oral care mod I with 0 vc for safety or precautions to progress ADL performance to PLOF  Long Term Goal 4: Pt will complete toileting/transfer with Sup and 0vc for safety or to maintain precautions for improved indep in toileting routine  Long Term Goal 5: Pt will demo competence with Jenny Martell for 100% of time during ADL tasks to maintain KATHY precautions and increase indep in daily occupations    Following session, patient left in safe position with all fall risk precautions in place.

## 2022-08-28 NOTE — PLAN OF CARE
Problem: Discharge Planning  Goal: Discharge to home or other facility with appropriate resources  Outcome: Progressing     Problem: Pain  Goal: Verbalizes/displays adequate comfort level or baseline comfort level  Outcome: Progressing     Problem: Skin/Tissue Integrity  Goal: Absence of new skin breakdown  Description: 1. Monitor for areas of redness and/or skin breakdown  2. Assess vascular access sites hourly  3. Every 4-6 hours minimum:  Change oxygen saturation probe site  4. Every 4-6 hours:  If on nasal continuous positive airway pressure, respiratory therapy assess nares and determine need for appliance change or resting period.   8/28/2022 0008 by Margareth Scott RN  Outcome: Progressing  8/27/2022 1635 by Juan Carter LPN  Outcome: Progressing     Problem: Safety - Adult  Goal: Free from fall injury  8/28/2022 0008 by Margareth Scott RN  Outcome: Progressing  8/27/2022 1635 by Juan Carter LPN  Outcome: Progressing     Problem: ABCDS Injury Assessment  Goal: Absence of physical injury  Outcome: Progressing  Flowsheets (Taken 8/27/2022 1643 by Juan Carter LPN)  Absence of Physical Injury: Implement safety measures based on patient assessment     Problem: Nutrition Deficit:  Goal: Optimize nutritional status  Outcome: Progressing     Problem: Respiratory - Adult  Goal: Achieves optimal ventilation and oxygenation  Outcome: Progressing     Problem: Skin/Tissue Integrity - Adult  Goal: Skin integrity remains intact  Outcome: Progressing  Flowsheets (Taken 8/27/2022 1643 by Juan Carter LPN)  Skin Integrity Remains Intact: Monitor for areas of redness and/or skin breakdown  Goal: Incisions, wounds, or drain sites healing without S/S of infection  Outcome: Progressing     Problem: Musculoskeletal - Adult  Goal: Return mobility to safest level of function  Outcome: Progressing  Goal: Maintain proper alignment of affected body part  Outcome: Progressing  Goal: Return ADL status to a safe level of function  Outcome: Progressing     Problem: Anxiety  Goal: Will report anxiety at manageable levels  Description: INTERVENTIONS:  1. Administer medication as ordered  2. Teach and rehearse alternative coping skills  3.  Provide emotional support with 1:1 interaction with staff  Outcome: Progressing  Flowsheets (Taken 8/27/2022 2230)  Will report anxiety at manageable levels:   Administer medication as ordered   Teach and rehearse alternative coping skills   Provide emotional support with 1:1 interaction with staff

## 2022-08-29 ENCOUNTER — TELEPHONE (OUTPATIENT)
Dept: CARDIOLOGY CLINIC | Age: 82
End: 2022-08-29

## 2022-08-29 LAB
ANION GAP SERPL CALCULATED.3IONS-SCNC: 12 MEQ/L (ref 8–16)
BLOOD CULTURE, ROUTINE: NORMAL
BUN BLDV-MCNC: 6 MG/DL (ref 7–22)
CALCIUM SERPL-MCNC: 9.1 MG/DL (ref 8.5–10.5)
CHLORIDE BLD-SCNC: 96 MEQ/L (ref 98–111)
CO2: 22 MEQ/L (ref 23–33)
CREAT SERPL-MCNC: 0.3 MG/DL (ref 0.4–1.2)
GFR SERPL CREATININE-BSD FRML MDRD: > 90 ML/MIN/1.73M2
GLUCOSE BLD-MCNC: 115 MG/DL (ref 70–108)
POTASSIUM SERPL-SCNC: 4 MEQ/L (ref 3.5–5.2)
SODIUM BLD-SCNC: 130 MEQ/L (ref 135–145)

## 2022-08-29 PROCEDURE — 97116 GAIT TRAINING THERAPY: CPT

## 2022-08-29 PROCEDURE — 99231 SBSQ HOSP IP/OBS SF/LOW 25: CPT | Performed by: NURSE PRACTITIONER

## 2022-08-29 PROCEDURE — 97535 SELF CARE MNGMENT TRAINING: CPT

## 2022-08-29 PROCEDURE — 6370000000 HC RX 637 (ALT 250 FOR IP): Performed by: FAMILY MEDICINE

## 2022-08-29 PROCEDURE — 6370000000 HC RX 637 (ALT 250 FOR IP): Performed by: INTERNAL MEDICINE

## 2022-08-29 PROCEDURE — 1180000000 HC REHAB R&B

## 2022-08-29 PROCEDURE — 99232 SBSQ HOSP IP/OBS MODERATE 35: CPT | Performed by: INTERNAL MEDICINE

## 2022-08-29 PROCEDURE — 97530 THERAPEUTIC ACTIVITIES: CPT

## 2022-08-29 PROCEDURE — 97110 THERAPEUTIC EXERCISES: CPT

## 2022-08-29 PROCEDURE — 6370000000 HC RX 637 (ALT 250 FOR IP): Performed by: PHYSICAL MEDICINE & REHABILITATION

## 2022-08-29 PROCEDURE — 6370000000 HC RX 637 (ALT 250 FOR IP): Performed by: NURSE PRACTITIONER

## 2022-08-29 PROCEDURE — 80048 BASIC METABOLIC PNL TOTAL CA: CPT

## 2022-08-29 PROCEDURE — 36415 COLL VENOUS BLD VENIPUNCTURE: CPT

## 2022-08-29 RX ORDER — CETIRIZINE HYDROCHLORIDE 10 MG/1
5 TABLET ORAL DAILY
Qty: 20 TABLET | Refills: 0 | Status: SHIPPED | OUTPATIENT
Start: 2022-08-30 | End: 2022-09-14

## 2022-08-29 RX ORDER — SODIUM CHLORIDE 1000 MG
2 TABLET, SOLUBLE MISCELLANEOUS 2 TIMES DAILY WITH MEALS
Status: DISCONTINUED | OUTPATIENT
Start: 2022-08-29 | End: 2022-08-30 | Stop reason: HOSPADM

## 2022-08-29 RX ADMIN — ACETAMINOPHEN 650 MG: 325 TABLET ORAL at 15:17

## 2022-08-29 RX ADMIN — APIXABAN 2.5 MG: 2.5 TABLET, FILM COATED ORAL at 09:05

## 2022-08-29 RX ADMIN — CETIRIZINE HYDROCHLORIDE 10 MG: 10 TABLET, FILM COATED ORAL at 09:18

## 2022-08-29 RX ADMIN — ACETAMINOPHEN 650 MG: 325 TABLET ORAL at 22:18

## 2022-08-29 RX ADMIN — FAMOTIDINE 20 MG: 20 TABLET ORAL at 09:05

## 2022-08-29 RX ADMIN — HYDROCORTISONE ACETATE: 1 CREAM TOPICAL at 09:18

## 2022-08-29 RX ADMIN — SODIUM CHLORIDE 2 G: 1 TABLET ORAL at 17:21

## 2022-08-29 RX ADMIN — ACETAMINOPHEN 650 MG: 325 TABLET ORAL at 05:17

## 2022-08-29 RX ADMIN — SODIUM CHLORIDE 2 G: 1 TABLET ORAL at 12:41

## 2022-08-29 RX ADMIN — HYDROCORTISONE ACETATE: 1 CREAM TOPICAL at 17:20

## 2022-08-29 RX ADMIN — METOPROLOL TARTRATE 25 MG: 25 TABLET, FILM COATED ORAL at 22:17

## 2022-08-29 RX ADMIN — APIXABAN 2.5 MG: 2.5 TABLET, FILM COATED ORAL at 22:17

## 2022-08-29 RX ADMIN — LORAZEPAM 0.5 MG: 0.5 TABLET ORAL at 22:18

## 2022-08-29 RX ADMIN — METOPROLOL TARTRATE 25 MG: 25 TABLET, FILM COATED ORAL at 09:04

## 2022-08-29 RX ADMIN — LORAZEPAM 0.5 MG: 0.5 TABLET ORAL at 04:14

## 2022-08-29 RX ADMIN — ACETAMINOPHEN 650 MG: 325 TABLET ORAL at 09:04

## 2022-08-29 RX ADMIN — LORAZEPAM 0.5 MG: 0.5 TABLET ORAL at 15:17

## 2022-08-29 RX ADMIN — HYDROCORTISONE ACETATE: 1 CREAM TOPICAL at 22:17

## 2022-08-29 RX ADMIN — HYDROCORTISONE ACETATE: 1 CREAM TOPICAL at 12:51

## 2022-08-29 ASSESSMENT — PAIN DESCRIPTION - ONSET: ONSET: ON-GOING

## 2022-08-29 ASSESSMENT — PAIN SCALES - WONG BAKER
WONGBAKER_NUMERICALRESPONSE: 2
WONGBAKER_NUMERICALRESPONSE: 4
WONGBAKER_NUMERICALRESPONSE: 2
WONGBAKER_NUMERICALRESPONSE: 4
WONGBAKER_NUMERICALRESPONSE: 4

## 2022-08-29 ASSESSMENT — PAIN DESCRIPTION - LOCATION
LOCATION: HIP

## 2022-08-29 ASSESSMENT — PAIN DESCRIPTION - PAIN TYPE: TYPE: ACUTE PAIN

## 2022-08-29 ASSESSMENT — PAIN SCALES - GENERAL
PAINLEVEL_OUTOF10: 4
PAINLEVEL_OUTOF10: 4
PAINLEVEL_OUTOF10: 5
PAINLEVEL_OUTOF10: 5
PAINLEVEL_OUTOF10: 2

## 2022-08-29 ASSESSMENT — PAIN DESCRIPTION - DESCRIPTORS
DESCRIPTORS: ACHING

## 2022-08-29 ASSESSMENT — PAIN DESCRIPTION - FREQUENCY: FREQUENCY: INTERMITTENT

## 2022-08-29 ASSESSMENT — PAIN DESCRIPTION - ORIENTATION
ORIENTATION: LEFT
ORIENTATION: LEFT

## 2022-08-29 ASSESSMENT — PAIN - FUNCTIONAL ASSESSMENT: PAIN_FUNCTIONAL_ASSESSMENT: ACTIVITIES ARE NOT PREVENTED

## 2022-08-29 NOTE — PROGRESS NOTES
[address] NOTE    Conference Date: 2022  Admit Date:  2022  1:33 PM  Patient Name: Tasia Velázquez    MRN: [id number]    : 1940  (78 y.o.)  Rehabilitation Admitting Diagnosis:  Hip fracture requiring operative repair, left, closed, initial encounter [facility]) Tremayne Reardon  Referring Practitioner: Modesta Yoder MD      CASE MANAGEMENT  Current issues/needs regarding patient and family discharge status: Patient continues to be motivated and progressing with therapy. Patient plans to be discharged on Tuesday,  with [address] for RN, PT, OT and HHA. SW will follow and maintain involvement in discharge planning. PHYSICAL THERAPY  Ms Joel Fournier met 2/5 STG's and 2/6 LTG's. Patient continues to make gradual gains towards goals set for her, limited by left hip/knee pain and anxiousness. Patient is able to ambulate increased distances with a RW, up to 160' with SBA/CGA, completes sit < > stand with modified independence, and supine < > sit with modified independence with increased time. Plan to address steps in later session. Pt presents with antalgic gait and decreased step length right LE. Pt is returning to home with her son tomorrow  and home health PT. Pt would benefit from continued skilled PT services to maximize her independence with functional mobility, reduce her risk for falls and allow pt to return to home safely. Equipment Needed: Yes  Other: RW ordered from [facility] 22    [address] [place] [address] Avenue is making steady progress on IP Rehab. She has progressed to a mod I level with sit to stand transfers, but continues to require SBA during mobility and balance tasks within her daily routine due to her impaired balance. Leanor Gain can complete her UB ADLs with mod I and requires SBA for LB ADLs due to her impaired balance.  Leanor Gain is limited at times by her anxiety and often requires and seeks out reassurance during her everyday rotines. Concepción Hebert is also limited by her endurance, balance. Concepción Hebert will have support of her son at home who was assisting with meal prep and laundry IADLs at baseline. She has an UB HEP she demo competency in. Concepción Hebert cont to require skilled OT to improve safety and indep with BADL and IADL tasks and [facility] after discharge. Other: Family has reacher. Family is going to otbain a tub transfer bench. Plan to order [facility]. Confirmed with son via phone 8/29    RECREATIONAL THERAPY  Patient has been offered participation in recreational therapy activities and participates as able. Looking forward to going home tomorrow-    NUTRITION  Weight: 141 lb 8 oz (64.2 kg) / Body mass index is 22.84 kg/m². Current diet: ADULT DIET; Regular; 1200 ml  Please see nutrition note for details. NURSING  Continent of Bowel: Yes. Frequency: daily. Management: prn immodium, glycolax, dulcolax. Continent of Bladder: Yes. Frequency: q2-4 hours. Management: none. Pain is Managed:  Yes. Management: routine Tylenol. Frequency of Intervention: tylenol. Adequately Controlled: Yes  Sleep: Adequate  Signs and Symptoms of Infection:  yes, patient in isolation for Covid. Signs and Symptoms of Skin Breakdown:  No.   Injury and/or Falls during Inpatient Rehabilitation Admission: No  Anticoagulants: eliquis  Diabetic: No  Consultations/Labs/X-rays: none  Oxygen while on IP Rehab:  No Currently using  0 liters per 0  . Home oxygen: No    No results for input(s): POCGLU in the last 72 hours.     No results found for: LDLCALC, LDLCHOLESTEROL, LDLDIRECT      Vitals:    08/27/22 2215 08/28/22 0800 08/28/22 2209 08/29/22 0910   BP: (!) 140/69 (!) 151/73 134/68 135/67   Pulse: 60 65 66 70   Resp: 18 16 16 16   Temp: 98 °F (36.7 °C) 97.7 °F (36.5 °C)  98 °F (36.7 °C)   TempSrc: Oral Oral Oral Oral   SpO2: 98% 96% 94% 96%   Weight:       Height:              Family Education: {Blank single:20127::\"Family available and participating in education \",\"No family available for education\",\"Need to make contact with family to initiate education\"}  Fall Risk:  {Blank single:13041::\"No\",\"Falling star program initiated\",\"Ultra high fall risk program initiated\"}  Is the patient appropriate for a stay in the functional apartment? {YES/NO:}    Discharge Plan   Estimated Discharge Date: {ambiguous abbreviation:8348634::\"Continue to assess\",\"***\"}   Destination: {Settin}  Services at Discharge: {FOLLOW-UP SERVICES:}  Is patient appropriate for an outpatient driving evaluation? {YES/NO:}  Equipment at Discharge: Other: Family has reacher. Family is going to otbain a tub transfer bench. Plan to order [facility]. Confirmed with son via phone   Other: RW ordered from [facility] 22  Factors facilitating achievement of predicted outcomes: {Patient Strengths:175208783}  Barriers to the achievement of predicted outcomes: {BARRIERS:169784225}  Follow up with physiatrist? {YES/NO:}  If yes, what timeframe? ***    Team Members Present at Conference:  :{IRF CONFERENCE ATTENDANCE - TE:80113}  Occupational Therapist:{IRF CONFERENCE ATTENDANCE - OE:25891}  Physical Therapist:{IRF CONFERENCE ATTENDANCE - DK:95610}  Speech Therapist:{IRF CONFERENCE ATTENDANCE - BU:61141}  Nurse:{IRF CONFERENCE ATTENDANCE - AZCRQ:31700}  Psychologist: Alex Garrett, PhD.    I approve the established interdisciplinary plan of care as documented within the medical record of Vandana Reece.     Danny Medina, PT

## 2022-08-29 NOTE — PROGRESS NOTES
Physical Medicine & Rehabilitation   Progress Note    Chief Complaint:   left hip fracture. S/p left hemiarthroplasty. Rehab needs    Subjective:  Patient seen today, in her room. Patient +COVID, now in droplet isolation. Patient with anxiety today, multiple concerns, mostly with emergent situations at home. Explained different scenarios for home. Patient does take benzodiazapine at home, if available here PRN. Patient denies any further concerns at this time. +BM 8/28/22      Rehabilitation:  PT:   Bed Mobility:  Supine to Sit: Stand By Assistance  Transfers:  Sit to Stand: [employer]  Stand to [employer] Assistance  Ambulation:  Stand By Assistance  Distance: 120' (In hallway and room)   Surface: Level Tile  Device:Rolling Walker  Gait Deviations: Forward Flexed Posture, Slow Mague, Decreased Step Length Bilaterally, Decreased Weight Shift Left, Decreased Gait Speed, and Decreased Heel Strike Bilaterally  Exercise:  Patient was guided in 1 set(s) 10-15 reps of exercise to both lower extremities. Ankle pumps, Glut sets, Seated marches, Seated hamstring curls, Seated heel/toe raises, Long arc quads, and Seated abduction/adduction. Exercises were completed for increased independence with functional mobility. OT:   BALANCE:  Standing Balance: Stand By Assistance. At [facility]    TRANSFERS:  Sit to Stand:  Stand By Assistance. From recliner   Stand to Sit: Stand By Assistance. To recliner   FUNCTIONAL MOBILITY:  Assistive Device: Rolling Walker  Assist Level:  Stand By Assistance. Distance: To and from bathroom  Pt had no LOB and moves slow   ADDITIONAL ACTIVITIES:  .Patient completed BUE strengthening exercises with skilled education on HEP: completed x15 reps x1 set with a minimal resistive band  in all joints and all planes in order to improve UE strength and activity tolerance required for BADL routine and toilet / shower transfers.  Patient tolerated in sitting, requiring short  rest breaks. Patient also required min verbal  cues for technique. .Patient completed dynamic standing task that facilitated 1 hand release . Patient required CGA , and demo'ed an endurance of 1-3  minutes. Demo good  tolerance with short  rest breaks. Standing task completed to challenge endurance and balance required for ADL and IADL skills. Review of Systems:  CONSTITUTIONAL:  positive for  fatigue  EYES:  positive for  glasses & bilateral cataracts. HEENT:  negative  RESPIRATORY:  negative  CARDIOVASCULAR:  positive for  palpitations, fatigue, heart murmur  GASTROINTESTINAL:  positive for constipation and decreased appetite  GENITOURINARY:  negative  SKIN:  left hip incision.  Rash to back and buttock area  HEMATOLOGIC/LYMPHATIC:  positive for swelling/edema  MUSCULOSKELETAL:  positive for  pain  NEUROLOGICAL:  positive for gait problems and pain  BEHAVIOR/PSYCH:  negative  System review otherwise negative      Objective:  Vitals:    08/29/22 0910   BP: 135/67   Pulse: 70   Resp: 16   Temp: 98 °F (36.7 °C)   SpO2: 96%      awake  Orientation:   person, place, time  Mood: within normal limits  Affect: slightly anxious   General appearance: Patient is well nourished, well developed, well groomed and in no acute distress     Memory:   normal,  Attention/Concentration: normal  Language:  normal     Cranial Nerves:  cranial nerves II-XII are grossly intact  ROM:  abnormal - left hip  Tone:  normal  Muscle bulk: within normal limits  Sensory:  Sensory intact     Skin: Left hip incision not visualized this assessment   Peripheral vascular: Pulses: Normal upper and lower extremity pulses; Edema: 1+      Diagnostics:   Recent Results (from the past 24 hour(s))   Basic Metabolic Panel    Collection Time: 08/29/22  7:11 AM   Result Value Ref Range    Sodium 130 (L) 135 - 145 meq/L    Potassium 4.0 3.5 - 5.2 meq/L    Chloride 96 (L) 98 - 111 meq/L    CO2 22 (L) 23 - 33 meq/L    Glucose 115 (H) 70 - 108 mg/dL    BUN 6 (L) 7 - 22 mg/dL    Creatinine 0.3 (L) 0.4 - 1.2 mg/dL    Calcium 9.1 8.5 - 10.5 mg/dL   Anion Gap    Collection Time: 08/29/22  7:11 AM   Result Value Ref Range    Anion Gap 12.0 8.0 - 16.0 meq/L   Glomerular Filtration Rate, Estimated    Collection Time: 08/29/22  7:11 AM   Result Value Ref Range    Est, Glom Filt Rate >90 ml/min/1.73m2         Impression:  Acute left displaced fmoral neck fracture  S/p left hip hemiarthroplasty with Dr. Luis Angel Traylor on 8/14/22. Mechanical ground level fall  SVT vs. AF with RVR  Asymptomatic bacteriuria  Moderate mitral valve regurgitation  Chronic hyponatremia  HTN  Insomnia  Anxiety  Vitamin D deficiency  Cataracts and macular degeneration   COVID 19+  8/25/22  Polydipsia       Plan:  Continue current therapies  Prophylaxis:  DVT: Eliquis 2.5 mg p.o. twice daily, GI: Pepcid 20 mg p.o. daily. Pain: Ultram 25 to 50 mg p.o. every 6 hours as needed, Tylenol 650 mg p.o. every 6 hours scheduled, Tylenol 650 mg p.o. every 4 hours as needed  Bowels: Dulcolax suppository 10 mg rectal daily as needed; GlycoLax 17 g p.o. daily as needed. Imodium PRN for loose stools  Anxiety: Ativan 0.5mg TID PRN  HTN: Lopressor 25mg BID   Rash: hydrocortisone cream QID, zyrtec daily, benadryl PRN  Dr Breonna Kruse following for medical management. Nephrology for electrolyte imbalance - fluid restriction 1200ml and salt tabs  Team conference Tuesday  Discharge plan to home on 8/30/2022 with home health services including PT, OT, nursing, and home health aide.       Missed Therapy Time:  None    Alfredito Villareal, APRN - CNP

## 2022-08-29 NOTE — PROGRESS NOTES
Patient: Ronna Aw  Unit/Bed: 3N-50/480-W  YOB: 1940  MRN: [id number] Acct: [de-identified]   Admitting Diagnosis: Hip fracture requiring operative repair, left, closed, initial encounter [facility]) Karla Civil Date:  8/17/2022  Hospital Day: 12    Assessment:     Principal Problem:    Hip fracture requiring operative repair, left, closed, initial encounter [facility])  Active Problems:    Hyponatremia    Essential hypertension    Paroxysmal atrial fibrillation ([facility] [address]  Resolved Problems:    * No resolved hospital problems. *      Plan:     Medically stable for discharge tomorrow        Subjective:     Patient has no complaint of CP or SOB. .   Medication side effects: none    Scheduled Meds:   sodium chloride  2 g Oral BID WC    metoprolol tartrate  25 mg Oral BID    calcium-cholecalciferol  1 tablet Oral Daily    cetirizine  10 mg Oral Daily    Vitamin D  5,000 Units Oral Daily with breakfast    hydrocortisone   Topical 4x Daily    acetaminophen  650 mg Oral Q6H    apixaban  2.5 mg Oral BID    famotidine  20 mg Oral Daily     Continuous Infusions:  PRN Meds:loperamide, diphenhydrAMINE, LORazepam, bisacodyl, ondansetron, polyethylene glycol, traMADol **OR** traMADol, acetaminophen    Review of Systems  Pertinent items are noted in HPI. Objective:     No data found. I/O last 3 completed shifts:   In: 600 [P.O.:600]  Out: -   I/O this shift:  In: 360 [P.O.:360]  Out: -     /67   Pulse 70   Temp 98 °F (36.7 °C) (Oral)   Resp 16   Ht 5' 6\" (1.676 m)   Wt 141 lb 8 oz (64.2 kg)   SpO2 96%   BMI 22.84 kg/m²     General appearance: alert, appears stated age, and cooperative  Head: Normocephalic, without obvious abnormality, atraumatic  Lungs: clear to auscultation bilaterally  Heart: regular rate and rhythm, S1, S2 normal, no murmur, click, rub or gallop  Abdomen: soft, non-tender; bowel sounds normal; no masses,  no organomegaly  Extremities: extremities normal, atraumatic, no cyanosis or edema  Skin: Skin color, texture, turgor normal. No rashes or lesions  Neurologic: Grossly normal    Electronically signed by Mehdi Mcneill MD on 8/29/2022 at 5:43 PM

## 2022-08-29 NOTE — PROGRESS NOTES
Discharge pain assessment:    Complete within 3 days of discharge. Pain Effect on Sleep ()   Ask patient: Gina Pan the past 5 days, how much of the time has pain made it hard for you to sleep at night?\" 1.  Rarely or not at all     If no pain is reported, end interview. If pain is reported, continue with the additional questions.    Pain Interference with Therapy Activities   Ask patient: Gina Pan the past 5 days, how often have you limited your participation in rehabilitation therapy sessions due to pain?\" 1.  Rarely or not at all   Pain Interference with Day to Day Activities   Ask patient: Gina Pan the past 5 days, how often have you limited your day to day activities (excluding rehabilitation therapy sessions) because of pain?\" 1.  Rarely or not at all

## 2022-08-29 NOTE — PROGRESS NOTES
Date: 2022  Patient Name: Gadiel Hampton        MRN: [id number]   Account: [de-identified]   : 1940  (78 y.o.)  Gender: female   Referring Practitioner: Anamaria Landry MD  Diagnosis: Hip fracture requiring operative repair, left, closed, initial encounter. Additional Pertinent Hx: Per chart review, pt is an 78 y.o. female with a PMH of \"Osteoporosis of multiple sites without pathological fracture, Squamous cell carcinoma of skin, Vascular headache, and Vitamin D deficiency\". Pt originally admitted to [facility] on 22 post fall at home, landing on left side. Pt reports she was unable to ambulate and fall resulted in femoral neck fracture, undergoing a left hemiarthoplasty on 22. \"Postoperatively, patient developed narrow complex tachyarrhythmia concerning for SVT versus A. fib with RVR status post adenosine x2 and DCCV x3.\" Pt presents to [facility] IPR unit on 22 s/p left hemiarthoplasty. Gadiel Hampton requires a Bedside Commode to complete bathing, toileting, dressing and grooming tasks. Patient requires a Bedside Commode due to Upper Extremity/Lower Extremity Weakness and limited ambulation and is unable to walk to the bathroom at home. Without the Bedside Commode, Gadiel Hampton is at increased risk for falls and would require increased assistance for ADL's and mobility.

## 2022-08-29 NOTE — PROGRESS NOTES
[facility]  Inpatient Rehabilitation  Occupational Therapy  Progress Note  Time:  Time In: 0830  Time Out: 1000  Timed Code Treatment Minutes: 90 Minutes  Minutes: 90      Date: 2022  Patient Name: Vandana Reece,   Gender: female      Room: [facility]54/054-A  MRN: [id number]  : 1940  (78 y.o.)  Referring Practitioner: Melissa Ortiz MD  Diagnosis: Hip fracture requiring operative repair, left, closed, initial encounter. Additional Pertinent Hx: Per chart review, pt is an 78 y.o. female with a PMH of \"Osteoporosis of multiple sites without pathological fracture, Squamous cell carcinoma of skin, Vascular headache, and Vitamin D deficiency\". Pt originally admitted to [facility] on 22 post fall at home, landing on left side. Pt reports she was unable to ambulate and fall resulted in femoral neck fracture, undergoing a left hemiarthoplasty on 22. \"Postoperatively, patient developed narrow complex tachyarrhythmia concerning for SVT versus A. fib with RVR status post adenosine x2 and DCCV x3.\" Pt presents to [facility] IPR unit on 22 s/p left hemiarthoplasty. Restrictions/Precautions:  Restrictions/Precautions: Isolation  Left Lower Extremity Weight Bearing: Weight Bearing As Tolerated  Position Activity Restriction  Hip Precautions: No hip flexion > 90 degrees, No hip internal rotation, Posterior hip precautions, No ADduction  Other position/activity restrictions: Droplet + precautions, KATHY precautions    SUBJECTIVE: patient pleasant and cooperative, agreeable to OT. Family unable to be present due to COVID 19 isolation, completed virtually via phone for verbal education. See below for details. PAIN: 1/10: L hip    Vitals: Nurse checked vitals prior to session    COGNITION: WFL and seeks reassurance often vs impaired recall     ADL:   EATING:Independent. Megan Martinez CARE Score: 6. ORAL HYGIENE:Independent. in seated position. CARE Score: 6.      TOILETING HYGIENE:Supervision or touching assistance. SBA for balance. CARE Score: 4. SHOWERING/BATHING:Supervision or touching assistance. supervision in sitting, SBA for standing. Pt anxious at times, requires cues for safety and learned techinque as pt often seeks reassurance. Latoya Martin CARE Score: 4.     UPPER BODY DRESSING:Independent. donning sweatshirt in seated position. CARE Score: 6. LOWER BODY DRESSING:Supervision or touching assistance. SBA for balance, cues for technique with reacher. CARE Score: 4. FOOTWEAR:Partial/moderate assistance  Able to doff socks, don socks with sock-aid, could use shoe horn but requires min A for fully donning and A for shoe laces. CARE Score: 3.     TOILET TRANSFER: Independent. mod I, slow pace. CARE Score: 6. BALANCE:  Sitting Balance:  Modified Independent. Standing Balance: Stand By Assistance. BED MOBILITY:  Not Tested    TRANSFERS:  Sit to Stand:  Modified Independent. Recliner, ETS  Stand to Sit: Modified Independent. FUNCTIONAL MOBILITY:  Assistive Device: Rolling Walker  Assist Level:  Stand By Assistance. Distance: To and from bathroom  Slow pace      ADDITIONAL ACTIVITIES:  Family education session scheduled, however was unable to complete in person due to COVID 19 isolation. This ALONZO and patient called son during treatment session with review on: ADL progress, KATHY precautions and use of LHAE, DME of tub tf bench / reacher / commode. Son appeared supportive, engaged in conversation. Patient completed BUE strengthening exercises with skilled education on HEP: completed x15 reps x1 set with a medium resistive band in all joints and all planes in order to improve UE strength and activity tolerance required for BADL routine and toilet / shower transfers. Patient tolerated well, requiring min rest breaks. Patient also required min cues for technique. ASSESSMENT:  Activity Tolerance:  Patient tolerance of  treatment: good.        Assessment: Assessment: Sampson Balderrama is making steady progress on IP Rehab. She has progressed to a mod I level with sit to stand transfers, but continues to require SBA during mobility and balance tasks within her daily routine due to her impaired balance. Josie Magana can complete her UB ADLs with mod I and requires SBA for LB ADLs due to her impaired balance. Josie Magana is limited at times by her anxiety and often requires and seeks out reassurance during her everyday rotines. Josie Magana is also limited by her endurance, balance. Josie Magana will have support of her son at home who was assisting with meal prep and laundry IADLs at baseline. She has an UB HEP she demo competency in. Josie Magana cont to require skilled OT to improve safety and indep with BADL and IADL tasks and would benefit from [facility] OT services after discharge. Discharge Recommendations: Home with Home health OT  Equipment Recommendations: Other: Recommend tub transfer bench and installation of grab bar in shower. Patient would benefit from sock aid and shoe horn but is unsure if she wants them at this time. Son ordered 2026 [facility]. Son is measuring toilet height to see if we need riser/BSC. Continue to monitor needs. Plan: Times per Week: 5x wk/90 min 1x wk/30min  Times per Day: Daily  Current Treatment Recommendations: Functional mobility training, Balance training, Self-Care / ADL, Patient/Caregiver education & training, Safety education & training, Strengthening, Endurance training, Equipment evaluation, education, & procurement    Patient Education  Patient Education: ADL's, Home Exercise Program, Precautions, Equipment Education, and Reviewed Prior Education    Goals  Short Term Goals  Time Frame for Short term goals: 1 wk  Short Term Goal 1: Pt will tolerate 3 min of standing with 1-2 hand release and SBA for improved indep in sinkside grooming tasks GOAL MET, CONTINUE   Short Term Goal 2: Pt will navigate room to gather needed supplies for ADL task with SBA and 0 vc for safety for PLOF in ADL routines.  GOAL MET, CONTINUE Short Term Goal 3: Pt will complete mobility to/from the bathroom with no more than SBA and min A for safety to progress to PLOF in ADL routines GOAL MET, CONTINUE   Short Term Goal 4: Pt will complete simple snack/meal prep task with SBA and min VC for safety/problem solving for safe indep in IADL routines GOAL MET, CONTINUE   Short Term Goal 5: Pt will complete community reintegration activity with SBA and min A for problem solving/KATHY precautions to progress to PLOF GOAL NOT MET, CONTINUE   Long Term Goals  Time Frame for Long term goals : 2 wks from IPR eval  Long Term Goal 1: Pt will complete UB/LB dressing with LHAE prn and Sup with 0 vc for safety or KATHY precautions to progress ADL performance to PLOF GOAL NOT MET, CONTINUE   Long Term Goal 2: Pt will complete UB/LB bathing with LHAE prn and Sup with 0vc for safety or KATHY precautions for improved ADL indep GOAL NOT MET, CONTINUE   Long Term Goal 3: Pt will complete oral care mod I with 0 vc for safety or precautions to progress ADL performance to PLOF GOAL MET, CONT   Long Term Goal 4: Pt will complete toileting/transfer with Sup and 0vc for safety or to maintain precautions for improved indep in toileting routine GOAL NOT MET, CONT   Long Term Goal 5: Pt will demo competence with [employer] for 100% of time during ADL tasks to maintain KATHY precautions and increase indep in daily occupations GOAL NOT MET, CONT     Following session, patient left in safe position with all fall risk precautions in place.

## 2022-08-29 NOTE — PROGRESS NOTES
[facility]   Nephrology progress note  8/29/2022, 10:40 AM      Pt Name:    Lopez Ortega  MRN:     [id number]     YOB: 1940  Admit Date:    8/17/2022  1:33 PM    Chief Complaint: Nephrology following for hyponatremia. Subjective:  Patient seen and examined  No chest pain or shortness of breath  Feels okay undergoing rehab    Objective:  24HR INTAKE/OUTPUT:    Intake/Output Summary (Last 24 hours) at 8/29/2022 1040  Last data filed at 8/29/2022 0508  Gross per 24 hour   Intake 600 ml   Output --   Net 600 ml      Admission weight: 141 lb 8 oz (64.2 kg)  Wt Readings from Last 3 Encounters:   08/17/22 141 lb 8 oz (64.2 kg)   08/13/22 125 lb (56.7 kg)   07/05/22 127 lb (57.6 kg)        Vitals :   Vitals:    08/27/22 2215 08/28/22 0800 08/28/22 2209 08/29/22 0910   BP: (!) 140/69 (!) 151/73 134/68 135/67   Pulse: 60 65 66 70   Resp: 18 16 16 16   Temp: 98 °F (36.7 °C) 97.7 °F (36.5 °C)  98 °F (36.7 °C)   TempSrc: Oral Oral Oral Oral   SpO2: 98% 96% 94% 96%   Weight:       Height:           Physical examination  General Appearance:  Well developed. No distress  Neck: No accessory muscle use  Lungs:  Breath sounds: clear  CNS: Grossly intact  Psych: Not agitated  Musculoskeletal:  Edema -mild left-sided edema noted    Medications:  Infusion:   Meds:    metoprolol tartrate  25 mg Oral BID    calcium-cholecalciferol  1 tablet Oral Daily    cetirizine  10 mg Oral Daily    Vitamin D  5,000 Units Oral Daily with breakfast    hydrocortisone   Topical 4x Daily    acetaminophen  650 mg Oral Q6H    apixaban  2.5 mg Oral BID    famotidine  20 mg Oral Daily       Lab Data :  CBC: No results for input(s): WBC, HGB, HCT, PLT in the last 72 hours.   CMP:  Recent Labs     08/27/22  0724 08/28/22  0631 08/29/22  0711   * 130* 130*   K 4.2 3.8 4.0   CL 96* 97* 96*   CO2 25 24 22*   BUN 6* 7 6*   CREATININE 0.4 0.4 0.3*   GLUCOSE 102 109* 115*   CALCIUM 8.9 9.1 9.1     Hepatic: No results for input(s): LABALBU, AST, ALT, ALB, BILITOT, ALKPHOS in the last 72 hours. Assessment and Plan:  Renal -patient may be having mild acute kidney injury her creatinine has rising from 0.3 to 0.7  This appears to be resolved     Electrolytes -hyponatremia-based on the urine lites it looks more like primary polydipsia currently on fluid restriction marginal improvement but stable around 130. Add salt tablets 2 g twice daily  Status post left femoral neck fracture and arthroplasty done on 8/14/2022  COVID-19 infection  Essential hypertension  Meds reviewed and discussed with patient    Crow Roberson MD  [facility]    This report has been created using voice recognition software.  It may contain minor errors which are inherent in voice recognition technology

## 2022-08-29 NOTE — TELEPHONE ENCOUNTER
FYI: Pt's son calling. Pt has been admitted since 8/12, now in inpatient rehab. Pt fell at home, was admitted, had hip surgery, ended up going in to AFIB RVR was cardioverted on the unit. Had episode of hypotension while admitted. Pt's son wanted Dr Samantha Neal to be aware, as it does not appear Cardiology has been consulted during her admission. Hospital f/u scheduled with Rajat Narvaez on 9/14. Later, pt called as well. She asked why Dr Samantha Neal hadn't come to see her during her admission. Explained to her how consults work. Pt expressed understanding.

## 2022-08-29 NOTE — DISCHARGE INSTRUCTIONS
[facility]  Address: [address] [place], [address]  Phone: [phone]

## 2022-08-29 NOTE — PLAN OF CARE
Problem: Discharge Planning  Goal: Discharge to home or other facility with appropriate resources  8/29/2022 1612 by AGNES Milton  Note: Transportation:   Has transportation kept you from medical appointments, meetings, work, or from getting things needed for daily living? (Check all that apply)  No.      Health Literacy:   How often do you need to have someone help you when you read instructions, pamphlets, or other written material from your doctor or pharmacy? 0. - Never    Social Isolation:  How often do you feel lonely or isolated from those around you?  0. Never      Patient Mood Interview (PHQ-2 to 9) (from [email or web address])   Say to Patient: \"Over the last 2 weeks, have you been bothered by any of the following problems? \"   If symptom is present, enter 1 (yes) in column 1 (Symptom Presence)  If yes in column 1, then ask the patient: About how often have you been bothered by this?   Read and show the patient a card with the symptom frequency choices. Indicate response in column 2, Symptom Frequency. Symptom Presence  No (enter 0 in column 2)   Yes (enter 0-3 in column 2)  9. No response (leave column 2 blank) Symptom Frequency  Never or 1 day  2-6 days (several days)  7-11 days (half or more of the days)  12-14 days (nearly every day    Symptom Presence Symptom Frequency   Little interest or pleasure in doing things 0. No 0. Never or 1 day   Feeling down, depressed, or hopeless 0. No 0.  Never or 1 day

## 2022-08-29 NOTE — PROGRESS NOTES
[facility]  INPATIENT PHYSICAL THERAPY  DAILY NOTE  [address]    Time In: 8251  Time Out: 1436  Timed Code Treatment Minutes: 31 Minutes  Minutes: 31          Date: 2022  Patient Name: Rosi Mejias,  Gender:  female        MRN: [id number]  : 1940  (78 y.o.)  Referral Date : 22  Referring Practitioner: Claudetta Shilling, MD  Diagnosis: Hip fracture requiring operative repair, left, closed, initial encounter  Additional Pertinent Hx: Ga Lozano  is a 78 y.o. female admitted to the inpatient rehabilitation unit on 2022. She was originally admitted to [facility] on 2022. Patient  has a past medical history of Osteoporosis of multiple sites without pathological fracture, Squamous cell carcinoma of skin, Vascular headache, and Vitamin D deficiency. Patient presented to [facility] after suffering a fall at home, landing on her left side. Patient was unable to ambulate and was found to have left femoral neck fracture. Patient was admitted and taken for left hemiarthroplasty with Dr Mauro South on 22. Postoperatively, patient developed narrow complex tachyarrhythmia concerning for SVT versus A. fib with RVR status post adenosine x2 and DCCV x3. Patient currently off amiodarone infusion and is in normal sinus rhythm.   Patient is now on Lopressor 12.5 mg twice daily     Prior Level of Function:  Lives With: Son  Type of Home: House  Home Layout: Two level, Able to Live on Main level with bedroom/bathroom, Performs ADL's on one level, Laundry in basement (pt's bedroom is upstairs but reports she could live on main floor)  Home Access: Stairs to enter without rails  Entrance Stairs - Number of Steps: 2 ELOY  Home Equipment: Cane   Bathroom Shower/Tub: Tub/Shower unit, Curtain  Bathroom Toilet: Standard    Receives Help From: Family, Friend(s)  ADL Assistance: Independent  Homemaking Assistance: Independent  Homemaking Responsibilities: Yes  Ambulation Assistance: Independent (uses cane at night time only)  Transfer Assistance: Independent  Active : Yes  Additional Comments: Son recently (retired) moved in with pt. Pt amb with SC intermittently in the home, and uses SC for longer distances and at night    Restrictions/Precautions:  Restrictions/Precautions: Isolation  Left Lower Extremity Weight Bearing: Weight Bearing As Tolerated  Position Activity Restriction  Hip Precautions: No hip flexion > 90 degrees, No hip internal rotation, Posterior hip precautions, No ADduction  Other position/activity restrictions: Droplet + precautions, KATHY precautions     SUBJECTIVE: Patient in room in chair, agreeable to PT. Pt requesting to use restroom. Pt very anxious this session. PAIN: left hip    Vitals: Vitals not assessed per clinical judgement, see nursing flowsheet    OBJECTIVE:  Bed Mobility:  Sit to Supine: Supervision  from elevated bed, verbal cues to scoot back. Elevated to 29\" to simulate car    Transfers:  Sit to Stand: Modified Independent  Stand to Sit:Modified Independent    Ambulation:  Contact Guard Assistance  Distance: 75'x2, 10', 5'  Surface: Level Tile  Device:Rolling Walker  Gait Deviations: Forward Flexed Posture, Slow Mague, Decreased Step Length on Right, Decreased Weight Shift Right, Decreased Gait Speed, and Decreased Heel Strike Bilaterally  *Verbal cues for right step length    Stairs:  Attempted 4\" step 2 times with RW. Pt able to right foot up to step without difficulty, but anxious and shaky. With attempt to placed weight through right LE, but unable to clear left foot off the ground and with right LE instability. PT trialled step a second time with a second person assist and pt still anxious and unable to safely complete. Pt fixated on needing to trial with a grab bar like she has at home.   Pt will benefit from trial at steps with son present tomorrow if able.    Balance:  Static Standing Balance: Contact Guard Assistance  Dynamic Standing Balance: Contact Guard Assistance, Minimal Assistance    Functional Outcome Measures: Not completed       ASSESSMENT:  Assessment: Patient progressing toward established goals. Activity Tolerance:  Patient tolerance of  treatment: good. Equipment Recommendations:Equipment Needed: Yes  Other: RW ordered from E 22  Discharge Recommendations: Continue to assess pending progress and Home with Home Health PT plus initial assistance with functional mobility  Plan: Current Treatment Recommendations: Strengthening, Balance training, Functional mobility training, Transfer training, Neuromuscular re-education, Stair training, Gait training, Endurance training, Home exercise program, Safety education & training, Patient/Caregiver education & training, Equipment evaluation, education, & procurement, Therapeutic activities  Plan:  (5x/wk 90min, 1x/wk 30min)    Patient Education  Patient Education: Transfers, Gait, Stairs,  - Patient Verbalized Understanding, - Patient Requires Continued Education    Goals:  Patient goals : \"just feel better\"  Short Term Goals  Time Frame for Short term goals: 1 week  Short term goal 1: Patient will complete rolling and supine < > sit with min assist with no bed rail and head of bed flat and maintain hip precautions to transfer in and out of bed with decreased difficulty. GOAL MET, SEE LTG  Short term goal 2: Patient will complete sit < > stand with CGA from various surfaces to stand to ambulate with decreased difficulty. GOAL MET, SEE LTG  Short term goal 3: Patient will ambulate Skip Natanael 1560' with a RW and SBA to progress towards ambulating household distances  Short term goal 4: Patient will ascend/descend 2, 6\" steps with 2 hand rails and CGA to progress towards safe home entry.   Short term goal 5: Patient will complete car transfer with min assist to transfer in and out of vehicle for home  Long Term Goals  Time Frame for Long term goals : 3 weeks from initial evaluation  Long term goal 1: Patient will complete supine < > sit with modified independence to transfer in and out of bed safely. GOAL MET  Long term goal 2: Patient will complete sit < > stand with modified independence to stand to ambulate safely. GOAL MET  Long term goal 3: Patient will bed < > chair transfer with a RW and modified independence to transfer surface to surface safely. Long term goal 4: Patient will ambulate 80' with a RW and modified independence to navigate home safely. Long term goal 5: Patient will ascend/descend 2 steps with hand held assist and cane with min assist for home entry. Additional Goals?: Yes  Long term goal 6: Patient will complete car transfer with SBA to transfer in and out of vehicle for transport to home and appointments    Following session, patient left in safe position with all fall risk precautions in place.

## 2022-08-29 NOTE — PLAN OF CARE
Problem: Discharge Planning  Goal: Discharge to home or other facility with appropriate resources  Outcome: Progressing  Flowsheets (Taken 8/29/2022 0910)  Discharge to home or other facility with appropriate resources:   Identify barriers to discharge with patient and caregiver   Arrange for needed discharge resources and transportation as appropriate   Identify discharge learning needs (meds, wound care, etc)   Arrange for interpreters to assist at discharge as needed   Refer to discharge planning if patient needs post-hospital services based on physician order or complex needs related to functional status, cognitive ability or social support system     Problem: Pain  Goal: Verbalizes/displays adequate comfort level or baseline comfort level  Outcome: Progressing  Flowsheets (Taken 8/28/2022 2209 by Angela Roger LPN)  Verbalizes/displays adequate comfort level or baseline comfort level: Encourage patient to monitor pain and request assistance     Problem: Respiratory - Adult  Goal: Achieves optimal ventilation and oxygenation  Outcome: Progressing  Flowsheets  Taken 8/29/2022 0910 by Keisha Londono RN  Achieves optimal ventilation and oxygenation:   Assess for changes in respiratory status   Assess for changes in mentation and behavior   Position to facilitate oxygenation and minimize respiratory effort   Oxygen supplementation based on oxygen saturation or arterial blood gases   Encourage broncho-pulmonary hygiene including cough, deep breathe, incentive spirometry   Assess the need for suctioning and aspirate as needed   Assess and instruct to report shortness of breath or any respiratory difficulty  Taken 8/28/2022 2209 by Angela Roger LPN  Achieves optimal ventilation and oxygenation: Assess for changes in respiratory status     Problem: Skin/Tissue Integrity - Adult  Goal: Skin integrity remains intact  Outcome: Progressing  Flowsheets  Taken 8/29/2022 1050 by Keisha Londono RN  Skin Integrity Remains Intact: Monitor for areas of redness and/or skin breakdown  Taken 8/29/2022 0910 by Katiana Garcia RN  Skin Integrity Remains Intact: Monitor for areas of redness and/or skin breakdown  Taken 8/28/2022 2209 by Kerry Candelaria LPN  Skin Integrity Remains Intact: Monitor for areas of redness and/or skin breakdown  Goal: Incisions, wounds, or drain sites healing without S/S of infection  Outcome: Progressing  Flowsheets  Taken 8/29/2022 1050  Incisions, Wounds, or Drain Sites Healing Without Sign and Symptoms of Infection:   ADMISSION and DAILY: Assess and document risk factors for pressure ulcer development   TWICE DAILY: Assess and document skin integrity   TWICE DAILY: Assess and document dressing/incision, wound bed, drain sites and surrounding tissue   Implement wound care per orders   Initiate isolation precautions as appropriate  Taken 8/29/2022 0910  Incisions, Wounds, or Drain Sites Healing Without Sign and Symptoms of Infection:   ADMISSION and DAILY: Assess and document risk factors for pressure ulcer development   TWICE DAILY: Assess and document skin integrity   Implement wound care per orders   TWICE DAILY: Assess and document dressing/incision, wound bed, drain sites and surrounding tissue   Initiate isolation precautions as appropriate   Initiate pressure ulcer prevention bundle as indicated

## 2022-08-29 NOTE — PROGRESS NOTES
[address] [place] [address]  INPATIENT PHYSICAL THERAPY  Progress Note  [address]    Time In: 1130  Time Out: 1230  Timed Code Treatment Minutes: 60 Minutes  Minutes: 60          Date: 2022  Patient Name: Antwan Hobbs,  Gender:  female        MRN: [id number]  : 1940  (78 y.o.)  Referral Date : 22  Referring Practitioner: Cande Massey MD  Diagnosis: Hip fracture requiring operative repair, left, closed, initial encounter  Additional Pertinent Hx: Ga Briseno  is a 78 y.o. female admitted to the inpatient rehabilitation unit on 2022. She was originally admitted to [address] [place] [address] on 2022. Patient  has a past medical history of Osteoporosis of multiple sites without pathological fracture, Squamous cell carcinoma of skin, Vascular headache, and Vitamin D deficiency. Patient presented to [facility] after suffering a fall at home, landing on her left side. Patient was unable to ambulate and was found to have left femoral neck fracture. Patient was admitted and taken for left hemiarthroplasty with Dr César Flynn on 22. Postoperatively, patient developed narrow complex tachyarrhythmia concerning for SVT versus A. fib with RVR status post adenosine x2 and DCCV x3. Patient currently off amiodarone infusion and is in normal sinus rhythm.   Patient is now on Lopressor 12.5 mg twice daily     Prior Level of Function:  Lives With: Son  Type of Home: House  Home Layout: Two level, Able to Live on Main level with bedroom/bathroom, Performs ADL's on one level, Laundry in basement (pt's bedroom is upstairs but reports she could live on main floor)  Home Access: Stairs to enter without rails  Entrance Stairs - Number of Steps: 2 ELOY  Home Equipment: Cane    Vitals: Heart Rate: 64 radial right wrist at rest ~2 minutes after ambulating    Restrictions/Precautions:  Restrictions/Precautions: Isolation  Left Lower Extremity Weight Bearing: Weight Bearing As Tolerated  Position Activity Restriction  Hip Precautions: No hip flexion > 90 degrees, No hip internal rotation, Posterior hip precautions, No ADduction  Other position/activity restrictions: Droplet + precautions, KATHY precautions    SUBJECTIVE: Patient in room in chair, agreeable to PT. Pt cooperative and pleasant, anxious. PT called and spoke with pt's son while pt present this date and addressed concerns pt was having including bed height which son indicated was 23 inches high, pt stairs which PT expressed plans to trial in afternoon and bed rail. PT provided information on how to order bed rail as needed. PT recommended pt's son be present while pt performing functional mobility at this time and pt's son agreed. Recommended pt have assistance when using restroom at night and possibly use phone to reach her son in the home. PAIN: left hip, not rated    OBJECTIVE:  Bed Mobility:  Rolling to Left: Modified Independent   Rolling to Right: Modified Independent   Supine to Sit: Modified Independent  Sit to Supine: Modified Independent   *Pt requiring increased time, anxious, but able to complete safely and maintain hip precautions. Transfers:  Sit to Stand: Modified Independent  Stand to Sit:Modified Independent  To/From Bed and Chair: Stand By Assistance with a RW    Ambulation:  Stand By Assistance, [employer] Assistance  Distance: 5', 10'x2, 110', 160' (SBA first ~50' then CGA to close SBA for increased distances due to decreased step length and antalgic gait and pt anxiousness)  Surface: Level Tile  Device:Rolling Walker  Gait Deviations:   Forward Flexed Posture, Slow Mague, Decreased Step Length on Right, Decreased Weight Shift Right, Decreased Gait Speed, Decreased Heel Strike Bilaterally, and Mild Path Deviations, Wide Base of Support  *Verbal cues for increased stride length    Exercise:  Patient was guided in 1 set(s) 10 reps of exercise to both lower extremities. Standing: hip flexion, heel raise, hamstring curl, hip extension, anterior step taps onto 8\" step x10 each. Exercises were completed for increased independence with functional mobility. Functional Outcome Measures: Not completed       ASSESSMENT:  Assessment:   Ms Orlando Rios met 2/5 STG's and 2/6 LTG's. Patient continues to make gradual gains towards goals set for her, limited by left hip/knee pain and anxiousness. Patient is able to ambulate increased distances with a RW, up to 160' with SBA/CGA, completes sit < > stand with modified independence, and supine < > sit with modified independence with increased time. Plan to address steps in later session. Pt presents with antalgic gait and decreased step length right LE. Pt is returning to home with her son tomorrow 8/29 and home health PT. Pt would benefit from continued skilled PT services to maximize her independence with functional mobility, reduce her risk for falls and allow pt to return to home safely. Activity Tolerance:  Patient tolerance of  treatment: good.       Equipment Recommendations:Equipment Needed: Yes  Other: RW ordered from [facility] 8/23/22  Discharge Recommendations:  Discharge Recommendations: Continue to assess pending progress, Patient would benefit from continued therapy after discharge    Plan: Current Treatment Recommendations: Strengthening, Balance training, Functional mobility training, Transfer training, Neuromuscular re-education, Stair training, Gait training, Endurance training, Home exercise program, Safety education & training, Patient/Caregiver education & training, Equipment evaluation, education, & procurement, Therapeutic activities  Plan:  (5x/wk 90min, 1x/wk 30min)    Patient Education  Patient Education: Family Education, [employer] Mobility, Transfers, Gait, Stairs, Verbal Exercise Instruction,  - Patient Verbalized Understanding, - Patient Requires Continued Education    Goals:  Patient goals : \"just feel better\"  Short Term Goals  Time Frame for Short term goals: 1 week  Short term goal 1: Patient will complete rolling and supine < > sit with min assist with no bed rail and head of bed flat and maintain hip precautions to transfer in and out of bed with decreased difficulty. GOAL MET, SEE LTG  Short term goal 2: Patient will complete sit < > stand with CGA from various surfaces to stand to ambulate with decreased difficulty. GOAL MET, SEE LTG  Short term goal 3: Patient will ambulate 100' with a RW and SBA to progress towards ambulating household distances NOT MET, CONTINUE  Short term goal 4: Patient will ascend/descend 2, 6\" steps with 2 hand rails and CGA to progress towards safe home entry. NOT ADDRESSED, CONTINUE  Short term goal 5: Patient will complete car transfer with min assist to transfer in and out of vehicle for home. NOT ADDRESSED CONTINUE  Long Term Goals  Time Frame for Long term goals : 3 weeks from initial evaluation  Long term goal 1: Patient will complete supine < > sit with modified independence to transfer in and out of bed safely. MET  Long term goal 2: Patient will complete sit < > stand with modified independence to stand to ambulate safely. MET  Long term goal 3: Patient will bed < > chair transfer with a RW and modified independence to transfer surface to surface safely. NOT MET  Long term goal 4: Patient will ambulate 80' with a RW and modified independence to navigate home safely. NOT MET  Long term goal 5: Patient will ascend/descend 2 steps with hand held assist and cane with min assist for home entry.  NOT MET  Additional Goals?: Yes  Long term goal 6: Patient will complete car transfer with SBA to transfer in and out of vehicle for transport to home and appointments NOT MET    Revised Short-Term Goals:    Short Term Goals  Time Frame for Short term goals: 1 week  Short term goal 1: Patient will complete rolling and supine < > sit with min assist with no bed rail and head of bed flat and maintain hip precautions to transfer in and out of bed with decreased difficulty. GOAL MET, SEE LTG  Short term goal 2: Patient will complete sit < > stand with CGA from various surfaces to stand to ambulate with decreased difficulty. GOAL MET, SEE LTG  Short term goal 3: Patient will ambulate 80' with a RW and SBA to progress towards ambulating household distances  Short term goal 4: Patient will ascend/descend 2, 6\" steps with 2 hand rails and CGA to progress towards safe home entry. Short term goal 5: Patient will complete car transfer with min assist to transfer in and out of vehicle for home   Revised Long-Term Goals  Long Term Goals  Time Frame for Long term goals : 3 weeks from initial evaluation  Long term goal 1: Patient will complete supine < > sit with modified independence to transfer in and out of bed safely. GOAL MET  Long term goal 2: Patient will complete sit < > stand with modified independence to stand to ambulate safely. GOAL MET  Long term goal 3: Patient will bed < > chair transfer with a RW and modified independence to transfer surface to surface safely. Long term goal 4: Patient will ambulate 80' with a RW and modified independence to navigate home safely. Long term goal 5: Patient will ascend/descend 2 steps with hand held assist and cane with min assist for home entry.   Additional Goals?: Yes  Long term goal 6: Patient will complete car transfer with SBA to transfer in and out of vehicle for transport to home and appointments (4) excellent

## 2022-08-30 VITALS
DIASTOLIC BLOOD PRESSURE: 69 MMHG | HEIGHT: 66 IN | TEMPERATURE: 96.2 F | WEIGHT: 141.5 LBS | RESPIRATION RATE: 18 BRPM | BODY MASS INDEX: 22.74 KG/M2 | HEART RATE: 69 BPM | SYSTOLIC BLOOD PRESSURE: 142 MMHG | OXYGEN SATURATION: 95 %

## 2022-08-30 LAB
ANION GAP SERPL CALCULATED.3IONS-SCNC: 10 MEQ/L (ref 8–16)
BASOPHILS # BLD: 0.6 %
BASOPHILS ABSOLUTE: 0 THOU/MM3 (ref 0–0.1)
BUN BLDV-MCNC: 7 MG/DL (ref 7–22)
CALCIUM SERPL-MCNC: 9.1 MG/DL (ref 8.5–10.5)
CHLORIDE BLD-SCNC: 98 MEQ/L (ref 98–111)
CO2: 22 MEQ/L (ref 23–33)
CREAT SERPL-MCNC: 0.3 MG/DL (ref 0.4–1.2)
EOSINOPHIL # BLD: 0 %
EOSINOPHILS ABSOLUTE: 0 THOU/MM3 (ref 0–0.4)
ERYTHROCYTE [DISTWIDTH] IN BLOOD BY AUTOMATED COUNT: 14.3 % (ref 11.5–14.5)
ERYTHROCYTE [DISTWIDTH] IN BLOOD BY AUTOMATED COUNT: 47.8 FL (ref 35–45)
GFR SERPL CREATININE-BSD FRML MDRD: > 90 ML/MIN/1.73M2
GLUCOSE BLD-MCNC: 112 MG/DL (ref 70–108)
HCT VFR BLD CALC: 34.2 % (ref 37–47)
HEMOGLOBIN: 11.2 GM/DL (ref 12–16)
IMMATURE GRANS (ABS): 0.02 THOU/MM3 (ref 0–0.07)
IMMATURE GRANULOCYTES: 0.4 %
LYMPHOCYTES # BLD: 18.2 %
LYMPHOCYTES ABSOLUTE: 0.9 THOU/MM3 (ref 1–4.8)
MCH RBC QN AUTO: 30.4 PG (ref 26–33)
MCHC RBC AUTO-ENTMCNC: 32.7 GM/DL (ref 32.2–35.5)
MCV RBC AUTO: 92.9 FL (ref 81–99)
MONOCYTES # BLD: 8.6 %
MONOCYTES ABSOLUTE: 0.4 THOU/MM3 (ref 0.4–1.3)
NUCLEATED RED BLOOD CELLS: 0 /100 WBC
PLATELET # BLD: 269 THOU/MM3 (ref 130–400)
PMV BLD AUTO: 9.2 FL (ref 9.4–12.4)
POTASSIUM SERPL-SCNC: 3.8 MEQ/L (ref 3.5–5.2)
RBC # BLD: 3.68 MILL/MM3 (ref 4.2–5.4)
SEG NEUTROPHILS: 72.2 %
SEGMENTED NEUTROPHILS ABSOLUTE COUNT: 3.7 THOU/MM3 (ref 1.8–7.7)
SODIUM BLD-SCNC: 130 MEQ/L (ref 135–145)
WBC # BLD: 5.1 THOU/MM3 (ref 4.8–10.8)

## 2022-08-30 PROCEDURE — 97535 SELF CARE MNGMENT TRAINING: CPT

## 2022-08-30 PROCEDURE — 80048 BASIC METABOLIC PNL TOTAL CA: CPT

## 2022-08-30 PROCEDURE — 85025 COMPLETE CBC W/AUTO DIFF WBC: CPT

## 2022-08-30 PROCEDURE — 99232 SBSQ HOSP IP/OBS MODERATE 35: CPT | Performed by: INTERNAL MEDICINE

## 2022-08-30 PROCEDURE — 97110 THERAPEUTIC EXERCISES: CPT

## 2022-08-30 PROCEDURE — 99239 HOSP IP/OBS DSCHRG MGMT >30: CPT | Performed by: NURSE PRACTITIONER

## 2022-08-30 PROCEDURE — 6370000000 HC RX 637 (ALT 250 FOR IP): Performed by: INTERNAL MEDICINE

## 2022-08-30 PROCEDURE — 6370000000 HC RX 637 (ALT 250 FOR IP): Performed by: PHYSICAL MEDICINE & REHABILITATION

## 2022-08-30 PROCEDURE — 6370000000 HC RX 637 (ALT 250 FOR IP): Performed by: NURSE PRACTITIONER

## 2022-08-30 PROCEDURE — 97530 THERAPEUTIC ACTIVITIES: CPT

## 2022-08-30 PROCEDURE — 36415 COLL VENOUS BLD VENIPUNCTURE: CPT

## 2022-08-30 PROCEDURE — 6370000000 HC RX 637 (ALT 250 FOR IP): Performed by: FAMILY MEDICINE

## 2022-08-30 PROCEDURE — 97116 GAIT TRAINING THERAPY: CPT

## 2022-08-30 RX ORDER — SODIUM CHLORIDE 1000 MG
2 TABLET, SOLUBLE MISCELLANEOUS 2 TIMES DAILY WITH MEALS
Qty: 120 TABLET | Refills: 3 | Status: SHIPPED | OUTPATIENT
Start: 2022-08-30 | End: 2022-10-11

## 2022-08-30 RX ADMIN — APIXABAN 2.5 MG: 2.5 TABLET, FILM COATED ORAL at 08:09

## 2022-08-30 RX ADMIN — Medication 5000 UNITS: at 08:09

## 2022-08-30 RX ADMIN — SODIUM CHLORIDE 2 G: 1 TABLET ORAL at 08:09

## 2022-08-30 RX ADMIN — LORAZEPAM 0.5 MG: 0.5 TABLET ORAL at 06:00

## 2022-08-30 RX ADMIN — FAMOTIDINE 20 MG: 20 TABLET ORAL at 08:09

## 2022-08-30 RX ADMIN — ACETAMINOPHEN 650 MG: 325 TABLET ORAL at 08:10

## 2022-08-30 RX ADMIN — CETIRIZINE HYDROCHLORIDE 10 MG: 10 TABLET, FILM COATED ORAL at 08:09

## 2022-08-30 RX ADMIN — HYDROCORTISONE ACETATE: 1 CREAM TOPICAL at 08:12

## 2022-08-30 RX ADMIN — ACETAMINOPHEN 650 MG: 325 TABLET ORAL at 02:33

## 2022-08-30 RX ADMIN — Medication 1 TABLET: at 08:10

## 2022-08-30 RX ADMIN — METOPROLOL TARTRATE 25 MG: 25 TABLET, FILM COATED ORAL at 08:09

## 2022-08-30 ASSESSMENT — PAIN SCALES - GENERAL
PAINLEVEL_OUTOF10: 1
PAINLEVEL_OUTOF10: 2

## 2022-08-30 ASSESSMENT — PAIN DESCRIPTION - ORIENTATION
ORIENTATION: LEFT
ORIENTATION: LEFT

## 2022-08-30 ASSESSMENT — PAIN SCALES - WONG BAKER
WONGBAKER_NUMERICALRESPONSE: 4

## 2022-08-30 ASSESSMENT — PAIN - FUNCTIONAL ASSESSMENT: PAIN_FUNCTIONAL_ASSESSMENT: ACTIVITIES ARE NOT PREVENTED

## 2022-08-30 ASSESSMENT — PAIN DESCRIPTION - LOCATION
LOCATION: HIP
LOCATION: HIP

## 2022-08-30 ASSESSMENT — PAIN DESCRIPTION - PAIN TYPE: TYPE: SURGICAL PAIN

## 2022-08-30 ASSESSMENT — PAIN DESCRIPTION - FREQUENCY: FREQUENCY: INTERMITTENT

## 2022-08-30 ASSESSMENT — PAIN DESCRIPTION - DESCRIPTORS
DESCRIPTORS: ACHING;DISCOMFORT
DESCRIPTORS: ACHING

## 2022-08-30 ASSESSMENT — PAIN DESCRIPTION - ONSET: ONSET: GRADUAL

## 2022-08-30 NOTE — PROGRESS NOTES
[address] NOTE    Conference Date: 2022  Admit Date:  2022  1:33 PM  Patient Name: Tra Coffman    MRN: [id number]    : 1940  (78 y.o.)  Rehabilitation Admitting Diagnosis:  Hip fracture requiring operative repair, left, closed, initial encounter [facility]) Marcelle Ewing  Referring Practitioner: Pablo Schultz MD      CASE MANAGEMENT  Current issues/needs regarding patient and family discharge status: Patient continues to be motivated and progressing with therapy. Patient plans to be discharged on Tuesday,  with [address] for RN, PT, OT and HHA. SW will follow and maintain involvement in discharge planning. PHYSICAL THERAPY  Ms Molly More met 2/5 STG's and 2/6 LTG's. Patient continues to make gradual gains towards goals set for her, limited by left hip/knee pain and anxiousness. Patient is able to ambulate increased distances with a RW, up to 160' with SBA/CGA, completes sit < > stand with modified independence, and supine < > sit with modified independence with increased time. Plan to address steps in later session. Pt presents with antalgic gait and decreased step length right LE. Pt is returning to home with her son tomorrow  and home health PT. Pt would benefit from continued skilled PT services to maximize her independence with functional mobility, reduce her risk for falls and allow pt to return to home safely. Equipment Needed: Yes  Other: RW ordered from [facility] 22    [address] [place] [address] Avenue is making steady progress on IP Rehab. She has progressed to a mod I level with sit to stand transfers, but continues to require SBA during mobility and balance tasks within her daily routine due to her impaired balance. David Chakraborty can complete her UB ADLs with mod I and requires SBA for LB ADLs due to her impaired balance.  David Mom is limited at times by her anxiety and often requires and seeks out reassurance during her everyday rotines. Unique Murillo is also limited by her endurance, balance. Unique Murillo will have support of her son at home who was assisting with meal prep and laundry IADLs at baseline. She has an UB HEP she demo competency in. Unique Murillo cont to require skilled OT to improve safety and indep with BADL and IADL tasks and would benefit from [facility] OT services after discharge. Other: Family has reacher. Family is going to otbain a tub transfer bench. Plan to order [facility]. Confirmed with son via phone 8/29    RECREATIONAL THERAPY  Patient has been offered participation in recreational therapy activities and participates as able. Looking forward to going home tomorrow-    NUTRITION  Weight: 141 lb 8 oz (64.2 kg) / Body mass index is 22.84 kg/m². Current diet: ADULT DIET; Regular; 1200 ml  Please see nutrition note for details. NURSING  Continent of Bowel: Yes. Frequency: daily. Management: prn immodium, glycolax, dulcolax. Continent of Bladder: Yes. Frequency: q2-4 hours. Management: none. Pain is Managed:  Yes. Management: Tylenol. Frequency of Intervention: tylenol. Adequately Controlled: Yes  Sleep: Adequate  Signs and Symptoms of Infection:  +COVID during stay  Signs and Symptoms of Skin Breakdown:  No.   Injury and/or Falls during Inpatient Rehabilitation Admission: No  Anticoagulants: eliquis  Diabetic: No  Consultations/Labs/X-rays: none  Oxygen while on IP Rehab:  No Currently using  0 liters per 0  . Home oxygen: No    No results for input(s): POCGLU in the last 72 hours.     No results found for: LDLCALC, LDLCHOLESTEROL, LDLDIRECT      Vitals:    08/28/22 2209 08/29/22 0910 08/29/22 2213 08/30/22 0808   BP: 134/68 135/67 127/60 (!) 142/69   Pulse: 66 70 63 69   Resp: 16 16 18 18   Temp:  98 °F (36.7 °C) 98.2 °F (36.8 °C) (!) 96.2 °F (35.7 °C)   TempSrc: Oral Oral Oral    SpO2: 94% 96% 95% 95%   Weight:       Height:              Family Education: Family available and participating in education   Fall Risk:  Falling star program initiated  Is the patient appropriate for a stay in the functional apartment? no    Discharge Plan   Estimated Discharge Date: 8/30/2022  Destination: discharge home with supervision  Services at Discharge: [address], Occupational Therapy, Nursing, and aide 2x week  Is patient appropriate for an outpatient driving evaluation? no  Equipment at Discharge: Other: Family has reacher. Family is going to otbain a tub transfer bench. Plan to order [facility]. Confirmed with son via phone 8/29  Other: RW ordered from [facility] 8/23/22  Factors facilitating achievement of predicted outcomes: Family support, Motivated, and Cooperative  Barriers to the achievement of predicted outcomes: Pain, Decreased endurance, and Lower extremity weakness  Follow up with physiatrist? no      Team Members Present at Conference:  Edison Barrientos [facility], [facility]   Occupational Therapist:Sienna Lundberg OTR/L [address], [address] [place] [address] Drive, [address], [address]  Nurse: oJse C Yates  Psychologist: Ting Leigh, PhD.    I approve the established interdisciplinary plan of care as documented within the medical record of [facility].     Arjun Nunez MD

## 2022-08-30 NOTE — PLAN OF CARE
Problem: Discharge Planning  Goal: Discharge to home or other facility with appropriate resources  8/30/2022 0124 by Jermain Ibarra RN  Outcome: Progressing  8/29/2022 1612 by AGNES Wade  Note: Transportation:   Has transportation kept you from medical appointments, meetings, work, or from getting things needed for daily living? (Check all that apply)  No.      Health Literacy:   How often do you need to have someone help you when you read instructions, pamphlets, or other written material from your doctor or pharmacy? 0. - Never    Social Isolation:  How often do you feel lonely or isolated from those around you?  0. Never      Patient Mood Interview (PHQ-2 to 9) (from [employer]©)   Say to Patient: \"Over the last 2 weeks, have you been bothered by any of the following problems? \"   If symptom is present, enter 1 (yes) in column 1 (Symptom Presence)  If yes in column 1, then ask the patient: About how often have you been bothered by this?   Read and show the patient a card with the symptom frequency choices. Indicate response in column 2, Symptom Frequency. Symptom Presence  No (enter 0 in column 2)   Yes (enter 0-3 in column 2)  9. No response (leave column 2 blank) Symptom Frequency  Never or 1 day  2-6 days (several days)  7-11 days (half or more of the days)  12-14 days (nearly every day    Symptom Presence Symptom Frequency   Little interest or pleasure in doing things 0. No 0. Never or 1 day   Feeling down, depressed, or hopeless 0. No 0. Never or 1 day           Problem: Pain  Goal: Verbalizes/displays adequate comfort level or baseline comfort level  Outcome: Progressing  Flowsheets (Taken 8/29/2022 2213 by Anne Marie Montiel LPN)  Verbalizes/displays adequate comfort level or baseline comfort level: Encourage patient to monitor pain and request assistance     Problem: Skin/Tissue Integrity  Goal: Absence of new skin breakdown  Description: 1.   Monitor for areas of redness and/or skin breakdown  2. Assess vascular access sites hourly  3. Every 4-6 hours minimum:  Change oxygen saturation probe site  4. Every 4-6 hours:  If on nasal continuous positive airway pressure, respiratory therapy assess nares and determine need for appliance change or resting period.   Outcome: Progressing     Problem: Safety - Adult  Goal: Free from fall injury  Outcome: Progressing     Problem: ABCDS Injury Assessment  Goal: Absence of physical injury  Outcome: Progressing     Problem: Nutrition Deficit:  Goal: Optimize nutritional status  Outcome: Progressing     Problem: Respiratory - Adult  Goal: Achieves optimal ventilation and oxygenation  Outcome: Progressing  Flowsheets (Taken 8/29/2022 2213 by Korey Perez LPN)  Achieves optimal ventilation and oxygenation:   Assess for changes in respiratory status   Assess for changes in mentation and behavior     Problem: Skin/Tissue Integrity - Adult  Goal: Skin integrity remains intact  Outcome: Progressing  Flowsheets (Taken 8/29/2022 2213 by Korey Perez LPN)  Skin Integrity Remains Intact: Monitor for areas of redness and/or skin breakdown  Goal: Incisions, wounds, or drain sites healing without S/S of infection  Outcome: Progressing     Problem: Musculoskeletal - Adult  Goal: Return mobility to safest level of function  Outcome: Progressing  Flowsheets (Taken 8/29/2022 2213 by Korey Perez LPN)  Return Mobility to Safest Level of Function: Assess patient stability and activity tolerance for standing, transferring and ambulating with or without assistive devices  Goal: Maintain proper alignment of affected body part  Outcome: Progressing  Flowsheets (Taken 8/29/2022 2213 by Korey Perez LPN)  Maintain proper alignment of affected body part: Support and protect limb and body alignment per provider's orders  Goal: Return ADL status to a safe level of function  Outcome: Progressing  Flowsheets (Taken 8/29/2022 2213 by Korey Perez LPN)  Return ADL Status to a Safe Level of Function: Administer medication as ordered     Problem: Anxiety  Goal: Will report anxiety at manageable levels  Description: INTERVENTIONS:  1. Administer medication as ordered  2. Teach and rehearse alternative coping skills  3.  Provide emotional support with 1:1 interaction with staff  Outcome: Progressing

## 2022-08-30 NOTE — PROGRESS NOTES
[address] [address]  Inpatient Rehabilitation  Occupational Therapy  Discharge Note  Time:  Time In: 0800  Time Out: 08  Timed Code Treatment Minutes: 32 Minutes  Minutes: 31    Date: 2022  Patient Name: Filiberto Lyn,   Gender: female      Room: [facility]/054-A  MRN: [id number]  : 1940  (78 y.o.)  Referring Practitioner: Claudette Sanches MD  Diagnosis: Hip fracture requiring operative repair, left, closed, initial encounter. Additional Pertinent Hx: Per chart review, pt is an 78 y.o. female with a PMH of \"Osteoporosis of multiple sites without pathological fracture, Squamous cell carcinoma of skin, Vascular headache, and Vitamin D deficiency\". Pt originally admitted to [address] [place] [address] on 22 post fall at home, landing on left side. Pt reports she was unable to ambulate and fall resulted in femoral neck fracture, undergoing a left hemiarthoplasty on 22. \"Postoperatively, patient developed narrow complex tachyarrhythmia concerning for SVT versus A. fib with RVR status post adenosine x2 and DCCV x3.\" Pt presents to [address] [place] [address] IPR unit on 22 s/p left hemiarthoplasty. Restrictions/Precautions:  Restrictions/Precautions: Isolation  Left Lower Extremity Weight Bearing: Weight Bearing As Tolerated  Position Activity Restriction  Hip Precautions: No hip flexion > 90 degrees, No hip internal rotation, Posterior hip precautions, No ADduction  Other position/activity restrictions: Droplet + precautions, KATHY precautions    SUBJECTIVE: Patient pleasant and cooperative, voices apprehensiveness regarding going home today     PAIN: doesn't rate, no c/o during session    Vitals: Vitals not assessed per clinical judgement, see nursing flowsheet    COGNITION: WFL     ADL:   Grooming: Stand By Assistance. Hand hygiene   Upper Extremity Dressing: Modified Independent. Donning t-shirt  Lower Extremity Dressing: Minimal Assistance.   A with shoe laces, able to use sock-aid and reacher   Toileting: Stand By Assistance. Toilet Transfer: Modified Independent. Ioana Chang BALANCE:  Sitting Balance:  Modified Independent. Standing Balance: Stand By Assistance. BED MOBILITY:  Not Tested    TRANSFERS:  Sit to Stand:  Modified Independent. Richardr, MYLES  Stand to Sit: Modified Independent. FUNCTIONAL MOBILITY:  Assistive Device: Rolling Walker  Assist Level:  Stand By Assistance. Distance: To and from bathroom       ADDITIONAL ACTIVITIES:  Reviewed HEP for home use: Patient completed BUE strengthening exercises with skilled education on HEP: completed x10 reps x1 set with a medium resistive band in all joints and all planes in order to improve UE strength and activity tolerance required for BADL routine and toilet / shower transfers. Patient tolerated well, requiring min rest breaks. Patient also required min cues for technique. ASSESSMENT:  Activity Tolerance:  Patient tolerance of  treatment: good. Assessment: Assessment: Mckay Ruth has made steady progress on IP Rehab. She has progressed to a mod I level with sit to stand transfers, but continues to require SBA during mobility and balance tasks within her daily routine due to her impaired balance. Mckay Ruth can complete her UB ADLs with mod I and requires SBA for LB ADLs due to her impaired balance. Mckay Ruth is limited at times by her anxiety and often requires and seeks out reassurance during her everyday rotines. Mckay Ruth is also limited by her endurance, balance and overall strength. Mckay Ruth will have support of her son at home who was assisting with meal prep and laundry IADLs at baseline. She has an UB HEP she demo competency in. Mckay Ruth cont to require skilled OT to improve safety and indep with BADL and IADL tasks and would benefit from [facility] OT services after discharge. Discharge Recommendations: Home with Home health OT  Equipment Recommendations: Other: Family has reacher. Family is going to otbain a tub transfer bench. Plan to order [facility].  Confirmed with son via phone 8/29  Plan: Discharge home with supportive son to assist and [facility] OT     Patient Education  Patient Education: ADL's, Home Exercise Program, Equipment Education, and Reviewed Prior Education    Goals  Short Term Goals  Time Frame for Short term goals: 1 wk  Short Term Goal 1: Pt will tolerate 3 min of standing with 1-2 hand release and SBA for improved indep in sinkside grooming tasks GOAL MET   Short Term Goal 2: Pt will navigate room to gather needed supplies for ADL task with SBA and 0 vc for safety for PLOF in ADL routines. GOAL MET   Short Term Goal 3: Pt will complete mobility to/from the bathroom with no more than SBA and min A for safety to progress to PLOF in ADL routines GOAL MET   Short Term Goal 4: Pt will complete simple snack/meal prep task with SBA and min VC for safety/problem solving for safe indep in IADL routines GOAL MET   Short Term Goal 5: Pt will complete community reintegration activity with SBA and min A for problem solving/KATHY precautions to progress to PLOF GOAL NOT MET   Long Term Goals  Time Frame for Long term goals : 2 wks from IPR eval  Long Term Goal 1: Pt will complete UB/LB dressing with LHAE prn and Sup with 0 vc for safety or KATHY precautions to progress ADL performance to PLOF GOAL NOT MET   Long Term Goal 2: Pt will complete UB/LB bathing with LHAE prn and Sup with 0vc for safety or KATHY precautions for improved ADL indep GOAL NOT MET   Long Term Goal 3: Pt will complete oral care mod I with 0 vc for safety or precautions to progress ADL performance to PLOF GOAL MET   Long Term Goal 4: Pt will complete toileting/transfer with Sup and 0vc for safety or to maintain precautions for improved indep in toileting routine GOAL MET   Long Term Goal 5: Pt will demo competence with Genna Smith for 100% of time during ADL tasks to maintain KATHY precautions and increase indep in daily occupations GOAL MET     Following session, patient left in safe position with all fall risk precautions in place.

## 2022-08-30 NOTE — PROGRESS NOTES
[facility]   Nephrology progress note  8/30/2022, 9:07 AM      Pt Name:    Antwan Hobbs  MRN:     [id number]     YOB: 1940  Admit Date:    8/17/2022  1:33 PM    Chief Complaint: Nephrology following for hyponatremia. Subjective:  Patient seen and examined  No chest pain or shortness of breath  Resting comfortably    Objective:  24HR INTAKE/OUTPUT:    Intake/Output Summary (Last 24 hours) at 8/30/2022 0907  Last data filed at 8/29/2022 1730  Gross per 24 hour   Intake 600 ml   Output --   Net 600 ml        Admission weight: 141 lb 8 oz (64.2 kg)  Wt Readings from Last 3 Encounters:   08/17/22 141 lb 8 oz (64.2 kg)   08/13/22 125 lb (56.7 kg)   07/05/22 127 lb (57.6 kg)        Vitals :   Vitals:    08/28/22 2209 08/29/22 0910 08/29/22 2213 08/30/22 0808   BP: 134/68 135/67 127/60 (!) 142/69   Pulse: 66 70 63 69   Resp: 16 16 18 18   Temp:  98 °F (36.7 °C) 98.2 °F (36.8 °C) (!) 96.2 °F (35.7 °C)   TempSrc: Oral Oral Oral    SpO2: 94% 96% 95% 95%   Weight:       Height:           Physical examination  General Appearance:  Well developed.  No distress  Neck: No accessory muscle use  Lungs:  Breath sounds: clear  CNS: Grossly intact  Psych: Not agitated  Musculoskeletal:  Edema -mild left-sided edema noted    Medications:  Infusion:   Meds:    sodium chloride  2 g Oral BID WC    metoprolol tartrate  25 mg Oral BID    calcium-cholecalciferol  1 tablet Oral Daily    cetirizine  10 mg Oral Daily    Vitamin D  5,000 Units Oral Daily with breakfast    hydrocortisone   Topical 4x Daily    acetaminophen  650 mg Oral Q6H    apixaban  2.5 mg Oral BID    famotidine  20 mg Oral Daily       Lab Data :  CBC:   Recent Labs     08/30/22  0645   WBC 5.1   HGB 11.2*   HCT 34.2*        CMP:  Recent Labs     08/28/22  0631 08/29/22  0711 08/30/22  0645   * 130* 130*   K 3.8 4.0 3.8   CL 97* 96* 98   CO2 24 22* 22*   BUN 7 6* 7   CREATININE 0.4 0.3* 0.3*   GLUCOSE 109* 115* 112* CALCIUM 9.1 9.1 9.1       Hepatic: No results for input(s): LABALBU, AST, ALT, ALB, BILITOT, ALKPHOS in the last 72 hours. Assessment and Plan:  Renal -patient may be having mild acute kidney injury her creatinine has rising from 0.3 to 0.7  This appears to be resolved     Electrolytes -hyponatremia-based on the urine lites it looks more like primary polydipsia currently on fluid restriction marginal improvement but stable around 130. Now maintaining stable. Continue salt tablets 2 g twice daily  Status post left femoral neck fracture and arthroplasty done on 8/14/2022  COVID-19 infection  Essential hypertension  Meds reviewed and discussed with patient    Upon discharge please have the patient follow-up with me in my office in 4 weeks with a BMP prior. She also needs to be on 1500 mL fluid restriction    Ania Delgado MD  [facility]    This report has been created using voice recognition software.  It may contain minor errors which are inherent in voice recognition technology

## 2022-08-30 NOTE — PLAN OF CARE
Problem: Discharge Planning  Goal: Discharge to home or other facility with appropriate resources  8/30/2022 0904 by Charlaine Claude, LSW  Note: Patient to be discharged on Tuesday, 8/30 to home. Patient will be under the supervision of her son, Kj Kendall. NETTA spoke with Kj Kendall on this date to confirm arrival of 11:30 am to participate in therapy. Patient will be receiving services through [facility]. NETTA provided information to Bea Richter on Wednesday, 8/24 via EPIC.

## 2022-08-30 NOTE — PLAN OF CARE
Problem: Discharge Planning  Goal: Discharge to home or other facility with appropriate resources  8/30/2022 1109 by AGNES Spears  Note: Team conference held Tuesday, 8/30. Recommendations of the team were explained to the patient by Dr Palmira Dunlap and SW. Team is recommending that patient continue on acute inpatient rehab for PT and OT, with expected discharge date of Tuesday, 8/30. Following discharge, team is recommending home health services with RN, PT, OT and HHA. Care plan reviewed with patient. Patient verbalized understanding of the plan of care and contributed to goal setting. SW to follow and maintain involvement in discharge planning.

## 2022-08-30 NOTE — PROGRESS NOTES
[address] [address]  Recreational Therapy  Discharge Note  Inpatient Rehabilitation Unit         Date:  8/30/2022       Patient Name: Rama Herrera      MRN: [id number]       YOB: 1940 (78 y.o.)       Gender: female  Diagnosis: Hip fracture requiring operative repair, left, closed, initial encounter. Referring Practitioner: Brittney Amor MD    Patient discharged from Recreational Therapy at this time. See recreational therapy notes for details.     Electronically signed by: JUSTYN Baig  Date: 8/30/2022

## 2022-08-30 NOTE — PROGRESS NOTES
Called [facility] to call report but had to leave a message for Isabel Cordoba to call this nurse at [phone].

## 2022-08-30 NOTE — PLAN OF CARE
Problem: Discharge Planning  Goal: Discharge to home or other facility with appropriate resources  8/30/2022 1220 by Jenni Peacock RN  Outcome: Progressing  Flowsheets (Taken 8/30/2022 1220)  Discharge to home or other facility with appropriate resources:   Identify barriers to discharge with patient and caregiver   Arrange for needed discharge resources and transportation as appropriate   Identify discharge learning needs (meds, wound care, etc)   Refer to discharge planning if patient needs post-hospital services based on physician order or complex needs related to functional status, cognitive ability or social support system  Note: Patient being discharged to home with son and home health today  8/30/2022 1109 by AGNES Sanches  Note: Team conference held Tuesday, 8/30. Recommendations of the team were explained to the patient by Dr Iker Mcnamara and SW. Team is recommending that patient continue on acute inpatient rehab for PT and OT, with expected discharge date of Tuesday, 8/30. Following discharge, team is recommending home health services with RN, PT, OT and HHA. Care plan reviewed with patient. Patient verbalized understanding of the plan of care and contributed to goal setting. SW to follow and maintain involvement in discharge planning. 8/30/2022 0904 by AGNES Sanches  Note: Patient to be discharged on Tuesday, 8/30 to home. Patient will be under the supervision of her son, Felix Bruno. SW spoke with Felix Bruno on this date to confirm arrival of 11:30 am to participate in therapy. Patient will be receiving services through [facility]. NETTA provided information to Whitney Flores on Wednesday, 8/24 via EPIC.     8/30/2022 0124 by Breanna Renteria RN  Outcome: Progressing     Problem: Pain  Goal: Verbalizes/displays adequate comfort level or baseline comfort level  8/30/2022 1220 by Jenni Peacock RN  Outcome: Progressing  Flowsheets (Taken 8/30/2022 1220)  Verbalizes/displays adequate comfort level or baseline comfort level:   Encourage patient to monitor pain and request assistance   Assess pain using appropriate pain scale  Note: Patients pain has been controlled with scheduled Tylenol and patient will be going home on the same regimen. 8/30/2022 0124 by Og Bishop RN  Outcome: Progressing  Flowsheets (Taken 8/29/2022 2213 by Eugenio Villagran LPN)  Verbalizes/displays adequate comfort level or baseline comfort level: Encourage patient to monitor pain and request assistance     Problem: Skin/Tissue Integrity  Goal: Absence of new skin breakdown  Description: 1. Monitor for areas of redness and/or skin breakdown  2. Assess vascular access sites hourly  3. Every 4-6 hours minimum:  Change oxygen saturation probe site  4. Every 4-6 hours:  If on nasal continuous positive airway pressure, respiratory therapy assess nares and determine need for appliance change or resting period. 8/30/2022 1220 by Jazmin Joseph RN  Outcome: Progressing  Note: Patients rash on back and buttock has resolved.    8/30/2022 0124 by Og Bishop RN  Outcome: Progressing     Problem: Safety - Adult  Goal: Free from fall injury  8/30/2022 1220 by Jazmin Joseph RN  Outcome: Progressing  Flowsheets (Taken 8/30/2022 1220)  Free From Fall Injury: Terrence Hall family/caregiver on patient safety  Note: Patient has remained free from falls at this time  8/30/2022 0124 by Og Bishop RN  Outcome: Progressing     Problem: ABCDS Injury Assessment  Goal: Absence of physical injury  8/30/2022 1220 by Jazmin Joseph RN  Outcome: Progressing  Flowsheets (Taken 8/30/2022 1220)  Absence of Physical Injury: Implement safety measures based on patient assessment  Note: Patient has   8/30/2022 0124 by Og Bishop RN  Outcome: Progressing     Problem: Nutrition Deficit:  Goal: Optimize nutritional status  8/30/2022 0124 by Og Bishop RN  Outcome: Progressing     Problem: Respiratory - Adult  Goal: Achieves optimal ventilation and oxygenation  8/30/2022 0124 by Jose Alfredo Wong RN  Outcome: Progressing  Flowsheets (Taken 8/29/2022 2213 by Hugh Yap LPN)  Achieves optimal ventilation and oxygenation:   Assess for changes in respiratory status   Assess for changes in mentation and behavior     Problem: Skin/Tissue Integrity - Adult  Goal: Skin integrity remains intact  8/30/2022 0124 by Jose Alfredo Wong RN  Outcome: Progressing  Flowsheets (Taken 8/29/2022 2213 by Hugh Yap LPN)  Skin Integrity Remains Intact: Monitor for areas of redness and/or skin breakdown     Problem: Skin/Tissue Integrity - Adult  Goal: Incisions, wounds, or drain sites healing without S/S of infection  8/30/2022 0124 by Jose Alfredo Wong RN  Outcome: Progressing     Problem: Musculoskeletal - Adult  Goal: Return mobility to safest level of function  8/30/2022 0124 by Jose Alfredo Wong RN  Outcome: Progressing  Flowsheets (Taken 8/29/2022 2213 by Hugh Yap LPN)  Return Mobility to Safest Level of Function: Assess patient stability and activity tolerance for standing, transferring and ambulating with or without assistive devices     Problem: Musculoskeletal - Adult  Goal: Maintain proper alignment of affected body part  8/30/2022 0124 by Jose Alfredo Wong RN  Outcome: Progressing  Flowsheets (Taken 8/29/2022 2213 by Hugh Yap LPN)  Maintain proper alignment of affected body part: Support and protect limb and body alignment per provider's orders     Problem: Anxiety  Goal: Will report anxiety at manageable levels  Description: INTERVENTIONS:  1. Administer medication as ordered  2. Teach and rehearse alternative coping skills  3.  Provide emotional support with 1:1 interaction with staff  8/30/2022 0124 by Jose Alfredo Wong RN  Outcome: Progressing

## 2022-08-30 NOTE — PROGRESS NOTES
[address] THERAPY  Discharge Note  [address]    Time In: 9849  Time Out: 9913  Timed Code Treatment Minutes: 70 Minutes  Minutes: 70          Date: 2022  Patient Name: Monica Bertrand,  Gender:  female        MRN: [id number]  : 1940  (78 y.o.)  Referral Date : 22  Referring Practitioner: Kiersten Hale MD  Diagnosis: Hip fracture requiring operative repair, left, closed, initial encounter  Additional Pertinent Hx: Ga Hinojosa  is a 78 y.o. female admitted to the inpatient rehabilitation unit on 2022. She was originally admitted to [facility] on 2022. Patient  has a past medical history of Osteoporosis of multiple sites without pathological fracture, Squamous cell carcinoma of skin, Vascular headache, and Vitamin D deficiency. Patient presented to [facility] after suffering a fall at home, landing on her left side. Patient was unable to ambulate and was found to have left femoral neck fracture. Patient was admitted and taken for left hemiarthroplasty with Dr Charlie Morocho on 22. Postoperatively, patient developed narrow complex tachyarrhythmia concerning for SVT versus A. fib with RVR status post adenosine x2 and DCCV x3. Patient currently off amiodarone infusion and is in normal sinus rhythm.   Patient is now on Lopressor 12.5 mg twice daily     Prior Level of Function:  Lives With: Son  Type of Home: House  Home Layout: Two level, Able to Live on Main level with bedroom/bathroom, Performs ADL's on one level, Laundry in basement (pt's bedroom is upstairs but reports she could live on main floor)  Home Access: Stairs to enter without rails  Entrance Stairs - Number of Steps: 2 ELOY  Home Equipment: Cane    Vitals: Vitals not assessed per clinical judgement, see nursing flowsheet    Restrictions/Precautions:  Restrictions/Precautions: Isolation  Left Lower Extremity Weight Bearing: Weight Bearing As Tolerated  Position Activity Restriction  Hip Precautions: No hip flexion > 90 degrees, No hip internal rotation, Posterior hip precautions, No ADduction  Other position/activity restrictions: Droplet + precautions, KATHY precautions    SUBJECTIVE: Patient in room in recliner, son present. Pt anxious throughout session, anxious about returning home and completing steps. PAIN: no complaints of pain    OBJECTIVE:  Bed Mobility:  Supine to Sit: Modified Independent  Sit to Supine: Modified Independent   *Head of bed flat, no rail, bed elevated to ~23 inches as bed will be at home    Transfers:  Sit to Stand: Modified Independent  Stand to [address] [place] [address] with verbal cues for technique    Ambulation:  Stand By Assistance  Distance: 5', 65'  Surface: Level Tile  Device:Rolling Walker  Gait Deviations: Forward Flexed Posture, Slow Mague, Decreased Step Length on Right, Decreased Gait Speed, and Decreased Heel Strike Bilaterally  *Verbal cues for right step length  *Educated pt's son on how to wei gait belt and had son demonstrate, requiring cues for technique. *Education on hand placement on gait belt and that it is recommended pt have assistance with all mobility at this time. Contact Guard Assistance  Distance: 10'  Surface: Uneven Surface  Device:Rolling Walker  Gait Deviations: Forward Flexed Posture, Slow Mague, Decreased Step Length on Right, Decreased Gait Speed, and Decreased Heel Strike Bilaterally     *Education on ability to purchase bed rail and transport chair as needed with return to home. Stairs:  Contact Guard Assistance  Number of Steps: 4, 2, 4  Height: 6\" step with Bilateral Handrails to simulate bilateral grab bars. Also completed a trial with both hands on one rail with descent as pt may need to complete this way due to grab bars being on door frame.   Recommended utilizing rail on right when going out the door to be able to descend with left foot first.  *1st trial completed on 6\" step, second and third trial when completing 2 steps pt ascending 8\" step first step then 6 inch step. Recommended son placing step at top of steps prior to pt ascending step that was pt able to reaching for walker once up step. With first trial, trialled pt holding onto rail on left and utilizing right for balance with right UE. Pt also suggested pt's son provide hand held assist to right UE if needed. Pt's son participated in hands on training with third trial, demonstrating safe hand placement on gait belt and proximity to patient. *PT suggested potentially placing chair in garage next to steps incase pt needs to rest before ascending steps due to pt anxious. Recommended pt bringing LE on step back down and sitting if pt very anxious and \"shaky. \"    Functional Outcome Measures: Not completed       ASSESSMENT:  Assessment:  At discharge Ms Kin Montemayor met 5/5 STG's and 2/6 LTG's. Patient did not meet LTG for gait due to requiring SBA with a RW, did not meet stair goal due to requiring CGA with hand rails and pt had 2 grab bars installed therefore need for cane and handheld assist not applicable, and pt did not meet car transfer goal due to requiring CGA for safety with cues for positioning. Ms Kin Montemayor made excellent progress towards goals set for her, progressing supine < > sit from min/max assist to modified independence, sit < > stand from CGA/min assist to modified independence and gait from min assist 5' with a RW to up to 80' with a RW and SBA/CGA. Patient limited by pain left hip and anxiety. Janelle Michelle is able to ascend/descend steps with hand rails and CGA with reassurance. Pt is returning to home with son and home health PT. Activity Tolerance:  Patient tolerance of  treatment: good.       Equipment Recommendations:Equipment Needed: Yes  Other: RW ordered from [facility] 8/23/22  Discharge Recommendations:  home with home health PT and assist from son    Plan: home with son and outpatient PT    Patient Education  Patient Education: Family Education, [employer], Transfers, Gait, Stairs, Use of [employer], Car Transfers,  - Patient Verbalized Understanding, - Patient Requires Continued Education    Goals:  Patient goals : \"just feel better\"  Short Term Goals  Time Frame for Short term goals: 1 week  Short term goal 1: Patient will complete rolling and supine < > sit with min assist with no bed rail and head of bed flat and maintain hip precautions to transfer in and out of bed with decreased difficulty. GOAL MET, SEE LTG  Short term goal 2: Patient will complete sit < > stand with CGA from various surfaces to stand to ambulate with decreased difficulty. GOAL MET, SEE LTG  Short term goal 3: Patient will ambulate Skip  1560' with a RW and SBA to progress towards ambulating household distances GOAL MET  Short term goal 4: Patient will ascend/descend 2, 6\" steps with 2 hand rails and CGA to progress towards safe home entry. GOAL MET  Short term goal 5: Patient will complete car transfer with min assist to transfer in and out of vehicle for home. GOAL MET  Long Term Goals  Time Frame for Long term goals : 3 weeks from initial evaluation  Long term goal 1: Patient will complete supine < > sit with modified independence to transfer in and out of bed safely. GOAL MET  Long term goal 2: Patient will complete sit < > stand with modified independence to stand to ambulate safely. GOAL MET  Long term goal 3: Patient will bed < > chair transfer with a RW and modified independence to transfer surface to surface safely. GOAL NOT MET  Long term goal 4: Patient will ambulate 80' with a RW and modified independence to navigate home safely. GOAL NOT MET  Long term goal 5: Patient will ascend/descend 2 steps with hand held assist and cane with min assist for home entry.  GOAL NOT MET  Additional Goals?: Yes  Long term goal 6: Patient will complete car transfer with SBA to transfer in and out of vehicle for transport to home and appointments GOAL NOT MET

## 2022-08-30 NOTE — DISCHARGE SUMMARY
Independent. . 6. SIT TO STAND:Independent. .  .     CHAIR TO BED TRANSFER:Supervision or touching assistance. . 4. CAR TRANSFER: . .  . WALK 10 FEET:Supervision or touching assistance. .  .  4. WALK 50 FEET WITH 2 TURNS:Supervision or touching assistance. .  .  4. WALK 150 FEET:Supervision or touching assistance. .  .  4. Inpatient Acute Hospital Course:   Patient is now s/p L hip hemiarthroplasty on 8/14/22 by Dr. Romaine Herring. Patient presented to the ED on 8/13/22  after sustaining a femur fracture. On the day of surgery, patient was identified in the pre-operative holding area and agreeable to proceed with surgery. Written consent was obtained. Please see operative note for further details of this procedure. Patient received nikos-operative antibiotics. Patient recovered in PACU before transfer to telemetry floor and stepdown subsequently. Patient was started on tylenol and norco for pain control and Eliquis 2.5mg bid for dvt prophylaxis for 4 weeks. On the day of discharge, patient was afebrile with stable vital signs, stable upon physical exam. Patient was discharged with prescriptions for pain. Patient was deemed stable for discharge to [facility] as recommended by PT/OT. Patient will follow up with Dr Romaine Herring in 2 weeks for post-operative visit. Inpatient Rehabilitation Course:   Patricia Manning is a 78 y.o. female admitted to inpatient rehabilitation on 8/17/2022 for left hip fracture. The patient participated in an aggressive multidisciplinary inpatient rehabilitation program involving 3 hours per day, 5 days per week of rehabilitation. Hypertension management was undertaken with medication management on any patient with systolic blood pressure greater than 990 or diastolic blood pressure greater than 90. Patient's metoprolol was decreased to 25mg BID. Lasix continued at current dosing.  Patient did require ativan PRN during her stay, which she continued from home as well. Patient with noted chronic hyponatremia, nephrology assisted with management and patient will be discharged home with salt tabs and follow up with nephrology. On 8/24 patient experienced a drop in BP and workup was complete, pro calcitonin was elevated and infectious workup found patient +COVID. Patient was placed in droplet isolation. Patient was otherwise asymptomatic the rest of her stay. Appropriate DVT prophylaxis options were continued throughout rehabilitation stay with Crista. Dr Stephane Barry followed during the IPR stay for medical management    Patient was discharged Home with [facility] in Stable condition.     Consults:   Family Medicine, nephrology    Significant Diagnostics:   CBC:   Lab Results   Component Value Date/Time    WBC 5.1 08/30/2022 06:45 AM    RBC 3.68 08/30/2022 06:45 AM    HGB 11.2 08/30/2022 06:45 AM    HCT 34.2 08/30/2022 06:45 AM    MCV 92.9 08/30/2022 06:45 AM    MCH 30.4 08/30/2022 06:45 AM    MCHC 32.7 08/30/2022 06:45 AM    RDW 14.6 08/27/2014 05:44 AM     08/30/2022 06:45 AM    MPV 9.2 08/30/2022 06:45 AM     BMP:    Lab Results   Component Value Date/Time     08/30/2022 06:45 AM    K 3.8 08/30/2022 06:45 AM    K 3.6 08/25/2022 02:14 PM    CL 98 08/30/2022 06:45 AM    CO2 22 08/30/2022 06:45 AM    BUN 7 08/30/2022 06:45 AM    LABALBU 3.5 08/25/2022 02:14 PM    CREATININE 0.3 08/30/2022 06:45 AM    CALCIUM 9.1 08/30/2022 06:45 AM    LABGLOM >90 08/30/2022 06:45 AM    GLUCOSE 112 08/30/2022 06:45 AM     Hepatic Function Panel:    Lab Results   Component Value Date/Time    ALKPHOS 77 08/25/2022 02:14 PM    ALT 17 08/25/2022 02:14 PM    AST 25 08/25/2022 02:14 PM    PROT 5.9 08/25/2022 02:14 PM    BILITOT 0.3 08/25/2022 02:14 PM    LABALBU 3.5 08/25/2022 02:14 PM     Albumin:    Lab Results   Component Value Date/Time    LABALBU 3.5 08/25/2022 02:14 PM     Calcium:    Lab Results   Component Value Date/Time    CALCIUM 9.1 08/30/2022 06:45 AM       Magnesium: Lab Results   Component Value Date/Time    MG 1.9 08/15/2022 03:33 AM       Uric Acid:    Lab Results   Component Value Date/Time    URICACID 2.4 08/25/2022 02:14 PM     PT/INR:    Lab Results   Component Value Date/Time    PROTIME 10.8 08/27/2014 05:44 AM    INR 1.15 08/14/2022 05:28 AM       PTT:    Lab Results   Component Value Date/Time    APTT 33.5 08/27/2014 05:44 AM     U/A:    Lab Results   Component Value Date/Time    COLORU YELLOW 08/24/2022 11:20 PM    PROTEINU NEGATIVE 08/24/2022 11:20 PM    PHUR 7.0 08/24/2022 11:20 PM    WBCUA 10-15 08/13/2022 12:33 AM    RBCUA 0-2 08/13/2022 12:33 AM    YEAST NONE SEEN 08/13/2022 12:33 AM    BACTERIA MANY 08/13/2022 12:33 AM    CLARITYU Cloudy 08/28/2019 01:34 PM    SPECGRAV 1.015 08/28/2019 01:34 PM    LEUKOCYTESUR NEGATIVE 08/24/2022 11:20 PM    UROBILINOGEN 0.2 08/24/2022 11:20 PM    BILIRUBINUR NEGATIVE 08/24/2022 11:20 PM    BLOODU NEGATIVE 08/24/2022 11:20 PM    GLUCOSEU NEGATIVE 08/24/2022 11:20 PM          Patient Instructions:   Home with Home Health   Therapy orders: PT and OT   Discharge lab work: [facility] 9/12/22  Code status: Full Code   Activity: activity as tolerated  Diet: ADULT DIET;  Regular; 1200 ml    Wound Care: keep wound clean and dry and as directed    Follow-up visits: See after visit summary from hospitalization       Discharge Medications:  Current Discharge Medication List             Details   sodium chloride 1 g tablet Take 2 tablets by mouth 2 times daily (with meals)  Qty: 120 tablet, Refills: 3      calcium-cholecalciferol 500-200 MG-UNIT per tablet Take 1 tablet by mouth daily  Qty: 30 tablet, Refills: 3      cetirizine (ZYRTEC) 10 MG tablet Take 0.5 tablets by mouth daily for 20 days  Qty: 20 tablet, Refills: 0                Details   metoprolol tartrate (LOPRESSOR) 25 MG tablet Take 1 tablet by mouth 2 times daily  Qty: 60 tablet, Refills: 3      apixaban (ELIQUIS) 2.5 MG TABS tablet Take 1 tablet by mouth 2 times daily for 7 days  Qty: 14 tablet, Refills: 0                Details   Multiple Vitamins-Minerals (PRESERVISION AREDS) CAPS Take 2 capsules by mouth daily      acetaminophen (TYLENOL) 500 MG tablet Take 1 tablet by mouth 3 times daily as needed for Pain  Qty: 60 tablet, Refills: 0      LORazepam (ATIVAN) 1 MG tablet Take 1 tablet by mouth daily as needed for Anxiety for up to 30 days. Qty: 30 tablet, Refills: 0    Associated Diagnoses: Anxiety      Cholecalciferol (VITAMIN D3) 5000 units CAPS Take 1 capsule by mouth daily    Associated Diagnoses: Vitamin D deficiency              Controlled substances monitoring: possible medication side effects, risk of tolerance and/or dependence, and alternative treatments discussed.      35 minutes spent preparing the patient for discharge    TERRENCE Saldivar - CNP

## 2022-08-31 ENCOUNTER — CARE COORDINATION (OUTPATIENT)
Dept: CASE MANAGEMENT | Age: 82
End: 2022-08-31

## 2022-08-31 DIAGNOSIS — S72.002A DISPLACED FRACTURE OF LEFT FEMORAL NECK (HCC): Primary | ICD-10-CM

## 2022-08-31 PROCEDURE — 1111F DSCHRG MED/CURRENT MED MERGE: CPT | Performed by: PHYSICAL MEDICINE & REHABILITATION

## 2022-08-31 NOTE — CARE COORDINATION
Patient contacted regarding COVID-19 diagnosis. Discussed COVID-19 related testing which was available at this time. Test results were positive. Patient informed of results, if available? Yes. Care Transition Nurse contacted the family by telephone to perform post discharge assessment. Call within 2 business days of discharge: Yes. Verified name and  with family as identifiers. Provided introduction to self, and explanation of the CTN/ACM role, and reason for call due to risk factors for infection and/or exposure to COVID-19. Symptoms reviewed with family who verbalized the following symptoms: no new symptoms  no worsening symptoms. Due to no new or worsening symptoms encounter was not routed to provider for escalation. Discussed follow-up appointments. If no appointment was previously scheduled, appointment scheduling offered: Yes. [facility] follow up appointment(s):   Future Appointments   Date Time Provider Clarissa Vivar   2022 10:50 AM Phyllis Chan MD [facility] [employer] [facility] [employer]   2022  3:15 PM MICHELLE Hernandez [facility]   9/15/2022 10:20 AM MD MAVIS Downey [facility]     Non-[facility] follow up appointment(s):     Non-face-to-face services provided:  Scheduled appointment with PCP-22  Scheduled appointment with Clarissa Landers and reviewed discharge summary and/or continuity of care documents     Advance Care Planning:   Does patient have an Advance Directive:  not on file. Educated patient about risk for severe COVID-19 due to risk factors according to CDC guidelines. CTN reviewed discharge instructions, medical action plan and red flag symptoms with the family who verbalized understanding. Discussed COVID vaccination status: No. Education provided on COVID-19 vaccination as appropriate. Discussed exposure protocols and quarantine with CDC Guidelines.  Family was given an opportunity to verbalize any questions and concerns and agrees to contact CTN or health care provider for questions related to their healthcare. Reviewed and educated family on any new and changed medications related to discharge diagnosis     Was patient discharged with a pulse oximeter? no  CTN spoke w/ son Sherry Jasso today and he says [place] is doing well. Denies sob, cough, fever, chills, chest pain/tightness, n/v/d, confusion, dizziness. Meds reviewed & are correct. L hip pain 1/10 Tylenol if needed. L hip steri strips intact. [facility] Bones) confirmed they have order to evaluate pt. And will call today. PCP [facility] 9/2/22. Ortho 9/9/22. Sherry Jasso will transport her. She is doing well walking around the house. No other concerns voiced at this time. CTN # given to Sherry Jasso. TEMI Perla [facility] [phone]      CTN provided contact information. Plan for follow-up call in 5-7 days based on severity of symptoms and risk factors.

## 2022-09-02 ENCOUNTER — OFFICE VISIT (OUTPATIENT)
Dept: FAMILY MEDICINE CLINIC | Age: 82
End: 2022-09-02

## 2022-09-02 DIAGNOSIS — S72.002A HIP FRACTURE REQUIRING OPERATIVE REPAIR, LEFT, CLOSED, INITIAL ENCOUNTER (HCC): Primary | ICD-10-CM

## 2022-09-02 DIAGNOSIS — Z09 HOSPITAL DISCHARGE FOLLOW-UP: ICD-10-CM

## 2022-09-02 PROCEDURE — G8420 CALC BMI NORM PARAMETERS: HCPCS | Performed by: FAMILY MEDICINE

## 2022-09-02 PROCEDURE — 1036F TOBACCO NON-USER: CPT | Performed by: FAMILY MEDICINE

## 2022-09-02 PROCEDURE — 1090F PRES/ABSN URINE INCON ASSESS: CPT | Performed by: FAMILY MEDICINE

## 2022-09-02 PROCEDURE — G8400 PT W/DXA NO RESULTS DOC: HCPCS | Performed by: FAMILY MEDICINE

## 2022-09-02 PROCEDURE — G8427 DOCREV CUR MEDS BY ELIG CLIN: HCPCS | Performed by: FAMILY MEDICINE

## 2022-09-02 PROCEDURE — 99443 PR PHYS/QHP TELEPHONE EVALUATION 21-30 MIN: CPT | Performed by: FAMILY MEDICINE

## 2022-09-02 NOTE — PROGRESS NOTES
Cynthia Gibbons agreed to Telephone Chat/Exam in presence of Dr Carlos Helton and myself. Verified who was present in room with Lizzy.

## 2022-09-02 NOTE — PROGRESS NOTES
Post-Discharge Transitional Care  Follow Up      Gadiel Hampton   YOB: 1940    Date of Office Visit:  9/2/2022  Date of Hospital Admission: 8/17/22  Date of Hospital Discharge: 8/30/22  Risk of hospital readmission (high >=14%. Medium >=10%) :Readmission Risk Score: 17.5      Care management risk score Rising risk (score 2-5) and Complex Care (Scores >=6): No Risk Score On File     Non face to face  following discharge, date last encounter closed (first attempt may have been earlier): 08/31/2022    Call initiated 2 business days of discharge: Yes    ASSESSMENT/PLAN:   Below is the assessment and plan developed based on review of pertinent history, physical exam, labs, studies, and medications. Hip fracture requiring operative repair, left, closed, initial encounter [facility])    Medical Decision Making: moderate complexity  No follow-ups on file. Subjective:   HPI:  Follow up of Hospital problems/diagnosis(es):broken left hip and Covid infection    Inpatient course: Discharge summary reviewed- see chart. Interval history/Current status: she states she is doing ok at home. The home health nurse is coming today. Patient Active Problem List   Diagnosis    Squamous cell carcinoma of skin    Osteoporosis of multiple sites without pathological fracture    Vitamin D deficiency    Essential hypertension    MVP (mitral valve prolapse)    Paroxysmal atrial fibrillation (HCC)    Age-related cataract of both eyes    Displaced fracture of left femoral neck (HCC)    Closed fracture of left hip (HCC)    Hip fracture requiring operative repair, left, closed, initial encounter ([facility] [address]    Hyponatremia       Medications listed as ordered at the time of discharge from hospital     Medication List            Accurate as of September 2, 2022 11:22 AM. If you have any questions, ask your nurse or doctor.                 CONTINUE taking these medications      acetaminophen 500 MG tablet  Commonly known as: TYLENOL  Take 1 tablet by mouth 3 times daily as needed for Pain     apixaban 2.5 MG Tabs tablet  Commonly known as: ELIQUIS  Take 1 tablet by mouth 2 times daily for 7 days     calcium-cholecalciferol 500-200 MG-UNIT per tablet  Take 1 tablet by mouth daily     cetirizine 10 MG tablet  Commonly known as: ZYRTEC  Take 0.5 tablets by mouth daily for 20 days     LORazepam 1 MG tablet  Commonly known as: ATIVAN  Take 1 tablet by mouth daily as needed for Anxiety for up to 30 days. metoprolol tartrate 25 MG tablet  Commonly known as: LOPRESSOR  Take 1 tablet by mouth 2 times daily     PreserVision AREDS Caps     sodium chloride 1 g tablet  Take 2 tablets by mouth 2 times daily (with meals)     vitamin D3 125 MCG (5000 UT) Caps  Take 1 capsule by mouth daily                Medications marked \"taking\" at this time  Outpatient Medications Marked as Taking for the 9/2/22 encounter (Office Visit) with Carol Vines MD   Medication Sig Dispense Refill    sodium chloride 1 g tablet Take 2 tablets by mouth 2 times daily (with meals) 120 tablet 3    calcium-cholecalciferol 500-200 MG-UNIT per tablet Take 1 tablet by mouth daily 30 tablet 3    metoprolol tartrate (LOPRESSOR) 25 MG tablet Take 1 tablet by mouth 2 times daily 60 tablet 3    cetirizine (ZYRTEC) 10 MG tablet Take 0.5 tablets by mouth daily for 20 days 20 tablet 0    apixaban (ELIQUIS) 2.5 MG TABS tablet Take 1 tablet by mouth 2 times daily for 7 days 14 tablet 0    Multiple Vitamins-Minerals (PRESERVISION AREDS) CAPS Take 2 capsules by mouth daily      acetaminophen (TYLENOL) 500 MG tablet Take 1 tablet by mouth 3 times daily as needed for Pain 60 tablet 0    LORazepam (ATIVAN) 1 MG tablet Take 1 tablet by mouth daily as needed for Anxiety for up to 30 days.  30 tablet 0    Cholecalciferol (VITAMIN D3) 5000 units CAPS Take 1 capsule by mouth daily          Medications patient taking as of now reconciled against medications ordered at time of hospital discharge: Yes    A comprehensive review of systems was negative except for: Musculoskeletal: positive for arthralgias, bone pain, muscle weakness, and myalgias  Behavioral/Psych: positive for anxiety    Objective:    Patient-Reported Vitals  No data recorded    This was a phone visit due to her not having the technology for the video. Omid Tabor, was evaluated through a synchronous (real-time) audio-video encounter. The patient (or guardian if applicable) is aware that this is a billable service, which includes applicable co-pays. This Virtual Visit was conducted with patient's (and/or legal guardian's) consent. The visit was conducted pursuant to the emergency declaration under the [address] [place] [address] authority and the [employer] General Act. Patient identification was verified, and a caregiver was present when appropriate. The patient was located at Home: [address] [place] [address]. Provider was located at [facility] (Appt Dept): [facility],  [address] [facility]. An electronic signature was used to authenticate this note.   --uZlma Muñiz MD

## 2022-09-06 ENCOUNTER — CARE COORDINATION (OUTPATIENT)
Dept: CASE MANAGEMENT | Age: 82
End: 2022-09-06

## 2022-09-06 NOTE — CARE COORDINATION
You Patient resolved from the Care Transitions episode on 9/6/22  Discussed COVID-19 related testing which was available at this time. Test results were positive. Patient informed of results, if available? Yes    Patient/family has been provided the following resources and education related to COVID-19:                         Signs, symptoms and red flags related to COVID-19            CDC exposure and quarantine guidelines            Conduit exposure contact - [phone]            Contact for their local Department of Health                 Patient currently reports that the following symptoms have improved:  no new/worsening symptoms  CTN spoke w/ Ladoris [place] today and she denies sob, cough, fever, chills, n/v/d, chest pain/tightness. L hip incision healing nicely. Steri strips intact per Tania Galvez who saw pt. Today. Ortho HFU 9/9/22 and her son will take her. Eating,d rinking & sleeping ok. Denies problems w/ urination/bowels. No other concerns voiced at this time. CTN informed pt. Of final call. She expressed appreciation for the care. No further outreach scheduled with this CTN/ACM. Episode of Care resolved. Patient has this CTN/ACM contact information if future needs arise.

## 2022-09-07 ENCOUNTER — TELEPHONE (OUTPATIENT)
Dept: FAMILY MEDICINE CLINIC | Age: 82
End: 2022-09-07

## 2022-09-07 NOTE — PROCEDURES
[address] [id number]                                 EVENT MONITOR    PATIENT NAME: Felix Townsend                 :        1940  MED REC NO:   [id number]                           ROOM:       0031  ACCOUNT NO:   [de-identified]                           ADMIT DATE: 2022  PROVIDER:     Heike Marcano M.D.    TEST TYPE:  Event monitor. CLINICAL HISTORY AND INDICATION:  This is a patient with AFib. EVENT MONITOR DESCRIPTION:  Event monitor was attached to the patient  between 2022 and 2022. EVENT MONITOR FINDINGS:  Baseline rhythm showed sinus rhythm. There  were short episodes of narrow complex tachycardia noted on two  occasions, seemed to be regular; however, cannot exclude AFib,  potentially could be atrial tachycardia, and it was not sustained. CONCLUSIONS:  1. Sinus rhythm. 2.  Two episodes that were short lasting, could be atrial tachycardia,  cannot completely exclude AFib; however, no sustained arrhythmias and no  pauses. Clinical correlation is recommended.         Nora Perkins M.D.    D: 2022 7:05:57       T: 2022 7:08:24     [email or web address]  Job#: [id number]     Doc#: [id number]    CC:

## 2022-09-07 NOTE — TELEPHONE ENCOUNTER
[facility] called req to take David Chakraborty out of isolation. Has not had symptoms for 10 days. Started with symptoms around Aug 25th.     Please call [phone]

## 2022-09-08 ENCOUNTER — TELEPHONE (OUTPATIENT)
Dept: FAMILY MEDICINE CLINIC | Age: 82
End: 2022-09-08

## 2022-09-08 DIAGNOSIS — N39.0 URINARY TRACT INFECTION WITHOUT HEMATURIA, SITE UNSPECIFIED: Primary | ICD-10-CM

## 2022-09-08 RX ORDER — SULFAMETHOXAZOLE AND TRIMETHOPRIM 800; 160 MG/1; MG/1
1 TABLET ORAL 2 TIMES DAILY
Qty: 10 TABLET | Refills: 0 | Status: SHIPPED | OUTPATIENT
Start: 2022-09-08 | End: 2022-09-13

## 2022-09-08 NOTE — TELEPHONE ENCOUNTER
Pt called req ATB for UTI. Pt is having urgency,frequency,odor.     Pt with broken hip and unable to come in     [facility]

## 2022-09-08 NOTE — TELEPHONE ENCOUNTER
Per Verbal order of  Sent prescription to pharmacy for pt. Pt informed via voicemail.      Date of last visit:  9/2/2022  Date of next visit:  Visit date not found    Requested Prescriptions     Signed Prescriptions Disp Refills    sulfamethoxazole-trimethoprim (BACTRIM DS;SEPTRA DS) 800-160 MG per tablet 10 tablet 0     Sig: Take 1 tablet by mouth 2 times daily for 5 days     Authorizing Provider: Lionel Nunez     Ordering User: Patti Eason

## 2022-09-12 DIAGNOSIS — F41.9 ANXIETY: ICD-10-CM

## 2022-09-12 RX ORDER — LORAZEPAM 1 MG/1
1 TABLET ORAL DAILY PRN
Qty: 30 TABLET | Refills: 0 | Status: SHIPPED | OUTPATIENT
Start: 2022-09-12 | End: 2022-10-11 | Stop reason: SDUPTHER

## 2022-09-12 NOTE — TELEPHONE ENCOUNTER
Date of last visit:  9/2/2022  Date of next visit:  Visit date not found    Requested Prescriptions     Pending Prescriptions Disp Refills    LORazepam (ATIVAN) 1 MG tablet 30 tablet 0     Sig: Take 1 tablet by mouth daily as needed for Anxiety for up to 30 days.

## 2022-09-14 ENCOUNTER — OFFICE VISIT (OUTPATIENT)
Dept: CARDIOLOGY CLINIC | Age: 82
End: 2022-09-14
Payer: MEDICARE

## 2022-09-14 VITALS
SYSTOLIC BLOOD PRESSURE: 124 MMHG | BODY MASS INDEX: 20.09 KG/M2 | DIASTOLIC BLOOD PRESSURE: 80 MMHG | HEART RATE: 68 BPM | WEIGHT: 125 LBS | HEIGHT: 66 IN

## 2022-09-14 DIAGNOSIS — I48.0 PAROXYSMAL ATRIAL FIBRILLATION (HCC): Primary | ICD-10-CM

## 2022-09-14 DIAGNOSIS — I10 ESSENTIAL HYPERTENSION: ICD-10-CM

## 2022-09-14 DIAGNOSIS — I34.1 MVP (MITRAL VALVE PROLAPSE): ICD-10-CM

## 2022-09-14 PROCEDURE — 99214 OFFICE O/P EST MOD 30 MIN: CPT | Performed by: STUDENT IN AN ORGANIZED HEALTH CARE EDUCATION/TRAINING PROGRAM

## 2022-09-14 PROCEDURE — 1090F PRES/ABSN URINE INCON ASSESS: CPT | Performed by: STUDENT IN AN ORGANIZED HEALTH CARE EDUCATION/TRAINING PROGRAM

## 2022-09-14 PROCEDURE — G8427 DOCREV CUR MEDS BY ELIG CLIN: HCPCS | Performed by: STUDENT IN AN ORGANIZED HEALTH CARE EDUCATION/TRAINING PROGRAM

## 2022-09-14 PROCEDURE — G8420 CALC BMI NORM PARAMETERS: HCPCS | Performed by: STUDENT IN AN ORGANIZED HEALTH CARE EDUCATION/TRAINING PROGRAM

## 2022-09-14 PROCEDURE — G8400 PT W/DXA NO RESULTS DOC: HCPCS | Performed by: STUDENT IN AN ORGANIZED HEALTH CARE EDUCATION/TRAINING PROGRAM

## 2022-09-14 PROCEDURE — 1111F DSCHRG MED/CURRENT MED MERGE: CPT | Performed by: STUDENT IN AN ORGANIZED HEALTH CARE EDUCATION/TRAINING PROGRAM

## 2022-09-14 PROCEDURE — 1036F TOBACCO NON-USER: CPT | Performed by: STUDENT IN AN ORGANIZED HEALTH CARE EDUCATION/TRAINING PROGRAM

## 2022-09-14 PROCEDURE — 1124F ACP DISCUSS-NO DSCNMKR DOCD: CPT | Performed by: STUDENT IN AN ORGANIZED HEALTH CARE EDUCATION/TRAINING PROGRAM

## 2022-09-14 NOTE — PROGRESS NOTES
Pt was given short term Rx of Eliquis at discharge, is now out  Will need Rx if needs to stay on, samples?   Pt states \"sometimes feels like I'm shaking inside\"

## 2022-09-14 NOTE — PROGRESS NOTES
[facility] PROFESSIONAL SERVICES  [facility] [address] [facility] [place] [address]   Dept: [phone]   Dept Fax: [phone]: [phone]      Chief Complaint   Patient presents with    Follow-Up from Hospital       Cardiologist:  Dr. Yessi Gray  79 yo female presents for hfu for left hip fracture s/p athroplasty. Hx of afib, MVP, MR. No chest pain, breathing is okay. No palpitations. No dizziness. Overall, she is feeling better, seems limited by her joints more so. Is concerned about her valve issue and the afib. She stopped her [address], had a 1 month supply. Reviewed monitor with patient, ? Afib. Does have significant risk factors. General:   No fever, no chills, no weight loss, no fatigue  Pulmonary:    No dyspnea, no wheezing  Cardiac:    Denies recent chest pain   GI:     No nausea or vomiting, no abdominal pain  Neuro:     No dizziness or light headedness  Musculoskeletal:  No recent active issues  Extremities:   No edema      Past Medical History:   Diagnosis Date    Osteoporosis of multiple sites without pathological fracture 10/2017    Squamous cell carcinoma of skin 2016    Vascular headache 1980's    Vitamin D deficiency 10/2017       Allergies   Allergen Reactions    Ciprofloxacin Other (See Comments)     dizziness    Clindamycin/Lincomycin Hives    Hydrocod Polst-Cpm Polst Er      anxious    Macrobid [Nitrofurantoin Macrocrystal] Diarrhea    Paxil [Paroxetine Hcl] Other (See Comments)     dizzy    Morphine Nausea And Vomiting       Current Outpatient Medications   Medication Sig Dispense Refill    LORazepam (ATIVAN) 1 MG tablet Take 1 tablet by mouth daily as needed for Anxiety for up to 30 days.  30 tablet 0    sodium chloride 1 g tablet Take 2 tablets by mouth 2 times daily (with meals) 120 tablet 3    calcium-cholecalciferol 500-200 MG-UNIT per tablet Take 1 tablet by mouth daily 30 tablet 3    metoprolol tartrate (LOPRESSOR) 25 MG tablet Take 1 tablet by mouth 2 times daily 60 tablet 3    Multiple Vitamins-Minerals (PRESERVISION AREDS) CAPS Take 2 capsules by mouth daily      acetaminophen (TYLENOL) 500 MG tablet Take 1 tablet by mouth 3 times daily as needed for Pain 60 tablet 0    Cholecalciferol (VITAMIN D3) 5000 units CAPS Take 1 capsule by mouth daily      apixaban (ELIQUIS) 2.5 MG TABS tablet Take 1 tablet by mouth 2 times daily for 7 days (Patient not taking: Reported on 9/14/2022) 14 tablet 0     No current facility-administered medications for this visit. Social History     Socioeconomic History    Marital status:    Tobacco Use    Smoking status: Never    Smokeless tobacco: Never   Substance and Sexual Activity    Alcohol use: No    Drug use: No    Sexual activity: Never     Social Determinants of Health     Financial Resource Strain: Low Risk     Difficulty of Paying Living Expenses: Not hard at all   Food Insecurity: No Food Insecurity    Worried About Running Out of Food in the Last Year: Never true    Ran Out of Food in the Last Year: Never true       No family history on file. Blood pressure 124/80, pulse 68, height 5' 6\" (1.676 m), weight 125 lb (56.7 kg), not currently breastfeeding. General:   Well developed, well nourished  Lungs:   Clear to auscultation, no rales  Heart:    RRR, Normal S1 S2, No murmur, rubs, or gallops  Abdomen:   Soft, non tender, no organomegalies, positive bowel sounds  Extremities:   No edema, no cyanosis, good peripheral pulses  Neurological:   Awake, alert, oriented. No obvious focal deficits  Musculoskeletal:  No obvious deformities    EKG:          Diagnosis Orders   1. Paroxysmal atrial fibrillation (HCC)        2. MVP (mitral valve prolapse)        3. Essential hypertension            No orders of the defined types were placed in this encounter. Assessment/Plan:   PAF--episode of tachy. Stopped eliquis. Had 1 month supply. Some tachyarrhythmias on monitor. W[facility] to stay [facility].  Eliquis not covered, too expensive. Will sent for xarelto and give samples. Will try to assist with cost.   MVP- mild on most recent echo. Moderate MR. HTN-well controlled. Continue current treatment.      Disposition:   Follow up with Dr Isabel Phipps as scheduled or sooner if needed

## 2022-09-15 ENCOUNTER — OFFICE VISIT (OUTPATIENT)
Dept: NEPHROLOGY | Age: 82
End: 2022-09-15
Payer: MEDICARE

## 2022-09-15 VITALS
DIASTOLIC BLOOD PRESSURE: 67 MMHG | BODY MASS INDEX: 20.18 KG/M2 | WEIGHT: 125 LBS | HEART RATE: 61 BPM | OXYGEN SATURATION: 100 % | SYSTOLIC BLOOD PRESSURE: 109 MMHG

## 2022-09-15 DIAGNOSIS — E87.1 HYPONATREMIA: Primary | ICD-10-CM

## 2022-09-15 PROCEDURE — 1036F TOBACCO NON-USER: CPT | Performed by: INTERNAL MEDICINE

## 2022-09-15 PROCEDURE — G8400 PT W/DXA NO RESULTS DOC: HCPCS | Performed by: INTERNAL MEDICINE

## 2022-09-15 PROCEDURE — 1124F ACP DISCUSS-NO DSCNMKR DOCD: CPT | Performed by: INTERNAL MEDICINE

## 2022-09-15 PROCEDURE — 1090F PRES/ABSN URINE INCON ASSESS: CPT | Performed by: INTERNAL MEDICINE

## 2022-09-15 PROCEDURE — G8427 DOCREV CUR MEDS BY ELIG CLIN: HCPCS | Performed by: INTERNAL MEDICINE

## 2022-09-15 PROCEDURE — 1111F DSCHRG MED/CURRENT MED MERGE: CPT | Performed by: INTERNAL MEDICINE

## 2022-09-15 PROCEDURE — G8420 CALC BMI NORM PARAMETERS: HCPCS | Performed by: INTERNAL MEDICINE

## 2022-09-15 PROCEDURE — 99213 OFFICE O/P EST LOW 20 MIN: CPT | Performed by: INTERNAL MEDICINE

## 2022-09-15 NOTE — PROGRESS NOTES
Cholecalciferol (VITAMIN D3) 5000 units CAPS Take 1 capsule by mouth daily         Vitals     /67 (Site: Left Upper Arm, Position: Sitting, Cuff Size: Small Adult)   Pulse 61   Wt 125 lb (56.7 kg)   SpO2 100%   BMI 20.18 kg/m²  Wt Readings from Last 3 Encounters:   09/15/22 125 lb (56.7 kg)   09/14/22 125 lb (56.7 kg)   08/17/22 141 lb 8 oz (64.2 kg)        Physical Exam     General -- no distress  Lungs -- clear  Heart -- S1, S2 heard, JVD - no  Abdomen - soft, non-tender  Extremities -- no edema  CNS - awake and alert    Labs, Radiology and Tests    Labs -               Sodium            Potassium            BUN            Creatinine            eGFR                        UPCR            UMCR                          Assessment    Renal -renal function stable at baseline  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

## 2022-09-19 ENCOUNTER — HOSPITAL ENCOUNTER (OUTPATIENT)
Age: 82
Discharge: HOME | End: 2022-09-19
Payer: MEDICARE

## 2022-09-19 DIAGNOSIS — E87.1 HYPONATREMIA: ICD-10-CM

## 2022-09-19 PROCEDURE — 36415 COLL VENOUS BLD VENIPUNCTURE: CPT

## 2022-09-19 PROCEDURE — 80048 BASIC METABOLIC PNL TOTAL CA: CPT

## 2022-09-20 LAB
ANION GAP SERPL CALCULATED.3IONS-SCNC: 15 MEQ/L (ref 8–16)
BUN BLDV-MCNC: 12 MG/DL (ref 7–22)
CALCIUM SERPL-MCNC: 9.6 MG/DL (ref 8.5–10.5)
CHLORIDE BLD-SCNC: 97 MEQ/L (ref 98–111)
CO2: 22 MEQ/L (ref 23–33)
CREAT SERPL-MCNC: 0.3 MG/DL (ref 0.4–1.2)
GFR SERPL CREATININE-BSD FRML MDRD: > 90 ML/MIN/1.73M2
GLUCOSE BLD-MCNC: 110 MG/DL (ref 70–108)
POTASSIUM SERPL-SCNC: 4.3 MEQ/L (ref 3.5–5.2)
SODIUM BLD-SCNC: 134 MEQ/L (ref 135–145)

## 2022-09-26 ENCOUNTER — TELEPHONE (OUTPATIENT)
Dept: FAMILY MEDICINE CLINIC | Age: 82
End: 2022-09-26

## 2022-09-26 NOTE — TELEPHONE ENCOUNTER
Pt called stating that she is having episodes of shaky, confusion, and vision issues. She said the vision issue has only happened once. She also said that she sleep all day Saturday. Then she had the shaky episode again last night. She is asking if this could be caused from to much sodium. Dr. Randy Mancia is having her take 2 2 g BID. She said that she will have to take Lorazepam to calm down.      [facility] may be reached at [phone]

## 2022-09-27 ENCOUNTER — HOSPITAL ENCOUNTER (OUTPATIENT)
Age: 82
Discharge: HOME | End: 2022-09-27
Payer: MEDICARE

## 2022-09-27 DIAGNOSIS — E87.1 HYPONATREMIA: Primary | ICD-10-CM

## 2022-09-27 NOTE — TELEPHONE ENCOUNTER
The amount of sodium she needs depends on the result of blood tests.  I can order a non fasting lab to check the sodium if she wants to get that done now

## 2022-10-01 ENCOUNTER — HOSPITAL ENCOUNTER (EMERGENCY)
Age: 82
Discharge: HOME | End: 2022-10-01
Payer: MEDICARE

## 2022-10-01 VITALS
RESPIRATION RATE: 16 BRPM | OXYGEN SATURATION: 98 % | HEART RATE: 70 BPM | SYSTOLIC BLOOD PRESSURE: 144 MMHG | HEIGHT: 66 IN | WEIGHT: 125 LBS | TEMPERATURE: 97.4 F | BODY MASS INDEX: 20.09 KG/M2 | DIASTOLIC BLOOD PRESSURE: 68 MMHG

## 2022-10-01 DIAGNOSIS — E87.1 HYPONATREMIA: ICD-10-CM

## 2022-10-01 DIAGNOSIS — I88.9 LYMPHADENITIS: Primary | ICD-10-CM

## 2022-10-01 LAB
ANION GAP SERPL CALCULATED.3IONS-SCNC: 11 MEQ/L (ref 8–16)
BUN BLDV-MCNC: 9 MG/DL (ref 7–22)
CALCIUM SERPL-MCNC: 9.9 MG/DL (ref 8.5–10.5)
CHLORIDE BLD-SCNC: 94 MEQ/L (ref 98–111)
CO2: 23 MEQ/L (ref 23–33)
CREAT SERPL-MCNC: 0.4 MG/DL (ref 0.4–1.2)
GFR SERPL CREATININE-BSD FRML MDRD: > 90 ML/MIN/1.73M2
GLUCOSE BLD-MCNC: 98 MG/DL (ref 70–108)
POTASSIUM SERPL-SCNC: 4.4 MEQ/L (ref 3.5–5.2)
SODIUM BLD-SCNC: 128 MEQ/L (ref 135–145)

## 2022-10-01 PROCEDURE — 99213 OFFICE O/P EST LOW 20 MIN: CPT | Performed by: NURSE PRACTITIONER

## 2022-10-01 PROCEDURE — 80048 BASIC METABOLIC PNL TOTAL CA: CPT

## 2022-10-01 PROCEDURE — 36415 COLL VENOUS BLD VENIPUNCTURE: CPT

## 2022-10-01 PROCEDURE — 99213 OFFICE O/P EST LOW 20 MIN: CPT

## 2022-10-01 RX ORDER — CEPHALEXIN 500 MG/1
500 CAPSULE ORAL 4 TIMES DAILY
Qty: 28 CAPSULE | Refills: 0 | Status: SHIPPED | OUTPATIENT
Start: 2022-10-01 | End: 2022-10-08

## 2022-10-01 RX ORDER — LACTOBACILLUS RHAMNOSUS GG 10B CELL
1 CAPSULE ORAL 2 TIMES DAILY
Qty: 30 CAPSULE | Refills: 0 | Status: SHIPPED | OUTPATIENT
Start: 2022-10-01

## 2022-10-01 ASSESSMENT — ENCOUNTER SYMPTOMS
CONSTIPATION: 0
SHORTNESS OF BREATH: 0
RHINORRHEA: 0
COUGH: 0
NAUSEA: 0
ABDOMINAL PAIN: 0
BLOOD IN STOOL: 0
EYE PAIN: 0
FACIAL SWELLING: 1
WHEEZING: 0
VOMITING: 0
DIARRHEA: 0

## 2022-10-01 ASSESSMENT — PAIN SCALES - GENERAL: PAINLEVEL_OUTOF10: 0

## 2022-10-01 ASSESSMENT — PAIN - FUNCTIONAL ASSESSMENT: PAIN_FUNCTIONAL_ASSESSMENT: 0-10

## 2022-10-01 NOTE — ED TRIAGE NOTES
Pt had recent surgery hip surgery (fx repair from fall) and was hospitalized 3-4 weeks. States she had edema bilateral face/neck and right side resolved but left side has not. No redness or warmth noted at site. Pt denies pain unless touching site. Pt is in physical therapy and using walker and cane. Pt is weak but this is her normal since recovering.

## 2022-10-01 NOTE — ED PROVIDER NOTES
[address]       Chief Complaint   Patient presents with    Facial Swelling     Left cheek into left neck onset x 4 weeks        Nurses Notes reviewed and I agree except as noted in the HPI. HISTORY OF PRESENT ILLNESS   Zain Martines is a 78 y.o. female who presents to urgent care with complaint of neck and \"glands\" swelling that has been ongoing for 3 to 4 weeks. Patient states that she has been in to see her doctors, but has not told anyone that she has been having this. Patient states she had it initially on both sides, but the right side has gone down. Patient denies difficulty swallowing or sore throat. Patient denies other symptoms including chest pain or shortness of breath. Upon examination patient does have swelling noted at the lower jaw area up into the chin. She does have some lymph swelling as well. Patient states that she did have some irritation from her dentures, but that has also cleared up. There is no sign of abscess that appears amenable to drainage. REVIEW OF SYSTEMS     Review of Systems   Constitutional:  Negative for appetite change, chills, fatigue, fever and unexpected weight change. HENT:  Positive for facial swelling. Negative for ear pain and rhinorrhea. Eyes:  Negative for pain and visual disturbance. Respiratory:  Negative for cough, shortness of breath and wheezing. Cardiovascular:  Negative for chest pain, palpitations and leg swelling. Gastrointestinal:  Negative for abdominal pain, blood in stool, constipation, diarrhea, nausea and vomiting. Genitourinary:  Negative for dysuria, frequency and hematuria. Musculoskeletal:  Negative for arthralgias, joint swelling and neck stiffness. Skin:  Negative for rash. Neurological:  Negative for dizziness, syncope, weakness, light-headedness and headaches. Hematological:  Does not bruise/bleed easily.      PAST MEDICAL HISTORY         Diagnosis Date    Osteoporosis of multiple sites without pathological fracture 10/2017    Squamous cell carcinoma of skin 2016    Vascular headache 1980's    Vitamin D deficiency 10/2017       SURGICAL HISTORY     Patient  has a past surgical history that includes meniscectomy (Left, 5/2013); bladder repair (9/2014); Total knee arthroplasty (Left, 4/2015); Skin cancer excision (09/2016); and hip surgery (Left, 8/14/2022). CURRENT MEDICATIONS       Previous Medications    ACETAMINOPHEN (TYLENOL) 500 MG TABLET    Take 1 tablet by mouth 3 times daily as needed for Pain    CALCIUM-CHOLECALCIFEROL 500-200 MG-UNIT PER TABLET    Take 1 tablet by mouth daily    CHOLECALCIFEROL (VITAMIN D3) 5000 UNITS CAPS    Take 1 capsule by mouth daily    LORAZEPAM (ATIVAN) 1 MG TABLET    Take 1 tablet by mouth daily as needed for Anxiety for up to 30 days. METOPROLOL TARTRATE (LOPRESSOR) 25 MG TABLET    Take 1 tablet by mouth 2 times daily    MULTIPLE VITAMINS-MINERALS (PRESERVISION AREDS) CAPS    Take 2 capsules by mouth daily    RIVAROXABAN (XARELTO) 15 MG TABS TABLET    Take 1 tablet by mouth daily (with breakfast)    SODIUM CHLORIDE 1 G TABLET    Take 2 tablets by mouth 2 times daily (with meals)       ALLERGIES     Patient is is allergic to ciprofloxacin, clindamycin/lincomycin, hydrocod polst-cpm polst er, macrobid [nitrofurantoin macrocrystal], paxil [paroxetine hcl], and morphine. FAMILY HISTORY     Patient'sfamily history is not on file. SOCIAL HISTORY     Patient  reports that she has never smoked. She has never used smokeless tobacco. She reports that she does not drink alcohol and does not use drugs. PHYSICAL EXAM     ED TRIAGE VITALS  BP: (!) 144/68, Temp: 97.4 °F (36.3 °C), Heart Rate: 70, Resp: 16, SpO2: 98 %  Physical Exam  Vitals and nursing note reviewed. Constitutional:       Appearance: She is well-developed. HENT:      Head: Normocephalic and atraumatic. Right Ear: There is impacted cerumen.       Left Ear: There is impacted cerumen. Eyes:      Conjunctiva/sclera: Conjunctivae normal.      Pupils: Pupils are equal, round, and reactive to light. Neck:     Cardiovascular:      Rate and Rhythm: Normal rate and regular rhythm. Heart sounds: Normal heart sounds. No murmur heard. No gallop. Pulmonary:      Effort: Pulmonary effort is normal. No respiratory distress. Breath sounds: Normal breath sounds. No wheezing or rales. Abdominal:      General: Bowel sounds are normal.      Palpations: Abdomen is soft. Musculoskeletal:         General: Normal range of motion. Cervical back: Normal range of motion. Skin:     General: Skin is warm and dry. Neurological:      Mental Status: She is alert and oriented to person, place, and time. DIAGNOSTIC RESULTS   Labs:No results found for this visit on 10/01/22. IMAGING:  No orders to display     URGENT CARE COURSE:        MDM  Risk of Complications, Morbidity, and/or Mortality  Presenting problems: minimal  Diagnostic procedures: low  Management options: low        Patient diagnosis includes but not limited to lymphadenitis, facial cellulitis, dental abscess or dental infection. Patient does have some swollen lymph nodes submandibular. She also has swelling of the jaw. Patient denies any irritation at her dentures at this time. There is no sign of abscess that appears amenable to drainage. Patient also does have impacted cerumen bilaterally in her ears. There was an attempt to flush the ear with warm water by nurse. Patient states she could not tolerate that because she felt the water was too cool despite the nurse trying to warm it up even more. Patient states that her primary care provider does flush her ears for her and she can have him do this at her next appointment. Decision was made to treat the lymphadenitis with Keflex. Patient to follow-up with primary care provider.     Go to ER for worsening symptoms, inability to swallow, inability to keep liquids down, inability to urinate for greater than 8 hours or difficulty breathing. Follow-up with your primary care provider. Medications - No data to display  PROCEDURES:    Procedures    FINALIMPRESSION      1.  Lymphadenitis        DISPOSITION/PLAN   DISPOSITION Discharge - Pending Orders Complete 10/01/2022 11:25:19 AM    PATIENT REFERRED TO:  Radha Matthew MD  [address]  [phone]        DISCHARGE MEDICATIONS:  New Prescriptions    CEPHALEXIN (KEFLEX) 500 MG CAPSULE    Take 1 capsule by mouth 4 times daily for 7 days    LACTOBACILLUS-INULIN (CULTURELLE ADULT ULT BALANCE) CAPS    Take 1 capsule by mouth in the morning and at bedtime     Current Discharge Medication List          TERRENCE Fraser CNP, APRN - CNP  10/01/22 1142

## 2022-10-01 NOTE — ED NOTES
Reviewed discharge instructions with patient and son who voiced understanding. Assisted to private vehicle x 1 assist via wheelchair.       Wes Chacon RN  10/01/22 3154

## 2022-10-01 NOTE — DISCHARGE INSTRUCTIONS
Go to ER for worsening symptoms, inability to swallow, inability to keep liquids down, inability to urinate for greater than 8 hours or difficulty breathing. Follow-up with your primary care provider.

## 2022-10-03 ENCOUNTER — OFFICE VISIT (OUTPATIENT)
Dept: CARDIOLOGY CLINIC | Age: 82
End: 2022-10-03
Payer: MEDICARE

## 2022-10-03 ENCOUNTER — TELEPHONE (OUTPATIENT)
Dept: FAMILY MEDICINE CLINIC | Age: 82
End: 2022-10-03

## 2022-10-03 VITALS — SYSTOLIC BLOOD PRESSURE: 122 MMHG | DIASTOLIC BLOOD PRESSURE: 72 MMHG | HEART RATE: 88 BPM

## 2022-10-03 DIAGNOSIS — I48.0 PAROXYSMAL ATRIAL FIBRILLATION (HCC): Primary | ICD-10-CM

## 2022-10-03 PROCEDURE — G8420 CALC BMI NORM PARAMETERS: HCPCS | Performed by: NUCLEAR MEDICINE

## 2022-10-03 PROCEDURE — 1036F TOBACCO NON-USER: CPT | Performed by: NUCLEAR MEDICINE

## 2022-10-03 PROCEDURE — 99214 OFFICE O/P EST MOD 30 MIN: CPT | Performed by: NUCLEAR MEDICINE

## 2022-10-03 PROCEDURE — G8400 PT W/DXA NO RESULTS DOC: HCPCS | Performed by: NUCLEAR MEDICINE

## 2022-10-03 PROCEDURE — G8427 DOCREV CUR MEDS BY ELIG CLIN: HCPCS | Performed by: NUCLEAR MEDICINE

## 2022-10-03 PROCEDURE — 1124F ACP DISCUSS-NO DSCNMKR DOCD: CPT | Performed by: NUCLEAR MEDICINE

## 2022-10-03 PROCEDURE — G8484 FLU IMMUNIZE NO ADMIN: HCPCS | Performed by: NUCLEAR MEDICINE

## 2022-10-03 PROCEDURE — 1090F PRES/ABSN URINE INCON ASSESS: CPT | Performed by: NUCLEAR MEDICINE

## 2022-10-03 NOTE — PROGRESS NOTES
[facility] [address] [place] [address] ST.  [address] [place] [id number]  Dept: [phone]  Dept Fax: [phone]  Loc: [phone]    Visit Date: 10/3/2022    Elmer Owens is a 78 y.o. female who presents todayfor:  Chief Complaint   Patient presents with    Check-Up    Atrial Fibrillation   Had a fall and hip fracture  Had some A fib then   Started on NOACs  Bruising and severe frailty   FALL risk   Low BP to start with   Some dizziness  Very limited patient   No chest pain       HPI:  HPI  Past Medical History:   Diagnosis Date    Osteoporosis of multiple sites without pathological fracture 10/2017    Squamous cell carcinoma of skin 2016    Vascular headache 1980's    Vitamin D deficiency 10/2017      Past Surgical History:   Procedure Laterality Date    BLADDER REPAIR  9/2014    HIP SURGERY Left 8/14/2022    LEFT HIP BEE  ARTHROPLASTY performed by Seferino Sullivan MD at [facility] Left 5/2013    lateral    SKIN CANCER EXCISION  09/2016    Dr Hermes Banda Left 4/2015     No family history on file. Social History     Tobacco Use    Smoking status: Never    Smokeless tobacco: Never   Substance Use Topics    Alcohol use: No      Current Outpatient Medications   Medication Sig Dispense Refill    CALCIUM PO Take by mouth      cephALEXin (KEFLEX) 500 MG capsule Take 1 capsule by mouth 4 times daily for 7 days 28 capsule 0    Lactobacillus-Inulin (CULTURELLE ADULT ULT BALANCE) CAPS Take 1 capsule by mouth in the morning and at bedtime 30 capsule 0    rivaroxaban (XARELTO) 15 MG TABS tablet Take 1 tablet by mouth daily (with breakfast) 90 tablet 3    LORazepam (ATIVAN) 1 MG tablet Take 1 tablet by mouth daily as needed for Anxiety for up to 30 days.  30 tablet 0    sodium chloride 1 g tablet Take 2 tablets by mouth 2 times daily (with meals) (Patient taking differently: Take 1 g by mouth 2 times daily (with meals)) 120 tablet 3 metoprolol tartrate (LOPRESSOR) 25 MG tablet Take 1 tablet by mouth 2 times daily 60 tablet 3    Multiple Vitamins-Minerals (PRESERVISION AREDS) CAPS Take 2 capsules by mouth daily      acetaminophen (TYLENOL) 500 MG tablet Take 1 tablet by mouth 3 times daily as needed for Pain 60 tablet 0     No current facility-administered medications for this visit. Allergies   Allergen Reactions    Ciprofloxacin Other (See Comments)     dizziness    Clindamycin/Lincomycin Hives    Hydrocod Polst-Cpm Polst Er      anxious    Macrobid [Nitrofurantoin Macrocrystal] Diarrhea    Paxil [Paroxetine Hcl] Other (See Comments)     dizzy    Morphine Nausea And Vomiting     Health Maintenance   Topic Date Due    COVID-19 Vaccine (3 - Booster for Pfizer series) 02/20/2022    Flu vaccine (1) Never done    DTaP/Tdap/Td vaccine (1 - Tdap) 01/11/2023 (Originally 4/12/1959)    Shingles vaccine (1 of 2) 01/11/2023 (Originally 4/12/1988)    Pneumococcal 65+ years Vaccine (1 - PCV) 01/11/2023 (Originally 4/12/2003)    Depression Screen  01/11/2023    Annual Wellness Visit (AWV)  01/12/2023    DEXA (modify frequency per FRAX score)  Completed    Hepatitis A vaccine  Aged Out    Hepatitis B vaccine  Aged Out    Hib vaccine  Aged Out    Meningococcal (ACWY) vaccine  Aged Out       Subjective:  Review of Systems  General:   No fever, no chills, some fatigue or weight loss  Pulmonary:    some dyspnea, no wheezing  Cardiac:    Denies recent chest pain,   GI:     No nausea or vomiting, no abdominal pain  Neuro:     No dizziness or light headedness,   Musculoskeletal:  No recent active issues  Extremities:   No edema, no obvious claudication     Objective:  Physical Exam  /72   Pulse 88   General:   Well developed, well nourished  Lungs:    Clear to auscultation  Heart:    Normal S1 S2, Slight murmur.  no rubs, no gallops  Abdomen:   Soft, non tender, no organomegalies, positive bowel sounds  Extremities:   No edema, no cyanosis, good peripheral pulses  Neurological:   Awake, alert, oriented. No obvious focal deficits  Musculoskelatal:  No obvious deformities    Assessment:      Diagnosis Orders   1. Paroxysmal atrial fibrillation ([address]        Concerning FALL risk   She is complaining about bruising     Plan:  No follow-ups on file. Discussed at length   Discussed fall risk with her and her son   Discussed being off of it or on it   All the ins and outs  They are to think about it  Discussed a watchman   I have spent 25 minutes face to face with the patient. More than 50% of this time was spent counseling and coordinating care. Continue risk factor modification and medical management  Thank you for allowing me to participate in the care of your patient. Please don't hesitate to contact me regarding any further issues related to the patient care    Orders Placed:  No orders of the defined types were placed in this encounter. Medications Prescribed:  No orders of the defined types were placed in this encounter. Discussed use, benefit, and side effects of prescribed medications. All patient questions answered. Pt voicedunderstanding. Instructed to continue current medications, diet and exercise. Continue risk factor modification and medical management. Patient agreed with treatment plan. Follow up as directed.     Electronically signedby Leia Harris MD on 10/3/2022 at 9:18 AM

## 2022-10-03 NOTE — TELEPHONE ENCOUNTER
----- Message from Fallon Márquez MD sent at 10/2/2022  9:15 PM EDT -----  Let her know the lab shows she needs to continue the salt tablets as Dr Mae Orta Rx'ed.

## 2022-10-03 NOTE — TELEPHONE ENCOUNTER
Let her know that the sodium was low at 128. Would she want us to make an appointment for her to discuss the sodium tablets with Dr Chiquita Ruggiero?

## 2022-10-03 NOTE — TELEPHONE ENCOUNTER
Would she want to see a kidney specialist through [facility] to discuss the need for sodium tablets?

## 2022-10-03 NOTE — TELEPHONE ENCOUNTER
Pt informed pt stated that the salt tablets are making her mouth sore and she get sick on them occasionally pt is asking what her sodium level is

## 2022-10-10 ENCOUNTER — TELEPHONE (OUTPATIENT)
Dept: FAMILY MEDICINE CLINIC | Age: 82
End: 2022-10-10

## 2022-10-10 NOTE — TELEPHONE ENCOUNTER
Patient called stating that she has been on Cephalexin for facial swelling. She will finish the antibiotic today and she is wondering if she needs a refill or a different one since she is still having the swelling on the left side, right side is good. Also having some soreness on the left side as well.     Please call patient [address] [place] [address] Bo

## 2022-10-11 ENCOUNTER — OFFICE VISIT (OUTPATIENT)
Dept: FAMILY MEDICINE CLINIC | Age: 82
End: 2022-10-11

## 2022-10-11 VITALS
DIASTOLIC BLOOD PRESSURE: 70 MMHG | SYSTOLIC BLOOD PRESSURE: 118 MMHG | HEART RATE: 64 BPM | HEIGHT: 66 IN | BODY MASS INDEX: 18.84 KG/M2 | WEIGHT: 117.25 LBS | RESPIRATION RATE: 12 BRPM

## 2022-10-11 DIAGNOSIS — E87.1 HYPONATREMIA: Primary | ICD-10-CM

## 2022-10-11 DIAGNOSIS — F41.9 ANXIETY: ICD-10-CM

## 2022-10-11 DIAGNOSIS — L03.211 CELLULITIS OF CHEEK: ICD-10-CM

## 2022-10-11 PROCEDURE — G8400 PT W/DXA NO RESULTS DOC: HCPCS | Performed by: FAMILY MEDICINE

## 2022-10-11 PROCEDURE — G8420 CALC BMI NORM PARAMETERS: HCPCS | Performed by: FAMILY MEDICINE

## 2022-10-11 PROCEDURE — 1090F PRES/ABSN URINE INCON ASSESS: CPT | Performed by: FAMILY MEDICINE

## 2022-10-11 PROCEDURE — 99213 OFFICE O/P EST LOW 20 MIN: CPT | Performed by: FAMILY MEDICINE

## 2022-10-11 PROCEDURE — G8427 DOCREV CUR MEDS BY ELIG CLIN: HCPCS | Performed by: FAMILY MEDICINE

## 2022-10-11 PROCEDURE — 1036F TOBACCO NON-USER: CPT | Performed by: FAMILY MEDICINE

## 2022-10-11 RX ORDER — LORAZEPAM 1 MG/1
1 TABLET ORAL DAILY PRN
Qty: 30 TABLET | Refills: 0 | Status: SHIPPED | OUTPATIENT
Start: 2022-10-11 | End: 2022-11-10

## 2022-10-11 RX ORDER — SODIUM CHLORIDE 1000 MG
1 TABLET, SOLUBLE MISCELLANEOUS 2 TIMES DAILY WITH MEALS
COMMUNITY
Start: 2022-10-11

## 2022-10-11 ASSESSMENT — ENCOUNTER SYMPTOMS
SINUS PRESSURE: 0
CONSTIPATION: 0
SHORTNESS OF BREATH: 0

## 2022-10-11 NOTE — PATIENT INSTRUCTIONS
No salt tablets for 4 days. After 4 days resume the salt tablets at one twice daily  Use some liquid antacid like Mylanta, 2 tablespoons in the mouth four times a day. Hold the antacid in the mouth as long as possible and then spit it out.

## 2022-10-11 NOTE — PROGRESS NOTES
Rubi Martinez (:  1940) is a 78 y.o. female,Established patient, here for evaluation of the following chief complaint(s): Other (edema)         ASSESSMENT/PLAN:   Diagnosis Orders   1. Hyponatremia  sodium chloride 1 g tablet      2. Cellulitis of cheek                 No salt tablets for 4 days. After 4 days resume the salt tablets at one twice daily  Use some liquid antacid like Mylanta, 2 tablespoons in the mouth four times a day. Hold the antacid in the mouth as long as possible and then spit it out. Subjective   SUBJECTIVE/OBJECTIVE:  HPI  She states she can't tolerate the salt tablet 4 per day but will take 2  The hip fracture is getting better  There has been swelling to the cheeks and submandibular areas  She went to the ED and was given ATB for lymphangitis  There is some soreness to the mandibular ridge  Review of Systems   Constitutional:  Positive for fatigue. HENT:  Positive for mouth sores. Negative for sinus pressure. Eyes:  Negative for visual disturbance. Respiratory:  Negative for shortness of breath. Cardiovascular:  Negative for chest pain. Gastrointestinal:  Negative for constipation. Genitourinary: Negative. Musculoskeletal:  Positive for arthralgias and myalgias. Skin:  Negative for rash. Neurological:  Positive for weakness. Negative for headaches. Objective   Physical Exam  Constitutional:       Appearance: Normal appearance. She is well-developed. HENT:      Head: Normocephalic and atraumatic. Eyes:      General: No scleral icterus. Conjunctiva/sclera: Conjunctivae normal.   Neck:      Trachea: No tracheal deviation. Cardiovascular:      Rate and Rhythm: Normal rate. Pulmonary:      Effort: Pulmonary effort is normal.   Skin:     General: Skin is warm and dry. Neurological:      General: No focal deficit present. Mental Status: She is alert.    Psychiatric:         Behavior: Behavior normal.      Blood pressure 118/70, pulse 64, resp. rate 12, height 5' 6\" (1.676 m), weight 117 lb 4 oz (53.2 kg), not currently breastfeeding. An electronic signature was used to authenticate this note.     --William Alfaro MD

## 2022-10-14 ENCOUNTER — TELEPHONE (OUTPATIENT)
Dept: CARDIOLOGY CLINIC | Age: 82
End: 2022-10-14

## 2022-10-14 ENCOUNTER — TELEPHONE (OUTPATIENT)
Dept: FAMILY MEDICINE CLINIC | Age: 82
End: 2022-10-14

## 2022-10-14 NOTE — TELEPHONE ENCOUNTER
Pt called she hasn't taken salt tablets due to mouth being sore pt was on atb to help for the soreness she is still having soreness pt is requesting another atb. Pt stated that it still hurts to swallow and chew    Send to [phone]

## 2022-10-14 NOTE — TELEPHONE ENCOUNTER
PATIENT CALLED ASKING IF SHE CAN TAKE PRESERVISION AREDS VITAMIN WITH THE [facility].

## 2022-10-14 NOTE — TELEPHONE ENCOUNTER
Per verbal order from Dr Slava Jerez: Use some liquid antacid like Mylanta, 2 tablespoons in the mouth four times a day. Hold the antacid in the mouth as long as possible and then spit it out. Continue it throughout the weekend and call on Monday 10- with an Update. Lizzy informed by Phone.

## 2022-10-19 ENCOUNTER — TELEPHONE (OUTPATIENT)
Dept: FAMILY MEDICINE CLINIC | Age: 82
End: 2022-10-19

## 2022-10-19 DIAGNOSIS — R35.0 URINARY FREQUENCY: Primary | ICD-10-CM

## 2022-10-19 NOTE — TELEPHONE ENCOUNTER
Chery Chau with [facility] called stating that pt is complaining of frequency and burning with urination. Chery Chau feels patient has an UTI. They are asking for something to be called in for patient.      Santiago Brambila may be reached at [phone]

## 2022-10-24 ENCOUNTER — TELEPHONE (OUTPATIENT)
Dept: FAMILY MEDICINE CLINIC | Age: 82
End: 2022-10-24

## 2022-10-24 DIAGNOSIS — N39.0 ACUTE UTI: Primary | ICD-10-CM

## 2022-10-24 RX ORDER — SULFAMETHOXAZOLE AND TRIMETHOPRIM 800; 160 MG/1; MG/1
1 TABLET ORAL 2 TIMES DAILY
Qty: 10 TABLET | Refills: 0 | Status: SHIPPED | OUTPATIENT
Start: 2022-10-24 | End: 2022-10-29

## 2022-10-24 RX ORDER — SULFAMETHOXAZOLE AND TRIMETHOPRIM 800; 160 MG/1; MG/1
1 TABLET ORAL 2 TIMES DAILY
Qty: 28 TABLET | Refills: 0 | Status: SHIPPED | OUTPATIENT
Start: 2022-10-24 | End: 2022-10-24 | Stop reason: SDUPTHER

## 2022-10-24 NOTE — TELEPHONE ENCOUNTER
----- Message from Gregoria Uribe MD sent at 10/24/2022 11:20 AM EDT -----  Let her know the urine culture shows infection. It would be good to check a urine specimen in 2 weeks. Where should the ATB go?

## 2022-10-24 NOTE — TELEPHONE ENCOUNTER
Pharmacist questioning dosage per day, says this medication isn't given like this. Please advise.   Need to call pack [id number]

## 2022-10-26 ENCOUNTER — TELEPHONE (OUTPATIENT)
Dept: FAMILY MEDICINE CLINIC | Age: 82
End: 2022-10-26

## 2022-10-26 DIAGNOSIS — S72.002A HIP FRACTURE REQUIRING OPERATIVE REPAIR, LEFT, CLOSED, INITIAL ENCOUNTER (HCC): Primary | ICD-10-CM

## 2022-10-26 NOTE — TELEPHONE ENCOUNTER
Home health therapy called to ask for an order for outpatient physical therapy be sent to [facility] physical therapy pt need's therapy to transition from walker to cane     If any question's please call Sonam Akbar [facility] at [phone]    Please fax once completed

## 2022-10-28 NOTE — TELEPHONE ENCOUNTER
Shawn Boles with [facility] called asking for the order for PT to placed and sent to [facility] on [address]. Please let [facility] know when ordered.     Shawn Boles may be reached at [phone]

## 2022-10-31 NOTE — TELEPHONE ENCOUNTER
Pt called said that [facility] [address] and they did not receive our referral.  I told her we would fax it again and if they did not get it in about an hour then have them call us.

## 2022-11-08 ENCOUNTER — NURSE ONLY (OUTPATIENT)
Dept: FAMILY MEDICINE CLINIC | Age: 82
End: 2022-11-08

## 2022-11-08 DIAGNOSIS — F41.9 ANXIETY: ICD-10-CM

## 2022-11-08 DIAGNOSIS — R35.0 URINARY FREQUENCY: Primary | ICD-10-CM

## 2022-11-08 LAB
BACTERIA URINE, POC: ABNORMAL
BILIRUBIN URINE: 17 MG/DL
BLOOD, URINE: POSITIVE
CASTS URINE, POC: ABNORMAL
CLARITY: ABNORMAL
COLOR: YELLOW
CRYSTALS URINE, POC: ABNORMAL
EPI CELLS URINE, POC: ABNORMAL
GLUCOSE URINE: ABNORMAL
KETONES, URINE: NEGATIVE
LEUKOCYTE EST, POC: POSITIVE
NITRITE, URINE: POSITIVE
PH UA: 6 (ref 4.5–8)
PROTEIN UA: POSITIVE
RBC URINE, POC: ABNORMAL
SPECIFIC GRAVITY UA: 1.01 (ref 1–1.03)
UROBILINOGEN, URINE: ABNORMAL
WBC URINE, POC: ABNORMAL
YEAST URINE, POC: ABNORMAL

## 2022-11-08 PROCEDURE — 81000 URINALYSIS NONAUTO W/SCOPE: CPT | Performed by: FAMILY MEDICINE

## 2022-11-08 RX ORDER — LORAZEPAM 1 MG/1
1 TABLET ORAL DAILY PRN
Qty: 30 TABLET | Refills: 0 | Status: SHIPPED | OUTPATIENT
Start: 2022-11-08 | End: 2022-12-08

## 2022-11-08 NOTE — TELEPHONE ENCOUNTER
Date of last visit:  10/11/2022  Date of next visit:  Visit date not found    Requested Prescriptions     Pending Prescriptions Disp Refills    LORazepam (ATIVAN) 1 MG tablet 30 tablet 0     Sig: Take 1 tablet by mouth daily as needed for Anxiety for up to 30 days.

## 2022-11-08 NOTE — TELEPHONE ENCOUNTER
Lizzy called requesting a refill of their:    LORazepam (ATIVAN) 1 MG tablet Take 1 tablet by mouth daily as needed for Anxiety     Send to [facility]

## 2022-11-09 ENCOUNTER — OFFICE VISIT (OUTPATIENT)
Dept: FAMILY MEDICINE CLINIC | Age: 82
End: 2022-11-09

## 2022-11-09 VITALS
BODY MASS INDEX: 20.41 KG/M2 | DIASTOLIC BLOOD PRESSURE: 60 MMHG | SYSTOLIC BLOOD PRESSURE: 136 MMHG | RESPIRATION RATE: 18 BRPM | WEIGHT: 127 LBS | HEIGHT: 66 IN | HEART RATE: 60 BPM

## 2022-11-09 DIAGNOSIS — K13.79 ORAL BLEEDING: Primary | ICD-10-CM

## 2022-11-09 PROCEDURE — G8420 CALC BMI NORM PARAMETERS: HCPCS | Performed by: FAMILY MEDICINE

## 2022-11-09 PROCEDURE — G8400 PT W/DXA NO RESULTS DOC: HCPCS | Performed by: FAMILY MEDICINE

## 2022-11-09 PROCEDURE — 1090F PRES/ABSN URINE INCON ASSESS: CPT | Performed by: FAMILY MEDICINE

## 2022-11-09 PROCEDURE — 99213 OFFICE O/P EST LOW 20 MIN: CPT | Performed by: FAMILY MEDICINE

## 2022-11-09 PROCEDURE — G8427 DOCREV CUR MEDS BY ELIG CLIN: HCPCS | Performed by: FAMILY MEDICINE

## 2022-11-09 PROCEDURE — 1036F TOBACCO NON-USER: CPT | Performed by: FAMILY MEDICINE

## 2022-11-09 ASSESSMENT — ENCOUNTER SYMPTOMS
CONSTIPATION: 0
SINUS PRESSURE: 0
SHORTNESS OF BREATH: 0

## 2022-11-09 NOTE — PROGRESS NOTES
Antoinette Ortiz (:  1940) is a 78 y.o. female,Established patient, here for evaluation of the following chief complaint(s): Other (Bleeding gums)         ASSESSMENT/PLAN:   Diagnosis Orders   1. Oral bleeding             Let me know if the bleeding recurs    Subjective   SUBJECTIVE/OBJECTIVE:  HPI  She had eaten sandwich that bothered her mouth  She used a mouth wash and saw some bright blood  She quit the salt tablet some time ago due to irritation in the mouth. Review of Systems   Constitutional:  Positive for fatigue. HENT:  Positive for mouth sores. Negative for sinus pressure. Eyes:  Negative for visual disturbance. Respiratory:  Negative for shortness of breath. Cardiovascular:  Negative for chest pain. Gastrointestinal:  Negative for constipation. Genitourinary: Negative. Musculoskeletal:  Positive for arthralgias, gait problem and myalgias. Skin:  Negative for rash. Neurological:  Positive for weakness. Negative for headaches. The patient's medications, allergies, past medical problems, surgical, social, and family histories were reviewed and updated as needed. Objective   Physical Exam  Constitutional:       Appearance: Normal appearance. She is well-developed. HENT:      Head: Normocephalic and atraumatic. Mouth/Throat:      Comments: She took the dentures out and the mucosa is unremarkable  Eyes:      General: No scleral icterus. Conjunctiva/sclera: Conjunctivae normal.   Neck:      Trachea: No tracheal deviation. Skin:     General: Skin is warm and dry. Neurological:      General: No focal deficit present. Mental Status: She is alert. Psychiatric:         Behavior: Behavior normal.    Blood pressure 136/60, pulse 60, resp. rate 18, height 5' 6\" (1.676 m), weight 127 lb (57.6 kg), not currently breastfeeding. An electronic signature was used to authenticate this note.     --Aurea Hollingsworth MD

## 2022-11-11 LAB — URINE CULTURE, ROUTINE: ABNORMAL

## 2022-11-15 ENCOUNTER — TELEPHONE (OUTPATIENT)
Dept: FAMILY MEDICINE CLINIC | Age: 82
End: 2022-11-15

## 2022-11-15 DIAGNOSIS — E87.1 HYPONATREMIA: Primary | ICD-10-CM

## 2022-11-15 DIAGNOSIS — N39.0 ACUTE UTI: Primary | ICD-10-CM

## 2022-11-15 RX ORDER — SULFAMETHOXAZOLE AND TRIMETHOPRIM 800; 160 MG/1; MG/1
1 TABLET ORAL 2 TIMES DAILY
Qty: 14 TABLET | Refills: 0 | Status: SHIPPED | OUTPATIENT
Start: 2022-11-15 | End: 2022-11-22

## 2022-11-15 NOTE — TELEPHONE ENCOUNTER
Patient called inquiring about the culture results. Patient says she seems to urinate frequently but no other symptoms.     Please call her back [phone]

## 2022-11-15 NOTE — TELEPHONE ENCOUNTER
Pt called and I informed her. She is now wanting to know if the antibiotic will take the salt out of her body since she has low sodium. Also when does she need to have her sodium checked again?

## 2022-11-29 ENCOUNTER — HOSPITAL ENCOUNTER (OUTPATIENT)
Age: 82
Discharge: HOME | End: 2022-11-29
Payer: MEDICARE

## 2022-11-29 DIAGNOSIS — E87.1 HYPONATREMIA: ICD-10-CM

## 2022-11-29 PROCEDURE — 36415 COLL VENOUS BLD VENIPUNCTURE: CPT

## 2022-11-29 PROCEDURE — 80048 BASIC METABOLIC PNL TOTAL CA: CPT

## 2022-11-30 LAB
ANION GAP SERPL CALCULATED.3IONS-SCNC: 11 MEQ/L (ref 8–16)
BUN BLDV-MCNC: 15 MG/DL (ref 7–22)
CALCIUM SERPL-MCNC: 9.2 MG/DL (ref 8.5–10.5)
CHLORIDE BLD-SCNC: 94 MEQ/L (ref 98–111)
CO2: 26 MEQ/L (ref 23–33)
CREAT SERPL-MCNC: 0.4 MG/DL (ref 0.4–1.2)
GFR SERPL CREATININE-BSD FRML MDRD: > 60 ML/MIN/1.73M2
GLUCOSE BLD-MCNC: 93 MG/DL (ref 70–108)
POTASSIUM SERPL-SCNC: 4.2 MEQ/L (ref 3.5–5.2)
SODIUM BLD-SCNC: 131 MEQ/L (ref 135–145)

## 2022-12-05 ENCOUNTER — TELEPHONE (OUTPATIENT)
Dept: FAMILY MEDICINE CLINIC | Age: 82
End: 2022-12-05

## 2022-12-05 NOTE — TELEPHONE ENCOUNTER
----- Message from Gregoria Uribe MD sent at 12/4/2022 10:13 PM EST -----  Let her know the sodium remains stable

## 2022-12-06 ENCOUNTER — NURSE ONLY (OUTPATIENT)
Dept: FAMILY MEDICINE CLINIC | Age: 82
End: 2022-12-06

## 2022-12-06 DIAGNOSIS — R35.0 URINARY FREQUENCY: Primary | ICD-10-CM

## 2022-12-06 LAB
BACTERIA URINE, POC: ABNORMAL
BILIRUBIN URINE: 0 MG/DL
BLOOD, URINE: POSITIVE
CASTS URINE, POC: ABNORMAL
CLARITY: ABNORMAL
COLOR: YELLOW
CRYSTALS URINE, POC: ABNORMAL
EPI CELLS URINE, POC: ABNORMAL
GLUCOSE URINE: NEGATIVE
KETONES, URINE: NEGATIVE
LEUKOCYTE EST, POC: ABNORMAL
NITRITE, URINE: NEGATIVE
PH UA: 7.5 (ref 4.5–8)
PROTEIN UA: POSITIVE
RBC URINE, POC: ABNORMAL
SPECIFIC GRAVITY UA: 1 (ref 1–1.03)
UROBILINOGEN, URINE: NORMAL
WBC URINE, POC: ABNORMAL
YEAST URINE, POC: ABNORMAL

## 2022-12-06 PROCEDURE — 81000 URINALYSIS NONAUTO W/SCOPE: CPT | Performed by: FAMILY MEDICINE

## 2022-12-06 RX ORDER — SULFAMETHOXAZOLE AND TRIMETHOPRIM 800; 160 MG/1; MG/1
1 TABLET ORAL 2 TIMES DAILY
Qty: 20 TABLET | Refills: 0 | Status: SHIPPED | OUTPATIENT
Start: 2022-12-06 | End: 2022-12-16

## 2022-12-06 NOTE — PROGRESS NOTES
Date of last visit:  Visit date not found  Date of next visit:  Visit date not found    Requested Prescriptions     Signed Prescriptions Disp Refills    sulfamethoxazole-trimethoprim (BACTRIM DS;SEPTRA DS) 800-160 MG per tablet 20 tablet 0     Sig: Take 1 tablet by mouth 2 times daily for 10 days        Pt informed to repeat urine in 2 weeks

## 2022-12-09 DIAGNOSIS — F41.9 ANXIETY: ICD-10-CM

## 2022-12-09 RX ORDER — LORAZEPAM 1 MG/1
1 TABLET ORAL DAILY PRN
Qty: 30 TABLET | Refills: 0 | Status: SHIPPED | OUTPATIENT
Start: 2022-12-09 | End: 2023-01-08

## 2023-01-10 DIAGNOSIS — F41.9 ANXIETY: ICD-10-CM

## 2023-01-10 RX ORDER — LORAZEPAM 1 MG/1
1 TABLET ORAL DAILY PRN
Qty: 30 TABLET | Refills: 0 | Status: SHIPPED | OUTPATIENT
Start: 2023-01-10 | End: 2023-02-09

## 2023-01-10 NOTE — TELEPHONE ENCOUNTER
Date of last visit:  11/9/2022  Date of next visit:  Visit date not found    Requested Prescriptions     Pending Prescriptions Disp Refills    LORazepam (ATIVAN) 1 MG tablet 30 tablet 0     Sig: Take 1 tablet by mouth daily as needed for Anxiety for up to 30 days.

## 2023-02-09 DIAGNOSIS — F41.9 ANXIETY: ICD-10-CM

## 2023-02-09 RX ORDER — LORAZEPAM 1 MG/1
1 TABLET ORAL DAILY PRN
Qty: 30 TABLET | Refills: 0 | Status: SHIPPED | OUTPATIENT
Start: 2023-02-09 | End: 2023-03-11

## 2023-03-01 NOTE — TELEPHONE ENCOUNTER
Pt c/o being tired all the time. Pt takes Metoprolol 25MG twice a day. Pt is asking if she can take an additional 25MG daily. Pt states she use to take 50MG of Metoprolol BID but does not want to go back to the full dose. Pt states it is okay to leave a voicemail.

## 2023-03-08 DIAGNOSIS — F41.9 ANXIETY: ICD-10-CM

## 2023-03-08 NOTE — TELEPHONE ENCOUNTER
Pt is requesting lorazepam 1 mg       Pharmacy: [facility]

## 2023-03-08 NOTE — TELEPHONE ENCOUNTER
Nat Romero called requesting a refill of the below medication which has been pended for you:     Requested Prescriptions     Pending Prescriptions Disp Refills    LORazepam (ATIVAN) 1 MG tablet 30 tablet 0     Sig: Take 1 tablet by mouth daily as needed for Anxiety for up to 30 days.        Last Appointment Date: 11/9/2022  Next Appointment Date: Visit date not found    Allergies   Allergen Reactions    Ciprofloxacin Other (See Comments)     dizziness    Clindamycin/Lincomycin Hives    Hydrocod Fabricio-Chlorphe Fabricio Er      anxious    Macrobid [Nitrofurantoin Macrocrystal] Diarrhea    Paxil [Paroxetine Hcl] Other (See Comments)     dizzy    Morphine Nausea And Vomiting

## 2023-03-09 RX ORDER — LORAZEPAM 1 MG/1
1 TABLET ORAL DAILY PRN
Qty: 30 TABLET | Refills: 0 | Status: SHIPPED | OUTPATIENT
Start: 2023-03-09 | End: 2023-04-08

## 2023-03-14 ENCOUNTER — TELEPHONE (OUTPATIENT)
Dept: FAMILY MEDICINE CLINIC | Age: 83
End: 2023-03-14

## 2023-03-14 ENCOUNTER — NURSE ONLY (OUTPATIENT)
Dept: FAMILY MEDICINE CLINIC | Age: 83
End: 2023-03-14

## 2023-03-14 DIAGNOSIS — R35.0 URINARY FREQUENCY: Primary | ICD-10-CM

## 2023-03-14 LAB
BACTERIA URINE, POC: ABNORMAL
BILIRUBIN URINE: 0 MG/DL
BLOOD, URINE: POSITIVE
CASTS URINE, POC: ABNORMAL
CLARITY: ABNORMAL
COLOR: ABNORMAL
CRYSTALS URINE, POC: ABNORMAL
EPI CELLS URINE, POC: ABNORMAL
GLUCOSE URINE: ABNORMAL
KETONES, URINE: NEGATIVE
LEUKOCYTE EST, POC: ABNORMAL
NITRITE, URINE: POSITIVE
PH UA: 7 (ref 4.5–8)
PROTEIN UA: POSITIVE
RBC URINE, POC: ABNORMAL
SPECIFIC GRAVITY UA: 1 (ref 1–1.03)
UROBILINOGEN, URINE: NORMAL
WBC URINE, POC: ABNORMAL
YEAST URINE, POC: ABNORMAL

## 2023-03-14 PROCEDURE — 81000 URINALYSIS NONAUTO W/SCOPE: CPT | Performed by: FAMILY MEDICINE

## 2023-04-06 DIAGNOSIS — F41.9 ANXIETY: ICD-10-CM

## 2023-04-06 RX ORDER — LORAZEPAM 1 MG/1
1 TABLET ORAL DAILY PRN
Qty: 30 TABLET | Refills: 0 | Status: SHIPPED | OUTPATIENT
Start: 2023-04-06 | End: 2023-05-06

## 2023-04-25 ENCOUNTER — OFFICE VISIT (OUTPATIENT)
Dept: FAMILY MEDICINE CLINIC | Age: 83
End: 2023-04-25

## 2023-04-25 VITALS
SYSTOLIC BLOOD PRESSURE: 106 MMHG | WEIGHT: 126.13 LBS | BODY MASS INDEX: 20.27 KG/M2 | HEART RATE: 64 BPM | RESPIRATION RATE: 12 BRPM | HEIGHT: 66 IN | DIASTOLIC BLOOD PRESSURE: 64 MMHG

## 2023-04-25 DIAGNOSIS — I48.0 PAROXYSMAL ATRIAL FIBRILLATION (HCC): ICD-10-CM

## 2023-04-25 DIAGNOSIS — I10 ESSENTIAL HYPERTENSION: ICD-10-CM

## 2023-04-25 DIAGNOSIS — F41.9 ANXIETY: Primary | ICD-10-CM

## 2023-04-25 PROCEDURE — 1090F PRES/ABSN URINE INCON ASSESS: CPT | Performed by: FAMILY MEDICINE

## 2023-04-25 PROCEDURE — G8420 CALC BMI NORM PARAMETERS: HCPCS | Performed by: FAMILY MEDICINE

## 2023-04-25 PROCEDURE — 99214 OFFICE O/P EST MOD 30 MIN: CPT | Performed by: FAMILY MEDICINE

## 2023-04-25 PROCEDURE — 1036F TOBACCO NON-USER: CPT | Performed by: FAMILY MEDICINE

## 2023-04-25 PROCEDURE — G8400 PT W/DXA NO RESULTS DOC: HCPCS | Performed by: FAMILY MEDICINE

## 2023-04-25 PROCEDURE — G8427 DOCREV CUR MEDS BY ELIG CLIN: HCPCS | Performed by: FAMILY MEDICINE

## 2023-04-25 SDOH — ECONOMIC STABILITY: FOOD INSECURITY: WITHIN THE PAST 12 MONTHS, YOU WORRIED THAT YOUR FOOD WOULD RUN OUT BEFORE YOU GOT MONEY TO BUY MORE.: NEVER TRUE

## 2023-04-25 SDOH — ECONOMIC STABILITY: INCOME INSECURITY: HOW HARD IS IT FOR YOU TO PAY FOR THE VERY BASICS LIKE FOOD, HOUSING, MEDICAL CARE, AND HEATING?: NOT HARD AT ALL

## 2023-04-25 SDOH — ECONOMIC STABILITY: HOUSING INSECURITY
IN THE LAST 12 MONTHS, WAS THERE A TIME WHEN YOU DID NOT HAVE A STEADY PLACE TO SLEEP OR SLEPT IN A SHELTER (INCLUDING NOW)?: NO

## 2023-04-25 SDOH — ECONOMIC STABILITY: FOOD INSECURITY: WITHIN THE PAST 12 MONTHS, THE FOOD YOU BOUGHT JUST DIDN'T LAST AND YOU DIDN'T HAVE MONEY TO GET MORE.: NEVER TRUE

## 2023-04-25 ASSESSMENT — ENCOUNTER SYMPTOMS
SINUS PRESSURE: 0
SHORTNESS OF BREATH: 0
CONSTIPATION: 0

## 2023-04-25 ASSESSMENT — PATIENT HEALTH QUESTIONNAIRE - PHQ9
1. LITTLE INTEREST OR PLEASURE IN DOING THINGS: 0
2. FEELING DOWN, DEPRESSED OR HOPELESS: 0
SUM OF ALL RESPONSES TO PHQ QUESTIONS 1-9: 0
SUM OF ALL RESPONSES TO PHQ9 QUESTIONS 1 & 2: 0
SUM OF ALL RESPONSES TO PHQ QUESTIONS 1-9: 0

## 2023-04-25 NOTE — PROGRESS NOTES
Khanh Corral (:  1940) is a 78 y.o. female,Established patient, here for evaluation of the following chief complaint(s):  Hypertension         ASSESSMENT/PLAN:   Diagnosis Orders   1. Anxiety        2. Paroxysmal atrial fibrillation (HCC)        3. Essential hypertension             See me in 6 months    Subjective   SUBJECTIVE/OBJECTIVE:  HPI  She states a recent cough but it's improved  She sees the cardiologist for the a fib and denies palpitations  She finds that the lorazapam helps her generalized anxiety and she doesn't feel impaired  Review of Systems   Constitutional:  Positive for fatigue. HENT:  Negative for sinus pressure. Eyes:  Negative for visual disturbance. Respiratory:  Negative for shortness of breath. Cardiovascular:  Negative for chest pain. Gastrointestinal:  Negative for constipation. Genitourinary: Negative. Musculoskeletal:  Positive for arthralgias, gait problem and myalgias. Skin:  Negative for rash. Neurological:  Positive for weakness. Negative for headaches. The patient's medications, allergies, past medical problems, surgical, social, and family histories were reviewed and updated as needed. Objective   Physical Exam  Constitutional:       General: She is not in acute distress. Appearance: Normal appearance. She is well-developed. HENT:      Head: Normocephalic and atraumatic. Right Ear: External ear normal.      Left Ear: External ear normal.      Nose: Nose normal.      Mouth/Throat:      Pharynx: No oropharyngeal exudate. Eyes:      General: No scleral icterus. Conjunctiva/sclera: Conjunctivae normal.   Neck:      Thyroid: No thyromegaly. Vascular: No carotid bruit. Trachea: No tracheal deviation. Cardiovascular:      Rate and Rhythm: Normal rate and regular rhythm. Heart sounds: Murmur (2/6 JAKUB) heard. Pulmonary:      Effort: Pulmonary effort is normal.      Breath sounds: Normal breath sounds.

## 2023-04-25 NOTE — PROGRESS NOTES
No components found for: CHLPL  No results found for: TRIG  No results found for: HDL  No results found for: LDLCALC  No results found for: LABVLDL    Lab Results   Component Value Date    ALT 17 08/25/2022    AST 25 08/25/2022    ALKPHOS 77 08/25/2022    BILITOT 0.3 08/25/2022           Is patient currently taking any cholesterol medications? No   If yes, see med list as above    Is the patient reporting any side effects of cholesterol medications? No    Is the patient taking any over the counter medications? Yes   If yes, see med list as above    Is the patient taking a daily aspirin? No      Patient Self-Management Goal for Chronic Condition  Goal: I will take all medications as prescribed by my doctor, and I will call the office if I am having any medication problems. Barriers to success: none  Plan for overcoming my barriers: N/A     Confidence: 9/10  Date goal set: 4/25/23  Date goal attained:     Have you seen any other physician or provider since your last visit no    Have you had any other diagnostic tests since your last visit? no    Have you changed or stopped any medications since your last visit including any over-the-counter medicines, vitamins, or herbal medicines? no     Are you taking all your prescribed medications?  Yes    If NO, why?

## 2023-05-08 DIAGNOSIS — F41.9 ANXIETY: ICD-10-CM

## 2023-05-08 RX ORDER — LORAZEPAM 1 MG/1
1 TABLET ORAL DAILY PRN
Qty: 30 TABLET | Refills: 0 | Status: SHIPPED | OUTPATIENT
Start: 2023-05-08 | End: 2023-06-07

## 2023-05-08 NOTE — TELEPHONE ENCOUNTER
Date of last visit:  4/25/2023  Date of next visit:  Visit date not found    Requested Prescriptions     Pending Prescriptions Disp Refills    LORazepam (ATIVAN) 1 MG tablet 30 tablet 0     Sig: Take 1 tablet by mouth daily as needed for Anxiety for up to 30 days.

## 2023-06-05 ENCOUNTER — OFFICE VISIT (OUTPATIENT)
Dept: CARDIOLOGY CLINIC | Age: 83
End: 2023-06-05
Payer: MEDICARE

## 2023-06-05 VITALS
HEIGHT: 62 IN | HEART RATE: 60 BPM | SYSTOLIC BLOOD PRESSURE: 122 MMHG | WEIGHT: 126 LBS | BODY MASS INDEX: 23.19 KG/M2 | DIASTOLIC BLOOD PRESSURE: 60 MMHG

## 2023-06-05 DIAGNOSIS — I10 PRIMARY HYPERTENSION: ICD-10-CM

## 2023-06-05 DIAGNOSIS — I48.0 PAROXYSMAL ATRIAL FIBRILLATION (HCC): Primary | ICD-10-CM

## 2023-06-05 PROCEDURE — 1124F ACP DISCUSS-NO DSCNMKR DOCD: CPT | Performed by: NUCLEAR MEDICINE

## 2023-06-05 PROCEDURE — G8420 CALC BMI NORM PARAMETERS: HCPCS | Performed by: NUCLEAR MEDICINE

## 2023-06-05 PROCEDURE — G8400 PT W/DXA NO RESULTS DOC: HCPCS | Performed by: NUCLEAR MEDICINE

## 2023-06-05 PROCEDURE — 3078F DIAST BP <80 MM HG: CPT | Performed by: NUCLEAR MEDICINE

## 2023-06-05 PROCEDURE — 1036F TOBACCO NON-USER: CPT | Performed by: NUCLEAR MEDICINE

## 2023-06-05 PROCEDURE — 99213 OFFICE O/P EST LOW 20 MIN: CPT | Performed by: NUCLEAR MEDICINE

## 2023-06-05 PROCEDURE — 3074F SYST BP LT 130 MM HG: CPT | Performed by: NUCLEAR MEDICINE

## 2023-06-05 PROCEDURE — 1090F PRES/ABSN URINE INCON ASSESS: CPT | Performed by: NUCLEAR MEDICINE

## 2023-06-05 PROCEDURE — G8427 DOCREV CUR MEDS BY ELIG CLIN: HCPCS | Performed by: NUCLEAR MEDICINE

## 2023-06-05 NOTE — PROGRESS NOTES
Patient here for check up.
contact me regarding any further issues related to the patient care    Orders Placed:  No orders of the defined types were placed in this encounter. Prescribed:  No orders of the defined types were placed in this encounter. Discussed use, benefit, and side effects of prescribed medications. All patient questions answered. Pt voicedunderstanding. Instructed to continue current medications, diet and exercise. Continue risk factor modification and medical management. Patient agreed with treatment plan. Follow up as directed.     Electronically signedby Jhon Beltran MD on 6/5/2023 at 9:53 AM

## 2023-06-09 DIAGNOSIS — F41.9 ANXIETY: ICD-10-CM

## 2023-06-09 RX ORDER — LORAZEPAM 1 MG/1
1 TABLET ORAL DAILY PRN
Qty: 30 TABLET | Refills: 0 | Status: SHIPPED | OUTPATIENT
Start: 2023-06-09 | End: 2023-07-09

## 2023-07-07 DIAGNOSIS — F41.9 ANXIETY: ICD-10-CM

## 2023-07-07 RX ORDER — LORAZEPAM 1 MG/1
1 TABLET ORAL DAILY PRN
Qty: 30 TABLET | Refills: 0 | Status: SHIPPED | OUTPATIENT
Start: 2023-07-07 | End: 2023-08-06

## 2023-07-07 NOTE — TELEPHONE ENCOUNTER
Pt called to req na refill on the following    LORazepam(ATIVAN) 1 mg  Take 1 tablet by mouth daily as needed for Anxiety    Please send to [facility]

## 2023-08-09 DIAGNOSIS — F41.9 ANXIETY: ICD-10-CM

## 2023-08-09 RX ORDER — LORAZEPAM 1 MG/1
1 TABLET ORAL DAILY PRN
Qty: 30 TABLET | Refills: 0 | Status: SHIPPED | OUTPATIENT
Start: 2023-08-09 | End: 2023-09-08

## 2023-08-16 ENCOUNTER — NURSE ONLY (OUTPATIENT)
Dept: FAMILY MEDICINE CLINIC | Age: 83
End: 2023-08-16

## 2023-08-16 DIAGNOSIS — R35.0 URINARY FREQUENCY: Primary | ICD-10-CM

## 2023-08-16 LAB
BACTERIA URINE, POC: ABNORMAL
BILIRUBIN URINE: 0 MG/DL
BLOOD, URINE: POSITIVE
CASTS URINE, POC: ABNORMAL
CLARITY: ABNORMAL
COLOR: YELLOW
CRYSTALS URINE, POC: ABNORMAL
EPI CELLS URINE, POC: ABNORMAL
GLUCOSE URINE: NEGATIVE
KETONES, URINE: NEGATIVE
LEUKOCYTE EST, POC: ABNORMAL
NITRITE, URINE: POSITIVE
PH UA: 1.01 (ref 4.5–8)
PROTEIN UA: POSITIVE
RBC URINE, POC: ABNORMAL
SPECIFIC GRAVITY UA: 1.01 (ref 1–1.03)
UROBILINOGEN, URINE: NORMAL
WBC URINE, POC: ABNORMAL
YEAST URINE, POC: ABNORMAL

## 2023-08-16 RX ORDER — SULFAMETHOXAZOLE AND TRIMETHOPRIM 800; 160 MG/1; MG/1
1 TABLET ORAL 2 TIMES DAILY
Qty: 20 TABLET | Refills: 0 | Status: SHIPPED | OUTPATIENT
Start: 2023-08-16 | End: 2023-08-26

## 2023-08-16 NOTE — PROGRESS NOTES
Requested Prescriptions     Signed Prescriptions Disp Refills    sulfamethoxazole-trimethoprim (BACTRIM DS;SEPTRA DS) 800-160 MG per tablet 20 tablet 0     Sig: Take 1 tablet by mouth 2 times daily for 10 days       Positive for UTI- Lizzy informed by Phone.

## 2023-08-21 ENCOUNTER — TELEPHONE (OUTPATIENT)
Dept: FAMILY MEDICINE CLINIC | Age: 83
End: 2023-08-21

## 2023-08-21 NOTE — TELEPHONE ENCOUNTER
Patient called stating that she has taken 5 days of Bactrim DS. She states she is no better. Still C/O frequency, and she just don't feel right. She also was insistent about the fact that she takes Xarelto. Please call her back and let her know what she should do.   [phone]    She uses [facility]

## 2023-09-06 DIAGNOSIS — F41.9 ANXIETY: ICD-10-CM

## 2023-09-06 RX ORDER — LORAZEPAM 1 MG/1
1 TABLET ORAL DAILY PRN
Qty: 30 TABLET | Refills: 0 | Status: SHIPPED | OUTPATIENT
Start: 2023-09-06 | End: 2023-10-06

## 2023-10-06 DIAGNOSIS — F41.9 ANXIETY: ICD-10-CM

## 2023-10-06 RX ORDER — LORAZEPAM 1 MG/1
1 TABLET ORAL DAILY PRN
Qty: 30 TABLET | Refills: 0 | Status: SHIPPED | OUTPATIENT
Start: 2023-10-06 | End: 2023-11-05

## 2023-10-06 NOTE — TELEPHONE ENCOUNTER
Lizzy called requesting a refill of their:    LORazepam (ATIVAN) 1 MG tablet Take 1 tablet by mouth daily as needed for Anxiety     Send to [place]

## 2023-10-09 ENCOUNTER — NURSE ONLY (OUTPATIENT)
Dept: FAMILY MEDICINE CLINIC | Age: 83
End: 2023-10-09

## 2023-10-09 DIAGNOSIS — R35.0 URINARY FREQUENCY: Primary | ICD-10-CM

## 2023-10-09 LAB
BACTERIA URINE, POC: ABNORMAL
BILIRUBIN URINE: 0 MG/DL
BLOOD, URINE: NEGATIVE
CASTS URINE, POC: ABNORMAL
CLARITY: ABNORMAL
COLOR: YELLOW
CRYSTALS URINE, POC: ABNORMAL
EPI CELLS URINE, POC: ABNORMAL
GLUCOSE URINE: NEGATIVE
KETONES, URINE: NEGATIVE
LEUKOCYTE EST, POC: ABNORMAL
NITRITE, URINE: POSITIVE
PH UA: 6.5 (ref 4.5–8)
PROTEIN UA: POSITIVE
RBC URINE, POC: ABNORMAL
SPECIFIC GRAVITY UA: 1.01 (ref 1–1.03)
UROBILINOGEN, URINE: NORMAL
WBC URINE, POC: ABNORMAL
YEAST URINE, POC: ABNORMAL

## 2023-10-09 PROCEDURE — 81000 URINALYSIS NONAUTO W/SCOPE: CPT | Performed by: FAMILY MEDICINE

## 2023-10-09 RX ORDER — SULFAMETHOXAZOLE AND TRIMETHOPRIM 800; 160 MG/1; MG/1
1 TABLET ORAL 2 TIMES DAILY
Qty: 20 TABLET | Refills: 0 | Status: SHIPPED | OUTPATIENT
Start: 2023-10-09 | End: 2023-10-19

## 2023-10-09 NOTE — TELEPHONE ENCOUNTER
MOM to return call to office     Also Positive for UTI so sent in ATB to [facility] per her Lab Visit today. activity/exertion, physical

## 2023-10-09 NOTE — PROGRESS NOTES
Requested Prescriptions     Signed Prescriptions Disp Refills    sulfamethoxazole-trimethoprim (BACTRIM DS;SEPTRA DS) 800-160 MG per tablet 20 tablet 0     Sig: Take 1 tablet by mouth 2 times daily for 10 days     There was a Patient Call from a Refill that I put in there about the Positive for UTI and the ATB sent.

## 2023-10-10 ENCOUNTER — TELEPHONE (OUTPATIENT)
Dept: FAMILY MEDICINE CLINIC | Age: 83
End: 2023-10-10

## 2023-10-25 ENCOUNTER — NURSE ONLY (OUTPATIENT)
Dept: FAMILY MEDICINE CLINIC | Age: 83
End: 2023-10-25

## 2023-10-25 ENCOUNTER — TELEPHONE (OUTPATIENT)
Dept: FAMILY MEDICINE CLINIC | Age: 83
End: 2023-10-25

## 2023-10-25 DIAGNOSIS — R35.0 URINARY FREQUENCY: Primary | ICD-10-CM

## 2023-10-25 LAB
BACTERIA URINE, POC: ABNORMAL
BILIRUBIN URINE: 0 MG/DL
BLOOD, URINE: POSITIVE
CASTS URINE, POC: ABNORMAL
CLARITY: ABNORMAL
COLOR: YELLOW
CRYSTALS URINE, POC: ABNORMAL
EPI CELLS URINE, POC: ABNORMAL
GLUCOSE URINE: NEGATIVE
KETONES, URINE: NEGATIVE
LEUKOCYTE EST, POC: ABNORMAL
NITRITE, URINE: NEGATIVE
PH UA: 9 (ref 4.5–8)
PROTEIN UA: POSITIVE
RBC URINE, POC: ABNORMAL
SPECIFIC GRAVITY UA: 1 (ref 1–1.03)
UROBILINOGEN, URINE: NORMAL
WBC URINE, POC: ABNORMAL
YEAST URINE, POC: ABNORMAL

## 2023-10-25 PROCEDURE — 81000 URINALYSIS NONAUTO W/SCOPE: CPT | Performed by: FAMILY MEDICINE

## 2023-10-25 RX ORDER — SULFAMETHOXAZOLE AND TRIMETHOPRIM 800; 160 MG/1; MG/1
1 TABLET ORAL 2 TIMES DAILY
Qty: 10 TABLET | Refills: 0 | Status: SHIPPED | OUTPATIENT
Start: 2023-10-25 | End: 2023-10-30

## 2023-10-25 NOTE — TELEPHONE ENCOUNTER
Called to let patient know that rx of at[facility].  And to stop into office in 2 weeks for a urine recheck

## 2023-11-01 ENCOUNTER — OFFICE VISIT (OUTPATIENT)
Dept: FAMILY MEDICINE CLINIC | Age: 83
End: 2023-11-01

## 2023-11-01 VITALS
HEIGHT: 62 IN | WEIGHT: 124.5 LBS | BODY MASS INDEX: 22.91 KG/M2 | SYSTOLIC BLOOD PRESSURE: 128 MMHG | HEART RATE: 64 BPM | DIASTOLIC BLOOD PRESSURE: 70 MMHG | RESPIRATION RATE: 16 BRPM

## 2023-11-01 DIAGNOSIS — I10 ESSENTIAL HYPERTENSION: Primary | ICD-10-CM

## 2023-11-01 DIAGNOSIS — F41.9 ANXIETY: ICD-10-CM

## 2023-11-01 RX ORDER — LORAZEPAM 1 MG/1
1 TABLET ORAL DAILY PRN
Qty: 30 TABLET | Refills: 5 | Status: SHIPPED | OUTPATIENT
Start: 2023-11-01 | End: 2023-11-03 | Stop reason: SDUPTHER

## 2023-11-01 ASSESSMENT — ENCOUNTER SYMPTOMS
SINUS PRESSURE: 0
SHORTNESS OF BREATH: 0
CONSTIPATION: 0

## 2023-11-01 NOTE — PROGRESS NOTES
Angie Mcneil (:  1940) is a 78 y.o. female,Established patient, here for evaluation of the following chief complaint(s): Anxiety and Depression         ASSESSMENT/PLAN:   Diagnosis Orders   1. Essential hypertension        2. Anxiety  LORazepam (ATIVAN) 1 MG tablet             See me in 6 months    Subjective   SUBJECTIVE/OBJECTIVE:  HPI  She states she will have diarrhea at times girish with being anxious  She uses maybe a quarter of the ativan at times. She doesn't feel impaired by it and the OARS was reviewed  The BP is doing well. Review of Systems   Constitutional:  Negative for fatigue. HENT:  Negative for sinus pressure. Eyes:  Negative for visual disturbance. Respiratory:  Negative for shortness of breath. Cardiovascular:  Negative for chest pain. Gastrointestinal:  Negative for constipation. Genitourinary: Negative. Musculoskeletal:  Positive for arthralgias, gait problem and myalgias. Skin:  Negative for rash. Neurological:  Negative for headaches. Psychiatric/Behavioral:  Positive for sleep disturbance. The patient is nervous/anxious. The patient's medications, allergies, past medical problems, surgical, social, and family histories were reviewed and updated as needed. Objective   Physical Exam  Constitutional:       Appearance: Normal appearance. She is well-developed. HENT:      Head: Normocephalic and atraumatic. Eyes:      General: No scleral icterus. Conjunctiva/sclera: Conjunctivae normal.   Neck:      Trachea: No tracheal deviation. Cardiovascular:      Rate and Rhythm: Normal rate and regular rhythm. Heart sounds: Normal heart sounds. Pulmonary:      Effort: Pulmonary effort is normal.      Breath sounds: Normal breath sounds. Skin:     General: Skin is warm and dry. Neurological:      General: No focal deficit present. Mental Status: She is alert.    Psychiatric:         Behavior: Behavior normal.    Blood pressure 128/70,

## 2023-11-02 ENCOUNTER — TELEPHONE (OUTPATIENT)
Dept: FAMILY MEDICINE CLINIC | Age: 83
End: 2023-11-02

## 2023-11-02 ENCOUNTER — NURSE ONLY (OUTPATIENT)
Dept: FAMILY MEDICINE CLINIC | Age: 83
End: 2023-11-02

## 2023-11-02 DIAGNOSIS — R35.0 URINARY FREQUENCY: Primary | ICD-10-CM

## 2023-11-02 LAB
BACTERIA URINE, POC: ABNORMAL
BILIRUBIN URINE: 0 MG/DL
BLOOD, URINE: POSITIVE
CASTS URINE, POC: ABNORMAL
CLARITY: ABNORMAL
COLOR: YELLOW
CRYSTALS URINE, POC: ABNORMAL
EPI CELLS URINE, POC: ABNORMAL
GLUCOSE URINE: ABNORMAL
KETONES, URINE: NEGATIVE
LEUKOCYTE EST, POC: ABNORMAL
NITRITE, URINE: NEGATIVE
PH UA: 5 (ref 4.5–8)
PROTEIN UA: POSITIVE
RBC URINE, POC: ABNORMAL
SPECIFIC GRAVITY UA: 1 (ref 1–1.03)
UROBILINOGEN, URINE: NORMAL
WBC URINE, POC: ABNORMAL
YEAST URINE, POC: ABNORMAL

## 2023-11-02 RX ORDER — CEPHALEXIN 250 MG/1
250 CAPSULE ORAL 3 TIMES DAILY
Qty: 21 CAPSULE | Refills: 0 | Status: SHIPPED | OUTPATIENT
Start: 2023-11-02 | End: 2023-11-09

## 2023-11-03 DIAGNOSIS — F41.9 ANXIETY: ICD-10-CM

## 2023-11-03 RX ORDER — LORAZEPAM 1 MG/1
1 TABLET ORAL DAILY PRN
Qty: 30 TABLET | Refills: 5 | Status: SHIPPED | OUTPATIENT
Start: 2023-11-03 | End: 2024-05-01

## 2023-11-05 LAB — URINE CULTURE, ROUTINE: NORMAL

## 2023-11-20 ENCOUNTER — TELEPHONE (OUTPATIENT)
Dept: FAMILY MEDICINE CLINIC | Age: 83
End: 2023-11-20

## 2023-11-20 ENCOUNTER — NURSE ONLY (OUTPATIENT)
Dept: FAMILY MEDICINE CLINIC | Age: 83
End: 2023-11-20

## 2023-11-20 DIAGNOSIS — R35.0 URINE FREQUENCY: Primary | ICD-10-CM

## 2023-11-20 LAB
BACTERIA URINE, POC: ABNORMAL
BILIRUBIN URINE: 0 MG/DL
BLOOD, URINE: NEGATIVE
CASTS URINE, POC: ABNORMAL
CLARITY: ABNORMAL
COLOR: YELLOW
CRYSTALS URINE, POC: ABNORMAL
EPI CELLS URINE, POC: ABNORMAL
GLUCOSE URINE: NEGATIVE
KETONES, URINE: NEGATIVE
LEUKOCYTE EST, POC: POSITIVE
NITRITE, URINE: NEGATIVE
PH UA: 7.5 (ref 4.5–8)
PROTEIN UA: POSITIVE
RBC URINE, POC: ABNORMAL
SPECIFIC GRAVITY UA: 1 (ref 1–1.03)
UROBILINOGEN, URINE: NORMAL
WBC URINE, POC: ABNORMAL
YEAST URINE, POC: ABNORMAL

## 2023-11-21 RX ORDER — SULFAMETHOXAZOLE AND TRIMETHOPRIM 800; 160 MG/1; MG/1
1 TABLET ORAL 2 TIMES DAILY
Qty: 14 TABLET | Refills: 0 | Status: SHIPPED | OUTPATIENT
Start: 2023-11-21 | End: 2023-11-28

## 2023-11-22 ENCOUNTER — TELEPHONE (OUTPATIENT)
Dept: FAMILY MEDICINE CLINIC | Age: 83
End: 2023-11-22

## 2023-11-22 LAB — URINE CULTURE, ROUTINE: NORMAL

## 2023-11-22 NOTE — RESULT ENCOUNTER NOTE
Let her know that the urine culture did not show any infection and perhaps she should try using baby wipes to clean the area after urination

## 2023-11-22 NOTE — TELEPHONE ENCOUNTER
----- Message from Zain Burr MD sent at 11/22/2023  5:17 PM EST -----  Let her know that the urine culture did not show any infection and perhaps she should try using baby wipes to clean the area after urination

## 2023-11-30 DIAGNOSIS — F41.9 ANXIETY: ICD-10-CM

## 2023-11-30 RX ORDER — LORAZEPAM 1 MG/1
1 TABLET ORAL DAILY PRN
Qty: 30 TABLET | Refills: 5 | Status: SHIPPED | OUTPATIENT
Start: 2023-11-30 | End: 2024-05-28

## 2023-11-30 NOTE — TELEPHONE ENCOUNTER
Felton Dickson called requesting a refill of the below medication which has been pended for you:     Requested Prescriptions     Pending Prescriptions Disp Refills    LORazepam (ATIVAN) 1 MG tablet 30 tablet 5     Sig: Take 1 tablet by mouth daily as needed for Anxiety for up to 180 days. She needs an appointment for more.  Max Daily Amount: 1 mg       Last Appointment Date: 11/1/2023  Next Appointment Date: Visit date not found    Allergies   Allergen Reactions    Ciprofloxacin Other (See Comments)     dizziness    Clindamycin/Lincomycin Hives    Hydrocod Fabricio-Chlorphe Fabricio Er      anxious    Macrobid [Nitrofurantoin Macrocrystal] Diarrhea    Paxil [Paroxetine Hcl] Other (See Comments)     dizzy    Morphine Nausea And Vomiting X Size Of Lesion In Cm: 0 Cryotherapy Text: The wound bed was treated with cryotherapy after the biopsy was performed. Wound Care: Petrolatum Type Of Destruction Used: Curettage Validate Triangulation: No Bill For Surgical Tray: yes Curettage Text: The wound bed was treated with curettage after the biopsy was performed. Billing Type: Third-Party Bill Notification Instructions: Patient will be notified of biopsy results. However, patient instructed to call the office if not contacted within 2 weeks. Biopsy Type: H and E Depth Of Biopsy: dermis Hemostasis: Amy's Silver Nitrate Text: The wound bed was treated with silver nitrate after the biopsy was performed. Hemostasis: Electrocautery Consent: Written consent was obtained and risks were reviewed including but not limited to scarring, infection, bleeding, scabbing, incomplete removal, nerve damage and allergy to anesthesia. Electrodesiccation And Curettage Text: The wound bed was treated with electrodesiccation and curettage after the biopsy was performed. Post-Care Instructions: I reviewed with the patient in detail post-care instructions. Patient is to keep the biopsy site dry overnight, and then apply bacitracin twice daily until healed. Patient may apply hydrogen peroxide soaks to remove any crusting. Detail Level: Detailed Electrodesiccation Text: The wound bed was treated with electrodesiccation after the biopsy was performed. Size Of Lesion In Cm: 2.5 Biopsy Method: Dermablade Information: Selecting Yes will display possible errors in your note based on the variables you have selected. This validation is only offered as a suggestion for you. PLEASE NOTE THAT THE VALIDATION TEXT WILL BE REMOVED WHEN YOU FINALIZE YOUR NOTE. IF YOU WANT TO FAX A PRELIMINARY NOTE YOU WILL NEED TO TOGGLE THIS TO 'NO' IF YOU DO NOT WANT IT IN YOUR FAXED NOTE. Anesthesia Type: 1% lidocaine with epinephrine Dressing: pressure dressing with telfa Anesthesia Volume In Cc (Will Not Render If 0): 0.5 Size Of Lesion In Cm: 2

## 2023-11-30 NOTE — TELEPHONE ENCOUNTER
Pt called to req an refill on the following    LORazepam (ATIVAN) 1 MG tablet  Take 1 tablet by mouth daily as needed for Anxiety     Please send to [address]

## 2024-01-15 ENCOUNTER — TELEPHONE (OUTPATIENT)
Dept: FAMILY MEDICINE CLINIC | Age: 84
End: 2024-01-15

## 2024-01-15 NOTE — TELEPHONE ENCOUNTER
Called patient to schedule Medicare Annual Wellness. She stated that she would have to call back to schedule as she has a lot going on right now.

## 2024-01-16 NOTE — TELEPHONE ENCOUNTER
Patient calling in regarding her xarelto prescription.  She received a letter from [facility] that they need a new prescription sent in due to the new year?  Please advise.

## 2024-03-13 NOTE — TELEPHONE ENCOUNTER
Lizzy Soares called requesting a refill on the following medications:  Requested Prescriptions     Pending Prescriptions Disp Refills    metoprolol tartrate (LOPRESSOR) 25 MG tablet 270 tablet 3     Sig: Take 1 tablet by mouth in the morning, at noon, and at bedtime     Pharmacy verified:  [facility]      Date of last visit: 06-05-23  Date of next visit (if applicable): 6/3/2024

## 2024-03-14 NOTE — TELEPHONE ENCOUNTER
Pt states she is almost out of metoprolol  and the pharmacy can not fill because it is too soon     Pt states her BP Is all over the place HR stays around 57-60    States she takes her metoprolol morning, noon and night but if she feels in the middle of the night sometimes she takes 2-4 Extra metoprolol, hence why she is out of meds so soon     Dr Jacobsen please advise

## 2024-03-14 NOTE — TELEPHONE ENCOUNTER
Pt states she takes it TID and sometimes an extra in the middle of the night, is is ok to write for 4 times daily?

## 2024-03-31 ENCOUNTER — TELEPHONE (OUTPATIENT)
Dept: FAMILY MEDICINE CLINIC | Age: 84
End: 2024-03-31

## 2024-03-31 RX ORDER — SULFAMETHOXAZOLE AND TRIMETHOPRIM 800; 160 MG/1; MG/1
1 TABLET ORAL 2 TIMES DAILY
Qty: 10 TABLET | Refills: 0 | Status: SHIPPED | OUTPATIENT
Start: 2024-03-31 | End: 2024-04-05 | Stop reason: CLARIF

## 2024-04-05 ENCOUNTER — OFFICE VISIT (OUTPATIENT)
Dept: FAMILY MEDICINE CLINIC | Age: 84
End: 2024-04-05

## 2024-04-05 VITALS
HEART RATE: 64 BPM | BODY MASS INDEX: 22.52 KG/M2 | RESPIRATION RATE: 18 BRPM | DIASTOLIC BLOOD PRESSURE: 60 MMHG | SYSTOLIC BLOOD PRESSURE: 130 MMHG | HEIGHT: 62 IN | WEIGHT: 122.38 LBS

## 2024-04-05 DIAGNOSIS — I48.0 PAROXYSMAL ATRIAL FIBRILLATION (HCC): ICD-10-CM

## 2024-04-05 DIAGNOSIS — Z00.00 MEDICARE ANNUAL WELLNESS VISIT, SUBSEQUENT: ICD-10-CM

## 2024-04-05 DIAGNOSIS — R35.0 URINE FREQUENCY: Primary | ICD-10-CM

## 2024-04-05 DIAGNOSIS — I10 ESSENTIAL HYPERTENSION: ICD-10-CM

## 2024-04-05 LAB
BACTERIA URINE, POC: ABNORMAL
BILIRUBIN URINE: 0 MG/DL
BLOOD, URINE: POSITIVE
CASTS URINE, POC: ABNORMAL
CLARITY: ABNORMAL
COLOR: YELLOW
CRYSTALS URINE, POC: ABNORMAL
EPI CELLS URINE, POC: ABNORMAL
GLUCOSE URINE: ABNORMAL
KETONES, URINE: NEGATIVE
LEUKOCYTE EST, POC: ABNORMAL
NITRITE, URINE: NEGATIVE
PH UA: 5 (ref 4.5–8)
PROTEIN UA: POSITIVE
RBC URINE, POC: ABNORMAL
SPECIFIC GRAVITY UA: 1.01 (ref 1–1.03)
UROBILINOGEN, URINE: NORMAL
WBC URINE, POC: ABNORMAL
YEAST URINE, POC: ABNORMAL

## 2024-04-05 ASSESSMENT — PATIENT HEALTH QUESTIONNAIRE - PHQ9
SUM OF ALL RESPONSES TO PHQ9 QUESTIONS 1 & 2: 0
SUM OF ALL RESPONSES TO PHQ QUESTIONS 1-9: 0
1. LITTLE INTEREST OR PLEASURE IN DOING THINGS: NOT AT ALL
SUM OF ALL RESPONSES TO PHQ QUESTIONS 1-9: 0
2. FEELING DOWN, DEPRESSED OR HOPELESS: NOT AT ALL

## 2024-04-05 NOTE — PROGRESS NOTES
and dry, no rash or erythema  Head: normocephalic and atraumatic  Eyes: pupils equal, round, and reactive to light, extraocular eye movements intact, conjunctivae normal  ENT: tympanic membrane, external ear and ear canal normal bilaterally, oropharynx clear and moist with normal mucous membranes  Neck: neck supple and non tender without mass, no thyromegaly or thyroid nodules, no cervical lymphadenopathy   Pulmonary/Chest: clear to auscultation bilaterally- no wheezes, rales or rhonchi, normal air movement, no respiratory distress  Cardiovascular: normal rate, regular rhythm, normal S1 and S2, no murmurs, no gallops, and no carotid bruits  Abdomen: soft, non-tender, non-distended, normal bowel sounds, no masses or organomegaly  Extremities: no cyanosis and no clubbing  Musculoskeletal: arthritic changes to the hands  Neurologic: gait and coordination normal, speech normal, and weak needing a walker       Allergies   Allergen Reactions    Ciprofloxacin Other (See Comments)     dizziness    Clindamycin/Lincomycin Hives    Hydrocod Fabricio-Chlorphe Fabricio Er      anxious    Macrobid [Nitrofurantoin Macrocrystal] Diarrhea    Paxil [Paroxetine Hcl] Other (See Comments)     dizzy    Morphine Nausea And Vomiting     Prior to Visit Medications    Medication Sig Taking? Authorizing Provider   metoprolol tartrate (LOPRESSOR) 25 MG tablet Take 1 tablet by mouth 4 times daily Yes Crys Jacinto MD   rivaroxaban (XARELTO) 15 MG TABS tablet Take 1 tablet by mouth daily (with breakfast) Yes Crys Jacinto MD   LORazepam (ATIVAN) 1 MG tablet Take 1 tablet by mouth daily as needed for Anxiety for up to 180 days. She needs an appointment for more. Max Daily Amount: 1 mg Yes Shailesh Antoine MD   acetaminophen (TYLENOL) 500 MG tablet Take 1 tablet by mouth 3 times daily as needed for Pain Yes Kin Wadsworth PA   aspirin EC 81 MG EC tablet Take 1 tablet by mouth in the morning and 1 tablet before bedtime.  Kin Wadsworth PA

## 2024-04-05 NOTE — PATIENT INSTRUCTIONS
contact your doctor if you have any problems.  Where can you learn more?  Go to [email or web address] and enter F075 to learn more about \"A Healthy Heart: Care Instructions.\"  Current as of: June 24, 2023               Content Version: 14.0  © 5300-7796 [employer].   Care instructions adapted under license by [employer]. If you have questions about a medical condition or this instruction, always ask your healthcare professional. [employer] disclaims any warranty or liability for your use of this information.      Personalized Preventive Plan for Lizzy Soares - 4/5/2024  Medicare offers a range of preventive health benefits. Some of the tests and screenings are paid in full while other may be subject to a deductible, co-insurance, and/or copay.    Some of these benefits include a comprehensive review of your medical history including lifestyle, illnesses that may run in your family, and various assessments and screenings as appropriate.    After reviewing your medical record and screening and assessments performed today your provider may have ordered immunizations, labs, imaging, and/or referrals for you.  A list of these orders (if applicable) as well as your Preventive Care list are included within your After Visit Summary for your review.    Other Preventive Recommendations:    A preventive eye exam performed by an eye specialist is recommended every 1-2 years to screen for glaucoma; cataracts, macular degeneration, and other eye disorders.  A preventive dental visit is recommended every 6 months.  Try to get at least 150 minutes of exercise per week or 10,000 steps per day on a pedometer .  Order or download the FREE \"Exercise & Physical Activity: Your Everyday Guide\" from [employer]. Call 1-212.712.4890 or search [employer] online.  You need 5254-4375 mg of calcium and 7699-3007 IU of vitamin D per day. It is possible to

## 2024-04-08 ENCOUNTER — TELEPHONE (OUTPATIENT)
Dept: FAMILY MEDICINE CLINIC | Age: 84
End: 2024-04-08

## 2024-04-08 LAB — URINE CULTURE, ROUTINE: NORMAL

## 2024-04-08 NOTE — TELEPHONE ENCOUNTER
----- Message from Shailesh Antoine MD sent at 4/8/2024 12:15 PM EDT -----  Let her know the urine culture grew no infection.

## 2024-05-10 NOTE — TELEPHONE ENCOUNTER
Date of last visit:  4/25/2023  Date of next visit:  Visit date not found    Requested Prescriptions     Pending Prescriptions Disp Refills    LORazepam (ATIVAN) 1 MG tablet 30 tablet 0     Sig: Take 1 tablet by mouth daily as needed for Anxiety for up to 30 days. normal... Attending to bill

## 2024-06-03 ENCOUNTER — OFFICE VISIT (OUTPATIENT)
Dept: CARDIOLOGY CLINIC | Age: 84
End: 2024-06-03
Payer: MEDICARE

## 2024-06-03 VITALS
WEIGHT: 122 LBS | DIASTOLIC BLOOD PRESSURE: 60 MMHG | SYSTOLIC BLOOD PRESSURE: 126 MMHG | HEART RATE: 69 BPM | BODY MASS INDEX: 22.31 KG/M2

## 2024-06-03 DIAGNOSIS — I48.0 PAROXYSMAL ATRIAL FIBRILLATION (HCC): Primary | ICD-10-CM

## 2024-06-03 DIAGNOSIS — I10 PRIMARY HYPERTENSION: ICD-10-CM

## 2024-06-03 PROCEDURE — 1124F ACP DISCUSS-NO DSCNMKR DOCD: CPT | Performed by: NUCLEAR MEDICINE

## 2024-06-03 PROCEDURE — G8427 DOCREV CUR MEDS BY ELIG CLIN: HCPCS | Performed by: NUCLEAR MEDICINE

## 2024-06-03 PROCEDURE — 1090F PRES/ABSN URINE INCON ASSESS: CPT | Performed by: NUCLEAR MEDICINE

## 2024-06-03 PROCEDURE — G8420 CALC BMI NORM PARAMETERS: HCPCS | Performed by: NUCLEAR MEDICINE

## 2024-06-03 PROCEDURE — 93000 ELECTROCARDIOGRAM COMPLETE: CPT | Performed by: NUCLEAR MEDICINE

## 2024-06-03 PROCEDURE — 1036F TOBACCO NON-USER: CPT | Performed by: NUCLEAR MEDICINE

## 2024-06-03 PROCEDURE — 99213 OFFICE O/P EST LOW 20 MIN: CPT | Performed by: NUCLEAR MEDICINE

## 2024-06-03 NOTE — PROGRESS NOTES
[facility] [place] [facility]  [facility]  [address]  [address]  [facility] [address]  Dept: [phone]  Dept Fax: [phone]  Loc: [phone]    Visit Date: 6/3/2024    Lizzy Soares is a 84 y.o. female who presents todayfor:  Chief Complaint   Patient presents with    Check-Up    Hypertension    Atrial Fibrillation   Know PAF and HTN   Seems stable for now   No chest pain  Labile BP   higher at times  No dizziness  No syncope  No bleeding issues         HPI:  HPI  Past Medical History:   Diagnosis Date    Osteoporosis of multiple sites without pathological fracture 10/2017    Squamous cell carcinoma of skin 2016    Vascular headache 1980's    Vitamin D deficiency 10/2017      Past Surgical History:   Procedure Laterality Date    BLADDER REPAIR  9/2014    HIP SURGERY Left 8/14/2022    LEFT HIP BEE  ARTHROPLASTY performed by Santiago Morejon MD at [facility] OR    MENISCECTOMY Left 5/2013    lateral    SKIN CANCER EXCISION  09/2016    Dr Blackman    TOTAL KNEE ARTHROPLASTY Left 4/2015     No family history on file.  Social History     Tobacco Use    Smoking status: Never    Smokeless tobacco: Never   Substance Use Topics    Alcohol use: No      Current Outpatient Medications   Medication Sig Dispense Refill    metoprolol tartrate (LOPRESSOR) 25 MG tablet Take 1 tablet by mouth 4 times daily 360 tablet 3    rivaroxaban (XARELTO) 15 MG TABS tablet Take 1 tablet by mouth daily (with breakfast) 90 tablet 3    LORazepam (ATIVAN) 1 MG tablet Take 1 tablet by mouth daily as needed for Anxiety for up to 180 days. She needs an appointment for more. Max Daily Amount: 1 mg 30 tablet 5    acetaminophen (TYLENOL) 500 MG tablet Take 1 tablet by mouth 3 times daily as needed for Pain 60 tablet 0     No current facility-administered medications for this visit.     Allergies   Allergen Reactions    Ciprofloxacin Other (See Comments)     dizziness    Clindamycin/Lincomycin Hives    Hydrocod

## 2024-06-19 DIAGNOSIS — F41.9 ANXIETY: ICD-10-CM

## 2024-06-19 RX ORDER — LORAZEPAM 1 MG/1
1 TABLET ORAL DAILY PRN
Qty: 30 TABLET | Refills: 5 | Status: SHIPPED | OUTPATIENT
Start: 2024-06-19 | End: 2024-12-16

## 2024-06-19 NOTE — TELEPHONE ENCOUNTER
Date of last visit:  4/5/2024  Date of next visit:  Visit date not found    Requested Prescriptions     Pending Prescriptions Disp Refills    LORazepam (ATIVAN) 1 MG tablet 30 tablet 5     Sig: Take 1 tablet by mouth daily as needed for Anxiety for up to 180 days. She needs an appointment for more. Max Daily Amount: 1 mg

## 2024-06-19 NOTE — TELEPHONE ENCOUNTER
Pt called to req an refill on the following    LORazepam (ATIVAN) 1 MG tablet   Take 1 tablet by mouth daily as needed for Anxiety     Please send to [facility] pharmacy

## 2024-07-31 ENCOUNTER — OFFICE VISIT (OUTPATIENT)
Dept: FAMILY MEDICINE CLINIC | Age: 84
End: 2024-07-31

## 2024-07-31 VITALS
BODY MASS INDEX: 22.45 KG/M2 | RESPIRATION RATE: 18 BRPM | HEIGHT: 62 IN | HEART RATE: 64 BPM | SYSTOLIC BLOOD PRESSURE: 132 MMHG | DIASTOLIC BLOOD PRESSURE: 80 MMHG | WEIGHT: 122 LBS

## 2024-07-31 DIAGNOSIS — R35.0 URINARY FREQUENCY: ICD-10-CM

## 2024-07-31 DIAGNOSIS — I10 ESSENTIAL HYPERTENSION: ICD-10-CM

## 2024-07-31 DIAGNOSIS — S20.219A CONTUSION OF CHEST WALL, UNSPECIFIED LATERALITY, INITIAL ENCOUNTER: Primary | ICD-10-CM

## 2024-07-31 DIAGNOSIS — F41.9 ANXIETY: ICD-10-CM

## 2024-07-31 LAB
BACTERIA URINE, POC: ABNORMAL
BILIRUBIN URINE: 1 MG/DL
BLOOD, URINE: POSITIVE
CASTS URINE, POC: ABNORMAL
CLARITY, UA: ABNORMAL
COLOR, UA: YELLOW
CRYSTALS URINE, POC: ABNORMAL
EPI CELLS URINE, POC: ABNORMAL
GLUCOSE URINE: ABNORMAL
KETONES, URINE: NEGATIVE
LEUKOCYTE EST, POC: ABNORMAL
NITRITE, URINE: POSITIVE
PH UA: 6 (ref 4.5–8)
PROTEIN UA: POSITIVE
RBC URINE, POC: ABNORMAL
SPECIFIC GRAVITY UA: 1.01 (ref 1–1.03)
UROBILINOGEN, URINE: NORMAL
WBC URINE, POC: ABNORMAL
YEAST URINE, POC: ABNORMAL

## 2024-07-31 PROCEDURE — 1036F TOBACCO NON-USER: CPT | Performed by: FAMILY MEDICINE

## 2024-07-31 PROCEDURE — 99213 OFFICE O/P EST LOW 20 MIN: CPT | Performed by: FAMILY MEDICINE

## 2024-07-31 PROCEDURE — G8420 CALC BMI NORM PARAMETERS: HCPCS | Performed by: FAMILY MEDICINE

## 2024-07-31 PROCEDURE — 1090F PRES/ABSN URINE INCON ASSESS: CPT | Performed by: FAMILY MEDICINE

## 2024-07-31 PROCEDURE — G8427 DOCREV CUR MEDS BY ELIG CLIN: HCPCS | Performed by: FAMILY MEDICINE

## 2024-07-31 PROCEDURE — 81000 URINALYSIS NONAUTO W/SCOPE: CPT | Performed by: FAMILY MEDICINE

## 2024-07-31 SDOH — ECONOMIC STABILITY: FOOD INSECURITY: WITHIN THE PAST 12 MONTHS, THE FOOD YOU BOUGHT JUST DIDN'T LAST AND YOU DIDN'T HAVE MONEY TO GET MORE.: NEVER TRUE

## 2024-07-31 SDOH — ECONOMIC STABILITY: INCOME INSECURITY: HOW HARD IS IT FOR YOU TO PAY FOR THE VERY BASICS LIKE FOOD, HOUSING, MEDICAL CARE, AND HEATING?: NOT HARD AT ALL

## 2024-07-31 SDOH — ECONOMIC STABILITY: FOOD INSECURITY: WITHIN THE PAST 12 MONTHS, YOU WORRIED THAT YOUR FOOD WOULD RUN OUT BEFORE YOU GOT MONEY TO BUY MORE.: NEVER TRUE

## 2024-07-31 ASSESSMENT — ENCOUNTER SYMPTOMS
SINUS PRESSURE: 0
CONSTIPATION: 0
SHORTNESS OF BREATH: 0

## 2024-07-31 NOTE — PROGRESS NOTES
Lizzy Soares (:  1940) is a 84 y.o. female,Established patient, here for evaluation of the following chief complaint(s):  Other (Fall on 2024)      Assessment & Plan    Diagnosis Orders   1. Contusion of chest wall, unspecified laterality, initial encounter        2. Anxiety        3. Essential hypertension          Use some fiber con along with the banana each day  See me in 6 months       Subjective   HPI  She states she fell about 3 weeks ago  She tripped going through a door  There is soreness to both anterior ribs  The lorazepam helps with the anxiety and shaking  There is diarrhea at times and will use one imodium    Review of Systems   Constitutional:  Positive for fatigue.   HENT:  Negative for sinus pressure.    Eyes:  Negative for visual disturbance.   Respiratory:  Negative for shortness of breath.    Cardiovascular:  Negative for chest pain.   Gastrointestinal:  Negative for constipation.   Genitourinary: Negative.    Musculoskeletal:  Positive for arthralgias and myalgias.   Skin:  Negative for rash.   Neurological:  Positive for weakness. Negative for headaches.      The patient's medications, allergies, past medical problems, surgical, social, and family histories were reviewed and updated as needed.    Objective   Physical Exam  Constitutional:       Appearance: Normal appearance. She is well-developed.   HENT:      Head: Normocephalic and atraumatic.   Eyes:      General: No scleral icterus.     Conjunctiva/sclera: Conjunctivae normal.   Neck:      Trachea: No tracheal deviation.   Cardiovascular:      Rate and Rhythm: Normal rate and regular rhythm.      Heart sounds: Murmur (3/6 upper left sternal border) heard.   Pulmonary:      Effort: Pulmonary effort is normal.      Breath sounds: Normal breath sounds.   Skin:     General: Skin is warm and dry.   Neurological:      General: No focal deficit present.      Mental Status: She is alert.   Psychiatric:         Behavior:

## 2024-08-04 LAB — URINE CULTURE, ROUTINE: ABNORMAL

## 2024-08-05 ENCOUNTER — TELEPHONE (OUTPATIENT)
Dept: FAMILY MEDICINE CLINIC | Age: 84
End: 2024-08-05

## 2024-08-05 DIAGNOSIS — N39.0 ACUTE UTI: Primary | ICD-10-CM

## 2024-08-05 RX ORDER — GRANULES FOR ORAL 3 G/1
3 POWDER ORAL ONCE
Qty: 1 EACH | Refills: 0 | Status: SHIPPED | OUTPATIENT
Start: 2024-08-05 | End: 2024-08-05

## 2024-08-05 NOTE — TELEPHONE ENCOUNTER
There was an infection and I will send in an atb that is just one dose. Urine check here in 2 weeks.

## 2024-08-09 ENCOUNTER — TELEPHONE (OUTPATIENT)
Dept: FAMILY MEDICINE CLINIC | Age: 84
End: 2024-08-09

## 2024-08-09 NOTE — TELEPHONE ENCOUNTER
Pt called stating she had diarrhea she stated she has been eating an bland diet and pt stated she has been taking diarrhea medication with no help she stated she doesn't feel well she stated she can't even get her hair done because she doesn't feel well    Pt is asking what she can do please call pt once addressed [phone]

## 2024-08-09 NOTE — TELEPHONE ENCOUNTER
No fever   No unusual smell  Light brown in color   No visible blood  Fosfomycin on Monday 8/5/2024

## 2024-08-22 ENCOUNTER — LAB (OUTPATIENT)
Dept: FAMILY MEDICINE CLINIC | Age: 84
End: 2024-08-22

## 2024-08-22 ENCOUNTER — TELEPHONE (OUTPATIENT)
Dept: FAMILY MEDICINE CLINIC | Age: 84
End: 2024-08-22

## 2024-08-22 DIAGNOSIS — R35.0 URINARY FREQUENCY: Primary | ICD-10-CM

## 2024-08-22 LAB
BACTERIA URINE, POC: ABNORMAL
BILIRUBIN URINE: 0 MG/DL
BLOOD, URINE: POSITIVE
CASTS URINE, POC: ABNORMAL
CLARITY, UA: ABNORMAL
COLOR, UA: YELLOW
CRYSTALS URINE, POC: ABNORMAL
EPI CELLS URINE, POC: ABNORMAL
GLUCOSE URINE: NEGATIVE
KETONES, URINE: NEGATIVE
LEUKOCYTE EST, POC: POSITIVE
NITRITE, URINE: NEGATIVE
PH UA: 6.5 (ref 4.5–8)
PROTEIN UA: POSITIVE
RBC URINE, POC: ABNORMAL
SPECIFIC GRAVITY UA: 1 (ref 1–1.03)
UROBILINOGEN, URINE: NORMAL
WBC URINE, POC: ABNORMAL
YEAST URINE, POC: ABNORMAL

## 2024-08-22 PROCEDURE — 81000 URINALYSIS NONAUTO W/SCOPE: CPT | Performed by: FAMILY MEDICINE

## 2024-08-25 LAB — URINE CULTURE, ROUTINE: ABNORMAL

## 2024-08-26 ENCOUNTER — TELEPHONE (OUTPATIENT)
Dept: FAMILY MEDICINE CLINIC | Age: 84
End: 2024-08-26

## 2024-08-26 NOTE — TELEPHONE ENCOUNTER
----- Message from Dr. Shailesh Antoine MD sent at 8/26/2024  2:13 PM EDT -----  Let her know that the urine culture does not require an ATB.

## 2024-09-09 ENCOUNTER — LAB (OUTPATIENT)
Dept: FAMILY MEDICINE CLINIC | Age: 84
End: 2024-09-09

## 2024-09-09 DIAGNOSIS — R35.0 URINARY FREQUENCY: Primary | ICD-10-CM

## 2024-09-09 LAB
BACTERIA URINE, POC: ABNORMAL
BILIRUBIN URINE: 0 MG/DL
BLOOD, URINE: POSITIVE
CASTS URINE, POC: ABNORMAL
CLARITY, UA: ABNORMAL
COLOR, UA: ABNORMAL
CRYSTALS URINE, POC: ABNORMAL
EPI CELLS URINE, POC: ABNORMAL
GLUCOSE URINE: ABNORMAL
KETONES, URINE: NEGATIVE
LEUKOCYTE EST, POC: ABNORMAL
NITRITE, URINE: NEGATIVE
PH UA: 7.5 (ref 4.5–8)
PROTEIN UA: POSITIVE
RBC URINE, POC: ABNORMAL
SPECIFIC GRAVITY UA: 1 (ref 1–1.03)
UROBILINOGEN, URINE: NORMAL
WBC URINE, POC: ABNORMAL
YEAST URINE, POC: ABNORMAL

## 2024-09-09 PROCEDURE — 81000 URINALYSIS NONAUTO W/SCOPE: CPT | Performed by: FAMILY MEDICINE

## 2024-09-09 RX ORDER — AMOXICILLIN 250 MG/1
250 CAPSULE ORAL 3 TIMES DAILY
Qty: 30 CAPSULE | Refills: 0 | Status: SHIPPED | OUTPATIENT
Start: 2024-09-09 | End: 2024-09-12 | Stop reason: ALTCHOICE

## 2024-09-12 RX ORDER — SULFAMETHOXAZOLE/TRIMETHOPRIM 800-160 MG
1 TABLET ORAL 2 TIMES DAILY
Qty: 10 TABLET | Refills: 0 | Status: SHIPPED | OUTPATIENT
Start: 2024-09-12 | End: 2024-09-17 | Stop reason: SDUPTHER

## 2024-09-17 ENCOUNTER — LAB (OUTPATIENT)
Dept: FAMILY MEDICINE CLINIC | Age: 84
End: 2024-09-17

## 2024-09-17 ENCOUNTER — TELEPHONE (OUTPATIENT)
Dept: FAMILY MEDICINE CLINIC | Age: 84
End: 2024-09-17

## 2024-09-17 DIAGNOSIS — R35.0 URINARY FREQUENCY: Primary | ICD-10-CM

## 2024-09-17 LAB
BACTERIA URINE, POC: ABNORMAL
BILIRUBIN URINE: 0 MG/DL
BLOOD, URINE: POSITIVE
CASTS URINE, POC: ABNORMAL
CLARITY, UA: ABNORMAL
COLOR, UA: YELLOW
CRYSTALS URINE, POC: ABNORMAL
EPI CELLS URINE, POC: ABNORMAL
GLUCOSE URINE: NEGATIVE
KETONES, URINE: POSITIVE
LEUKOCYTE EST, POC: POSITIVE
NITRITE, URINE: ABNORMAL
PH UA: 6 (ref 4.5–8)
PROTEIN UA: POSITIVE
RBC URINE, POC: ABNORMAL
SPECIFIC GRAVITY UA: 1.01 (ref 1–1.03)
UROBILINOGEN, URINE: ABNORMAL
WBC URINE, POC: ABNORMAL
YEAST URINE, POC: ABNORMAL

## 2024-09-17 PROCEDURE — 81000 URINALYSIS NONAUTO W/SCOPE: CPT | Performed by: FAMILY MEDICINE

## 2024-09-17 RX ORDER — SULFAMETHOXAZOLE/TRIMETHOPRIM 800-160 MG
1 TABLET ORAL 2 TIMES DAILY
Qty: 10 TABLET | Refills: 0 | Status: SHIPPED | OUTPATIENT
Start: 2024-09-17 | End: 2024-09-22

## 2024-09-20 DIAGNOSIS — R35.0 URINARY FREQUENCY: Primary | ICD-10-CM

## 2024-09-20 DIAGNOSIS — N30.00 ACUTE CYSTITIS WITHOUT HEMATURIA: ICD-10-CM

## 2024-09-20 LAB — URINE CULTURE, ROUTINE: ABNORMAL

## 2024-09-20 RX ORDER — GRANULES FOR ORAL 3 G/1
3 POWDER ORAL ONCE
Qty: 1 EACH | Refills: 0 | Status: SHIPPED | OUTPATIENT
Start: 2024-09-20 | End: 2024-09-20

## 2024-10-01 ENCOUNTER — OFFICE VISIT (OUTPATIENT)
Dept: FAMILY MEDICINE CLINIC | Age: 84
End: 2024-10-01

## 2024-10-01 ENCOUNTER — TELEPHONE (OUTPATIENT)
Dept: FAMILY MEDICINE CLINIC | Age: 84
End: 2024-10-01

## 2024-10-01 DIAGNOSIS — R35.0 URINARY FREQUENCY: Primary | ICD-10-CM

## 2024-10-01 LAB
BACTERIA URINE, POC: ABNORMAL
BILIRUBIN URINE: 0 MG/DL
BLOOD, URINE: POSITIVE
CASTS URINE, POC: ABNORMAL
CLARITY, UA: ABNORMAL
COLOR, UA: YELLOW
CRYSTALS URINE, POC: ABNORMAL
EPI CELLS URINE, POC: ABNORMAL
GLUCOSE URINE: ABNORMAL
KETONES, URINE: NEGATIVE
LEUKOCYTE EST, POC: ABNORMAL
NITRITE, URINE: POSITIVE
PH UA: 7.5 (ref 4.5–8)
PROTEIN UA: POSITIVE
RBC URINE, POC: ABNORMAL
SPECIFIC GRAVITY UA: 1 (ref 1–1.03)
UROBILINOGEN, URINE: NORMAL
WBC URINE, POC: ABNORMAL
YEAST URINE, POC: ABNORMAL

## 2024-10-01 PROCEDURE — 81000 URINALYSIS NONAUTO W/SCOPE: CPT | Performed by: FAMILY MEDICINE

## 2024-10-02 RX ORDER — SULFAMETHOXAZOLE/TRIMETHOPRIM 800-160 MG
1 TABLET ORAL 2 TIMES DAILY
Qty: 14 TABLET | Refills: 0 | Status: SHIPPED | OUTPATIENT
Start: 2024-10-02 | End: 2024-10-05 | Stop reason: ALTCHOICE

## 2024-10-02 NOTE — TELEPHONE ENCOUNTER
Patient called requesting something for her urinary symptoms.  States that she has been in the bathroom \"tinkling\" since 3:00 this morning.    Patient thinks that if she starts taking some Sulfa it might help.    She says she doesn't have any energy. And feels sick.  Offered an appt but she wants to wait until the culture is back.    Uses [facility]    Please call her back

## 2024-10-04 LAB — URINE CULTURE, ROUTINE: ABNORMAL

## 2024-10-05 RX ORDER — LEVOFLOXACIN 500 MG/1
500 TABLET, FILM COATED ORAL DAILY
Qty: 7 TABLET | Refills: 0 | OUTPATIENT
Start: 2024-10-05 | End: 2024-10-12

## 2024-10-07 ENCOUNTER — TELEPHONE (OUTPATIENT)
Dept: FAMILY MEDICINE CLINIC | Age: 84
End: 2024-10-07

## 2024-10-07 RX ORDER — LEVOFLOXACIN 500 MG/1
500 TABLET, FILM COATED ORAL DAILY
Qty: 7 TABLET | Refills: 0 | Status: SHIPPED | OUTPATIENT
Start: 2024-10-07 | End: 2024-10-14

## 2024-10-07 NOTE — TELEPHONE ENCOUNTER
Date of last visit:  10/1/2024  Date of next visit:  Visit date not found    Requested Prescriptions     Signed Prescriptions Disp Refills    levoFLOXacin (LEVAQUIN) 500 MG tablet 7 tablet 0     Sig: Take 1 tablet by mouth daily for 7 days     Authorizing Provider: MARLINE PURVIS     Ordering User: NONI PARRY       levoFLOXacin (LEVAQUIN) 500 MG tablet 7 tablet 0 10/5/2024 10/12/2024    Sig - Route: Take 1 tablet by mouth daily for 7 days - Oral    Class: Phone In      [facility] informed by Phone.

## 2024-10-07 NOTE — TELEPHONE ENCOUNTER
----- Message from Dr. Shailesh Antoine MD sent at 10/5/2024  9:43 PM EDT -----  Let her know the urine has an infection that the bactrim will not help. Please call in adeline levoquin 500 mg, one PO daily for 5 days. Check another urine specimen here in 2 weeks.

## 2024-10-07 NOTE — TELEPHONE ENCOUNTER
Pt called asking about evoFLOXacin (LEVAQUIN) 500 MG tablet  Take 1 tablet by mouth daily for 7 days 7 tab supply (Arash wrote script but it never got sent or called in), Pharmacy does not have the script, I called them and I checked notes, don't see anything that says saying it was sent or phoned in. Pt is still taking the Bactrim till she gets the new script.    [facility]    Please call pt once addressed

## 2024-10-28 ENCOUNTER — OFFICE VISIT (OUTPATIENT)
Dept: FAMILY MEDICINE CLINIC | Age: 84
End: 2024-10-28

## 2024-10-28 ENCOUNTER — TELEPHONE (OUTPATIENT)
Dept: FAMILY MEDICINE CLINIC | Age: 84
End: 2024-10-28

## 2024-10-28 DIAGNOSIS — R35.0 URINARY FREQUENCY: Primary | ICD-10-CM

## 2024-10-28 LAB
BACTERIA URINE, POC: ABNORMAL
BILIRUBIN URINE: 0 MG/DL
BLOOD, URINE: POSITIVE
CASTS URINE, POC: ABNORMAL
CLARITY, UA: ABNORMAL
COLOR, UA: YELLOW
CRYSTALS URINE, POC: ABNORMAL
EPI CELLS URINE, POC: ABNORMAL
GLUCOSE URINE: ABNORMAL
KETONES, URINE: NEGATIVE
LEUKOCYTE EST, POC: ABNORMAL
NITRITE, URINE: POSITIVE
PH UA: 7 (ref 4.5–8)
PROTEIN UA: POSITIVE
RBC URINE, POC: ABNORMAL
SPECIFIC GRAVITY UA: 1 (ref 1–1.03)
UROBILINOGEN, URINE: NORMAL
WBC URINE, POC: ABNORMAL
YEAST URINE, POC: ABNORMAL

## 2024-10-28 PROCEDURE — 81000 URINALYSIS NONAUTO W/SCOPE: CPT | Performed by: FAMILY MEDICINE

## 2024-10-30 ENCOUNTER — TELEPHONE (OUTPATIENT)
Dept: FAMILY MEDICINE CLINIC | Age: 84
End: 2024-10-30

## 2024-10-30 NOTE — TELEPHONE ENCOUNTER
Because of her numerous infections I need to see the result of the culture so as to reduce the risk of resistant infections. The culture is not ready yet.

## 2024-10-30 NOTE — TELEPHONE ENCOUNTER
Pt called to req an atb she is having burning urgency frequency    Please send to [facility] pharmacy    Please call pt once addressed

## 2024-10-31 NOTE — TELEPHONE ENCOUNTER
Pt called checking on urine culture.    Pt stated she gave the urine for culture on 10-28-24.    Pt stated she is still having the same symptoms    [facility] .

## 2024-11-01 LAB — URINE CULTURE, ROUTINE: ABNORMAL

## 2024-11-01 RX ORDER — LEVOFLOXACIN 250 MG/1
250 TABLET, FILM COATED ORAL DAILY
Qty: 3 TABLET | Refills: 0 | Status: SHIPPED | OUTPATIENT
Start: 2024-11-01 | End: 2024-11-04

## 2024-11-01 RX ORDER — LEVOFLOXACIN 250 MG/1
250 TABLET, FILM COATED ORAL DAILY
Qty: 3 TABLET | Refills: 0 | Status: SHIPPED | OUTPATIENT
Start: 2024-11-01 | End: 2024-11-01 | Stop reason: SDUPTHER

## 2024-11-01 NOTE — TELEPHONE ENCOUNTER
The urine culture showed an infection. I will send an ATB and I need her to give us a urine specimen in 2 weeks to be sure it's gone.

## 2024-11-01 NOTE — TELEPHONE ENCOUNTER
Lizzy informed by Phone.  Patient said she can't take atb that she was last on said it had max in it.  Made her dizzy and she could hardly stand

## 2024-11-01 NOTE — TELEPHONE ENCOUNTER
The one I sent does not have max in the name and is the lowest strength they make and just for 3 days.

## 2024-11-06 ENCOUNTER — TELEPHONE (OUTPATIENT)
Dept: FAMILY MEDICINE CLINIC | Age: 84
End: 2024-11-06

## 2024-11-06 DIAGNOSIS — N39.0 RECURRENT UTI: Primary | ICD-10-CM

## 2024-11-06 NOTE — TELEPHONE ENCOUNTER
Darius informed via phone.  Will sent referral to dr. Ward and they will call to schedule patient.

## 2024-11-06 NOTE — TELEPHONE ENCOUNTER
Son, Darius called requesting a referral to Dr Ivan Ward due to her battling a UTI for the past six weeks.    Darius can be reached at [phone]

## 2024-11-07 ENCOUNTER — OFFICE VISIT (OUTPATIENT)
Dept: FAMILY MEDICINE CLINIC | Age: 84
End: 2024-11-07

## 2024-11-07 VITALS
SYSTOLIC BLOOD PRESSURE: 132 MMHG | RESPIRATION RATE: 12 BRPM | WEIGHT: 114 LBS | BODY MASS INDEX: 20.98 KG/M2 | DIASTOLIC BLOOD PRESSURE: 80 MMHG | HEART RATE: 68 BPM | HEIGHT: 62 IN

## 2024-11-07 DIAGNOSIS — R53.1 WEAKNESS: ICD-10-CM

## 2024-11-07 DIAGNOSIS — N39.0 RECURRENT UTI: ICD-10-CM

## 2024-11-07 DIAGNOSIS — R35.0 URINARY FREQUENCY: ICD-10-CM

## 2024-11-07 DIAGNOSIS — I48.0 PAROXYSMAL ATRIAL FIBRILLATION (HCC): Primary | ICD-10-CM

## 2024-11-07 NOTE — PROGRESS NOTES
Lizzy Soares (:  1940) is a 84 y.o. female,Established patient, here for evaluation of the following chief complaint(s):  Anxiety         Assessment & Plan  Paroxysmal atrial fibrillation (HCC)       Orders:  •  DME Order for Walker as OP    Urinary frequency       Orders:  •  POC URINE with Microscopic    Recurrent UTI       Orders:  •  POC URINE with Microscopic    Weakness       Orders:  •  TSH; Future  •  Comprehensive Metabolic Panel; Future  •  CBC with Auto Differential; Future  •  Vitamin B12; Future           Subjective   HPI  We discussed how her weight is down from before.  It seems that she will have weakness at times  He son is here and doesn't feel it's from the ativan  We discussed how she completed the levoquin but that could be part of her troubles with the intolerance of it  Review of Systems   Constitutional:  Positive for fatigue.   HENT:  Negative for sinus pressure.    Eyes:  Negative for visual disturbance.   Respiratory:  Negative for shortness of breath.    Cardiovascular:  Negative for chest pain.   Gastrointestinal:  Negative for constipation.   Genitourinary:  Positive for frequency.   Musculoskeletal:  Positive for arthralgias, gait problem and myalgias.   Skin:  Negative for rash.   Neurological:  Positive for weakness. Negative for headaches.      The patient's medications, allergies, past medical problems, surgical, social, and family histories were reviewed and updated as needed.    Objective   Physical Exam  Constitutional:       Appearance: Normal appearance. She is well-developed.   HENT:      Head: Normocephalic and atraumatic.   Eyes:      General: No scleral icterus.     Conjunctiva/sclera: Conjunctivae normal.   Neck:      Trachea: No tracheal deviation.   Cardiovascular:      Rate and Rhythm: Normal rate and regular rhythm.      Heart sounds: Normal heart sounds.   Pulmonary:      Effort: Pulmonary effort is normal.      Breath sounds: Normal breath sounds.

## 2024-11-08 ENCOUNTER — TELEPHONE (OUTPATIENT)
Dept: FAMILY MEDICINE CLINIC | Age: 84
End: 2024-11-08

## 2024-11-08 ENCOUNTER — OFFICE VISIT (OUTPATIENT)
Dept: FAMILY MEDICINE CLINIC | Age: 84
End: 2024-11-08

## 2024-11-08 DIAGNOSIS — R35.0 URINARY FREQUENCY: Primary | ICD-10-CM

## 2024-11-08 DIAGNOSIS — N30.00 ACUTE CYSTITIS WITHOUT HEMATURIA: ICD-10-CM

## 2024-11-08 LAB
BACTERIA URINE, POC: ABNORMAL
BILIRUBIN URINE: 0 MG/DL
BLOOD, URINE: POSITIVE
CASTS URINE, POC: ABNORMAL
CLARITY, UA: ABNORMAL
COLOR, UA: YELLOW
CRYSTALS URINE, POC: ABNORMAL
EPI CELLS URINE, POC: ABNORMAL
GLUCOSE URINE: ABNORMAL
KETONES, URINE: NEGATIVE
LEUKOCYTE EST, POC: 125
NITRITE, URINE: POSITIVE
PH UA: 6 (ref 4.5–8)
PROTEIN UA: ABNORMAL
RBC URINE, POC: ABNORMAL
SPECIFIC GRAVITY UA: 1.01 (ref 1–1.03)
UROBILINOGEN, URINE: NORMAL
WBC URINE, POC: ABNORMAL
YEAST URINE, POC: ABNORMAL

## 2024-11-08 RX ORDER — GRANULES FOR ORAL 3 G/1
3 POWDER ORAL ONCE
Qty: 1 EACH | Refills: 0 | Status: SHIPPED | OUTPATIENT
Start: 2024-11-08 | End: 2024-11-08

## 2024-11-08 NOTE — PROGRESS NOTES
Medication was sent to pharmacy for pt. Urine was also sent for culture. Pt informed by telephone.

## 2024-11-08 NOTE — TELEPHONE ENCOUNTER
Pt's son Darius called really requesting an antibiotic for mother. He stated that she has scheduled eye injections on Tuesday that are very costly and have to be premade and she can not miss this appointment. He stated that he doesn't think he can get a urine sample to bring in and stated that she did have an infection about a week ago. They stated they would like the prescription to get sent asap and stated fosfomycin really works for patient. [facility] is the pharmacy.

## 2024-11-11 ENCOUNTER — TELEPHONE (OUTPATIENT)
Dept: FAMILY MEDICINE CLINIC | Age: 84
End: 2024-11-11

## 2024-11-11 DIAGNOSIS — N30.00 ACUTE CYSTITIS WITHOUT HEMATURIA: Primary | ICD-10-CM

## 2024-11-11 LAB — URINE CULTURE, ROUTINE: ABNORMAL

## 2024-11-11 RX ORDER — GRANULES FOR ORAL 3 G/1
3 POWDER ORAL ONCE
Qty: 1 EACH | Refills: 0 | Status: SHIPPED | OUTPATIENT
Start: 2024-11-11 | End: 2024-11-11

## 2024-11-11 RX ORDER — METHENAMINE HIPPURATE 1000 MG/1
1 TABLET ORAL 2 TIMES DAILY WITH MEALS
Qty: 60 TABLET | Refills: 1 | Status: SHIPPED | OUTPATIENT
Start: 2024-11-11

## 2024-11-11 NOTE — TELEPHONE ENCOUNTER
She states that the pharmacy can not get the fosfomycin till tomorrow and she's nervous about it. She's taken another dose of the ativan.  I told her she should be fine an to begin the Hipprex the day following the fosfomycin.   She sees Dr Ward 11/18 and I reminded her that he will be managing all her bladder concerns from then on.

## 2024-11-11 NOTE — TELEPHONE ENCOUNTER
I will repeat the fosfomycin and when she finishes that begin the hipprex one twice everyday. Hopefully that will sterilize the urine. Have they heard when the appointment with Dr Ward will be.

## 2024-11-11 NOTE — TELEPHONE ENCOUNTER
----- Message from Dr. Shailesh Antoine MD sent at 11/11/2024  2:37 PM EST -----  Let her know that the urine culture from 11/8 did show infection. Did she use the fosfomycin after the last appointment with me. How is she feeling now.

## 2024-11-11 NOTE — TELEPHONE ENCOUNTER
Pt's son, Nicolás carroll said that she is still having urinary frequency.  Had diarrhea this morning not sure if related to the fosfomycin.  The fosfomycin seemed to help a little.  Burning sensation did go away and stayed away so far.      [facility]

## 2024-11-11 NOTE — RESULT ENCOUNTER NOTE
Let her know that the urine culture from 11/8 did show infection. Did she use the fosfomycin after the last appointment with me. How is she feeling now.

## 2024-11-25 ENCOUNTER — TELEPHONE (OUTPATIENT)
Dept: CARDIOLOGY CLINIC | Age: 84
End: 2024-11-25

## 2024-11-25 NOTE — TELEPHONE ENCOUNTER
Patient left message complaining of swelling   Called patient back and left message to call office back.

## 2024-11-26 NOTE — TELEPHONE ENCOUNTER
Pt calling back with complaints of ankles being terribly swollen. Denies SOB and abdominal bloating. No water pills. Metoprolol is prescribed 25 mg 4x daily. She states she has been taking a \"few extra\" during the day because she gets this \"nervousness inside of her\" and the metoprolol helps to calm her down. She doesn't have specific BP and HR readings. States BP varies, sometimes on the lower side up to 150s. HR goes up easily.     Offered appointment with Brandan. Patient declined mid-level appointment.     Please advise.

## 2024-11-27 ENCOUNTER — OFFICE VISIT (OUTPATIENT)
Dept: CARDIOLOGY CLINIC | Age: 84
End: 2024-11-27
Payer: MEDICARE

## 2024-11-27 VITALS
BODY MASS INDEX: 20.98 KG/M2 | WEIGHT: 114 LBS | HEART RATE: 72 BPM | HEIGHT: 62 IN | DIASTOLIC BLOOD PRESSURE: 70 MMHG | SYSTOLIC BLOOD PRESSURE: 119 MMHG

## 2024-11-27 DIAGNOSIS — R60.0 LEG EDEMA: ICD-10-CM

## 2024-11-27 DIAGNOSIS — I10 ESSENTIAL HYPERTENSION: ICD-10-CM

## 2024-11-27 DIAGNOSIS — R06.09 DYSPNEA ON EXERTION: ICD-10-CM

## 2024-11-27 DIAGNOSIS — I34.0 NONRHEUMATIC MITRAL VALVE REGURGITATION: Primary | ICD-10-CM

## 2024-11-27 DIAGNOSIS — I38 VALVULAR HEART DISEASE: ICD-10-CM

## 2024-11-27 DIAGNOSIS — I48.0 PAROXYSMAL ATRIAL FIBRILLATION (HCC): ICD-10-CM

## 2024-11-27 PROCEDURE — 1090F PRES/ABSN URINE INCON ASSESS: CPT | Performed by: STUDENT IN AN ORGANIZED HEALTH CARE EDUCATION/TRAINING PROGRAM

## 2024-11-27 PROCEDURE — G8484 FLU IMMUNIZE NO ADMIN: HCPCS | Performed by: STUDENT IN AN ORGANIZED HEALTH CARE EDUCATION/TRAINING PROGRAM

## 2024-11-27 PROCEDURE — 1036F TOBACCO NON-USER: CPT | Performed by: STUDENT IN AN ORGANIZED HEALTH CARE EDUCATION/TRAINING PROGRAM

## 2024-11-27 PROCEDURE — G8420 CALC BMI NORM PARAMETERS: HCPCS | Performed by: STUDENT IN AN ORGANIZED HEALTH CARE EDUCATION/TRAINING PROGRAM

## 2024-11-27 PROCEDURE — 1159F MED LIST DOCD IN RCRD: CPT | Performed by: STUDENT IN AN ORGANIZED HEALTH CARE EDUCATION/TRAINING PROGRAM

## 2024-11-27 PROCEDURE — G8427 DOCREV CUR MEDS BY ELIG CLIN: HCPCS | Performed by: STUDENT IN AN ORGANIZED HEALTH CARE EDUCATION/TRAINING PROGRAM

## 2024-11-27 PROCEDURE — 99214 OFFICE O/P EST MOD 30 MIN: CPT | Performed by: STUDENT IN AN ORGANIZED HEALTH CARE EDUCATION/TRAINING PROGRAM

## 2024-11-27 PROCEDURE — 1124F ACP DISCUSS-NO DSCNMKR DOCD: CPT | Performed by: STUDENT IN AN ORGANIZED HEALTH CARE EDUCATION/TRAINING PROGRAM

## 2024-11-27 RX ORDER — LEVOFLOXACIN 500 MG/1
500 TABLET, FILM COATED ORAL DAILY
COMMUNITY
Start: 2024-11-19

## 2024-11-27 RX ORDER — FUROSEMIDE 20 MG/1
20 TABLET ORAL DAILY
Qty: 30 TABLET | Refills: 1 | Status: SHIPPED | OUTPATIENT
Start: 2024-11-27

## 2024-11-27 NOTE — PATIENT INSTRUCTIONS
Take lasix/furosemide 3-5 days. Monitor blood pressure and weight.     Check blood work early next week.       You may receive a survey regarding the care you received during your visit. We encourage you to complete and return your survey, as your input is valuable to us. We hope you will choose us in the future for your healthcare needs. Thank you!    Your Medical Assistants today: Nohemi Lange  Thank you for coming to our office! It was a pleasure to serve you.

## 2024-11-27 NOTE — PROGRESS NOTES
types were placed in this encounter.      Disposition:    F/u with DR Jacobsen as scheduled or sooner if needed.    Discussed use, benefit, and side effects of prescribed medications. All patient questions answered. Pt voiced understanding. Instructed to continue current medications, diet and exercise. Continue risk factor modification and medical management. Patient agreed with treatment plan. Follow up as directed.    Electronically signedby Connor Mortimer, PA-C on 11/27/2024 at 2:56 PM

## 2024-12-02 ENCOUNTER — TELEPHONE (OUTPATIENT)
Dept: CARDIOLOGY CLINIC | Age: 84
End: 2024-12-02

## 2024-12-02 NOTE — TELEPHONE ENCOUNTER
Pt called in stating she took the Lasix 20mg daily but doesn't feel like it has improved her leg swelling at all.   She does feel short of breath and doesn't weight herself.   She gets her echo tomorrow.  I advised her to get her labs drawn tomorrow as well.

## 2024-12-03 ENCOUNTER — HOSPITAL ENCOUNTER (OUTPATIENT)
Age: 84
Discharge: HOME | End: 2024-12-05
Payer: MEDICARE

## 2024-12-03 ENCOUNTER — HOSPITAL ENCOUNTER (OUTPATIENT)
Age: 84
Discharge: HOME | End: 2024-12-03
Payer: MEDICARE

## 2024-12-03 DIAGNOSIS — R06.09 DYSPNEA ON EXERTION: ICD-10-CM

## 2024-12-03 DIAGNOSIS — R60.0 LEG EDEMA: ICD-10-CM

## 2024-12-03 DIAGNOSIS — I34.0 NONRHEUMATIC MITRAL VALVE REGURGITATION: ICD-10-CM

## 2024-12-03 DIAGNOSIS — I38 VALVULAR HEART DISEASE: ICD-10-CM

## 2024-12-03 LAB
ANION GAP SERPL CALC-SCNC: 12 MEQ/L (ref 8–16)
BUN SERPL-MCNC: 6 MG/DL (ref 7–22)
CALCIUM SERPL-MCNC: 9.5 MG/DL (ref 8.5–10.5)
CHLORIDE SERPL-SCNC: 90 MEQ/L (ref 98–111)
CO2 SERPL-SCNC: 25 MEQ/L (ref 23–33)
CREAT SERPL-MCNC: 0.3 MG/DL (ref 0.4–1.2)
ECHO AR MAX VEL PISA: 4.5 M/S
ECHO AV CUSP MM: 1.5 CM
ECHO AV MEAN GRADIENT: 5 MMHG
ECHO AV MEAN VELOCITY: 1 M/S
ECHO AV PEAK GRADIENT: 9 MMHG
ECHO AV PEAK VELOCITY: 1.5 M/S
ECHO AV REGURGITANT PHT: 436 MS
ECHO AV VELOCITY RATIO: 0.8
ECHO AV VTI: 29.7 CM
ECHO EST RA PRESSURE: 3 MMHG
ECHO LA AREA 2C: 21.1 CM2
ECHO LA AREA 4C: 21.5 CM2
ECHO LA DIAMETER: 3.5 CM
ECHO LA MAJOR AXIS: 5.6 CM
ECHO LA MINOR AXIS: 5.7 CM
ECHO LA VOL BP: 63 ML (ref 22–52)
ECHO LA VOL MOD A2C: 64 ML (ref 22–52)
ECHO LA VOL MOD A4C: 62 ML (ref 22–52)
ECHO LV E' LATERAL VELOCITY: 6 CM/S
ECHO LV E' SEPTAL VELOCITY: 5.9 CM/S
ECHO LV EDV A2C: 56 ML
ECHO LV EDV A4C: 117 ML
ECHO LV EJECTION FRACTION A2C: 53 %
ECHO LV EJECTION FRACTION A4C: 64 %
ECHO LV EJECTION FRACTION BIPLANE: 60 % (ref 55–100)
ECHO LV ESV A2C: 26 ML
ECHO LV ESV A4C: 42 ML
ECHO LV FRACTIONAL SHORTENING: 34 % (ref 28–44)
ECHO LV INTERNAL DIMENSION DIASTOLIC: 5.6 CM (ref 3.9–5.3)
ECHO LV INTERNAL DIMENSION SYSTOLIC: 3.7 CM
ECHO LV ISOVOLUMETRIC RELAXATION TIME (IVRT): 81 MS
ECHO LV IVSD: 0.8 CM (ref 0.6–0.9)
ECHO LV MASS 2D: 165 G (ref 67–162)
ECHO LV POSTERIOR WALL DIASTOLIC: 0.8 CM (ref 0.6–0.9)
ECHO LV RELATIVE WALL THICKNESS RATIO: 0.29
ECHO LVOT AV VTI INDEX: 0.94
ECHO LVOT MEAN GRADIENT: 3 MMHG
ECHO LVOT PEAK GRADIENT: 6 MMHG
ECHO LVOT PEAK VELOCITY: 1.2 M/S
ECHO LVOT VTI: 27.9 CM
ECHO MV A VELOCITY: 0.66 M/S
ECHO MV E DECELERATION TIME (DT): 243 MS
ECHO MV E VELOCITY: 0.78 M/S
ECHO MV E/A RATIO: 1.18
ECHO MV E/E' LATERAL: 13
ECHO MV E/E' RATIO (AVERAGED): 13.11
ECHO MV E/E' SEPTAL: 13.22
ECHO MV REGURGITANT ALIASING (NYQUIST) VELOCITY: 31 CM/S
ECHO MV REGURGITANT PEAK GRADIENT: 159 MMHG
ECHO MV REGURGITANT PEAK VELOCITY: 6.3 M/S
ECHO MV REGURGITANT RADIUS PISA: 1.6 CM
ECHO MV REGURGITANT VTIA: 219 CM
ECHO PV MAX VELOCITY: 0.8 M/S
ECHO PV PEAK GRADIENT: 3 MMHG
ECHO RIGHT VENTRICULAR SYSTOLIC PRESSURE (RVSP): 38 MMHG
ECHO RV TAPSE: 2.8 CM (ref 1.7–?)
ECHO TV E WAVE: 0.4 M/S
ECHO TV REGURGITANT MAX VELOCITY: 2.96 M/S
ECHO TV REGURGITANT PEAK GRADIENT: 35 MMHG
GFR SERPL CREATININE-BSD FRML MDRD: > 90 ML/MIN/1.73M2
GLUCOSE SERPL-MCNC: 90 MG/DL (ref 70–108)
NT-PROBNP SERPL IA-MCNC: 361.9 PG/ML (ref 0–449)
POTASSIUM SERPL-SCNC: 4.1 MEQ/L (ref 3.5–5.2)
SODIUM SERPL-SCNC: 127 MEQ/L (ref 135–145)

## 2024-12-03 PROCEDURE — 93306 TTE W/DOPPLER COMPLETE: CPT

## 2024-12-03 PROCEDURE — 36415 COLL VENOUS BLD VENIPUNCTURE: CPT

## 2024-12-03 PROCEDURE — 80048 BASIC METABOLIC PNL TOTAL CA: CPT

## 2024-12-03 PROCEDURE — 93306 TTE W/DOPPLER COMPLETE: CPT | Performed by: NUCLEAR MEDICINE

## 2024-12-03 PROCEDURE — 83880 ASSAY OF NATRIURETIC PEPTIDE: CPT

## 2024-12-04 ENCOUNTER — TELEPHONE (OUTPATIENT)
Dept: CARDIOLOGY CLINIC | Age: 84
End: 2024-12-04

## 2024-12-04 NOTE — TELEPHONE ENCOUNTER
LM for patients son to return call.  At time of call, openings in [facility]'s next Tues. Would they be willing to be seen there?

## 2024-12-04 NOTE — TELEPHONE ENCOUNTER
Spoke to patient's son Cain.  They cannot do next Tuesday d/t prev scheduled appointment with Dr Rodriguez for eye injections for macular degeneration.  No soon appts to offer.  Cain okay with office calling with a cancellation spot.  Need to keep open and follow for opening.

## 2024-12-04 NOTE — TELEPHONE ENCOUNTER
----- Message from Connor Mortimer, PA-C sent at 12/4/2024  7:52 AM EST -----  Noted results. Ann-Marie to review.

## 2024-12-05 NOTE — LETTER
[employer], an artificial intelligence clinical decision support software, has identified that Your patient, GIANFRANCO BYNUM (1940), meets the criteria for Severe Mitral Regurgitation based on the echo performed on 12/03/2024. Per ACC/AHA Valvular Heart disease guidelines, an intervention may be indicated. A referral requires a multifactorial decision outside the purview of the algorithm. For determining appropriate referral, please evaluate the patientâ€™s record.     If you find a referral is necessary and the patient does not already have a cardiologist, Vanessa Sands, the [facility] Structural Heart Valve Coordinator will be happy to assist your office in connecting this patient with the [facility] multidisciplinary heart team for further evaluation. To do so, please either send an [facility] referral [facility] to Dr. Sharad Boyd, an EPIC message to Vanessa Sands or you can call [phone].  We appreciate your partnership in providing our patients the best cardiac care possible.    You are receiving this notification as part of a quality initiative using the power of artificial intelligence and the electronic health record to improve patient outcomes.     Note: It is the clinician's decision and ultimate responsibility whether to refer if clinically indicated to do so. [employer] is intended for use by a qualified healthcare professional.

## 2024-12-09 ENCOUNTER — OFFICE VISIT (OUTPATIENT)
Dept: CARDIOLOGY CLINIC | Age: 84
End: 2024-12-09
Payer: MEDICARE

## 2024-12-09 VITALS — HEART RATE: 60 BPM | SYSTOLIC BLOOD PRESSURE: 142 MMHG | DIASTOLIC BLOOD PRESSURE: 84 MMHG

## 2024-12-09 DIAGNOSIS — I34.0 NONRHEUMATIC MITRAL VALVE REGURGITATION: ICD-10-CM

## 2024-12-09 DIAGNOSIS — R60.0 LEG EDEMA: Primary | ICD-10-CM

## 2024-12-09 DIAGNOSIS — I10 PRIMARY HYPERTENSION: ICD-10-CM

## 2024-12-09 DIAGNOSIS — F41.9 ANXIETY: ICD-10-CM

## 2024-12-09 DIAGNOSIS — I48.21 PERMANENT ATRIAL FIBRILLATION (HCC): ICD-10-CM

## 2024-12-09 PROCEDURE — 1159F MED LIST DOCD IN RCRD: CPT | Performed by: NUCLEAR MEDICINE

## 2024-12-09 PROCEDURE — 99214 OFFICE O/P EST MOD 30 MIN: CPT | Performed by: NUCLEAR MEDICINE

## 2024-12-09 PROCEDURE — G8420 CALC BMI NORM PARAMETERS: HCPCS | Performed by: NUCLEAR MEDICINE

## 2024-12-09 PROCEDURE — 1036F TOBACCO NON-USER: CPT | Performed by: NUCLEAR MEDICINE

## 2024-12-09 PROCEDURE — G8427 DOCREV CUR MEDS BY ELIG CLIN: HCPCS | Performed by: NUCLEAR MEDICINE

## 2024-12-09 PROCEDURE — 1090F PRES/ABSN URINE INCON ASSESS: CPT | Performed by: NUCLEAR MEDICINE

## 2024-12-09 PROCEDURE — G8484 FLU IMMUNIZE NO ADMIN: HCPCS | Performed by: NUCLEAR MEDICINE

## 2024-12-09 PROCEDURE — 1124F ACP DISCUSS-NO DSCNMKR DOCD: CPT | Performed by: NUCLEAR MEDICINE

## 2024-12-09 RX ORDER — FUROSEMIDE 40 MG/1
40 TABLET ORAL DAILY
Qty: 90 TABLET | Refills: 3 | Status: SHIPPED | OUTPATIENT
Start: 2024-12-09

## 2024-12-09 RX ORDER — POTASSIUM CHLORIDE 1500 MG/1
20 TABLET, EXTENDED RELEASE ORAL DAILY
Qty: 90 TABLET | Refills: 3 | Status: SHIPPED | OUTPATIENT
Start: 2024-12-09

## 2024-12-09 RX ORDER — LORAZEPAM 1 MG/1
1 TABLET ORAL DAILY PRN
Qty: 30 TABLET | Refills: 5 | Status: SHIPPED | OUTPATIENT
Start: 2024-12-09 | End: 2025-06-07

## 2024-12-09 NOTE — TELEPHONE ENCOUNTER
Pt called to req an refill on the following    LORazepam (ATIVAN) 1 MG tablet   Take 1 tablet by mouth daily as needed for Anxiety     Please send to [facility]

## 2024-12-09 NOTE — TELEPHONE ENCOUNTER
Date of last visit:  11/8/2024  Date of next visit:  Visit date not found    Requested Prescriptions     Pending Prescriptions Disp Refills    LORazepam (ATIVAN) 1 MG tablet 30 tablet 5     Sig: Take 1 tablet by mouth daily as needed for Anxiety for up to 180 days. She needs an appointment for more. Max Daily Amount: 1 mg

## 2024-12-09 NOTE — PROGRESS NOTES
cyanosis, good peripheral pulses  Neurological:   Awake, alert, oriented. No obvious focal deficits  Musculoskelatal:  No obvious deformities   BP (!) 142/84   Pulse 60 Comment: irregular    Assessment:  Assessment & Plan    Diagnosis Orders   1. Leg edema        2. Permanent atrial fibrillation (HCC)        3. Primary hypertension        4. Nonrheumatic mitral valve regurgitation        Severe MR  LE edema  Possible CHF  On xeralto already for A fib         Plan:  No follow-ups on file.  As above  Discussed options  Conservative vs mitral clip   Discussed more diuretics  CHF clinic referral   Continue risk factor modification and medical management  Thank you for allowing me to participate in the care of your patient. Please don't hesitate to contact me regarding any further issues related to the patient care    Orders Placed:  No orders of the defined types were placed in this encounter.      Prescribed:  No orders of the defined types were placed in this encounter.         Discussed use, benefit, and side effects of prescribed medications. All patient questions answered. Pt voicedunderstanding. Instructed to continue current medications, diet and exercise. Continue risk factor modification and medical management. Patient agreed with treatment plan. Follow up as directed.    Electronically signedby Crys Jacinto MD on 12/9/2024 at 10:39 AM

## 2024-12-11 NOTE — TELEPHONE ENCOUNTER
Pt called stating she take's this medication more than once daily if she need's pt has 3 or 4 pill's left

## 2024-12-24 ENCOUNTER — TELEPHONE (OUTPATIENT)
Dept: CARDIOLOGY CLINIC | Age: 84
End: 2024-12-24

## 2024-12-24 NOTE — TELEPHONE ENCOUNTER
Pita Owens, RN  [facility] Clinical Staff  Left several message to call CHF clinic to sched new patient appointment  No return calls    I called patient and spoke to patient and her son, Cain-on HIPAA picked up.   He states they saw Dr. Forde last week and they are going to do everything through them.  Thanks.

## 2025-01-23 ENCOUNTER — HOSPITAL ENCOUNTER (EMERGENCY)
Age: 85
Discharge: HOME | End: 2025-01-23
Payer: MEDICARE

## 2025-01-23 VITALS
RESPIRATION RATE: 20 BRPM | SYSTOLIC BLOOD PRESSURE: 107 MMHG | DIASTOLIC BLOOD PRESSURE: 55 MMHG | BODY MASS INDEX: 18.4 KG/M2 | HEIGHT: 66 IN | TEMPERATURE: 98 F | HEART RATE: 77 BPM | OXYGEN SATURATION: 96 %

## 2025-01-23 DIAGNOSIS — L03.213 PERIORBITAL CELLULITIS OF LEFT EYE: Primary | ICD-10-CM

## 2025-01-23 PROCEDURE — 99213 OFFICE O/P EST LOW 20 MIN: CPT | Performed by: NURSE PRACTITIONER

## 2025-01-23 PROCEDURE — 99213 OFFICE O/P EST LOW 20 MIN: CPT

## 2025-01-23 ASSESSMENT — PAIN - FUNCTIONAL ASSESSMENT: PAIN_FUNCTIONAL_ASSESSMENT: NONE - DENIES PAIN

## 2025-01-23 ASSESSMENT — ENCOUNTER SYMPTOMS
RHINORRHEA: 0
SORE THROAT: 0
NAUSEA: 0
SHORTNESS OF BREATH: 0
DIARRHEA: 0
CHEST TIGHTNESS: 0
VOMITING: 0
COUGH: 0

## 2025-01-23 NOTE — ED PROVIDER NOTES
[facility]  Urgent Care Encounter       CHIEF COMPLAINT       Chief Complaint   Patient presents with    Rash     yesterday       Nurses Notes reviewed and I agree except as noted in the HPI.  HISTORY OF PRESENT ILLNESS   Lizzy Soares is a 84 y.o. female who presents to the [facility] urgent care for evaluation of a rash.  Patient and son report rash started yesterday.  Reports she did note a small amount of blood on her forehead where the rash initially started.  Does report pain with palpation.  Denies pruritus.  Denies discharge.    The history is provided by the patient. No  was used.       REVIEW OF SYSTEMS     Review of Systems   Constitutional:  Negative for activity change, appetite change, chills, fatigue and fever.   HENT:  Negative for ear discharge, ear pain, rhinorrhea and sore throat.    Respiratory:  Negative for cough, chest tightness and shortness of breath.    Cardiovascular:  Negative for chest pain.   Gastrointestinal:  Negative for diarrhea, nausea and vomiting.   Genitourinary:  Negative for dysuria.   Skin:  Positive for rash.   Allergic/Immunologic: Negative for environmental allergies and food allergies.   Neurological:  Negative for dizziness and headaches.       PAST MEDICAL HISTORY         Diagnosis Date    Osteoporosis of multiple sites without pathological fracture 10/2017    Squamous cell carcinoma of skin 2016    Vascular headache 1980's    Vitamin D deficiency 10/2017       SURGICALHISTORY     Patient  has a past surgical history that includes meniscectomy (Left, 5/2013); bladder repair (9/2014); Total knee arthroplasty (Left, 4/2015); Skin cancer excision (09/2016); and hip surgery (Left, 8/14/2022).    CURRENT MEDICATIONS       Discharge Medication List as of 1/23/2025 11:12 AM        CONTINUE these medications which have NOT CHANGED    Details   !! furosemide (LASIX) 40 MG tablet Take 1 tablet by mouth daily, Disp-90 tablet, R-3Normal

## 2025-01-23 NOTE — DISCHARGE INSTR - COC
Continuity of Care Form    Patient Name: Lizzy Soares   :  1940  MRN:  [id number]    Admit date:  2025  Discharge date:  ***    Code Status Order: Prior   Advance Directives:   Advance Care Flowsheet Documentation             Admitting Physician:  No admitting provider for patient encounter.  PCP: Shailesh Antoine MD    Discharging Nurse: ***  Discharging Hospital Unit/Room#:   Discharging Unit Phone Number: ***    Emergency Contact:   Extended Emergency Contact Information  Primary Emergency Contact: Nicolás Soares  Home Phone: [phone]  Mobile Phone: [phone]  Relation: Child    Past Surgical History:  Past Surgical History:   Procedure Laterality Date    BLADDER REPAIR  2014    HIP SURGERY Left 2022    LEFT HIP BEE  ARTHROPLASTY performed by Santiago [facility]jon MD at [facility] OR    MENISCECTOMY Left 2013    lateral    SKIN CANCER EXCISION  2016    Dr Blackman    TOTAL KNEE ARTHROPLASTY Left 2015       Immunization History:   Immunization History   Administered Date(s) Administered    COVID-19, PFIZER PURPLE top, DILUTE for use, (age 12 y+), 30mcg/0.3mL 2021, 2021       Active Problems:  Patient Active Problem List   Diagnosis Code    Squamous cell carcinoma of skin C44.92    Osteoporosis of multiple sites without pathological fracture M81.0    Vitamin D deficiency E55.9    Essential hypertension I10    MVP (mitral valve prolapse) I34.1    Paroxysmal atrial fibrillation (HCC) I48.0    Age-related cataract of both eyes H25.9    Displaced fracture of left femoral neck ([facility]) S72.002A    Closed fracture of left hip ([facility]) S72.002A    Hip fracture requiring operative repair, left, closed, initial encounter ([facility]) S72.002A    Hyponatremia E87.1       Isolation/Infection:   Isolation            No Isolation          Patient Infection Status       Infection Onset Added Last Indicated Last Indicated By Review Planned Expiration Resolved Resolved By    None active     Resolved    COVID-19 22 COVID-19, Rapid   22 Infection     S&S ,  +                          Nurse Assessment:  Last Vital Signs: BP (!) 107/55   Pulse 77   Temp 98 °F (36.7 °C) (Temporal)   Resp 20   Ht 1.676 m (5' 6\")   SpO2 96%   BMI 18.40 kg/m²     Last documented pain score (0-10 scale):    Last Weight:   Wt Readings from Last 1 Encounters:   24 51.7 kg (114 lb)     Mental Status:  {IP PT MENTAL STATUS:}    IV Access:  { BRITTNY IV ACCESS:821813452}    Nursing Mobility/ADLs:  Walking   {CHP DME ADLs:250041162}  Transfer  {CHP DME ADLs:234389228}  Bathing  {CHP DME ADLs:403326210}  Dressing  {CHP DME ADLs:107185785}  Toileting  {CHP DME ADLs:927207857}  Feeding  {CHP DME ADLs:374610126}  Med Admin  {CHP DME ADLs:798315863}  Med Delivery   { BRITTNY MED Delivery:745069893}    Wound Care Documentation and Therapy:  Incision 22 Hip Lateral;Left (Active)   Number of days: 892        Elimination:  Continence:   Bowel: {YES / NO:}  Bladder: {YES / NO:}  Urinary Catheter: {Urinary Catheter:900839670}   Colostomy/Ileostomy/Ileal Conduit: {YES / NO:}       Date of Last BM: ***  No intake or output data in the 24 hours ending 25 1121  No intake/output data recorded.    Safety Concerns:     { BRITTNY Safety Concerns:031306920}    Impairments/Disabilities:      { BRITTNY Impairments/Disabilities:004477453}    Nutrition Therapy:  Current Nutrition Therapy:   { BRITTNY Diet List:992018191}    Routes of Feeding: {CHP DME Other Feedings:346287662}  Liquids: {Slp liquid thickness:08297}  Daily Fluid Restriction: {CHP DME Yes amt example:934369453}  Last Modified Barium Swallow with Video (Video Swallowing Test): {Done Not Done Date:044401711}    Treatments at the Time of Hospital Discharge:   Respiratory Treatments: ***  Oxygen Therapy:  {Therapy; copd oxygen:82274}  Ventilator:    { CC Vent List:013065544}    Rehab Therapies: {THERAPEUTIC

## 2025-01-23 NOTE — ED NOTES
Pt with complaints of a rash to the right side of face that started yesterday. Denies any changes. Denies taking any medications.     Uvaldo Preston LPN  01/23/25 1025

## 2025-01-24 ENCOUNTER — HOSPITAL ENCOUNTER (EMERGENCY)
Age: 85
Discharge: HOME | End: 2025-01-24
Payer: MEDICARE

## 2025-01-24 VITALS
HEART RATE: 87 BPM | RESPIRATION RATE: 18 BRPM | DIASTOLIC BLOOD PRESSURE: 69 MMHG | SYSTOLIC BLOOD PRESSURE: 123 MMHG | WEIGHT: 114 LBS | BODY MASS INDEX: 18.4 KG/M2 | OXYGEN SATURATION: 98 %

## 2025-01-24 DIAGNOSIS — B02.7 DISSEMINATED HERPES ZOSTER: Primary | ICD-10-CM

## 2025-01-24 PROCEDURE — 99213 OFFICE O/P EST LOW 20 MIN: CPT

## 2025-01-24 PROCEDURE — 99213 OFFICE O/P EST LOW 20 MIN: CPT | Performed by: NURSE PRACTITIONER

## 2025-01-24 RX ORDER — VALACYCLOVIR HYDROCHLORIDE 1 G/1
1000 TABLET, FILM COATED ORAL 3 TIMES DAILY
Qty: 21 TABLET | Refills: 0 | Status: SHIPPED | OUTPATIENT
Start: 2025-01-24 | End: 2025-01-31

## 2025-01-24 ASSESSMENT — PAIN - FUNCTIONAL ASSESSMENT: PAIN_FUNCTIONAL_ASSESSMENT: 0-10

## 2025-01-24 ASSESSMENT — ENCOUNTER SYMPTOMS
EYE PAIN: 1
EYE REDNESS: 0
PHOTOPHOBIA: 0

## 2025-01-24 ASSESSMENT — PAIN DESCRIPTION - LOCATION: LOCATION: HEAD;FACE

## 2025-01-25 NOTE — ED PROVIDER NOTES
[facility]  Urgent Care Encounter       CHIEF COMPLAINT       Chief Complaint   Patient presents with    Herpes Zoster       Nurses Notes reviewed and I agree except as noted in the HPI.  HISTORY OF PRESENT ILLNESS   Lizzy Soares is a 84 y.o. female who presents with complaints of worsened red rash to the left side of her face.  Patient was seen in urgent care yesterday and treated with Augmentin for periorbital cellulitis.  She reports starting the medication but her symptoms have worsened.  She admits to a slightly painful rash to her left eye and radiating up into her forehead.  A family friend that is a nurse advised her to return for reevaluation due to concerns for shingles.  She denies any fever, vision changes, or headache.  She does admit to some discomfort when closing her eye briefly.    The history is provided by the patient and a relative.       REVIEW OF SYSTEMS     Review of Systems   Constitutional:  Negative for fever.   Eyes:  Positive for pain (left). Negative for photophobia, redness and visual disturbance.   Musculoskeletal:  Positive for myalgias.   Skin:  Positive for rash (left side of face).   Neurological:  Negative for weakness and headaches.       PAST MEDICAL HISTORY         Diagnosis Date    Osteoporosis of multiple sites without pathological fracture 10/2017    Squamous cell carcinoma of skin 2016    Vascular headache 1980's    Vitamin D deficiency 10/2017       SURGICALHISTORY     Patient  has a past surgical history that includes meniscectomy (Left, 5/2013); bladder repair (9/2014); Total knee arthroplasty (Left, 4/2015); Skin cancer excision (09/2016); and hip surgery (Left, 8/14/2022).    CURRENT MEDICATIONS       Previous Medications    ACETAMINOPHEN (TYLENOL) 500 MG TABLET    Take 1 tablet by mouth 3 times daily as needed for Pain    D-MANNOSE 500 MG CAPS    Take by mouth    FUROSEMIDE (LASIX) 20 MG TABLET    Take 1 tablet by mouth daily    FUROSEMIDE  Normal rate.   Pulmonary:      Effort: Pulmonary effort is normal.   Skin:     General: Skin is warm and dry.      Findings: Rash (herpetic) present. Rash is vesicular.   Neurological:      Mental Status: She is alert and oriented to person, place, and time.         DIAGNOSTIC RESULTS     Labs:No results found for this visit on 01/24/25.    IMAGING:  None    EKG:  None    URGENT CARE COURSE:     Vitals:    01/24/25 1912   BP: 123/69   Pulse: 87   Resp: 18   SpO2: 98%   Weight: 51.7 kg (114 lb)       Medications - No data to display       PROCEDURES:  None    FINAL IMPRESSION      1. Disseminated herpes zoster      DISPOSITION/ PLAN   DISPOSITION Decision To Discharge 01/24/2025 07:21:49 PM     Patient here with shingles to the left side of her face.  Advised to stop Augmentin.  Patient started on valacyclovir for the next week.  Advised to watch for worsening involvement in the eye.  Present to the ER for worsening symptoms.  May take Tylenol for discomfort.  Information on shingles given to patient.  Patient discharged in stable condition with her family member.    PATIENT REFERRED TO:  Shailesh Antoine MD  [address] [place] [address] / [place] [address]      DISCHARGE MEDICATIONS:  New Prescriptions    VALACYCLOVIR (VALTREX) 1 G TABLET    Take 1 tablet by mouth 3 times daily for 7 days       Discontinued Medications    AMOXICILLIN-CLAVULANATE (AUGMENTIN) 875-125 MG PER TABLET    Take 1 tablet by mouth 2 times daily for 7 days       Current Discharge Medication List          TERRENCE Garcia CNP    (Please note that portions of this note were completed with a voice recognition program. Efforts were made to edit the dictations but occasionally words are mis-transcribed.)            Bravo Kingston APRN - CNP  01/24/25 1932

## 2025-01-25 NOTE — ED NOTES
Painful rash above the left eye going back to the parietal scalp. Left eyelid is swollen.  Patient and son state that it has gotten worse since she was seen yesterday.      Charlotte Alonso RN  01/24/25 1920

## 2025-02-10 ENCOUNTER — TELEPHONE (OUTPATIENT)
Dept: WOUND CARE | Age: 85
End: 2025-02-10

## 2025-02-10 NOTE — TELEPHONE ENCOUNTER
Patient's son called to schedule patient. Talked with patient and her son and patient does not want to come to an appointment for about a month due to her not being up to it.   Called Cindy at Dr Ward's office and left message updating her with this information.

## 2025-02-10 NOTE — TELEPHONE ENCOUNTER
----- Message from Anastasiya PITTMAN sent at 2/10/2025  1:00 PM EST -----   [phone] BREANA @ DR MCKOY'S OFFICE AT [facility] LEFT A VM THAT DR MCKOY TALKED TO DR HAMILTON AND HE AGREED TO SEE GIANFRANCO IN THE WOUND CLINIC OFFICE. PLEASE CALL OFFICE BACK TO SCHEDULE

## 2025-02-10 NOTE — TELEPHONE ENCOUNTER
----- Message from Anastasiya PITTMAN sent at 2/10/2025  1:00 PM EST -----   [phone] BREANA @ DR MCKOY'S OFFICE AT [facility] LEFT A VM THAT DR MCKOY TALKED TO DR HAMILTON AND HE AGREED TO SEE GIANFRANCO IN THE WOUND CLINIC OFFICE. PLEASE CALL OFFICE BACK TO SCHEDULE

## 2025-02-10 NOTE — TELEPHONE ENCOUNTER
Called and spoke to Cindy at Dr Ward's office and I offered to call patient to schedule. She stated to call patient's son Nicolás to schedule. Called son, voicemail left to call back.

## 2025-03-05 ENCOUNTER — HOSPITAL ENCOUNTER (OUTPATIENT)
Dept: WOUND CARE | Age: 85
Discharge: HOME | End: 2025-03-05
Attending: INTERNAL MEDICINE
Payer: MEDICARE

## 2025-03-05 VITALS
RESPIRATION RATE: 18 BRPM | OXYGEN SATURATION: 98 % | SYSTOLIC BLOOD PRESSURE: 134 MMHG | TEMPERATURE: 98.6 F | HEART RATE: 70 BPM | DIASTOLIC BLOOD PRESSURE: 63 MMHG

## 2025-03-05 DIAGNOSIS — N39.0 URINARY TRACT INFECTION WITHOUT HEMATURIA, SITE UNSPECIFIED: Primary | ICD-10-CM

## 2025-03-05 PROCEDURE — 99213 OFFICE O/P EST LOW 20 MIN: CPT

## 2025-03-05 NOTE — CONSULTS
[facility]         Consult and Procedure Note      Lizzy Soares  MEDICAL RECORD NUMBER:  [id number]  AGE: 84 y.o.   GENDER: female  : 1940  EPISODE DATE:  3/5/2025    Subjective:   Recurrent UTI  HISTORY OF PRESENT ILLNESS     She is an 84-year-old female patient referred for evaluation and treatment and possible need of IV antibiotics to treat recurrent UTI patient had received multiple antibiotics including Levaquin fosfomycin continues to have dysuria.  She was referred by urology.  She had reaction to Cipro hence they would like possible IV antibiotics.  She denies any fever or chills she has no fever or chills no flank pain no history of stones.  PAST MEDICAL HISTORY                 Diagnosis Date    Osteoporosis of multiple sites without pathological fracture 10/2017    Squamous cell carcinoma of skin     Vascular headache     Vitamin D deficiency 10/2017     PAST SURGICAL HISTORY     PAST SURGICAL HISTORY    Past Surgical History:   Procedure Laterality Date    BLADDER REPAIR  2014    HIP SURGERY Left 2022    LEFT HIP BEE  ARTHROPLASTY performed by Santiago Morejon MD at [facility] OR    MENISCECTOMY Left 2013    lateral    SKIN CANCER EXCISION  2016    Dr Blackman    TOTAL KNEE ARTHROPLASTY Left 2015     FAMILY HISTORY         History reviewed. No pertinent family history.    SOCIAL HISTORY       Social History     Tobacco Use    Smoking status: Never    Smokeless tobacco: Never   Substance Use Topics    Alcohol use: No    Drug use: No       ALLERGIES       Allergies   Allergen Reactions    Ciprofloxacin Other (See Comments)     dizziness    Clindamycin/Lincomycin Hives    Hydrocod Fabricio-Chlorphe Fabricio Er      anxious    Levaquin [Levofloxacin] Dizziness or Vertigo    Macrobid [Nitrofurantoin Macrocrystal] Diarrhea    Paxil [Paroxetine Hcl] Other (See Comments)     dizzy    Morphine Nausea And Vomiting         MEDICATIONS       Current Outpatient Medications on File

## 2025-03-05 NOTE — PATIENT INSTRUCTIONS
Visit Discharge/Physician Orders:  - Drink plenty of water throughout a day  - Urine culture ordered today ( pee a little first then use cup to collect urine)  - Discussed antibiotic options today, will likely need IV infusions    [facility]  [address] [place], [place] [address]    OR    [address] [place] [address]       Follow up visit:   as needed, call if you have any concerns               Keep next scheduled appointment. Please give 24 hour notice if unable to keep appointment. [phone]    If you experience any of the following, please call the Wound Care Service during business hours: Monday through Friday 8:00 am - 4:30 pm  ([phone]).   *Increase in pain   *Temperature over 101   *Increase in drainage from your wound or a foul odor   *Uncontrolled swelling   *Need for compression bandage changes due to slippage, breakthrough drainage    If you need medical attention outside of business hours, please contact your Primary Care Doctor or go to the nearest emergency room.

## 2025-03-08 ENCOUNTER — LAB (OUTPATIENT)
Dept: LAB | Age: 85
End: 2025-03-08

## 2025-03-08 LAB
AMORPH SED URNS QL MICRO: ABNORMAL
BACTERIA URNS QL MICRO: ABNORMAL /HPF
BILIRUB UR QL STRIP.AUTO: NEGATIVE
CASTS #/AREA URNS LPF: ABNORMAL /LPF
CASTS 2: ABNORMAL /LPF
CHARACTER UR: ABNORMAL
COLOR, UA: YELLOW
CRYSTALS URNS MICRO: ABNORMAL
EPITHELIAL CELLS, UA: ABNORMAL /HPF
GLUCOSE UR QL STRIP.AUTO: NEGATIVE MG/DL
HGB UR QL STRIP.AUTO: ABNORMAL
KETONES UR QL STRIP.AUTO: NEGATIVE
MISCELLANEOUS 2: ABNORMAL
MUCOUS THREADS URNS QL MICRO: ABNORMAL
NITRITE UR QL STRIP: POSITIVE
PH UR STRIP.AUTO: 7 [PH] (ref 5–9)
PROT UR STRIP.AUTO-MCNC: ABNORMAL MG/DL
RBC URINE: ABNORMAL /HPF
RENAL EPI CELLS #/AREA URNS HPF: ABNORMAL /[HPF]
SP GR UR REFRACT.AUTO: 1.01 (ref 1–1.03)
UROBILINOGEN, URINE: 0.2 EU/DL (ref 0–1)
WBC #/AREA URNS HPF: ABNORMAL /HPF
WBC #/AREA URNS HPF: ABNORMAL /[HPF]
YEAST LIKE FUNGI URNS QL MICRO: ABNORMAL

## 2025-03-09 LAB
BACTERIA UR CULT: ABNORMAL
ORGANISM: ABNORMAL

## 2025-03-10 ENCOUNTER — TELEPHONE (OUTPATIENT)
Dept: WOUND CARE | Age: 85
End: 2025-03-10

## 2025-03-10 DIAGNOSIS — B96.5 PSEUDOMONAS URINARY TRACT INFECTION: Primary | ICD-10-CM

## 2025-03-10 DIAGNOSIS — N39.0 PSEUDOMONAS URINARY TRACT INFECTION: Primary | ICD-10-CM

## 2025-03-10 LAB
BACTERIA UR CULT: ABNORMAL
ORGANISM: ABNORMAL

## 2025-03-10 RX ORDER — SODIUM CHLORIDE 0.9 % (FLUSH) 0.9 %
5-40 SYRINGE (ML) INJECTION PRN
Status: CANCELLED | OUTPATIENT
Start: 2025-03-11

## 2025-03-10 NOTE — TELEPHONE ENCOUNTER
Written orders received from Dr Kumari for patient this morning. Orders faxed to Outpatient Nursing per his request. Attempted to update patient, no answer at this time. VM left to return call.

## 2025-03-11 ENCOUNTER — HOSPITAL ENCOUNTER (OUTPATIENT)
Dept: NURSING | Age: 85
Discharge: HOME | End: 2025-03-11
Payer: MEDICARE

## 2025-03-11 VITALS
HEART RATE: 73 BPM | RESPIRATION RATE: 18 BRPM | DIASTOLIC BLOOD PRESSURE: 71 MMHG | TEMPERATURE: 97.3 F | SYSTOLIC BLOOD PRESSURE: 129 MMHG | OXYGEN SATURATION: 95 %

## 2025-03-11 DIAGNOSIS — N39.0 PSEUDOMONAS URINARY TRACT INFECTION: Primary | ICD-10-CM

## 2025-03-11 DIAGNOSIS — B96.5 PSEUDOMONAS URINARY TRACT INFECTION: Primary | ICD-10-CM

## 2025-03-11 PROCEDURE — 96365 THER/PROPH/DIAG IV INF INIT: CPT

## 2025-03-11 PROCEDURE — 2580000003 HC RX 258: Performed by: INTERNAL MEDICINE

## 2025-03-11 PROCEDURE — 6360000002 HC RX W HCPCS: Performed by: INTERNAL MEDICINE

## 2025-03-11 RX ORDER — SODIUM CHLORIDE 0.9 % (FLUSH) 0.9 %
5-40 SYRINGE (ML) INJECTION PRN
Status: DISCONTINUED | OUTPATIENT
Start: 2025-03-11 | End: 2025-03-12 | Stop reason: HOSPADM

## 2025-03-11 RX ORDER — SODIUM CHLORIDE 0.9 % (FLUSH) 0.9 %
5-40 SYRINGE (ML) INJECTION PRN
Status: CANCELLED | OUTPATIENT
Start: 2025-03-12

## 2025-03-11 RX ADMIN — CEFEPIME 2000 MG: 2 INJECTION, POWDER, FOR SOLUTION INTRAVENOUS at 13:17

## 2025-03-11 NOTE — PROGRESS NOTES
1300 Patient in wheelchair to [facility] for Cefepime infusion. Patient verbalizes understanding of infusion. PT RIGHTS AND RESPONSIBILITIES OFFERED TO PT.    1317 Cefepime infusion started.     1347 Infusion complete. Patient tolerated well.    1405 AVS reviewed with patient and son. Verbalizes understanding. Patient discharged in wheelchair with son.       _M___ Safety:       (Environmental)  [place] to environment  Ensure ID band is correct and in place/ allergy band as needed  Assess for fall risk  Initiate fall precautions as applicable (fall band, side rails, etc.)  Call light within reach  Bed in low position/ wheels locked    _M___ Pain:       Assess pain level and characteristics  Administer analgesics as ordered  Assess effectiveness of pain management and report to MD as needed    _M___ Knowledge Deficit:  Assess baseline knowledge  Provide teaching at level of understanding  Provide teaching via preferred learning method  Evaluate teaching effectiveness    _M___ Hemodynamic/Respiratory Status:       (Pre and Post Procedure Monitoring)  Assess/Monitor vital signs and LOC  Assess Baseline SpO2 prior to any sedation  Obtain weight/height  Assess vital signs/ LOC until patient meets discharge criteria  Monitor procedure site and notify MD of any issues

## 2025-03-12 ENCOUNTER — HOSPITAL ENCOUNTER (OUTPATIENT)
Dept: NURSING | Age: 85
Discharge: HOME | End: 2025-03-12
Payer: MEDICARE

## 2025-03-12 VITALS
HEART RATE: 71 BPM | DIASTOLIC BLOOD PRESSURE: 67 MMHG | SYSTOLIC BLOOD PRESSURE: 134 MMHG | OXYGEN SATURATION: 96 % | RESPIRATION RATE: 16 BRPM | TEMPERATURE: 97.8 F

## 2025-03-12 DIAGNOSIS — N39.0 PSEUDOMONAS URINARY TRACT INFECTION: Primary | ICD-10-CM

## 2025-03-12 DIAGNOSIS — B96.5 PSEUDOMONAS URINARY TRACT INFECTION: Primary | ICD-10-CM

## 2025-03-12 PROCEDURE — 2580000003 HC RX 258: Performed by: INTERNAL MEDICINE

## 2025-03-12 PROCEDURE — 6360000002 HC RX W HCPCS: Performed by: INTERNAL MEDICINE

## 2025-03-12 PROCEDURE — 96365 THER/PROPH/DIAG IV INF INIT: CPT

## 2025-03-12 RX ORDER — SODIUM CHLORIDE 0.9 % (FLUSH) 0.9 %
5-40 SYRINGE (ML) INJECTION PRN
Status: CANCELLED | OUTPATIENT
Start: 2025-03-13

## 2025-03-12 RX ORDER — SODIUM CHLORIDE 0.9 % (FLUSH) 0.9 %
5-40 SYRINGE (ML) INJECTION PRN
Status: DISCONTINUED | OUTPATIENT
Start: 2025-03-12 | End: 2025-03-13 | Stop reason: HOSPADM

## 2025-03-12 RX ADMIN — CEFEPIME 2000 MG: 2 INJECTION, POWDER, FOR SOLUTION INTRAVENOUS at 13:12

## 2025-03-12 NOTE — PROGRESS NOTES
1302 Kellee was wheeled into room via wheelchair by son for cefepime infusion. Pt rights and responsibilities offered to her. Infusion discussed and questions were answered.     1312  cefepime infusion started and Kellee has call light within reach    1345  Infusion complete and Kellee tolerated well. D/c instructions discussed and Kellee and son verbalized understanding. Kellee was wheeled out via wheelchair by son for d/c today.           _m___ Safety:       (Environmental)  [place] to environment  Ensure ID band is correct and in place/ allergy band as needed  Assess for fall risk  Initiate fall precautions as applicable (fall band, side rails, etc.)  Call light within reach  Bed in low position/ wheels locked    _m___ Pain:       Assess pain level and characteristics  Administer analgesics as ordered  Assess effectiveness of pain management and report to MD as needed    __m__ Knowledge Deficit:  Assess baseline knowledge  Provide teaching at level of understanding  Provide teaching via preferred learning method  Evaluate teaching effectiveness    _m___ Hemodynamic/Respiratory Status:       (Pre and Post Procedure Monitoring)  Assess/Monitor vital signs and LOC  Assess Baseline SpO2 prior to any sedation  Obtain weight/height  Assess vital signs/ LOC until patient meets discharge criteria  Monitor procedure site and notify MD of any issues

## 2025-03-13 ENCOUNTER — HOSPITAL ENCOUNTER (OUTPATIENT)
Dept: NURSING | Age: 85
Discharge: HOME | End: 2025-03-13
Payer: MEDICARE

## 2025-03-13 VITALS
HEART RATE: 67 BPM | RESPIRATION RATE: 18 BRPM | OXYGEN SATURATION: 97 % | TEMPERATURE: 97.5 F | DIASTOLIC BLOOD PRESSURE: 64 MMHG | SYSTOLIC BLOOD PRESSURE: 114 MMHG

## 2025-03-13 DIAGNOSIS — N39.0 PSEUDOMONAS URINARY TRACT INFECTION: Primary | ICD-10-CM

## 2025-03-13 DIAGNOSIS — B96.5 PSEUDOMONAS URINARY TRACT INFECTION: Primary | ICD-10-CM

## 2025-03-13 PROCEDURE — 6360000002 HC RX W HCPCS: Performed by: INTERNAL MEDICINE

## 2025-03-13 PROCEDURE — 2580000003 HC RX 258: Performed by: INTERNAL MEDICINE

## 2025-03-13 PROCEDURE — 96365 THER/PROPH/DIAG IV INF INIT: CPT

## 2025-03-13 PROCEDURE — 2500000003 HC RX 250 WO HCPCS: Performed by: INTERNAL MEDICINE

## 2025-03-13 RX ORDER — SODIUM CHLORIDE 0.9 % (FLUSH) 0.9 %
5-40 SYRINGE (ML) INJECTION PRN
Status: DISCONTINUED | OUTPATIENT
Start: 2025-03-13 | End: 2025-03-14 | Stop reason: HOSPADM

## 2025-03-13 RX ORDER — SODIUM CHLORIDE 0.9 % (FLUSH) 0.9 %
5-40 SYRINGE (ML) INJECTION PRN
Status: CANCELLED | OUTPATIENT
Start: 2025-03-14

## 2025-03-13 RX ADMIN — SODIUM CHLORIDE, PRESERVATIVE FREE 10 ML: 5 INJECTION INTRAVENOUS at 13:44

## 2025-03-13 RX ADMIN — CEFEPIME 2000 MG: 2 INJECTION, POWDER, FOR SOLUTION INTRAVENOUS at 13:09

## 2025-03-13 ASSESSMENT — PAIN - FUNCTIONAL ASSESSMENT: PAIN_FUNCTIONAL_ASSESSMENT: NONE - DENIES PAIN

## 2025-03-13 NOTE — PROGRESS NOTES
Patient admitted to room 02, vitals are stable. Patient offered rights and responsibilities.     __m__ Safety:       (Environmental)  [place] to environment  Ensure ID band is correct and in place/ allergy band as needed  Assess for fall risk  Initiate fall precautions as applicable (fall band, side rails, etc.)  Call light within reach  Bed in low position/ wheels locked    __m__ Pain:       Assess pain level and characteristics  Administer analgesics as ordered  Assess effectiveness of pain management and report to MD as needed    __m__ Knowledge Deficit:  Assess baseline knowledge  Provide teaching at level of understanding  Provide teaching via preferred learning method  Evaluate teaching effectiveness    _m___ Hemodynamic/Respiratory Status:       (Pre and Post Procedure Monitoring)  Assess/Monitor vital signs and LOC  Assess Baseline SpO2 prior to any sedation  Obtain weight/height  Assess vital signs/ LOC until patient meets discharge criteria  Monitor procedure site and notify MD of any issues    _

## 2025-03-14 ENCOUNTER — HOSPITAL ENCOUNTER (OUTPATIENT)
Dept: NURSING | Age: 85
Discharge: HOME | End: 2025-03-14
Payer: MEDICARE

## 2025-03-14 VITALS
DIASTOLIC BLOOD PRESSURE: 63 MMHG | TEMPERATURE: 99 F | SYSTOLIC BLOOD PRESSURE: 102 MMHG | RESPIRATION RATE: 18 BRPM | HEART RATE: 70 BPM | OXYGEN SATURATION: 98 %

## 2025-03-14 DIAGNOSIS — N39.0 PSEUDOMONAS URINARY TRACT INFECTION: Primary | ICD-10-CM

## 2025-03-14 DIAGNOSIS — B96.5 PSEUDOMONAS URINARY TRACT INFECTION: Primary | ICD-10-CM

## 2025-03-14 PROCEDURE — 2580000003 HC RX 258: Performed by: INTERNAL MEDICINE

## 2025-03-14 PROCEDURE — 96365 THER/PROPH/DIAG IV INF INIT: CPT

## 2025-03-14 PROCEDURE — 2500000003 HC RX 250 WO HCPCS: Performed by: INTERNAL MEDICINE

## 2025-03-14 PROCEDURE — 6360000002 HC RX W HCPCS: Performed by: INTERNAL MEDICINE

## 2025-03-14 RX ORDER — SODIUM CHLORIDE 0.9 % (FLUSH) 0.9 %
5-40 SYRINGE (ML) INJECTION PRN
Status: DISCONTINUED | OUTPATIENT
Start: 2025-03-14 | End: 2025-03-15 | Stop reason: HOSPADM

## 2025-03-14 RX ORDER — SODIUM CHLORIDE 0.9 % (FLUSH) 0.9 %
5-40 SYRINGE (ML) INJECTION PRN
Status: CANCELLED | OUTPATIENT
Start: 2025-03-15

## 2025-03-14 RX ADMIN — SODIUM CHLORIDE, PRESERVATIVE FREE 10 ML: 5 INJECTION INTRAVENOUS at 13:00

## 2025-03-14 RX ADMIN — CEFEPIME 2000 MG: 2 INJECTION, POWDER, FOR SOLUTION INTRAVENOUS at 13:03

## 2025-03-14 NOTE — PROGRESS NOTES
1255 Patient arrived to [facility] in wheel chair for cefepime infusion.  Oriented to room and call light  PT RIGHTS AND RESPONSIBILITIES OFFERED TO PT.    1300 iv flushed and she denies complaints    1303 cefepime started and she denies complaints     1333 Medication completed and she denies complaints.  Iv flushed and alcohol cap placed.  Discharge instructions given and explained and she denies questions.  Discharged in wheel chair     _M___ Safety:       (Environmental)  [place] to environment  Ensure ID band is correct and in place/ allergy band as needed  Assess for fall risk  Initiate fall precautions as applicable (fall band, side rails, etc.)  Call light within reach  Bed in low position/ wheels locked    _M___ Pain:       Assess pain level and characteristics  Administer analgesics as ordered  Assess effectiveness of pain management and report to MD as needed    _M___ Knowledge Deficit:  Assess baseline knowledge  Provide teaching at level of understanding  Provide teaching via preferred learning method  Evaluate teaching effectiveness    __M__ Hemodynamic/Respiratory Status:       (Pre and Post Procedure Monitoring)  Assess/Monitor vital signs and LOC  Assess Baseline SpO2 prior to any sedation  Obtain weight/height  Assess vital signs/ LOC until patient meets discharge criteria  Monitor procedure site and notify MD of any issues

## 2025-03-15 ENCOUNTER — HOSPITAL ENCOUNTER (OUTPATIENT)
Dept: GENERAL RADIOLOGY | Age: 85
Discharge: HOME | End: 2025-03-15
Payer: MEDICARE

## 2025-03-15 VITALS
TEMPERATURE: 98 F | BODY MASS INDEX: 18.4 KG/M2 | WEIGHT: 114 LBS | SYSTOLIC BLOOD PRESSURE: 120 MMHG | HEART RATE: 60 BPM | OXYGEN SATURATION: 96 % | DIASTOLIC BLOOD PRESSURE: 72 MMHG | RESPIRATION RATE: 20 BRPM

## 2025-03-15 DIAGNOSIS — B96.5 PSEUDOMONAS URINARY TRACT INFECTION: Primary | ICD-10-CM

## 2025-03-15 DIAGNOSIS — N39.0 PSEUDOMONAS URINARY TRACT INFECTION: Primary | ICD-10-CM

## 2025-03-15 PROCEDURE — 2580000003 HC RX 258: Performed by: INTERNAL MEDICINE

## 2025-03-15 PROCEDURE — 6360000002 HC RX W HCPCS: Performed by: INTERNAL MEDICINE

## 2025-03-15 PROCEDURE — 96365 THER/PROPH/DIAG IV INF INIT: CPT

## 2025-03-15 PROCEDURE — 2500000003 HC RX 250 WO HCPCS: Performed by: INTERNAL MEDICINE

## 2025-03-15 RX ORDER — SODIUM CHLORIDE 0.9 % (FLUSH) 0.9 %
5-40 SYRINGE (ML) INJECTION PRN
Status: DISCONTINUED | OUTPATIENT
Start: 2025-03-15 | End: 2025-03-16 | Stop reason: HOSPADM

## 2025-03-15 RX ORDER — SODIUM CHLORIDE 0.9 % (FLUSH) 0.9 %
5-40 SYRINGE (ML) INJECTION PRN
Status: CANCELLED | OUTPATIENT
Start: 2025-03-15

## 2025-03-15 RX ADMIN — SODIUM CHLORIDE, PRESERVATIVE FREE 5 ML: 5 INJECTION INTRAVENOUS at 13:47

## 2025-03-15 RX ADMIN — SODIUM CHLORIDE, PRESERVATIVE FREE 10 ML: 5 INJECTION INTRAVENOUS at 13:08

## 2025-03-15 RX ADMIN — CEFEPIME 2000 MG: 2 INJECTION, POWDER, FOR SOLUTION INTRAVENOUS at 13:09

## 2025-03-15 NOTE — PROGRESS NOTES
Patient to room in wheelchair, with family member, for antibiotic infusion. Left arm INT patent. Flushes with ease. Dressing clean, dry, and intact.

## 2025-03-15 NOTE — PROGRESS NOTES
Infusion completed. Patient tolerated well. No adverse reactions noted at this time. Vital signs remain within normal limits. Discharge instructions reviewed with patient. Patient verbalized understanding. Wheelchair to private transportation at this time.

## 2025-03-15 NOTE — PROGRESS NOTES
_Met__ Safety   GOAL: Patient will not experience fall during visit.       (Environmental)  [place] to environment  Ensure ID band is correct and in place/ allergy band as needed  Assess for fall risk  Initiate fall precautions as applicable (fall band, side rails, etc.)  Call light within reach  Bed in low position/ wheels locked    __Met__ Knowledge Deficit:   GOAL:  Patient will verbalize understanding of therapy plan.   Assess baseline knowledge  Provide teaching at level of understanding  Provide teaching via preferred learning method  Evaluate teaching effectiveness    __Met__ Hemodynamic/Respiratory Status:   GOAL:  Vital signs remain within normal limits prior and post treatment.       (Pre and Post Procedure Monitoring)  Assess/Monitor vital signs and LOC  Assess Baseline SpO2 prior to any sedation  Obtain weight/height  Assess vital signs/ LOC until patient meets discharge criteria  Monitor procedure site and notify MD of any issues        CARE PLAN END DATE:   03/16/25

## 2025-03-19 ENCOUNTER — TELEPHONE (OUTPATIENT)
Dept: WOUND CARE | Age: 85
End: 2025-03-19

## 2025-03-19 NOTE — TELEPHONE ENCOUNTER
Received call from son notifying us that patient completed her IV antibiotics. He asked what they should do next? Spoke with Dr Kumari and he said we monitor patient for now. If she does not have symptom, to not send a urine culture. Call us with any concerns. Updated son with this information.

## 2025-03-28 ENCOUNTER — TELEPHONE (OUTPATIENT)
Dept: WOUND CARE | Age: 85
End: 2025-03-28

## 2025-03-28 NOTE — TELEPHONE ENCOUNTER
----- Message from Anastasiya PITTMAN sent at 3/28/2025  9:11 AM EDT -----   [phone] SAYS THAT HER BOTTOM IS REALLY SORE, IS THERE ANYTHING THAT CAN HELP?

## 2025-03-28 NOTE — TELEPHONE ENCOUNTER
Updated provider on patient's concerns that she has a UTI. Provider advised she go to urgent care to be evaluated.

## 2025-03-29 ENCOUNTER — LAB (OUTPATIENT)
Dept: LAB | Age: 85
End: 2025-03-29

## 2025-03-29 LAB
BACTERIA URNS QL MICRO: ABNORMAL /HPF
BILIRUB UR QL STRIP.AUTO: NEGATIVE
CASTS #/AREA URNS LPF: ABNORMAL /LPF
CASTS 2: ABNORMAL /LPF
CHARACTER UR: ABNORMAL
COLOR, UA: YELLOW
CRYSTALS URNS MICRO: ABNORMAL
EPITHELIAL CELLS, UA: ABNORMAL /HPF
GLUCOSE UR QL STRIP.AUTO: NEGATIVE MG/DL
HGB UR QL STRIP.AUTO: ABNORMAL
KETONES UR QL STRIP.AUTO: NEGATIVE
MISCELLANEOUS 2: ABNORMAL
NITRITE UR QL STRIP: POSITIVE
PH UR STRIP.AUTO: 7 [PH] (ref 5–9)
PROT UR STRIP.AUTO-MCNC: 30 MG/DL
RBC URINE: ABNORMAL /HPF
RENAL EPI CELLS #/AREA URNS HPF: ABNORMAL /[HPF]
SP GR UR REFRACT.AUTO: 1.01 (ref 1–1.03)
UROBILINOGEN, URINE: 0.2 EU/DL (ref 0–1)
WBC #/AREA URNS HPF: > 200 /HPF
WBC #/AREA URNS HPF: ABNORMAL /[HPF]
YEAST LIKE FUNGI URNS QL MICRO: ABNORMAL

## 2025-03-30 LAB
BACTERIA UR CULT: ABNORMAL
ORGANISM: ABNORMAL

## 2025-03-31 LAB
BACTERIA UR CULT: ABNORMAL
ORGANISM: ABNORMAL

## 2025-04-28 RX ORDER — METOPROLOL TARTRATE 25 MG/1
TABLET, FILM COATED ORAL
Qty: 360 TABLET | Refills: 1 | OUTPATIENT
Start: 2025-04-28

## 2025-04-28 RX ORDER — METOPROLOL TARTRATE 25 MG/1
TABLET, FILM COATED ORAL
Qty: 360 TABLET | Refills: 0 | OUTPATIENT
Start: 2025-04-28

## 2025-04-28 RX ORDER — METOPROLOL TARTRATE 25 MG/1
TABLET, FILM COATED ORAL
Qty: 360 TABLET | Refills: 1 | Status: SHIPPED | OUTPATIENT
Start: 2025-04-28

## 2025-04-28 NOTE — TELEPHONE ENCOUNTER
Refill already sent.    E-Prescribing Status: Receipt confirmed by pharmacy (4/28/2025  7:19 AM EDT)

## 2025-04-28 NOTE — TELEPHONE ENCOUNTER
Lizzy is requesting a refill of their   Requested Prescriptions     Pending Prescriptions Disp Refills    metoprolol tartrate (LOPRESSOR) 25 MG tablet 360 tablet 1     Refused Prescriptions Disp Refills    metoprolol tartrate (LOPRESSOR) 25 MG tablet [Pharmacy Med Name: Metoprolol Tartrate Oral Tablet 25 MG] 360 tablet 0     Sig: TAKE 1 TABLET BY MOUTH 4 TIMES A DAY     Refused By: GOLDEN GRAVES     Reason for Refusal: Duplicate Request   . Please advise.      Last Appt:  12/9/2024  Next Appt:   7/9/2025  Preferred pharmacy:     [id number] - [place], [place] - [address] - P [phone] - F [phone]

## 2025-06-11 ENCOUNTER — OFFICE VISIT (OUTPATIENT)
Dept: FAMILY MEDICINE CLINIC | Age: 85
End: 2025-06-11

## 2025-06-11 VITALS
WEIGHT: 108 LBS | BODY MASS INDEX: 17.36 KG/M2 | DIASTOLIC BLOOD PRESSURE: 60 MMHG | HEART RATE: 76 BPM | SYSTOLIC BLOOD PRESSURE: 108 MMHG | HEIGHT: 66 IN | RESPIRATION RATE: 16 BRPM

## 2025-06-11 DIAGNOSIS — I48.21 PERMANENT ATRIAL FIBRILLATION (HCC): ICD-10-CM

## 2025-06-11 DIAGNOSIS — F41.9 ANXIETY: ICD-10-CM

## 2025-06-11 PROCEDURE — 1090F PRES/ABSN URINE INCON ASSESS: CPT | Performed by: FAMILY MEDICINE

## 2025-06-11 PROCEDURE — G8427 DOCREV CUR MEDS BY ELIG CLIN: HCPCS | Performed by: FAMILY MEDICINE

## 2025-06-11 PROCEDURE — 1036F TOBACCO NON-USER: CPT | Performed by: FAMILY MEDICINE

## 2025-06-11 PROCEDURE — 99213 OFFICE O/P EST LOW 20 MIN: CPT | Performed by: FAMILY MEDICINE

## 2025-06-11 PROCEDURE — 1123F ACP DISCUSS/DSCN MKR DOCD: CPT | Performed by: FAMILY MEDICINE

## 2025-06-11 PROCEDURE — G8419 CALC BMI OUT NRM PARAM NOF/U: HCPCS | Performed by: FAMILY MEDICINE

## 2025-06-11 RX ORDER — LORAZEPAM 1 MG/1
1 TABLET ORAL DAILY PRN
Qty: 30 TABLET | Refills: 5 | Status: SHIPPED | OUTPATIENT
Start: 2025-06-11 | End: 2025-12-08

## 2025-06-11 SDOH — ECONOMIC STABILITY: FOOD INSECURITY: WITHIN THE PAST 12 MONTHS, THE FOOD YOU BOUGHT JUST DIDN'T LAST AND YOU DIDN'T HAVE MONEY TO GET MORE.: NEVER TRUE

## 2025-06-11 SDOH — ECONOMIC STABILITY: FOOD INSECURITY: WITHIN THE PAST 12 MONTHS, YOU WORRIED THAT YOUR FOOD WOULD RUN OUT BEFORE YOU GOT MONEY TO BUY MORE.: NEVER TRUE

## 2025-06-11 ASSESSMENT — PATIENT HEALTH QUESTIONNAIRE - PHQ9
1. LITTLE INTEREST OR PLEASURE IN DOING THINGS: SEVERAL DAYS
SUM OF ALL RESPONSES TO PHQ QUESTIONS 1-9: 2
2. FEELING DOWN, DEPRESSED OR HOPELESS: SEVERAL DAYS

## 2025-06-11 ASSESSMENT — ENCOUNTER SYMPTOMS
CONSTIPATION: 0
SHORTNESS OF BREATH: 0
SINUS PRESSURE: 0

## 2025-06-11 NOTE — PROGRESS NOTES
Lizzy Soares (:  1940) is a 85 y.o. female,Established patient, here for evaluation of the following chief complaint(s):  Hypertension         Assessment & Plan  Anxiety            Permanent atrial fibrillation (HCC)              See me in 6 months       Subjective   HPI  We discussed how she sees the cardiologist for the afib  The lorazepam helps and she states not feeling sedated by it.  The oars was reviewed  Review of Systems   Constitutional:  Positive for fatigue.   HENT:  Negative for sinus pressure.    Eyes:  Negative for visual disturbance.   Respiratory:  Negative for shortness of breath.    Cardiovascular:  Negative for chest pain.   Gastrointestinal:  Negative for constipation.   Genitourinary: Negative.    Musculoskeletal:  Positive for arthralgias, gait problem and myalgias.   Skin:  Negative for rash.   Neurological:  Positive for weakness. Negative for headaches.   Psychiatric/Behavioral:  The patient is nervous/anxious.       The patient's medications, allergies, past medical problems, surgical, social, and family histories were reviewed and updated as needed.    Objective   Physical Exam  Constitutional:       Appearance: Normal appearance. She is well-developed.   HENT:      Head: Normocephalic and atraumatic.   Eyes:      General: No scleral icterus.     Conjunctiva/sclera: Conjunctivae normal.   Neck:      Trachea: No tracheal deviation.   Cardiovascular:      Rate and Rhythm: Normal rate and regular rhythm.      Heart sounds: Murmur (3/6 JAKUB) heard.   Pulmonary:      Effort: Pulmonary effort is normal.      Breath sounds: Normal breath sounds.   Skin:     General: Skin is warm and dry.   Neurological:      General: No focal deficit present.      Mental Status: She is alert.   Psychiatric:         Behavior: Behavior normal.      Blood pressure 108/60, pulse 76, resp. rate 16, height 1.676 m (5' 6\"), weight 49 kg (108 lb), not currently breastfeeding.          An electronic

## 2025-07-09 ENCOUNTER — OFFICE VISIT (OUTPATIENT)
Dept: CARDIOLOGY CLINIC | Age: 85
End: 2025-07-09
Payer: MEDICARE

## 2025-07-09 VITALS — HEART RATE: 64 BPM | DIASTOLIC BLOOD PRESSURE: 70 MMHG | SYSTOLIC BLOOD PRESSURE: 142 MMHG

## 2025-07-09 DIAGNOSIS — I34.0 NONRHEUMATIC MITRAL VALVE REGURGITATION: Primary | ICD-10-CM

## 2025-07-09 DIAGNOSIS — I10 PRIMARY HYPERTENSION: ICD-10-CM

## 2025-07-09 DIAGNOSIS — I48.21 PERMANENT ATRIAL FIBRILLATION (HCC): ICD-10-CM

## 2025-07-09 PROCEDURE — 1123F ACP DISCUSS/DSCN MKR DOCD: CPT | Performed by: NUCLEAR MEDICINE

## 2025-07-09 PROCEDURE — 1090F PRES/ABSN URINE INCON ASSESS: CPT | Performed by: NUCLEAR MEDICINE

## 2025-07-09 PROCEDURE — 1036F TOBACCO NON-USER: CPT | Performed by: NUCLEAR MEDICINE

## 2025-07-09 PROCEDURE — G8419 CALC BMI OUT NRM PARAM NOF/U: HCPCS | Performed by: NUCLEAR MEDICINE

## 2025-07-09 PROCEDURE — 99214 OFFICE O/P EST MOD 30 MIN: CPT | Performed by: NUCLEAR MEDICINE

## 2025-07-09 PROCEDURE — G8428 CUR MEDS NOT DOCUMENT: HCPCS | Performed by: NUCLEAR MEDICINE

## 2025-07-09 NOTE — PROGRESS NOTES
[facility] [place] [facility]  [facility]  [address]  [address]  [facility] [address]  Dept: [phone]  Dept Fax: [phone]  Loc: [phone]    Visit Date: 7/9/2025    Lizzy Soares is a 85 y.o. female who presents todayfor:  Chief Complaint   Patient presents with    Follow-up    Shortness of Breath    Atrial Fibrillation    Hypertension   Following her MV at [facility]   Planning for mitral clip   Does have chronic dyspnea  Exertional   Dealing with recurrent UTI   Delaying her mitral clip   Know A fib and xeralto   No bleeding issues  BP is stable  No dizziness  No syncope symptoms  Very limited patient         HPI:  HPI  Past Medical History:   Diagnosis Date    Osteoporosis of multiple sites without pathological fracture 10/2017    Squamous cell carcinoma of skin 2016    Vascular headache 1980's    Vitamin D deficiency 10/2017      Past Surgical History:   Procedure Laterality Date    BLADDER REPAIR  9/2014    HIP SURGERY Left 8/14/2022    LEFT HIP BEE  ARTHROPLASTY performed by Santiago Morejon MD at [facility] OR    MENISCECTOMY Left 5/2013    lateral    SKIN CANCER EXCISION  09/2016    Dr Blackman    TOTAL KNEE ARTHROPLASTY Left 4/2015     No family history on file.  Social History     Tobacco Use    Smoking status: Never    Smokeless tobacco: Never   Substance Use Topics    Alcohol use: No      Current Outpatient Medications   Medication Sig Dispense Refill    LORazepam (ATIVAN) 1 MG tablet Take 1 tablet by mouth daily as needed for Anxiety for up to 180 days. Max Daily Amount: 1 mg 30 tablet 5    metoprolol tartrate (LOPRESSOR) 25 MG tablet TAKE 1 TABLET BY MOUTH 4 TIMES A DAY (Patient taking differently: Pt states she takes it when she needs it) 360 tablet 1    D-Mannose 500 MG CAPS Take by mouth      rivaroxaban (XARELTO) 15 MG TABS tablet Take 1 tablet by mouth daily (with breakfast) 90 tablet 3    acetaminophen (TYLENOL) 500 MG tablet Take 1 tablet by mouth 3 times daily

## 2025-07-21 NOTE — TELEPHONE ENCOUNTER
Lizzy Soares called requesting a refill on the following medications:  Requested Prescriptions     Pending Prescriptions Disp Refills    rivaroxaban (XARELTO) 15 MG TABS tablet 90 tablet 3     Sig: Take 1 tablet by mouth daily (with breakfast)     Pharmacy verified:[facility]   .pv      Date of last visit: 7/9/25   Date of next visit (if applicable): Visit date not found

## 2025-08-16 ENCOUNTER — APPOINTMENT (OUTPATIENT)
Dept: GENERAL RADIOLOGY | Age: 85
End: 2025-08-16
Payer: MEDICARE

## 2025-08-16 ENCOUNTER — HOSPITAL ENCOUNTER (EMERGENCY)
Age: 85
Discharge: HOME | End: 2025-08-16
Attending: STUDENT IN AN ORGANIZED HEALTH CARE EDUCATION/TRAINING PROGRAM
Payer: MEDICARE

## 2025-08-16 ENCOUNTER — APPOINTMENT (OUTPATIENT)
Dept: CT IMAGING | Age: 85
End: 2025-08-16
Payer: MEDICARE

## 2025-08-16 VITALS
DIASTOLIC BLOOD PRESSURE: 61 MMHG | HEART RATE: 65 BPM | WEIGHT: 107 LBS | SYSTOLIC BLOOD PRESSURE: 130 MMHG | BODY MASS INDEX: 17.27 KG/M2 | RESPIRATION RATE: 16 BRPM | OXYGEN SATURATION: 98 % | TEMPERATURE: 97.8 F

## 2025-08-16 DIAGNOSIS — S42.031A CLOSED DISPLACED FRACTURE OF ACROMIAL END OF RIGHT CLAVICLE, INITIAL ENCOUNTER: ICD-10-CM
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
## 2025-08-16 DIAGNOSIS — S40.011A CONTUSION OF RIGHT SHOULDER, INITIAL ENCOUNTER: ICD-10-CM

## 2025-08-16 LAB
EKG ATRIAL RATE: 55 BPM
EKG P AXIS: 96 DEGREES
EKG P-R INTERVAL: 178 MS
EKG Q-T INTERVAL: 390 MS
EKG QRS DURATION: 90 MS
EKG QTC CALCULATION (BAZETT): 373 MS
EKG R AXIS: -22 DEGREES
EKG T AXIS: 35 DEGREES
EKG VENTRICULAR RATE: 55 BPM

## 2025-08-16 PROCEDURE — 73030 X-RAY EXAM OF SHOULDER: CPT

## 2025-08-16 PROCEDURE — 70450 CT HEAD/BRAIN W/O DYE: CPT

## 2025-08-16 PROCEDURE — 93010 ELECTROCARDIOGRAM REPORT: CPT | Performed by: NUCLEAR MEDICINE

## 2025-08-16 PROCEDURE — 72125 CT NECK SPINE W/O DYE: CPT

## 2025-08-16 PROCEDURE — 93005 ELECTROCARDIOGRAM TRACING: CPT | Performed by: FAMILY MEDICINE

## 2025-08-16 ASSESSMENT — PAIN SCALES - GENERAL
PAINLEVEL_OUTOF10: 0
PAINLEVEL_OUTOF10: 6

## 2025-08-16 ASSESSMENT — PAIN DESCRIPTION - FREQUENCY: FREQUENCY: INTERMITTENT

## 2025-08-16 ASSESSMENT — PAIN DESCRIPTION - ORIENTATION: ORIENTATION: RIGHT

## 2025-08-16 ASSESSMENT — PAIN - FUNCTIONAL ASSESSMENT
PAIN_FUNCTIONAL_ASSESSMENT: 0-10
PAIN_FUNCTIONAL_ASSESSMENT: ACTIVITIES ARE NOT PREVENTED
PAIN_FUNCTIONAL_ASSESSMENT: 0-10

## 2025-08-16 ASSESSMENT — PAIN DESCRIPTION - LOCATION: LOCATION: SHOULDER

## 2025-08-16 ASSESSMENT — PAIN DESCRIPTION - PAIN TYPE: TYPE: ACUTE PAIN

## 2025-08-18 ENCOUNTER — TELEPHONE (OUTPATIENT)
Dept: FAMILY MEDICINE CLINIC | Age: 85
End: 2025-08-18

## 2025-08-18 ENCOUNTER — HOSPITAL ENCOUNTER (INPATIENT)
Age: 85
LOS: 3 days | Discharge: NURSING, REHAB OR LONG-TERM CARE | End: 2025-08-21
Attending: STUDENT IN AN ORGANIZED HEALTH CARE EDUCATION/TRAINING PROGRAM
Payer: MEDICARE

## 2025-08-18 ENCOUNTER — CARE COORDINATION (OUTPATIENT)
Dept: FAMILY MEDICINE CLINIC | Age: 85
End: 2025-08-18

## 2025-08-18 DIAGNOSIS — S42.031A CLOSED DISPLACED FRACTURE OF ACROMIAL END OF RIGHT CLAVICLE, INITIAL ENCOUNTER: ICD-10-CM

## 2025-08-18 DIAGNOSIS — F41.9 ANXIETY: ICD-10-CM

## 2025-08-18 DIAGNOSIS — Z78.9 UNABLE TO CARE FOR SELF: Primary | ICD-10-CM
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
## 2025-08-18 LAB
ALBUMIN SERPL BCG-MCNC: 3.8 G/DL (ref 3.4–4.9)
ALP SERPL-CCNC: 72 U/L (ref 38–126)
ALT SERPL W/O P-5'-P-CCNC: < 5 U/L (ref 10–35)
ANION GAP SERPL CALC-SCNC: 10 MEQ/L (ref 8–16)
APTT PPP: 34.1 SECONDS (ref 22–38)
AST SERPL-CCNC: 16 U/L (ref 10–35)
BACTERIA URNS QL MICRO: ABNORMAL /HPF
BASOPHILS ABSOLUTE: 0 THOU/MM3 (ref 0–0.1)
BASOPHILS NFR BLD AUTO: 0.6 %
BILIRUB SERPL-MCNC: 0.6 MG/DL (ref 0.3–1.2)
BILIRUB UR QL STRIP.AUTO: NEGATIVE
BUN SERPL-MCNC: 10 MG/DL (ref 8–23)
CALCIUM SERPL-MCNC: 9.5 MG/DL (ref 8.5–10.5)
CASTS #/AREA URNS LPF: ABNORMAL /LPF
CASTS 2: ABNORMAL /LPF
CHARACTER UR: ABNORMAL
CHLORIDE SERPL-SCNC: 95 MEQ/L (ref 98–111)
CO2 SERPL-SCNC: 22 MEQ/L (ref 22–29)
COLOR, UA: YELLOW
CREAT SERPL-MCNC: 0.4 MG/DL (ref 0.5–0.9)
CRYSTALS URNS MICRO: ABNORMAL
DEPRECATED RDW RBC AUTO: 45.3 FL (ref 35–45)
DEPRECATED RDW RBC AUTO: 46 FL (ref 35–45)
EKG ATRIAL RATE: 68 BPM
EKG ATRIAL RATE: 75 BPM
EKG P AXIS: 56 DEGREES
EKG P AXIS: 58 DEGREES
EKG P-R INTERVAL: 168 MS
EKG P-R INTERVAL: 178 MS
EKG Q-T INTERVAL: 368 MS
EKG Q-T INTERVAL: 382 MS
EKG QRS DURATION: 86 MS
EKG QRS DURATION: 86 MS
EKG QTC CALCULATION (BAZETT): 406 MS
EKG QTC CALCULATION (BAZETT): 410 MS
EKG R AXIS: -22 DEGREES
EKG R AXIS: -24 DEGREES
EKG T AXIS: 58 DEGREES
EKG T AXIS: 62 DEGREES
EKG VENTRICULAR RATE: 68 BPM
EKG VENTRICULAR RATE: 75 BPM
EOSINOPHIL NFR BLD AUTO: 0 %
EOSINOPHILS ABSOLUTE: 0 THOU/MM3 (ref 0–0.4)
EPITHELIAL CELLS, UA: ABNORMAL /HPF
ERYTHROCYTE [DISTWIDTH] IN BLOOD BY AUTOMATED COUNT: 13.5 % (ref 11.5–14.5)
ERYTHROCYTE [DISTWIDTH] IN BLOOD BY AUTOMATED COUNT: 13.5 % (ref 11.5–14.5)
GFR SERPL CREATININE-BSD FRML MDRD: > 90 ML/MIN/1.73M2
GLUCOSE SERPL-MCNC: 107 MG/DL (ref 74–109)
GLUCOSE UR QL STRIP.AUTO: NEGATIVE MG/DL
HCT VFR BLD AUTO: 38 % (ref 37–47)
HCT VFR BLD AUTO: 38.1 % (ref 37–47)
HGB BLD-MCNC: 12.9 GM/DL (ref 12–16)
HGB BLD-MCNC: 13 GM/DL (ref 12–16)
HGB UR QL STRIP.AUTO: NEGATIVE
IMM GRANULOCYTES # BLD AUTO: 0.01 THOU/MM3 (ref 0–0.07)
IMM GRANULOCYTES NFR BLD AUTO: 0.1 %
INR PPP: 1.18 (ref 0.85–1.13)
KETONES UR QL STRIP.AUTO: ABNORMAL
LYMPHOCYTES ABSOLUTE: 1.3 THOU/MM3 (ref 1–4.8)
LYMPHOCYTES NFR BLD AUTO: 19.4 %
MCH RBC QN AUTO: 31.2 PG (ref 26–33)
MCH RBC QN AUTO: 31.2 PG (ref 26–33)
MCHC RBC AUTO-ENTMCNC: 33.9 GM/DL (ref 32.2–35.5)
MCHC RBC AUTO-ENTMCNC: 34.1 GM/DL (ref 32.2–35.5)
MCV RBC AUTO: 91.4 FL (ref 81–99)
MCV RBC AUTO: 92 FL (ref 81–99)
MISCELLANEOUS 2: ABNORMAL
MONOCYTES ABSOLUTE: 0.6 THOU/MM3 (ref 0.4–1.3)
MONOCYTES NFR BLD AUTO: 9.5 %
NEUTROPHILS ABSOLUTE: 4.8 THOU/MM3 (ref 1.8–7.7)
NEUTROPHILS NFR BLD AUTO: 70.4 %
NITRITE UR QL STRIP: NEGATIVE
NRBC BLD AUTO-RTO: 0 /100 WBC
OSMOLALITY SERPL CALC.SUM OF ELEC: 254.7 MOSMOL/KG (ref 275–300)
PH UR STRIP.AUTO: 7 [PH] (ref 5–9)
PLATELET # BLD AUTO: 174 THOU/MM3 (ref 130–400)
PLATELET # BLD AUTO: 205 THOU/MM3 (ref 130–400)
PMV BLD AUTO: 10.1 FL (ref 9.4–12.4)
PMV BLD AUTO: 10.2 FL (ref 9.4–12.4)
POTASSIUM SERPL-SCNC: 4.3 MEQ/L (ref 3.5–5.2)
PROT SERPL-MCNC: 6.6 G/DL (ref 6.4–8.3)
PROT UR STRIP.AUTO-MCNC: ABNORMAL MG/DL
PROTHROMBIN TIME: 13.8 SECONDS (ref 10–13.5)
RBC # BLD AUTO: 4.13 MILL/MM3 (ref 4.2–5.4)
RBC # BLD AUTO: 4.17 MILL/MM3 (ref 4.2–5.4)
RBC URINE: ABNORMAL /HPF
RENAL EPI CELLS #/AREA URNS HPF: ABNORMAL /[HPF]
SODIUM SERPL-SCNC: 127 MEQ/L (ref 135–145)
SP GR UR REFRACT.AUTO: 1.02 (ref 1–1.03)
UROBILINOGEN, URINE: 0.2 EU/DL (ref 0–1)
WBC # BLD AUTO: 6.8 THOU/MM3 (ref 4.8–10.8)
WBC # BLD AUTO: 7.6 THOU/MM3 (ref 4.8–10.8)
WBC #/AREA URNS HPF: ABNORMAL /HPF
WBC #/AREA URNS HPF: ABNORMAL /[HPF]
YEAST LIKE FUNGI URNS QL MICRO: ABNORMAL

## 2025-08-18 PROCEDURE — 80053 COMPREHEN METABOLIC PANEL: CPT

## 2025-08-18 PROCEDURE — 6370000000 HC RX 637 (ALT 250 FOR IP)

## 2025-08-18 PROCEDURE — 36415 COLL VENOUS BLD VENIPUNCTURE: CPT

## 2025-08-18 PROCEDURE — 93005 ELECTROCARDIOGRAM TRACING: CPT | Performed by: PHYSICIAN ASSISTANT

## 2025-08-18 PROCEDURE — 85025 COMPLETE CBC W/AUTO DIFF WBC: CPT

## 2025-08-18 PROCEDURE — 2500000003 HC RX 250 WO HCPCS

## 2025-08-18 PROCEDURE — 93005 ELECTROCARDIOGRAM TRACING: CPT | Performed by: STUDENT IN AN ORGANIZED HEALTH CARE EDUCATION/TRAINING PROGRAM

## 2025-08-18 PROCEDURE — 1200000000 HC SEMI PRIVATE

## 2025-08-18 PROCEDURE — 87086 URINE CULTURE/COLONY COUNT: CPT

## 2025-08-18 PROCEDURE — 85730 THROMBOPLASTIN TIME PARTIAL: CPT

## 2025-08-18 PROCEDURE — 6820000001 HC L2 TRAUMA SURGERY EVALUATION: Performed by: PHYSICIAN ASSISTANT

## 2025-08-18 PROCEDURE — 99223 1ST HOSP IP/OBS HIGH 75: CPT | Performed by: STUDENT IN AN ORGANIZED HEALTH CARE EDUCATION/TRAINING PROGRAM

## 2025-08-18 PROCEDURE — 85610 PROTHROMBIN TIME: CPT

## 2025-08-18 PROCEDURE — 99285 EMERGENCY DEPT VISIT HI MDM: CPT

## 2025-08-18 PROCEDURE — 85027 COMPLETE CBC AUTOMATED: CPT

## 2025-08-18 PROCEDURE — 81001 URINALYSIS AUTO W/SCOPE: CPT

## 2025-08-18 RX ORDER — HEPARIN SODIUM 1000 [USP'U]/ML
60 INJECTION, SOLUTION INTRAVENOUS; SUBCUTANEOUS PRN
Status: DISCONTINUED | OUTPATIENT
Start: 2025-08-18 | End: 2025-08-18

## 2025-08-18 RX ORDER — ACETAMINOPHEN 325 MG/1
650 TABLET ORAL EVERY 6 HOURS PRN
Status: DISCONTINUED | OUTPATIENT
Start: 2025-08-18 | End: 2025-08-21 | Stop reason: HOSPADM

## 2025-08-18 RX ORDER — MAGNESIUM SULFATE IN WATER 40 MG/ML
2000 INJECTION, SOLUTION INTRAVENOUS PRN
Status: DISCONTINUED | OUTPATIENT
Start: 2025-08-18 | End: 2025-08-21 | Stop reason: HOSPADM

## 2025-08-18 RX ORDER — SODIUM CHLORIDE 9 MG/ML
INJECTION, SOLUTION INTRAVENOUS PRN
Status: DISCONTINUED | OUTPATIENT
Start: 2025-08-18 | End: 2025-08-21 | Stop reason: HOSPADM

## 2025-08-18 RX ORDER — LORAZEPAM 0.5 MG/1
0.5 TABLET ORAL 2 TIMES DAILY PRN
Status: DISCONTINUED | OUTPATIENT
Start: 2025-08-18 | End: 2025-08-21 | Stop reason: HOSPADM

## 2025-08-18 RX ORDER — ACETAMINOPHEN 650 MG/1
650 SUPPOSITORY RECTAL EVERY 6 HOURS PRN
Status: DISCONTINUED | OUTPATIENT
Start: 2025-08-18 | End: 2025-08-21 | Stop reason: HOSPADM

## 2025-08-18 RX ORDER — ONDANSETRON 4 MG/1
4 TABLET, ORALLY DISINTEGRATING ORAL EVERY 8 HOURS PRN
Status: DISCONTINUED | OUTPATIENT
Start: 2025-08-18 | End: 2025-08-21 | Stop reason: HOSPADM

## 2025-08-18 RX ORDER — POTASSIUM CHLORIDE 1500 MG/1
40 TABLET, EXTENDED RELEASE ORAL PRN
Status: DISCONTINUED | OUTPATIENT
Start: 2025-08-18 | End: 2025-08-21 | Stop reason: HOSPADM

## 2025-08-18 RX ORDER — HEPARIN SODIUM 1000 [USP'U]/ML
60 INJECTION, SOLUTION INTRAVENOUS; SUBCUTANEOUS PRN
Status: DISCONTINUED | OUTPATIENT
Start: 2025-08-19 | End: 2025-08-19

## 2025-08-18 RX ORDER — ONDANSETRON 2 MG/ML
4 INJECTION INTRAMUSCULAR; INTRAVENOUS EVERY 6 HOURS PRN
Status: DISCONTINUED | OUTPATIENT
Start: 2025-08-18 | End: 2025-08-21 | Stop reason: HOSPADM

## 2025-08-18 RX ORDER — HEPARIN SODIUM 1000 [USP'U]/ML
30 INJECTION, SOLUTION INTRAVENOUS; SUBCUTANEOUS PRN
Status: DISCONTINUED | OUTPATIENT
Start: 2025-08-18 | End: 2025-08-18

## 2025-08-18 RX ORDER — HEPARIN SODIUM 10000 [USP'U]/100ML
5-30 INJECTION, SOLUTION INTRAVENOUS CONTINUOUS
Status: DISCONTINUED | OUTPATIENT
Start: 2025-08-18 | End: 2025-08-19

## 2025-08-18 RX ORDER — POTASSIUM CHLORIDE 7.45 MG/ML
10 INJECTION INTRAVENOUS PRN
Status: DISCONTINUED | OUTPATIENT
Start: 2025-08-18 | End: 2025-08-21 | Stop reason: HOSPADM

## 2025-08-18 RX ORDER — METOPROLOL TARTRATE 25 MG/1
25 TABLET, FILM COATED ORAL 4 TIMES DAILY
Status: DISCONTINUED | OUTPATIENT
Start: 2025-08-18 | End: 2025-08-21 | Stop reason: HOSPADM

## 2025-08-18 RX ORDER — POLYETHYLENE GLYCOL 3350 17 G/17G
17 POWDER, FOR SOLUTION ORAL DAILY PRN
Status: DISCONTINUED | OUTPATIENT
Start: 2025-08-18 | End: 2025-08-21 | Stop reason: HOSPADM

## 2025-08-18 RX ORDER — SODIUM CHLORIDE 0.9 % (FLUSH) 0.9 %
5-40 SYRINGE (ML) INJECTION EVERY 12 HOURS SCHEDULED
Status: DISCONTINUED | OUTPATIENT
Start: 2025-08-18 | End: 2025-08-21 | Stop reason: HOSPADM

## 2025-08-18 RX ORDER — LORAZEPAM 1 MG/1
1 TABLET ORAL DAILY PRN
Status: DISCONTINUED | OUTPATIENT
Start: 2025-08-18 | End: 2025-08-18 | Stop reason: SDUPTHER

## 2025-08-18 RX ORDER — SODIUM CHLORIDE 0.9 % (FLUSH) 0.9 %
5-40 SYRINGE (ML) INJECTION PRN
Status: DISCONTINUED | OUTPATIENT
Start: 2025-08-18 | End: 2025-08-21 | Stop reason: HOSPADM

## 2025-08-18 RX ORDER — HEPARIN SODIUM 10000 [USP'U]/100ML
5-30 INJECTION, SOLUTION INTRAVENOUS CONTINUOUS
Status: DISCONTINUED | OUTPATIENT
Start: 2025-08-18 | End: 2025-08-18

## 2025-08-18 RX ORDER — HEPARIN SODIUM 1000 [USP'U]/ML
30 INJECTION, SOLUTION INTRAVENOUS; SUBCUTANEOUS PRN
Status: DISCONTINUED | OUTPATIENT
Start: 2025-08-19 | End: 2025-08-19

## 2025-08-18 RX ADMIN — LORAZEPAM 0.5 MG: 0.5 TABLET ORAL at 21:19

## 2025-08-18 RX ADMIN — SODIUM CHLORIDE, PRESERVATIVE FREE 10 ML: 5 INJECTION INTRAVENOUS at 21:22

## 2025-08-18 ASSESSMENT — ENCOUNTER SYMPTOMS
SHORTNESS OF BREATH: 0
ABDOMINAL PAIN: 0
VOMITING: 0
NAUSEA: 0

## 2025-08-18 ASSESSMENT — PAIN SCALES - GENERAL: PAINLEVEL_OUTOF10: 0

## 2025-08-19 PROBLEM — S42.001A CLOSED NONDISPLACED FRACTURE OF RIGHT CLAVICLE: Status: ACTIVE | Noted: 2025-08-19

## 2025-08-19 PROBLEM — F41.9 ANXIETY: Status: ACTIVE | Noted: 2025-08-19

## 2025-08-19 PROBLEM — I34.0 SEVERE MITRAL REGURGITATION: Status: ACTIVE | Noted: 2025-08-19

## 2025-08-19 PROBLEM — Z78.9 UNABLE TO CARE FOR SELF: Status: ACTIVE | Noted: 2025-08-19

## 2025-08-19 LAB
ANION GAP SERPL CALC-SCNC: 8 MEQ/L (ref 8–16)
APTT PPP: 34.5 SECONDS (ref 22–38)
BACTERIA UR CULT: ABNORMAL
BUN SERPL-MCNC: 9 MG/DL (ref 8–23)
CALCIUM SERPL-MCNC: 9.1 MG/DL (ref 8.5–10.5)
CHLORIDE SERPL-SCNC: 96 MEQ/L (ref 98–111)
CO2 SERPL-SCNC: 24 MEQ/L (ref 22–29)
CREAT SERPL-MCNC: 0.5 MG/DL (ref 0.5–0.9)
DEPRECATED RDW RBC AUTO: 46.2 FL (ref 35–45)
ERYTHROCYTE [DISTWIDTH] IN BLOOD BY AUTOMATED COUNT: 13.6 % (ref 11.5–14.5)
GFR SERPL CREATININE-BSD FRML MDRD: > 90 ML/MIN/1.73M2
GLUCOSE SERPL-MCNC: 105 MG/DL (ref 74–109)
HCT VFR BLD AUTO: 38 % (ref 37–47)
HGB BLD-MCNC: 12.9 GM/DL (ref 12–16)
MCH RBC QN AUTO: 31.3 PG (ref 26–33)
MCHC RBC AUTO-ENTMCNC: 33.9 GM/DL (ref 32.2–35.5)
MCV RBC AUTO: 92.2 FL (ref 81–99)
ORGANISM: ABNORMAL
OSMOLALITY SERPL: 272 MOSMOL/KG (ref 275–295)
OSMOLALITY UR: 415 MOSMOL/KG (ref 250–750)
PLATELET # BLD AUTO: 181 THOU/MM3 (ref 130–400)
PMV BLD AUTO: 10 FL (ref 9.4–12.4)
POTASSIUM SERPL-SCNC: 4.7 MEQ/L (ref 3.5–5.2)
RBC # BLD AUTO: 4.12 MILL/MM3 (ref 4.2–5.4)
SODIUM SERPL-SCNC: 128 MEQ/L (ref 135–145)
SODIUM UR-SCNC: 55 MEQ/L
WBC # BLD AUTO: 6.9 THOU/MM3 (ref 4.8–10.8)

## 2025-08-19 PROCEDURE — 97112 NEUROMUSCULAR REEDUCATION: CPT

## 2025-08-19 PROCEDURE — 85730 THROMBOPLASTIN TIME PARTIAL: CPT

## 2025-08-19 PROCEDURE — 6370000000 HC RX 637 (ALT 250 FOR IP): Performed by: STUDENT IN AN ORGANIZED HEALTH CARE EDUCATION/TRAINING PROGRAM

## 2025-08-19 PROCEDURE — 1200000000 HC SEMI PRIVATE

## 2025-08-19 PROCEDURE — 97530 THERAPEUTIC ACTIVITIES: CPT

## 2025-08-19 PROCEDURE — 80048 BASIC METABOLIC PNL TOTAL CA: CPT

## 2025-08-19 PROCEDURE — 83935 ASSAY OF URINE OSMOLALITY: CPT

## 2025-08-19 PROCEDURE — 97162 PT EVAL MOD COMPLEX 30 MIN: CPT

## 2025-08-19 PROCEDURE — 2500000003 HC RX 250 WO HCPCS

## 2025-08-19 PROCEDURE — 6370000000 HC RX 637 (ALT 250 FOR IP): Performed by: PHYSICIAN ASSISTANT

## 2025-08-19 PROCEDURE — 6370000000 HC RX 637 (ALT 250 FOR IP)

## 2025-08-19 PROCEDURE — 2500000003 HC RX 250 WO HCPCS: Performed by: PHYSICIAN ASSISTANT

## 2025-08-19 PROCEDURE — 85027 COMPLETE CBC AUTOMATED: CPT

## 2025-08-19 PROCEDURE — 83930 ASSAY OF BLOOD OSMOLALITY: CPT

## 2025-08-19 PROCEDURE — 84300 ASSAY OF URINE SODIUM: CPT

## 2025-08-19 PROCEDURE — 99232 SBSQ HOSP IP/OBS MODERATE 35: CPT | Performed by: PHYSICIAN ASSISTANT

## 2025-08-19 PROCEDURE — 36415 COLL VENOUS BLD VENIPUNCTURE: CPT

## 2025-08-19 RX ADMIN — METOPROLOL TARTRATE 25 MG: 25 TABLET, FILM COATED ORAL at 17:39

## 2025-08-19 RX ADMIN — SODIUM CHLORIDE, PRESERVATIVE FREE 10 ML: 5 INJECTION INTRAVENOUS at 08:58

## 2025-08-19 RX ADMIN — LORAZEPAM 0.5 MG: 0.5 TABLET ORAL at 09:01

## 2025-08-19 RX ADMIN — ACETAMINOPHEN 650 MG: 325 TABLET ORAL at 10:00

## 2025-08-19 RX ADMIN — MICONAZOLE NITRATE: 2 POWDER TOPICAL at 13:47

## 2025-08-19 RX ADMIN — METOPROLOL TARTRATE 25 MG: 25 TABLET, FILM COATED ORAL at 08:58

## 2025-08-19 RX ADMIN — METOPROLOL TARTRATE 25 MG: 25 TABLET, FILM COATED ORAL at 21:48

## 2025-08-19 RX ADMIN — SODIUM CHLORIDE, PRESERVATIVE FREE 10 ML: 5 INJECTION INTRAVENOUS at 21:48

## 2025-08-19 RX ADMIN — LORAZEPAM 0.5 MG: 0.5 TABLET ORAL at 21:51

## 2025-08-19 RX ADMIN — METOPROLOL TARTRATE 25 MG: 25 TABLET, FILM COATED ORAL at 12:53

## 2025-08-19 RX ADMIN — MICONAZOLE NITRATE: 2 POWDER TOPICAL at 21:48

## 2025-08-19 RX ADMIN — RIVAROXABAN 15 MG: 15 TABLET, FILM COATED ORAL at 12:53

## 2025-08-19 ASSESSMENT — PAIN SCALES - WONG BAKER: WONGBAKER_NUMERICALRESPONSE: NO HURT

## 2025-08-19 ASSESSMENT — PAIN - FUNCTIONAL ASSESSMENT
PAIN_FUNCTIONAL_ASSESSMENT: 0-10
PAIN_FUNCTIONAL_ASSESSMENT: WONG-BAKER FACES

## 2025-08-19 ASSESSMENT — PATIENT HEALTH QUESTIONNAIRE - PHQ9
2. FEELING DOWN, DEPRESSED OR HOPELESS: SEVERAL DAYS
SUM OF ALL RESPONSES TO PHQ QUESTIONS 1-9: 2
SUM OF ALL RESPONSES TO PHQ QUESTIONS 1-9: 2
1. LITTLE INTEREST OR PLEASURE IN DOING THINGS: SEVERAL DAYS
SUM OF ALL RESPONSES TO PHQ QUESTIONS 1-9: 2
SUM OF ALL RESPONSES TO PHQ QUESTIONS 1-9: 2

## 2025-08-19 ASSESSMENT — PAIN SCALES - GENERAL
PAINLEVEL_OUTOF10: 0
PAINLEVEL_OUTOF10: 1

## 2025-08-19 ASSESSMENT — PAIN DESCRIPTION - LOCATION: LOCATION: GENERALIZED

## 2025-08-20 LAB
DEPRECATED RDW RBC AUTO: 45.6 FL (ref 35–45)
ERYTHROCYTE [DISTWIDTH] IN BLOOD BY AUTOMATED COUNT: 13.4 % (ref 11.5–14.5)
HCT VFR BLD AUTO: 38.8 % (ref 37–47)
HGB BLD-MCNC: 13 GM/DL (ref 12–16)
MCH RBC QN AUTO: 30.8 PG (ref 26–33)
MCHC RBC AUTO-ENTMCNC: 33.5 GM/DL (ref 32.2–35.5)
MCV RBC AUTO: 91.9 FL (ref 81–99)
PLATELET # BLD AUTO: 175 THOU/MM3 (ref 130–400)
PMV BLD AUTO: 9.9 FL (ref 9.4–12.4)
RBC # BLD AUTO: 4.22 MILL/MM3 (ref 4.2–5.4)
WBC # BLD AUTO: 7.1 THOU/MM3 (ref 4.8–10.8)

## 2025-08-20 PROCEDURE — 1200000000 HC SEMI PRIVATE

## 2025-08-20 PROCEDURE — 99232 SBSQ HOSP IP/OBS MODERATE 35: CPT | Performed by: PHYSICIAN ASSISTANT

## 2025-08-20 PROCEDURE — 97530 THERAPEUTIC ACTIVITIES: CPT

## 2025-08-20 PROCEDURE — 85027 COMPLETE CBC AUTOMATED: CPT

## 2025-08-20 PROCEDURE — 97110 THERAPEUTIC EXERCISES: CPT

## 2025-08-20 PROCEDURE — 36415 COLL VENOUS BLD VENIPUNCTURE: CPT

## 2025-08-20 PROCEDURE — 97166 OT EVAL MOD COMPLEX 45 MIN: CPT

## 2025-08-20 PROCEDURE — 6370000000 HC RX 637 (ALT 250 FOR IP)

## 2025-08-20 PROCEDURE — 6370000000 HC RX 637 (ALT 250 FOR IP): Performed by: STUDENT IN AN ORGANIZED HEALTH CARE EDUCATION/TRAINING PROGRAM

## 2025-08-20 PROCEDURE — 2500000003 HC RX 250 WO HCPCS

## 2025-08-20 PROCEDURE — 6370000000 HC RX 637 (ALT 250 FOR IP): Performed by: PHYSICIAN ASSISTANT

## 2025-08-20 RX ADMIN — METOPROLOL TARTRATE 25 MG: 25 TABLET, FILM COATED ORAL at 12:41

## 2025-08-20 RX ADMIN — LORAZEPAM 0.5 MG: 0.5 TABLET ORAL at 20:18

## 2025-08-20 RX ADMIN — MICONAZOLE NITRATE: 2 POWDER TOPICAL at 20:15

## 2025-08-20 RX ADMIN — SODIUM CHLORIDE, PRESERVATIVE FREE 10 ML: 5 INJECTION INTRAVENOUS at 08:32

## 2025-08-20 RX ADMIN — ACETAMINOPHEN 650 MG: 325 TABLET ORAL at 06:12

## 2025-08-20 RX ADMIN — MICONAZOLE NITRATE: 2 POWDER TOPICAL at 08:30

## 2025-08-20 RX ADMIN — SODIUM CHLORIDE, PRESERVATIVE FREE 10 ML: 5 INJECTION INTRAVENOUS at 20:15

## 2025-08-20 RX ADMIN — RIVAROXABAN 15 MG: 15 TABLET, FILM COATED ORAL at 12:41

## 2025-08-20 RX ADMIN — METOPROLOL TARTRATE 25 MG: 25 TABLET, FILM COATED ORAL at 08:30

## 2025-08-20 RX ADMIN — METOPROLOL TARTRATE 25 MG: 25 TABLET, FILM COATED ORAL at 20:15

## 2025-08-20 RX ADMIN — LORAZEPAM 0.5 MG: 0.5 TABLET ORAL at 09:52

## 2025-08-20 RX ADMIN — METOPROLOL TARTRATE 25 MG: 25 TABLET, FILM COATED ORAL at 17:44

## 2025-08-20 ASSESSMENT — PAIN DESCRIPTION - LOCATION
LOCATION: BACK
LOCATION: SHOULDER

## 2025-08-20 ASSESSMENT — PAIN DESCRIPTION - ORIENTATION
ORIENTATION: MID;LOWER
ORIENTATION: RIGHT

## 2025-08-20 ASSESSMENT — PAIN - FUNCTIONAL ASSESSMENT: PAIN_FUNCTIONAL_ASSESSMENT: 0-10

## 2025-08-20 ASSESSMENT — PAIN SCALES - WONG BAKER: WONGBAKER_NUMERICALRESPONSE: HURTS A LITTLE BIT

## 2025-08-20 ASSESSMENT — PAIN SCALES - GENERAL
PAINLEVEL_OUTOF10: 6
PAINLEVEL_OUTOF10: 2
PAINLEVEL_OUTOF10: 3

## 2025-08-20 ASSESSMENT — PAIN DESCRIPTION - DESCRIPTORS
DESCRIPTORS: ACHING
DESCRIPTORS: ACHING

## 2025-08-21 VITALS
SYSTOLIC BLOOD PRESSURE: 109 MMHG | HEART RATE: 70 BPM | HEIGHT: 63 IN | RESPIRATION RATE: 18 BRPM | BODY MASS INDEX: 18.96 KG/M2 | TEMPERATURE: 97.9 F | DIASTOLIC BLOOD PRESSURE: 54 MMHG | OXYGEN SATURATION: 97 % | WEIGHT: 107 LBS

## 2025-08-21 PROBLEM — E44.0 MODERATE MALNUTRITION: Status: ACTIVE | Noted: 2025-08-21

## 2025-08-21 PROCEDURE — 6370000000 HC RX 637 (ALT 250 FOR IP): Performed by: STUDENT IN AN ORGANIZED HEALTH CARE EDUCATION/TRAINING PROGRAM

## 2025-08-21 PROCEDURE — 6370000000 HC RX 637 (ALT 250 FOR IP)

## 2025-08-21 PROCEDURE — 97110 THERAPEUTIC EXERCISES: CPT

## 2025-08-21 PROCEDURE — 6370000000 HC RX 637 (ALT 250 FOR IP): Performed by: PHYSICIAN ASSISTANT

## 2025-08-21 PROCEDURE — 97530 THERAPEUTIC ACTIVITIES: CPT

## 2025-08-21 PROCEDURE — 99238 HOSP IP/OBS DSCHRG MGMT 30/<: CPT | Performed by: PHYSICIAN ASSISTANT

## 2025-08-21 PROCEDURE — 97535 SELF CARE MNGMENT TRAINING: CPT

## 2025-08-21 PROCEDURE — 2500000003 HC RX 250 WO HCPCS

## 2025-08-21 RX ORDER — LORAZEPAM 0.5 MG/1
0.5 TABLET ORAL 2 TIMES DAILY PRN
Qty: 30 TABLET | Refills: 0 | Status: SHIPPED | OUTPATIENT
Start: 2025-08-21 | End: 2025-09-20

## 2025-08-21 RX ORDER — ONDANSETRON 4 MG/1
4 TABLET, ORALLY DISINTEGRATING ORAL EVERY 8 HOURS PRN
DISCHARGE
Start: 2025-08-21

## 2025-08-21 RX ADMIN — LORAZEPAM 0.5 MG: 0.5 TABLET ORAL at 08:41

## 2025-08-21 RX ADMIN — METOPROLOL TARTRATE 25 MG: 25 TABLET, FILM COATED ORAL at 08:41

## 2025-08-21 RX ADMIN — RIVAROXABAN 15 MG: 15 TABLET, FILM COATED ORAL at 13:02

## 2025-08-21 RX ADMIN — MICONAZOLE NITRATE: 2 POWDER TOPICAL at 08:41

## 2025-08-21 RX ADMIN — ACETAMINOPHEN 650 MG: 325 TABLET ORAL at 14:19

## 2025-08-21 RX ADMIN — METOPROLOL TARTRATE 25 MG: 25 TABLET, FILM COATED ORAL at 13:02

## 2025-08-21 RX ADMIN — SODIUM CHLORIDE, PRESERVATIVE FREE 10 ML: 5 INJECTION INTRAVENOUS at 08:40

## 2025-08-22 ENCOUNTER — TELEPHONE (OUTPATIENT)
Age: 85
End: 2025-08-22

## 2025-08-25 ENCOUNTER — TELEPHONE (OUTPATIENT)
Age: 85
End: 2025-08-25

## (undated) DEVICE — 3M™ IOBAN™ 2 ANTIMICROBIAL INCISE DRAPE 6651EZ: Brand: IOBAN™ 2

## (undated) DEVICE — STRIP,CLOSURE,WOUND,MEDI-STRIP,1/2X4: Brand: MEDLINE

## (undated) DEVICE — DRESSING TRNSPAR W8XL12IN FLM SURESITE 123

## (undated) DEVICE — HEWSON SUTURE RETRIEVER: Brand: HEWSON SUTURE RETRIEVER

## (undated) DEVICE — DRAPE,U/SHT,SPLIT,FILM,60X84,STERILE: Brand: MEDLINE

## (undated) DEVICE — NEEDLE SPNL L3.5IN PNK HUB S STL REG WALL FIT STYL W/ QNCKE

## (undated) DEVICE — GOWN,SIRUS,POLYRNF,BRTHSLV,XLN/XXL,18/CS: Brand: MEDLINE

## (undated) DEVICE — PACK-MAJOR

## (undated) DEVICE — PAD HIP IMMOB

## (undated) DEVICE — GLOVE SURG SZ 85 L12IN FNGR THK13MIL WHT ISOLEX POLYISOPRENE

## (undated) DEVICE — SUTURE ETHBND EXCEL SZ 2 L30IN NONABSORBABLE GRN L40MM V-37 MX69G

## (undated) DEVICE — GAUZE,SPONGE,8"X4",12PLY,XRAY,STRL,LF: Brand: MEDLINE

## (undated) DEVICE — DUAL CUT SAGITTAL BLADE

## (undated) DEVICE — DRESSING ANITMICROBIAL FOAM OPTIFOAM POSTOP STRP 35 X 10 IN

## (undated) DEVICE — SUTURE VCRL + SZ 2-0 L27IN ABSRB CLR CT-1 1/2 CIR TAPERCUT VCP259H

## (undated) DEVICE — DRESSING ALG W3XL4IN TRNSPAR ANTIMIC WND CNTCT LAYR W/

## (undated) DEVICE — ADHESIVE SKIN CLSR 0.7ML TOP DERMBND ADV

## (undated) DEVICE — 450 ML BOTTLE OF 0.05% CHLORHEXIDINE GLUCONATE IN 99.95% STERILE WATER FOR IRRIGATION, USP AND APPLICATOR.: Brand: IRRISEPT ANTIMICROBIAL WOUND LAVAGE

## (undated) DEVICE — BASIC SINGLE BASIN BTC-LF: Brand: MEDLINE INDUSTRIES, INC.

## (undated) DEVICE — SUTURE STRATAFIX SPRL SZ 1 L14IN ABSRB VLT L48CM CTX 1/2 SXPD2B405

## (undated) DEVICE — SUTURE VCRL + SZ 2-0 L27IN ABSRB UD CP-1 1/2 CIR REV CUT VCP266H

## (undated) DEVICE — 3M™ IOBAN™ 2 ANTIMICROBIAL INCISE DRAPE 6650EZ: Brand: IOBAN™ 2

## (undated) DEVICE — 35 ML SYRINGE LUER-LOCK TIP: Brand: MONOJECT

## (undated) DEVICE — SUTURE MCRYL SZ 3-0 L36IN ABSRB UD L36MM CT-1 1/2 CIR Y944H

## (undated) DEVICE — 3M™ STERI-DRAPE™ U-DRAPE 1015: Brand: STERI-DRAPE™

## (undated) DEVICE — Device

## (undated) DEVICE — 1010 S-DRAPE TOWEL DRAPE 10/BX: Brand: STERI-DRAPE™

## (undated) DEVICE — GLOVE SURG SZ 7 L12IN THK7.5MIL DK GRN LTX FREE MSG6570] MEDLINE INDUSTRIES INC]

## (undated) DEVICE — GLOVE ORANGE PI 8 1/2   MSG9085